# Patient Record
Sex: FEMALE | Race: BLACK OR AFRICAN AMERICAN | Employment: OTHER | URBAN - METROPOLITAN AREA
[De-identification: names, ages, dates, MRNs, and addresses within clinical notes are randomized per-mention and may not be internally consistent; named-entity substitution may affect disease eponyms.]

---

## 2017-01-25 ENCOUNTER — HOSPITAL ENCOUNTER (OUTPATIENT)
Dept: VASCULAR SURGERY | Age: 62
Discharge: HOME | End: 2017-01-25
Attending: FAMILY MEDICINE
Payer: COMMERCIAL

## 2017-01-25 ENCOUNTER — OFFICE VISIT (OUTPATIENT)
Dept: FAMILY MEDICINE CLINIC | Age: 62
End: 2017-01-25

## 2017-01-25 VITALS
SYSTOLIC BLOOD PRESSURE: 132 MMHG | WEIGHT: 265.5 LBS | HEIGHT: 68 IN | HEART RATE: 69 BPM | RESPIRATION RATE: 20 BRPM | TEMPERATURE: 97.1 F | BODY MASS INDEX: 40.24 KG/M2 | DIASTOLIC BLOOD PRESSURE: 92 MMHG

## 2017-01-25 DIAGNOSIS — M54.9 ACUTE UPPER BACK PAIN: Primary | ICD-10-CM

## 2017-01-25 DIAGNOSIS — M79.661 PAIN IN RIGHT LOWER LEG: ICD-10-CM

## 2017-01-25 DIAGNOSIS — M62.830 MUSCLE SPASM OF BACK: ICD-10-CM

## 2017-01-25 PROCEDURE — 93971 EXTREMITY STUDY: CPT

## 2017-01-25 RX ORDER — CYCLOBENZAPRINE HCL 10 MG
10 TABLET ORAL
Qty: 40 TAB | Refills: 0 | Status: SHIPPED | OUTPATIENT
Start: 2017-01-25 | End: 2017-02-17 | Stop reason: SDUPTHER

## 2017-01-25 RX ORDER — NAPROXEN 500 MG/1
500 TABLET ORAL 2 TIMES DAILY WITH MEALS
Qty: 30 TAB | Refills: 0 | Status: SHIPPED | OUTPATIENT
Start: 2017-01-25 | End: 2017-05-30 | Stop reason: ALTCHOICE

## 2017-01-25 NOTE — MR AVS SNAPSHOT
Visit Information Date & Time Provider Department Dept. Phone Encounter #  
 1/25/2017  9:30 AM Gloria Ha MD [address] [phone] [id number] Your Appointments 2/13/2017  9:30 AM  
Follow Up with Gloria Ha MD  
[address] (--) Appt Note: fu; fu; PT R/S FROM 1/13/17  
 [facility] [address] [place] [phone]  
  
    
 6/8/2017 11:00 AM  
Follow Up with Will Peters MD  
[facility] [place] ([facility]) Appt Note: 1 year with an ekg [facility] [address] [address] [phone] [facility] [address] Upcoming Health Maintenance Date Due DTaP/Tdap/Td series (1 - Tdap) 9/11/1976 ZOSTER VACCINE AGE 60> 9/11/2015 BREAST CANCER SCRN MAMMOGRAM 12/8/2016 PAP AKA CERVICAL CYTOLOGY 2/1/2017 COLONOSCOPY 5/26/2020 Allergies as of 1/25/2017  Review Complete On: 1/25/2017 By: Gloria Ha MD  
  
 Severity Noted Reaction Type Reactions Omnicef [Cefdinir] High 07/26/2011    Hives Codeine  06/23/2011    Other (comments) Severe headache Levaquin [Levofloxacin]  10/12/2012    Unknown (comments) Morphine  06/23/2011    Other (comments) Severe headache  
 Sulfa (Sulfonamide Antibiotics)  06/23/2011    Hives Topamax [Topiramate]  01/11/2016    Other (comments) Made blood vessels red and pain in eyes Current Immunizations  Reviewed on 9/28/2016 Name Date Influenza Vaccine (Quad) PF 9/28/2016 Not reviewed this visit You Were Diagnosed With   
  
 Codes Comments Acute upper back pain    -  Primary ICD-10-CM: M54.9 ICD-9-CM: 724.5, 338.19 Pain in right lower leg     ICD-10-CM: M79.661 ICD-9-CM: 729.5 Muscle spasm of back     ICD-10-CM: O23.724 ICD-9-CM: 724.8 Vitals BP Pulse Temp Resp Height(growth percentile) Weight(growth percentile) (!) 132/92 (BP 1 Location: Right arm, BP Patient Position: Sitting) 69 97.1 °F (36.2 °C) (Oral) 20 5' 8\" (1.727 m) 265 lb 8 oz (120.4 kg) BMI OB Status Smoking Status 40.37 kg/m2 Hysterectomy Former Smoker Vitals History BMI and BSA Data Body Mass Index Body Surface Area  
 40.37 kg/m 2 2.4 m 2 Preferred Pharmacy Pharmacy Name Phone [facility] [address] [address] [phone] Your Updated Medication List  
  
   
This list is accurate as of: 1/25/17 10:32 AM.  Always use your most recent med list.  
  
  
  
  
 ALIGN 4 mg Cap Generic drug:  Bifidobacterium Infantis Take  by mouth daily. ALPRAZolam 0.25 mg tablet Commonly known as:  XANAX  
TAKE 1 TABLET BY MOUTH EVERY DAY AS NEEDED FOR ANXIETY Blood-Glucose Meter monitoring kit Check blood sugar TID PRN with meals and/or bouts of dizziness  
  
 clobetasol 0.05 % ointment Commonly known as:  Sung Nim Apply  to affected area two (2) times daily as needed for Skin Irritation or Itching. cyclobenzaprine 10 mg tablet Commonly known as:  FLEXERIL Take 1 Tab by mouth three (3) times daily as needed for Muscle Spasm(s). EPINEPHrine 0.3 mg/0.3 mL injection Commonly known as:  AUVI-Q  
0.3 mL by IntraMUSCular route once as needed for Anaphylaxis for up to 1 dose. FISH OIL CONCENTRATE PO Take  by mouth daily. glucose blood VI test strips strip Commonly known as:  blood glucose test  
Patient to check blood sugar TID PRN  
  
 indomethacin 50 mg capsule Commonly known as:  INDOCIN Lancets Misc Check blood sugar TID PRN with meals and dizziness  
  
 lansoprazole 30 mg capsule Commonly known as:  PREVACID TAKE 1 CAPSULE BY MOUTH DAILY  
  
 levothyroxine 100 mcg tablet Commonly known as:  SYNTHROID  
 Take 1 Tab by mouth Daily (before breakfast). Brand name only LIPITOR 10 mg tablet Generic drug:  atorvastatin Take  by mouth. Take 1 every other day MULTIVITAMIN PO Take  by mouth daily. OTHER Azelaic/Finast/Flutic/Minox(Scalp) 5/0.1% Apply to affected area of scalp daily 30 ml  
  
 rosuvastatin 5 mg tablet Commonly known as:  CRESTOR Take 1 Tab by mouth nightly. VITAMIN C PO Take  by mouth daily. Prescriptions Sent to Pharmacy Refills  
 cyclobenzaprine (FLEXERIL) 10 mg tablet 0 Sig: Take 1 Tab by mouth three (3) times daily as needed for Muscle Spasm(s). Class: Normal  
 Pharmacy: [facility] [address] Ph #: [phone] Route: Oral  
  
To-Do List   
 01/25/2017 Lab:  D DIMER   
  
 01/25/2017 Imaging:  DUPLEX LOWER EXT VENOUS RIGHT Patient Instructions Please contact our office if you have any questions about your visit today. Introducing [facility] & HEALTH SERVICES! [facility] introduces [employer] patient portal. Now you can access parts of your medical record, email your doctor's office, and request medication refills online. 1. In your internet browser, go to [email or web address] 2. Click on the First Time User? Click Here link in the Sign In box. You will see the New Member Sign Up page. 3. Enter your [employer] Access Code exactly as it appears below. You will not need to use this code after youve completed the sign-up process. If you do not sign up before the expiration date, you must request a new code. · [employer] Access Code: [email or web address] Expires: 4/25/2017 10:32 AM 
 
4. Enter the last four digits of your Social Security Number (xxxx) and Date of Birth (mm/dd/yyyy) as indicated and click Submit. You will be taken to the next sign-up page. 5. Create a [employer] ID.  This will be your [employer] login ID and cannot be changed, so think of one that is secure and easy to remember. 6. Create a [employer] password. You can change your password at any time. 7. Enter your Password Reset Question and Answer. This can be used at a later time if you forget your password. 8. Enter your e-mail address. You will receive e-mail notification when new information is available in [address] 9. Click Sign Up. You can now view and download portions of your medical record. 10. Click the Download Summary menu link to download a portable copy of your medical information. If you have questions, please visit the Frequently Asked Questions section of the [employer] website. Remember, [employer] is NOT to be used for urgent needs. For medical emergencies, dial 911. Now available from your iPhone and Android! Please provide this summary of care documentation to your next provider. Your primary care clinician is listed as ELVIN BRITTON. If you have any questions after today's visit, please call [phone].

## 2017-01-25 NOTE — PROCEDURES
[facility]  *** FINAL REPORT ***    Name: Jeneane Curling  MRN: [id number]    Outpatient  : 11 Sep 1955  HIS Order #: [id number]  [address] [facility] Visit #: [id number]  Date: 2017    TYPE OF TEST: Peripheral Venous Testing    REASON FOR TEST  Pain in limb    Right Leg:-  Deep venous thrombosis:           No  Superficial venous thrombosis:    No  Deep venous insufficiency:        No  Superficial venous insufficiency: Not examined      INTERPRETATION/FINDINGS  Duplex images were obtained using 2-D gray scale, color flow, and  spectral Doppler analysis. Right leg :  1. Deep vein(s) visualized include the common femoral, deep femoral,  proximal femoral, mid femoral, distal femoral, popliteal(above knee),  popliteal(fossa), popliteal(below knee), gastrocnemius, posterior  tibial and peroneal veins. 2. No evidence of deep venous thrombosis detected in the veins  visualized. 3. No evidence of deep vein thrombosis in the contralateral common  femoral vein. 4. Superficial vein(s) visualized include the great saphenous and  small saphenous veins. 5. No evidence of superficial thrombosis detected. 6. No evidence of reflux detected in the deep veins visualized. ADDITIONAL COMMENTS  Wet reading given to Nohemy at 1:27 p.m. I have personally reviewed the data relevant to the interpretation of  this  study. TECHNOLOGIST: Orville Oneal. Ruth Meier  Signed: 2017 01:28 PM    PHYSICIAN: Dorcas Hightower.  Jori Rodriguez MD  Signed: 2017 11:10 PM

## 2017-01-25 NOTE — PROGRESS NOTES
Chief Complaint   Patient presents with    Leg Pain     right       Health Maintenance reviewed     1. Have you been to the ER, urgent care clinic since your last visit? Hospitalized since your last visit? No    2. Have you seen or consulted any other health care providers outside of the [address] [place] [address] since your last visit? Include any pap smears or colon screening.  Yes When: 1/17 Where: dermatology Reason for visit: ov

## 2017-01-25 NOTE — PROGRESS NOTES
HISTORY OF PRESENT ILLNESS  Sanjuana Oleary is a 64 y.o. female. Chief Complaint   Patient presents with    Leg Pain     right; sharp burning pain to back of lower leg 1 week ago that has radiated up to knee. Don't know if it's related but about the same time experienced some rib pain. Sudden onset of pain sharp burning pain right lower leg on the anteromedial aspect, states never had before, then developed pain up to the knee, states she has noted the protrusion of a vein on the front of the knee and then noted pain in the back of the knee as well. doew not feel any warmth. Pain comes and goes. Then developed sudden onset of pain upper back pain over the rib cage, no sob but was a severe pain \"like elephants stomping\". Deep inspiration causes more pain in the lower back. Tried taking aspirin regularly which has helped with the pain but still some pain in the left lower back   saw Dr. Mitch Griffin started on astelin nasal and sudafed for tinnitus and will start allergy shots  Ongoing pain in left ankle wearing a soft cast changed every 2 weeks, has been sedentar as a result of the pain      HPI  Past Medical History   Diagnosis Date    Abnormal nuclear cardiac imaging test 02/09/2012     Mid to distal anteroseptal and apical reversible defect concerning for ishcmeia. Mild-mod, mid-distal anterior & apical hypk. EF 67%. Borderline abnormal EKG w/1-mm inferolateral ST depression at 7 min exercise. Occasional PVCs.  Anxiety     Carotid duplex 93/75/2252     Mild 7-32% LICA stenosis.  Dengue fever      7/10 while in [place]    Diverticulosis     Dyslipidemia     GERD     Holter monitor study 04/29/2016     Sinus rhythm, avg HR 67 bpm (range  bpm). Rare ectopy.  Hypothyroidism     RLE venous duplex 07/23/2015     Right leg:  No DVT.  S/P cardiac catheterization 02/24/2012     Angiographically normal coronaries. Hyperdynamic. EF 70%. No RWMA.       Sinusitis     Tinnitus     Uter[facility])      hysterectomy     Current Outpatient Prescriptions   Medication Sig Dispense Refill    ALPRAZolam (XANAX) 0.25 mg tablet TAKE 1 TABLET BY MOUTH EVERY DAY AS NEEDED FOR ANXIETY 30 Tab 0    atorvastatin (LIPITOR) 10 mg tablet Take  by mouth. Take 1 every other day      clobetasol (TEMOVATE) 0.05 % ointment Apply  to affected area two (2) times daily as needed for Skin Irritation or Itching. 60 g 3    lansoprazole (PREVACID) 30 mg capsule TAKE 1 CAPSULE BY MOUTH DAILY 90 Cap 0    levothyroxine (SYNTHROID) 100 mcg tablet Take 1 Tab by mouth Daily (before breakfast). Brand name only 90 Tab 3    OTHER Azelaic/Finast/Flutic/Minox(Scalp) 5/0.1%  Apply to affected area of scalp daily  30 ml      EPINEPHrine (AUVI-Q) 0.3 mg/0.3 mL (1:1,000) injection 0.3 mL by IntraMUSCular route once as needed for Anaphylaxis for up to 1 dose. 1 Each 2    Lancets Misc Check blood sugar TID PRN with meals and dizziness 1 Package 11    Blood-Glucose Meter monitoring kit Check blood sugar TID PRN with meals and/or bouts of dizziness 1 Kit 0    glucose blood VI test strips (BLOOD GLUCOSE TEST) strip Patient to check blood sugar TID PRN 1 Package 11    Bifidobacterium Infantis (ALIGN) 4 mg cap Take  by mouth daily.  ASCORBATE CALCIUM (VITAMIN C PO) Take  by mouth daily.  OMEGA-3 FATTY ACIDS (FISH OIL CONCENTRATE PO) Take  by mouth daily.  rosuvastatin (CRESTOR) 5 mg tablet Take 1 Tab by mouth nightly. 30 Tab 1    indomethacin (INDOCIN) 50 mg capsule   0    MULTIVITAMIN PO Take  by mouth daily.        Allergies   Allergen Reactions    Omnicef [Cefdinir] Hives    Codeine Other (comments)     Severe headache    Levaquin [Levofloxacin] Unknown (comments)    Morphine Other (comments)     Severe headache    Sulfa (Sulfonamide Antibiotics) Hives    Topamax [Topiramate] Other (comments)     Made blood vessels red and pain in eyes       Review of Systems   Respiratory: Negative for shortness of breath. Cardiovascular: Negative for leg swelling. Musculoskeletal: Positive for myalgias. Neurological: Positive for sensory change. Tingling: burning pain in leg. Visit Vitals    BP (!) 132/92 (BP 1 Location: Right arm, BP Patient Position: Sitting)  Comment: manual    Pulse 69    Temp 97.1 °F (36.2 °C) (Oral)    Resp 20    Ht 5' 8\" (1.727 m)    Wt 265 lb 8 oz (120.4 kg)    BMI 40.37 kg/m2       Physical Exam   Constitutional: She appears well-developed and well-nourished. No distress. Neck: No JVD present. Pulmonary/Chest: Effort normal and breath sounds normal. No respiratory distress. She has no wheezes. She has no rales. Musculoskeletal: She exhibits no edema. Lymphadenopathy:     She has no cervical adenopathy. Neurological: Coordination normal.   Skin: No erythema. No pallor. Noted prominence of veins of the right lower ext   Psychiatric: She has a normal mood and affect. Her behavior is normal.   Nursing note and vitals reviewed. ASSESSMENT and PLAN    ICD-10-CM ICD-9-CM    1. Acute upper back pain M54.9 724.5 D DIMER     338.19 D DIMER      naproxen (NAPROSYN) 500 mg tablet   2. Pain in right lower leg M79.661 729.5 D DIMER      DUPLEX LOWER EXT VENOUS RIGHT      D DIMER      naproxen (NAPROSYN) 500 mg tablet   3.  Muscle spasm of back M62.830 724.8 cyclobenzaprine (FLEXERIL) 10 mg tablet

## 2017-01-26 ENCOUNTER — TELEPHONE (OUTPATIENT)
Dept: FAMILY MEDICINE CLINIC | Age: 62
End: 2017-01-26

## 2017-01-26 LAB
A-G RATIO,AGRAT: 1.8 RATIO (ref 1.1–2.6)
ALBUMIN SERPL-MCNC: 4.4 G/DL (ref 3.5–5)
ALP SERPL-CCNC: 90 U/L (ref 40–120)
ALT SERPL-CCNC: 31 U/L (ref 5–40)
ANION GAP SERPL CALC-SCNC: 17 MMOL/L
AST SERPL W P-5'-P-CCNC: 24 U/L (ref 10–37)
BILIRUB SERPL-MCNC: 0.5 MG/DL (ref 0.2–1.2)
BUN SERPL-MCNC: 16 MG/DL (ref 6–22)
CALCIUM SERPL-MCNC: 9.7 MG/DL (ref 8.4–10.5)
CHLORIDE SERPL-SCNC: 105 MMOL/L (ref 98–110)
CHOLEST SERPL-MCNC: 168 MG/DL (ref 110–200)
CO2 SERPL-SCNC: 24 MMOL/L (ref 20–32)
CREAT SERPL-MCNC: 0.8 MG/DL (ref 0.8–1.4)
D-DIMER, 115188: 0.29 MCG FEU/ML (ref 0–1.19)
GFRAA, 66117: >60
GFRNA, 66118: >60
GLOBULIN,GLOB: 2.5 G/DL (ref 2–4)
GLUCOSE SERPL-MCNC: 109 MG/DL (ref 65–99)
HCV AB SER IA-ACNC: NORMAL
HDLC SERPL-MCNC: 45 MG/DL (ref 40–59)
HEP A AB,IGM, 006734: NORMAL
HEP B CORE AB,IGM, 016881: NORMAL
HEP B SURFACE AG SCRN, 006510: NORMAL
LDLC SERPL CALC-MCNC: 107 MG/DL (ref 50–99)
POTASSIUM SERPL-SCNC: 4.6 MMOL/L (ref 3.5–5.5)
PROT SERPL-MCNC: 6.9 G/DL (ref 6.2–8.1)
SODIUM SERPL-SCNC: 146 MMOL/L (ref 133–145)
T4 FREE SERPL-MCNC: 1 NG/DL (ref 0.9–1.8)
TRIGL SERPL-MCNC: 77 MG/DL (ref 40–149)
TSH SERPL DL<=0.005 MIU/L-ACNC: 19.03 MCU/ML (ref 0.27–4.2)
VLDLC SERPL CALC-MCNC: 15 MG/DL (ref 8–30)

## 2017-01-27 NOTE — TELEPHONE ENCOUNTER
Per Dr. Webb Section instructions pt notified her thyroid level is elevated and she needs a sooner appointment. Made one for 1/31/17.

## 2017-01-31 ENCOUNTER — OFFICE VISIT (OUTPATIENT)
Dept: FAMILY MEDICINE CLINIC | Age: 62
End: 2017-01-31

## 2017-01-31 VITALS
HEART RATE: 68 BPM | TEMPERATURE: 98 F | SYSTOLIC BLOOD PRESSURE: 129 MMHG | DIASTOLIC BLOOD PRESSURE: 85 MMHG | HEIGHT: 68 IN | BODY MASS INDEX: 39.93 KG/M2 | RESPIRATION RATE: 20 BRPM | WEIGHT: 263.5 LBS

## 2017-01-31 DIAGNOSIS — Z51.81 MEDICATION MONITORING ENCOUNTER: ICD-10-CM

## 2017-01-31 DIAGNOSIS — R73.03 PREDIABETES: ICD-10-CM

## 2017-01-31 DIAGNOSIS — M54.9 ACUTE UPPER BACK PAIN: ICD-10-CM

## 2017-01-31 DIAGNOSIS — E78.5 DYSLIPIDEMIA: Primary | ICD-10-CM

## 2017-01-31 DIAGNOSIS — M62.830 MUSCLE SPASM OF BACK: ICD-10-CM

## 2017-01-31 DIAGNOSIS — M79.661 PAIN IN RIGHT LOWER LEG: ICD-10-CM

## 2017-01-31 DIAGNOSIS — E03.9 HYPOTHYROIDISM, UNSPECIFIED TYPE: ICD-10-CM

## 2017-01-31 RX ORDER — LEVOTHYROXINE SODIUM 112 UG/1
112 TABLET ORAL
Qty: 30 TAB | Refills: 6 | Status: SHIPPED | OUTPATIENT
Start: 2017-01-31 | End: 2017-03-01 | Stop reason: SDUPTHER

## 2017-01-31 NOTE — MR AVS SNAPSHOT
Visit Information Date & Time Provider Department Dept. Phone Encounter #  
 1/31/2017  3:15 PM Srinivasa Retana MD [facility] [phone] [id number] Follow-up Instructions Return in about 6 weeks (around 3/14/2017). Your Appointments 2/13/2017  9:30 AM  
Follow Up with Srinivasa Retana MD  
[facility] (--) Appt Note: fu; fu; PT R/S FROM 1/13/17  
 [facility] [address] [place] [phone]  
  
    
 6/8/2017 11:00 AM  
Follow Up with Shakeel Sharp MD  
[facility] ([address]) Appt Note: 1 year with an ekg [place] [address] [address] [address] [phone] [address] Upcoming Health Maintenance Date Due DTaP/Tdap/Td series (1 - Tdap) 9/11/1976 ZOSTER VACCINE AGE 60> 9/11/2015 BREAST CANCER SCRN MAMMOGRAM 12/8/2016 PAP AKA CERVICAL CYTOLOGY 2/1/2017 COLONOSCOPY 5/26/2020 Allergies as of 1/31/2017  Review Complete On: 1/31/2017 By: Srinivasa Retana MD  
  
 Severity Noted Reaction Type Reactions Omnicef [Cefdinir] High 07/26/2011    Hives Codeine  06/23/2011    Other (comments) Severe headache Levaquin [Levofloxacin]  10/12/2012    Unknown (comments) Morphine  06/23/2011    Other (comments) Severe headache  
 Sulfa (Sulfonamide Antibiotics)  06/23/2011    Hives Topamax [Topiramate]  01/11/2016    Other (comments) Made blood vessels red and pain in eyes Current Immunizations  Reviewed on 9/28/2016 Name Date Influenza Vaccine (Quad) PF 9/28/2016 Not reviewed this visit You Were Diagnosed With   
  
 Codes Comments Dyslipidemia    -  Primary ICD-10-CM: E78.5 ICD-9-CM: 272.4 Hypothyroidism, unspecified type     ICD-10-CM: E03.9 ICD-9-CM: 244.9  Prediabetes     ICD-10-CM: R73.03 
 ICD-9-CM: 790.29 Acute upper back pain     ICD-10-CM: M54.9 ICD-9-CM: 724.5, 338.19 Pain in right lower leg     ICD-10-CM: M79.661 ICD-9-CM: 729.5 Muscle spasm of back     ICD-10-CM: Q19.906 ICD-9-CM: 724.8 Medication monitoring encounter     ICD-10-CM: Z51.81 
ICD-9-CM: V58.83 Vitals BP Pulse Temp Resp Height(growth percentile) Weight(growth percentile) 129/85 (BP 1 Location: Right arm, BP Patient Position: Sitting) 68 98 °F (36.7 °C) (Oral) 20 5' 8\" (1.727 m) 263 lb 8 oz (119.5 kg) BMI OB Status Smoking Status 40.07 kg/m2 Hysterectomy Former Smoker BMI and BSA Data Body Mass Index Body Surface Area 40.07 kg/m 2 2.39 m 2 Preferred Pharmacy Pharmacy Name Phone [facility] [address] [address] [phone] Your Updated Medication List  
  
   
This list is accurate as of: 1/31/17  3:58 PM.  Always use your most recent med list.  
  
  
  
  
 ALIGN 4 mg Cap Generic drug:  Bifidobacterium Infantis Take  by mouth daily. ALPRAZolam 0.25 mg tablet Commonly known as:  XANAX  
TAKE 1 TABLET BY MOUTH EVERY DAY AS NEEDED FOR ANXIETY Blood-Glucose Meter monitoring kit Check blood sugar TID PRN with meals and/or bouts of dizziness  
  
 clobetasol 0.05 % ointment Commonly known as:  Jadon Webster Apply  to affected area two (2) times daily as needed for Skin Irritation or Itching. cyclobenzaprine 10 mg tablet Commonly known as:  FLEXERIL Take 1 Tab by mouth three (3) times daily as needed for Muscle Spasm(s). EPINEPHrine 0.3 mg/0.3 mL injection Commonly known as:  AUVI-Q  
0.3 mL by IntraMUSCular route once as needed for Anaphylaxis for up to 1 dose. FISH OIL CONCENTRATE PO Take  by mouth daily. glucose blood VI test strips strip Commonly known as:  blood glucose test  
Patient to check blood sugar TID PRN  
  
 Lancets Misc Check blood sugar TID PRN with meals and dizziness  
  
 lansoprazole 30 mg capsule Commonly known as:  PREVACID TAKE 1 CAPSULE BY MOUTH DAILY  
  
 levothyroxine 112 mcg tablet Commonly known as:  SYNTHROID Take 1 Tab by mouth Daily (before breakfast). LIPITOR 10 mg tablet Generic drug:  atorvastatin Take  by mouth. Take 1 every other day MULTIVITAMIN PO Take  by mouth daily. naproxen 500 mg tablet Commonly known as:  NAPROSYN Take 1 Tab by mouth two (2) times daily (with meals). OTHER Azelaic/Finast/Flutic/Minox(Scalp) 5/0.1% Apply to affected area of scalp daily 30 ml  
  
 rosuvastatin 5 mg tablet Commonly known as:  CRESTOR Take 1 Tab by mouth nightly. VITAMIN C PO Take  by mouth daily. Prescriptions Printed Refills  
 levothyroxine (SYNTHROID) 112 mcg tablet 6 Sig: Take 1 Tab by mouth Daily (before breakfast). Class: Print Route: Oral  
  
Follow-up Instructions Return in about 6 weeks (around 3/14/2017). To-Do List   
 03/14/2017 Lab:  HEMOGLOBIN A1C W/O EAG   
  
 03/14/2017 Lab:  METABOLIC PANEL, BASIC   
  
 03/14/2017 Lab:  T4, FREE   
  
 03/14/2017 Lab:  TSH 3RD GENERATION Patient Instructions Please contact our office if you have any questions about your visit today. Introducing [facility] & HEALTH SERVICES! [facility] introduces [employer] patient portal. Now you can access parts of your medical record, email your doctor's office, and request medication refills online. 1. In your internet browser, go to [email or web address] 2. Click on the First Time User? Click Here link in the Sign In box. You will see the New Member Sign Up page. 3. Enter your [employer] Access Code exactly as it appears below. You will not need to use this code after youve completed the sign-up process.  If you do not sign up before the expiration date, you must request a new code. 
 
· [employer] Access Code: [email or web address] Expires: 4/25/2017 10:32 AM 
 
4. Enter the last four digits of your Social Security Number (xxxx) and Date of Birth (mm/dd/yyyy) as indicated and click Submit. You will be taken to the next sign-up page. 5. Create a [employer] ID. This will be your [employer] login ID and cannot be changed, so think of one that is secure and easy to remember. 6. Create a [employer] password. You can change your password at any time. 7. Enter your Password Reset Question and Answer. This can be used at a later time if you forget your password. 8. Enter your e-mail address. You will receive e-mail notification when new information is available in [address] 9. Click Sign Up. You can now view and download portions of your medical record. 10. Click the Download Summary menu link to download a portable copy of your medical information. If you have questions, please visit the Frequently Asked Questions section of the [employer] website. Remember, [employer] is NOT to be used for urgent needs. For medical emergencies, dial 911. Now available from your iPhone and Android! Please provide this summary of care documentation to your next provider. Your primary care clinician is listed as ELVIN BRITTON. If you have any questions after today's visit, please call [phone].

## 2017-01-31 NOTE — PROGRESS NOTES
Chief Complaint   Patient presents with    Results     discuss lab results       1. Have you been to the ER, urgent care clinic since your last visit? Hospitalized since your last visit? No    2. Have you seen or consulted any other health care providers outside of the [address] [place] [address] since your last visit? Include any pap smears or colon screening.  No

## 2017-01-31 NOTE — PROGRESS NOTES
HISTORY OF PRESENT ILLNESS  West Hodgkin is a 64 y.o. female. Chief Complaint   Patient presents with    Results     discuss lab results     Went for d dimer and doppler study for leg swelling and pain, I prescribed naprosyn thinking was phlebitis and flexeril for muscle spasm, noted difference and improvement immediateily  I prescribed naprosyn and flexeril which seemed to help significantly  Doing PT for foot pain  Has gained weight due to .immpbility from foot pain severe  Hypothyroidism Reports compliance with meds but Chronic problem, uncontrolled this visit  hyperlipidemia chronic problem, stable   HPI  Past Medical History   Diagnosis Date    Abnormal nuclear cardiac imaging test 02/09/2012     Mid to distal anteroseptal and apical reversible defect concerning for ishcmeia. Mild-mod, mid-distal anterior & apical hypk. EF 67%. Borderline abnormal EKG w/1-mm inferolateral ST depression at 7 min exercise. Occasional PVCs.  Anxiety     Carotid duplex 74/65/2195     Mild 8-98% LICA stenosis.  Dengue fever      7/10 while in [place]    Diverticulosis     Dyslipidemia     GERD     Holter monitor study 04/29/2016     Sinus rhythm, avg HR 67 bpm (range  bpm). Rare ectopy.  Hypothyroidism     RLE venous duplex 07/23/2015     Right leg:  No DVT.  S/P cardiac catheterization 02/24/2012     Angiographically normal coronaries. Hyperdynamic. EF 70%. No RWMA.  Sinusitis     Tinnitus     Uterine cancer [facility])      hysterectomy     Current Outpatient Prescriptions   Medication Sig Dispense Refill    cyclobenzaprine (FLEXERIL) 10 mg tablet Take 1 Tab by mouth three (3) times daily as needed for Muscle Spasm(s). 40 Tab 0    naproxen (NAPROSYN) 500 mg tablet Take 1 Tab by mouth two (2) times daily (with meals). 30 Tab 0    ALPRAZolam (XANAX) 0.25 mg tablet TAKE 1 TABLET BY MOUTH EVERY DAY AS NEEDED FOR ANXIETY 30 Tab 0    atorvastatin (LIPITOR) 10 mg tablet Take  by mouth. Take 1 every other day      clobetasol (TEMOVATE) 0.05 % ointment Apply  to affected area two (2) times daily as needed for Skin Irritation or Itching. 60 g 3    rosuvastatin (CRESTOR) 5 mg tablet Take 1 Tab by mouth nightly. 30 Tab 1    lansoprazole (PREVACID) 30 mg capsule TAKE 1 CAPSULE BY MOUTH DAILY 90 Cap 0    levothyroxine (SYNTHROID) 100 mcg tablet Take 1 Tab by mouth Daily (before breakfast). Brand name only 90 Tab 3    OTHER Azelaic/Finast/Flutic/Minox(Scalp) 5/0.1%  Apply to affected area of scalp daily  30 ml      EPINEPHrine (AUVI-Q) 0.3 mg/0.3 mL (1:1,000) injection 0.3 mL by IntraMUSCular route once as needed for Anaphylaxis for up to 1 dose. 1 Each 2    Lancets Misc Check blood sugar TID PRN with meals and dizziness 1 Package 11    Blood-Glucose Meter monitoring kit Check blood sugar TID PRN with meals and/or bouts of dizziness 1 Kit 0    glucose blood VI test strips (BLOOD GLUCOSE TEST) strip Patient to check blood sugar TID PRN 1 Package 11    Bifidobacterium Infantis (ALIGN) 4 mg cap Take  by mouth daily.  ASCORBATE CALCIUM (VITAMIN C PO) Take  by mouth daily.  MULTIVITAMIN PO Take  by mouth daily.  OMEGA-3 FATTY ACIDS (FISH OIL CONCENTRATE PO) Take  by mouth daily. Allergies   Allergen Reactions    Omnicef [Cefdinir] Hives    Codeine Other (comments)     Severe headache    Levaquin [Levofloxacin] Unknown (comments)    Morphine Other (comments)     Severe headache    Sulfa (Sulfonamide Antibiotics) Hives    Topamax [Topiramate] Other (comments)     Made blood vessels red and pain in eyes       Review of Systems   Cardiovascular: Positive for leg swelling. Musculoskeletal: Positive for joint pain. Negative for falls.      Visit Vitals    /85 (BP 1 Location: Right arm, BP Patient Position: Sitting)    Pulse 68    Temp 98 °F (36.7 °C) (Oral)    Resp 20    Ht 5' 8\" (1.727 m)    Wt 263 lb 8 oz (119.5 kg)    BMI 40.07 kg/m2       Physical Exam Constitutional: She is oriented to person, place, and time. She appears well-developed and well-nourished. No distress. HENT:   Mouth/Throat: Oropharynx is clear and moist.   Neck: No JVD present. No thyromegaly present. Cardiovascular: Normal rate, regular rhythm and normal heart sounds. Pulmonary/Chest: Effort normal and breath sounds normal. No respiratory distress. She has no wheezes. She has no rales. Musculoskeletal: She exhibits edema (trace). She exhibits no tenderness. Lymphadenopathy:     She has no cervical adenopathy. Neurological: She is alert and oriented to person, place, and time. Coordination normal.   Skin: No pallor. Psychiatric: She has a normal mood and affect. Her behavior is normal.   Nursing note and vitals reviewed. Results for orders placed or performed in visit on 01/25/17   D DIMER   Result Value Ref Range    D-Dimer 0.29 0.00 - 1.19 mcg FEU/mL   LIPID PANEL   Result Value Ref Range    Triglyceride 77 40 - 149 mg/dL    HDL Cholesterol 45 40 - 59 mg/dL    Cholesterol, total 168 110 - 200 mg/dL    LDL, calculated 107 (H) 50 - 99 mg/dL    VLDL, calculated 15 8 - 30 mg/dL   METABOLIC PANEL, COMPREHENSIVE   Result Value Ref Range    Glucose 109 (H) 65 - 99 mg/dL    BUN 16 6 - 22 mg/dL    Creatinine 0.8 0.8 - 1.4 mg/dL    Sodium 146 (H) 133 - 145 mmol/L    Potassium 4.6 3.5 - 5.5 mmol/L    Chloride 105 98 - 110 mmol/L    CO2 24 20 - 32 mmol/L    AST 24 10 - 37 U/L    ALT 31 5 - 40 U/L    Alk.  phosphatase 90 40 - 120 U/L    Bilirubin, total 0.5 0.2 - 1.2 mg/dL    Calcium 9.7 8.4 - 10.5 mg/dL    Protein, total 6.9 6.2 - 8.1 g/dL    Albumin 4.4 3.5 - 5.0 g/dL    A-G Ratio 1.8 1.1 - 2.6 ratio    Globulin 2.5 2.0 - 4.0 g/dL    Anion gap 17.0 mmol/L    GFRAA >60.0 >60.0    GFRNA >60.0 >60.0   HEPATITIS A AB, IGM   Result Value Ref Range    Hepatitis A Ab, IgM None Detected None Detec   HEPATITIS B CORE AB, IGM   Result Value Ref Range    Hep B Core Ab, IgM None Detected None Detec HEPATITIS C AB   Result Value Ref Range    Hep C Virus Ab None Detected None Detec   T4, FREE   Result Value Ref Range    T4, Free 1.0 0.9 - 1.8 ng/dL   TSH, 3RD GENERATION   Result Value Ref Range    TSH 19.03 (H) 0.27 - 4.20 mcU/mL   HEP B SURFACE AG   Result Value Ref Range    Hep B surface Ag screen None Detected None Detec   Name: Ashlee Wilcox  MRN: [id number] Outpatient  : 11 Sep 1955  HIS Order #: [id number]  [id number] [facility] Visit #: [id number]  Date: 2017     TYPE OF TEST: Peripheral Venous Testing     REASON FOR TEST  Pain in limb     Right Leg:-  Deep venous thrombosis: No  Superficial venous thrombosis: No  Deep venous insufficiency: No  Superficial venous insufficiency: Not examined        INTERPRETATION/FINDINGS  Duplex images were obtained using 2-D gray scale, color flow, and  spectral Doppler analysis. Right leg :  1. Deep vein(s) visualized include the common femoral, deep femoral,  proximal femoral, mid femoral, distal femoral, popliteal(above knee),  popliteal(fossa), popliteal(below knee), gastrocnemius, posterior  tibial and peroneal veins. 2. No evidence of deep venous thrombosis detected in the veins  visualized. 3. No evidence of deep vein thrombosis in the contralateral common  femoral vein. 4. Superficial vein(s) visualized include the great saphenous and  small saphenous veins. 5. No evidence of superficial thrombosis detected. 6. No evidence of reflux detected in the deep veins visualized.     ADDITIONAL COMMENTS  Wet reading given to Joel Caballero at 1:27 p.m.     I have personally reviewed the data relevant to the interpretation of  this  study.     TECHNOLOGIST: Rayne Murdock. Jahaira Drivers  Signed: 2017 01:28 PM     PHYSICIAN: Nick Chaudhary. Sahra Stewart MD  Signed: 2017 11:10 PM     ASSESSMENT and PLAN    ICD-10-CM ICD-9-CM    1.  Dyslipidemia   Increased not as well controlled now mildly uncontrolled work on diet likely secondary to weight gain and decreased activity E78.5 272.4 2. Hypothyroidism, unspecified type  uncontrolled increase Synthroid recheck labs for followup E03.9 244.9 levothyroxine (SYNTHROID) 112 mcg tablet      METABOLIC PANEL, BASIC      TSH 3RD GENERATION      T4, FREE   3. Prediabetes ,Check labs on follow up  R64.82 653.20 METABOLIC PANEL, BASIC      HEMOGLOBIN A1C W/O EAG   4. Acute upper back pain improved with muscle relaxant M54.9 724.5      338.19    5. Pain in right lower leg M79.661 729.5    6. Muscle spasm of back M62.830 724.8    7. Medication monitoring encounter T87.66 K49.21 METABOLIC PANEL, BASIC      TSH 3RD GENERATION      T4, FREE      HEMOGLOBIN A1C W/O EAG   Follow-up Disposition:  Return in about 6 weeks (around 3/14/2017).

## 2017-02-06 ENCOUNTER — TELEPHONE (OUTPATIENT)
Dept: FAMILY MEDICINE CLINIC | Age: 62
End: 2017-02-06

## 2017-02-10 ENCOUNTER — OFFICE VISIT (OUTPATIENT)
Dept: FAMILY MEDICINE CLINIC | Age: 62
End: 2017-02-10

## 2017-02-10 VITALS
DIASTOLIC BLOOD PRESSURE: 80 MMHG | SYSTOLIC BLOOD PRESSURE: 122 MMHG | HEIGHT: 68 IN | BODY MASS INDEX: 39.56 KG/M2 | WEIGHT: 261 LBS | TEMPERATURE: 97.4 F | HEART RATE: 77 BPM | RESPIRATION RATE: 20 BRPM

## 2017-02-10 DIAGNOSIS — M79.604 PAIN IN RIGHT LEG: Primary | ICD-10-CM

## 2017-02-10 DIAGNOSIS — S89.91XS TRAUMATIC LEG INJURY, RIGHT, SEQUELA: ICD-10-CM

## 2017-02-10 DIAGNOSIS — Z91.81 HISTORY OF FALL: ICD-10-CM

## 2017-02-10 NOTE — MR AVS SNAPSHOT
Visit Information Date & Time Provider Department Dept. Phone Encounter #  
 2/10/2017  8:00 AM Chen Wilson MD [address] [phone] [id number] Follow-up Instructions Return in about 1 month (around 3/10/2017). Your Appointments 3/7/2017 10:15 AM  
Follow Up with Chen Wilson MD  
[address] (--) Appt Note: fu; fu; PT R/S FROM 1/13/17; fu  
 [address] [place] [address] [address]  
  
    
 6/8/2017 11:00 AM  
Follow Up with Liberty Elliott MD  
[facility] ([facility]) Appt Note: 1 year with an ekg [place] [address] [address] [address] [phone] [address] Upcoming Health Maintenance Date Due DTaP/Tdap/Td series (1 - Tdap) 9/11/1976 ZOSTER VACCINE AGE 60> 9/11/2015 BREAST CANCER SCRN MAMMOGRAM 12/8/2016 PAP AKA CERVICAL CYTOLOGY 2/1/2017 COLONOSCOPY 5/26/2020 Allergies as of 2/10/2017  Review Complete On: 2/10/2017 By: Chen Wilson MD  
  
 Severity Noted Reaction Type Reactions Omnicef [Cefdinir] High 07/26/2011    Hives Codeine  06/23/2011    Other (comments) Severe headache Levaquin [Levofloxacin]  10/12/2012    Unknown (comments) Morphine  06/23/2011    Other (comments) Severe headache  
 Sulfa (Sulfonamide Antibiotics)  06/23/2011    Hives Topamax [Topiramate]  01/11/2016    Other (comments) Made blood vessels red and pain in eyes Current Immunizations  Reviewed on 9/28/2016 Name Date Influenza Vaccine (Quad) PF 9/28/2016 Not reviewed this visit You Were Diagnosed With   
  
 Codes Comments Pain In Right Leg    -  Primary ICD-10-CM: M79.604 ICD-9-CM: 729.5 Traumatic leg injury, right, sequela     ICD-10-CM: S89.91XS 
ICD-9-CM: 908.9 History of fall     ICD-10-CM: Z91.81 
ICD-9-CM: V15.88 Vitals BP Pulse Temp Resp Height(growth percentile) Weight(growth percentile) 122/80 (BP 1 Location: Left arm, BP Patient Position: Sitting) 77 97.4 °F (36.3 °C) (Oral) 20 5' 8\" (1.727 m) 261 lb (118.4 kg) BMI OB Status Smoking Status 39.68 kg/m2 Hysterectomy Former Smoker BMI and BSA Data Body Mass Index Body Surface Area  
 39.68 kg/m 2 2.38 m 2 Preferred Pharmacy Pharmacy Name Phone [facility] [address] [facility] 18 [phone] Your Updated Medication List  
  
   
This list is accurate as of: 2/10/17  9:08 AM.  Always use your most recent med list.  
  
  
  
  
 ALIGN 4 mg Cap Generic drug:  Bifidobacterium Infantis Take  by mouth daily. ALPRAZolam 0.25 mg tablet Commonly known as:  XANAX  
TAKE 1 TABLET BY MOUTH EVERY DAY AS NEEDED FOR ANXIETY Blood-Glucose Meter monitoring kit Check blood sugar TID PRN with meals and/or bouts of dizziness  
  
 clobetasol 0.05 % ointment Commonly known as:  Ulysses Hale Apply  to affected area two (2) times daily as needed for Skin Irritation or Itching. cyclobenzaprine 10 mg tablet Commonly known as:  FLEXERIL Take 1 Tab by mouth three (3) times daily as needed for Muscle Spasm(s). EPINEPHrine 0.3 mg/0.3 mL injection Commonly known as:  AUVI-Q  
0.3 mL by IntraMUSCular route once as needed for Anaphylaxis for up to 1 dose. FISH OIL CONCENTRATE PO Take  by mouth daily. glucose blood VI test strips strip Commonly known as:  blood glucose test  
Patient to check blood sugar TID PRN Lancets Misc Check blood sugar TID PRN with meals and dizziness  
  
 lansoprazole 30 mg capsule Commonly known as:  PREVACID TAKE 1 CAPSULE BY MOUTH DAILY  
  
 levothyroxine 112 mcg tablet Commonly known as:  SYNTHROID  
 Take 1 Tab by mouth Daily (before breakfast). LIPITOR 10 mg tablet Generic drug:  atorvastatin Take  by mouth. Take 1 every other day MULTIVITAMIN PO Take  by mouth daily. naproxen 500 mg tablet Commonly known as:  NAPROSYN Take 1 Tab by mouth two (2) times daily (with meals). OTHER Azelaic/Finast/Flutic/Minox(Scalp) 5/0.1% Apply to affected area of scalp daily 30 ml  
  
 rosuvastatin 5 mg tablet Commonly known as:  CRESTOR Take 1 Tab by mouth nightly. VITAMIN C PO Take  by mouth daily. Follow-up Instructions Return in about 1 month (around 3/10/2017). To-Do List   
 02/10/2017 Imaging:  MRI FEMUR RT  WO CONT Patient Instructions Please contact our office if you have any questions about your visit today. Introducing [facility]! Jes Schulz introduces [employer] patient portal. Now you can access parts of your medical record, email your doctor's office, and request medication refills online. 1. In your internet browser, go to [email or web address] 2. Click on the First Time User? Click Here link in the Sign In box. You will see the New Member Sign Up page. 3. Enter your [employer] Access Code exactly as it appears below. You will not need to use this code after youve completed the sign-up process. If you do not sign up before the expiration date, you must request a new code. · [employer] Access Code: [email or web address] Expires: 4/25/2017 10:32 AM 
 
4. Enter the last four digits of your Social Security Number (xxxx) and Date of Birth (mm/dd/yyyy) as indicated and click Submit. You will be taken to the next sign-up page. 5. Create a [employer] ID. This will be your [employer] login ID and cannot be changed, so think of one that is secure and easy to remember. 6. Create a [employer] password. You can change your password at any time. 7. Enter your Password Reset Question and Answer.  This can be used at a later time if you forget your password. 8. Enter your e-mail address. You will receive e-mail notification when new information is available in [address] 9. Click Sign Up. You can now view and download portions of your medical record. 10. Click the Download Summary menu link to download a portable copy of your medical information. If you have questions, please visit the Frequently Asked Questions section of the [employer] website. Remember, [employer] is NOT to be used for urgent needs. For medical emergencies, dial 911. Now available from your iPhone and Android! Please provide this summary of care documentation to your next provider. Your primary care clinician is listed as ELVIN BRITTON. If you have any questions after today's visit, please call [phone].

## 2017-02-10 NOTE — PROGRESS NOTES
Chief Complaint   Patient presents with    Other     request referral to see vascular concerning legs. Swelling and redness, some pain       Health Maintenance reviewed     1. Have you been to the ER, urgent care clinic since your last visit? Hospitalized since your last visit? No    2. Have you seen or consulted any other health care providers outside of the [address] [place] [address] since your last visit? Include any pap smears or colon screening.  No

## 2017-02-10 NOTE — PROGRESS NOTES
HISTORY OF PRESENT ILLNESS  Ranjana Anand is a 64 y.o. female. Chief Complaint   Patient presents with    Other     request referral to see vascular concerning legs. Swelling and redness, some pain     Was taking naprosyn and flexeriil  complains of worsening of pain in popliteal area and difficulty with bending the leg back and complains of red streaky areas on the medial aspect. Concerned about lymphedema bc of history of lymph node resection 1 1/2 years ago fall and persistent pain since worried with feeling lump and firmness in thigh and pain in popliteal area worsening, has not been right since the fall    HPI  Past Medical History   Diagnosis Date    Abnormal nuclear cardiac imaging test 02/09/2012     Mid to distal anteroseptal and apical reversible defect concerning for ishcmeia. Mild-mod, mid-distal anterior & apical hypk. EF 67%. Borderline abnormal EKG w/1-mm inferolateral ST depression at 7 min exercise. Occasional PVCs.  Anxiety     Carotid duplex 37/87/7681     Mild 6-74% LICA stenosis.  Dengue fever      7/10 while in [place]    Diverticulosis     Dyslipidemia     GERD     Holter monitor study 04/29/2016     Sinus rhythm, avg HR 67 bpm (range  bpm). Rare ectopy.  Hypothyroidism     RLE venous duplex 07/23/2015     Right leg:  No DVT.  S/P cardiac catheterization 02/24/2012     Angiographically normal coronaries. Hyperdynamic. EF 70%. No RWMA.  Sinusitis     Tinnitus     Uterine cancer (HCC)      hysterectomy     ,  Current Outpatient Prescriptions   Medication Sig Dispense Refill    levothyroxine (SYNTHROID) 112 mcg tablet Take 1 Tab by mouth Daily (before breakfast). 30 Tab 6    cyclobenzaprine (FLEXERIL) 10 mg tablet Take 1 Tab by mouth three (3) times daily as needed for Muscle Spasm(s). 40 Tab 0    naproxen (NAPROSYN) 500 mg tablet Take 1 Tab by mouth two (2) times daily (with meals).  30 Tab 0    ALPRAZolam (XANAX) 0.25 mg tablet TAKE 1 TABLET BY MOUTH EVERY DAY AS NEEDED FOR ANXIETY 30 Tab 0    atorvastatin (LIPITOR) 10 mg tablet Take  by mouth. Take 1 every other day      clobetasol (TEMOVATE) 0.05 % ointment Apply  to affected area two (2) times daily as needed for Skin Irritation or Itching. 60 g 3    lansoprazole (PREVACID) 30 mg capsule TAKE 1 CAPSULE BY MOUTH DAILY 90 Cap 0    OTHER Azelaic/Finast/Flutic/Minox(Scalp) 5/0.1%  Apply to affected area of scalp daily  30 ml      EPINEPHrine (AUVI-Q) 0.3 mg/0.3 mL (1:1,000) injection 0.3 mL by IntraMUSCular route once as needed for Anaphylaxis for up to 1 dose. 1 Each 2    Lancets Misc Check blood sugar TID PRN with meals and dizziness 1 Package 11    Blood-Glucose Meter monitoring kit Check blood sugar TID PRN with meals and/or bouts of dizziness 1 Kit 0    glucose blood VI test strips (BLOOD GLUCOSE TEST) strip Patient to check blood sugar TID PRN 1 Package 11    Bifidobacterium Infantis (ALIGN) 4 mg cap Take  by mouth daily.  ASCORBATE CALCIUM (VITAMIN C PO) Take  by mouth daily.  OMEGA-3 FATTY ACIDS (FISH OIL CONCENTRATE PO) Take  by mouth daily.  rosuvastatin (CRESTOR) 5 mg tablet Take 1 Tab by mouth nightly. 30 Tab 1    MULTIVITAMIN PO Take  by mouth daily. Allergies   Allergen Reactions    Omnicef [Cefdinir] Hives    Codeine Other (comments)     Severe headache    Levaquin [Levofloxacin] Unknown (comments)    Morphine Other (comments)     Severe headache    Sulfa (Sulfonamide Antibiotics) Hives    Topamax [Topiramate] Other (comments)     Made blood vessels red and pain in eyes       Review of Systems   Musculoskeletal: Positive for falls and joint pain. Visit Vitals    /80 (BP 1 Location: Left arm, BP Patient Position: Sitting)    Pulse 77    Temp 97.4 °F (36.3 °C) (Oral)    Resp 20    Ht 5' 8\" (1.727 m)    Wt 261 lb (118.4 kg)    BMI 39.68 kg/m2       Physical Exam   Constitutional: She is oriented to person, place, and time.  She appears well-developed and well-nourished. No distress. HENT:   Mouth/Throat: Oropharynx is clear and moist.   Pulmonary/Chest: Effort normal.   Musculoskeletal: She exhibits edema (upper anterior thigh, firm ttp) and tenderness. Lymphadenopathy:     She has no cervical adenopathy. Neurological: She is oriented to person, place, and time. Coordination normal.   Skin: No rash noted. No erythema. No pallor. Psychiatric: She has a normal mood and affect. Nursing note and vitals reviewed. Name: Ashlee Wilcox  MRN: [id number] Outpatient  : 11 Sep 1955  HIS Order #: [id number]  [id number] [employer] Visit #: [id number]  Date: 2017     TYPE OF TEST: Peripheral Venous Testing     REASON FOR TEST  Pain in limb     Right Leg:-  Deep venous thrombosis: No  Superficial venous thrombosis: No  Deep venous insufficiency: No  Superficial venous insufficiency: Not examined        INTERPRETATION/FINDINGS  Duplex images were obtained using 2-D gray scale, color flow, and  spectral Doppler analysis. Right leg :  1. Deep vein(s) visualized include the common femoral, deep femoral,  proximal femoral, mid femoral, distal femoral, popliteal(above knee),  popliteal(fossa), popliteal(below knee), gastrocnemius, posterior  tibial and peroneal veins. 2. No evidence of deep venous thrombosis detected in the veins  visualized. 3. No evidence of deep vein thrombosis in the contralateral common  femoral vein. 4. Superficial vein(s) visualized include the great saphenous and  small saphenous veins. 5. No evidence of superficial thrombosis detected. 6. No evidence of reflux detected in the deep veins visualized.     ADDITIONAL COMMENTS  Wet reading given to Joel Caballero at 1:27 p.m.     I have personally reviewed the data relevant to the interpretation of  this  study.     TECHNOLOGIST: Rayne Murdock. Jahaira Drivers  Signed: 2017 01:28 PM     PHYSICIAN: Raciel Ayala.  Sahra Stewart MD  Signed: 2017 11:10 PM        ASSESSMENT and PLAN    ICD-10-CM ICD-9-CM    1. Pain In Right Leg M79.604 729.5 MRI FEMUR RT  WO CONT ordered bc of palpable abnormality and history of fall    2. Traumatic leg injury, right, sequela S89. 91XS 908.9 MRI FEMUR RT  WO CONT   3. History of fall Z91.81 V15.88 MRI FEMUR RT  WO CONT   Visit based of time 35 minutes total,  with more than 50% of the face-to-face visit time spent in counseling on leg pain and swelling. Redness, us review, ddx, it's treatment, prognosis, management advise, plan and follow-up recommendations.

## 2017-02-17 DIAGNOSIS — M62.830 MUSCLE SPASM OF BACK: ICD-10-CM

## 2017-02-17 RX ORDER — CYCLOBENZAPRINE HCL 10 MG
TABLET ORAL
Qty: 40 TAB | Refills: 0 | Status: SHIPPED | OUTPATIENT
Start: 2017-02-17 | End: 2017-07-21

## 2017-03-01 DIAGNOSIS — E03.9 HYPOTHYROIDISM, UNSPECIFIED TYPE: ICD-10-CM

## 2017-03-02 RX ORDER — ATORVASTATIN CALCIUM 10 MG/1
TABLET, FILM COATED ORAL
Qty: 90 TAB | Refills: 0 | Status: SHIPPED | OUTPATIENT
Start: 2017-03-02 | End: 2017-05-30 | Stop reason: SDUPTHER

## 2017-03-02 RX ORDER — LEVOTHYROXINE SODIUM 112 UG/1
TABLET ORAL
Qty: 90 TAB | Refills: 0 | Status: SHIPPED | OUTPATIENT
Start: 2017-03-02 | End: 2017-06-04 | Stop reason: SDUPTHER

## 2017-03-14 DIAGNOSIS — R73.03 PREDIABETES: ICD-10-CM

## 2017-03-14 DIAGNOSIS — Z51.81 MEDICATION MONITORING ENCOUNTER: ICD-10-CM

## 2017-03-14 DIAGNOSIS — E03.9 HYPOTHYROIDISM, UNSPECIFIED TYPE: ICD-10-CM

## 2017-03-31 ENCOUNTER — OFFICE VISIT (OUTPATIENT)
Dept: FAMILY MEDICINE CLINIC | Age: 62
End: 2017-03-31

## 2017-03-31 VITALS
WEIGHT: 257 LBS | SYSTOLIC BLOOD PRESSURE: 132 MMHG | HEIGHT: 68 IN | BODY MASS INDEX: 38.95 KG/M2 | OXYGEN SATURATION: 96 % | DIASTOLIC BLOOD PRESSURE: 91 MMHG | TEMPERATURE: 97 F | HEART RATE: 90 BPM

## 2017-03-31 DIAGNOSIS — M54.9 ACUTE UPPER BACK PAIN: ICD-10-CM

## 2017-03-31 DIAGNOSIS — M79.661 PAIN IN RIGHT LOWER LEG: ICD-10-CM

## 2017-03-31 DIAGNOSIS — F41.9 ANXIETY: ICD-10-CM

## 2017-03-31 DIAGNOSIS — E03.9 HYPOTHYROIDISM, UNSPECIFIED TYPE: Primary | ICD-10-CM

## 2017-03-31 DIAGNOSIS — R73.03 PREDIABETES: ICD-10-CM

## 2017-03-31 DIAGNOSIS — M62.830 MUSCLE SPASM OF BACK: ICD-10-CM

## 2017-03-31 RX ORDER — ALPRAZOLAM 0.25 MG/1
TABLET ORAL
Qty: 30 TAB | Refills: 0 | Status: SHIPPED | OUTPATIENT
Start: 2017-03-31 | End: 2017-05-30 | Stop reason: SDUPTHER

## 2017-03-31 NOTE — PROGRESS NOTES
1. Have you been to the ER, urgent care clinic since your last visit? Hospitalized since your last visit? No    2. Have you seen or consulted any other health care providers outside of the [address] [place] [address] since your last visit? Include any pap smears or colon screening. Yes When: 1 week ago Where: Dr. Lucretia Forde Reason for visit: diverticulitis  Chief Complaint   Patient presents with    Leg Pain     right, 1 month follow up for leg pain, unable to get MRI and labs due to having diverticulitis x 3 weeks, being treated by Dr. Veena Saunders. States no pain in leg today.     Blood sugar problem     prediabetic    Cholesterol Problem    Hypothyroidism    Vitamin D Deficiency    Labs

## 2017-03-31 NOTE — MR AVS SNAPSHOT
Visit Information Date & Time Provider Department Dept. Phone Encounter #  
 3/31/2017  1:45 PM Celio Dickey MD [address] [phone] Follow-up Instructions Return in about 6 weeks (around 5/12/2017). Your Appointments 6/8/2017 11:00 AM  
Follow Up with Arcadio Fobres MD  
[facility] \[facility] [place] [facility]\"" ([address]) Appt Note: 1 year with an ekg [place] [address] [address] [address] [phone] [facility] [address] Upcoming Health Maintenance Date Due DTaP/Tdap/Td series (1 - Tdap) 9/11/1976 ZOSTER VACCINE AGE 60> 9/11/2015 BREAST CANCER SCRN MAMMOGRAM 12/8/2016 PAP AKA CERVICAL CYTOLOGY 2/1/2017 COLONOSCOPY 5/26/2020 Allergies as of 3/31/2017  Review Complete On: 3/31/2017 By: Celio Dickey MD  
  
 Severity Noted Reaction Type Reactions Omnicef [Cefdinir] High 07/26/2011    Hives Codeine  06/23/2011    Other (comments) Severe headache Levaquin [Levofloxacin]  10/12/2012    Unknown (comments) Morphine  06/23/2011    Other (comments) Severe headache  
 Sulfa (Sulfonamide Antibiotics)  06/23/2011    Hives Topamax [Topiramate]  01/11/2016    Other (comments) Made blood vessels red and pain in eyes Current Immunizations  Reviewed on 9/28/2016 Name Date Influenza Vaccine (Quad) PF 9/28/2016 Not reviewed this visit You Were Diagnosed With   
  
 Codes Comments Hypothyroidism, unspecified type    -  Primary ICD-10-CM: E03.9 ICD-9-CM: 244.9 Prediabetes     ICD-10-CM: R73.03 
ICD-9-CM: 790.29 Anxiety     ICD-10-CM: F41.9 ICD-9-CM: 300.00 Muscle spasm of back     ICD-10-CM: N33.297 ICD-9-CM: 724.8 Acute upper back pain     ICD-10-CM: M54.9 ICD-9-CM: 724.5, 338.19 Pain in right lower leg     ICD-10-CM: M79.661 ICD-9-CM: 729.5 Vitals BP Pulse Temp Height(growth percentile) Weight(growth percentile) SpO2  
 (!) 132/91 90 97 °F (36.1 °C) (Oral) 5' 8\" (1.727 m) 257 lb (116.6 kg) 96% BMI OB Status Smoking Status 39.08 kg/m2 Hysterectomy Former Smoker BMI and BSA Data Body Mass Index Body Surface Area 39.08 kg/m 2 2.37 m 2 Preferred Pharmacy Pharmacy Name Phone [facility] [address] [place] [address] [phone] Your Updated Medication List  
  
   
This list is accurate as of: 3/31/17  2:36 PM.  Always use your most recent med list.  
  
  
  
  
 ALIGN 4 mg Cap Generic drug:  Bifidobacterium Infantis Take  by mouth daily. ALPRAZolam 0.25 mg tablet Commonly known as:  XANAX  
TAKE 1 TABLET BY MOUTH EVERY DAY AS NEEDED FOR ANXIETY  
  
 atorvastatin 10 mg tablet Commonly known as:  LIPITOR  
TAKE 1 TABLET BY MOUTH EVERY DAY Blood-Glucose Meter monitoring kit Check blood sugar TID PRN with meals and/or bouts of dizziness  
  
 clobetasol 0.05 % ointment Commonly known as:  Izetta Hatch Apply  to affected area two (2) times daily as needed for Skin Irritation or Itching. cyclobenzaprine 10 mg tablet Commonly known as:  FLEXERIL  
TAKE 1 TABLET BY MOUTH THREE TIMES DAILY AS NEEDED FOR MUSCLE SPASMS EPINEPHrine 0.3 mg/0.3 mL injection Commonly known as:  AUVI-Q  
0.3 mL by IntraMUSCular route once as needed for Anaphylaxis for up to 1 dose. FISH OIL CONCENTRATE PO Take  by mouth daily. glucose blood VI test strips strip Commonly known as:  blood glucose test  
Patient to check blood sugar TID PRN Lancets Misc Check blood sugar TID PRN with meals and dizziness  
  
 lansoprazole 30 mg capsule Commonly known as:  PREVACID TAKE 1 CAPSULE BY MOUTH DAILY MULTIVITAMIN PO Take  by mouth daily. naproxen 500 mg tablet Commonly known as:  NAPROSYN  
 Take 1 Tab by mouth two (2) times daily (with meals). OTHER Azelaic/Finast/Flutic/Minox(Scalp) 5/0.1% Apply to affected area of scalp daily 30 ml  
  
 rosuvastatin 5 mg tablet Commonly known as:  CRESTOR Take 1 Tab by mouth nightly. SYNTHROID 112 mcg tablet Generic drug:  levothyroxine TAKE 1 TABLET BY MOUTH DAILY BEFORE BREAKFAST  
  
 VITAMIN C PO Take  by mouth daily. Prescriptions Printed Refills ALPRAZolam (XANAX) 0.25 mg tablet 0 Sig: TAKE 1 TABLET BY MOUTH EVERY DAY AS NEEDED FOR ANXIETY Class: Print Follow-up Instructions Return in about 6 weeks (around 5/12/2017). Patient Instructions Please contact our office if you have any questions about your visit today. Introducing [facility]! Екатерина Bella introduces [employer] patient portal. Now you can access parts of your medical record, email your doctor's office, and request medication refills online. 1. In your internet browser, go to [email or web address] 2. Click on the First Time User? Click Here link in the Sign In box. You will see the New Member Sign Up page. 3. Enter your [facility] Access Code exactly as it appears below. You will not need to use this code after youve completed the sign-up process. If you do not sign up before the expiration date, you must request a new code. · [employer] Access Code: [email or web address] Expires: 4/25/2017 11:32 AM 
 
4. Enter the last four digits of your Social Security Number (xxxx) and Date of Birth (mm/dd/yyyy) as indicated and click Submit. You will be taken to the next sign-up page. 5. Create a [facility] ID. This will be your [employer] login ID and cannot be changed, so think of one that is secure and easy to remember. 6. Create a [employer] password. You can change your password at any time. 7. Enter your Password Reset Question and Answer. This can be used at a later time if you forget your password. 8. Enter your e-mail address. You will receive e-mail notification when new information is available in [address] 9. Click Sign Up. You can now view and download portions of your medical record. 10. Click the Download Summary menu link to download a portable copy of your medical information. If you have questions, please visit the Frequently Asked Questions section of the [employer] website. Remember, [employer] is NOT to be used for urgent needs. For medical emergencies, dial 911. Now available from your iPhone and Android! Please provide this summary of care documentation to your next provider. Your primary care clinician is listed as ELVIN BRITTON. If you have any questions after today's visit, please call [phone].

## 2017-03-31 NOTE — PROGRESS NOTES
HISTORY OF PRESENT ILLNESS  Russell Joiner is a 64 y.o. female. Chief Complaint   Patient presents with    Leg Pain     right, 1 month follow up for leg pain, unable to get MRI and labs due to having diverticulitis x 3 weeks, being treated by Dr. Claudia Nogueira. States no pain in leg today.  Blood sugar problem     Prediabetic chronic problem, stable no meds on diet for this     Cholesterol Problem    Hypothyroidism Chronic problem, uncontrolled I increased synthroid 112 last visit and states she is not eating so much very late at night close to the time of taking her synthroid    Vitamin D Deficiency    Labs   Pain right leg, did not get mri yet, states she is taping her leg and trying to do stretches  continues in PT for heel pain      HPI  Past Medical History:   Diagnosis Date    Abnormal nuclear cardiac imaging test 02/09/2012    Mid to distal anteroseptal and apical reversible defect concerning for ishcmeia. Mild-mod, mid-distal anterior & apical hypk. EF 67%. Borderline abnormal EKG w/1-mm inferolateral ST depression at 7 min exercise. Occasional PVCs.  Anxiety     Carotid duplex 56/92/9622    Mild 0-40% LICA stenosis.  Dengue fever     7/10 while in [place]    Diverticulosis     Dyslipidemia     GERD     Holter monitor study 04/29/2016    Sinus rhythm, avg HR 67 bpm (range  bpm). Rare ectopy.  Hypothyroidism     RLE venous duplex 07/23/2015    Right leg:  No DVT.  S/P cardiac catheterization 02/24/2012    Angiographically normal coronaries. Hyperdynamic. EF 70%. No RWMA.       Sinusitis     Tinnitus     [facility]terine [facility])     hysterectomy     Current Outpatient Prescriptions   Medication Sig Dispense Refill    atorvastatin (LIPITOR) 10 mg tablet TAKE 1 TABLET BY MOUTH EVERY DAY 90 Tab 0    SYNTHROID 112 mcg tablet TAKE 1 TABLET BY MOUTH DAILY BEFORE BREAKFAST 90 Tab 0    ALPRAZolam (XANAX) 0.25 mg tablet TAKE 1 TABLET BY MOUTH EVERY DAY AS NEEDED FOR ANXIETY 30 Tab 0    clobetasol (TEMOVATE) 0.05 % ointment Apply  to affected area two (2) times daily as needed for Skin Irritation or Itching. 60 g 3    lansoprazole (PREVACID) 30 mg capsule TAKE 1 CAPSULE BY MOUTH DAILY 90 Cap 0    OTHER Azelaic/Finast/Flutic/Minox(Scalp) 5/0.1%  Apply to affected area of scalp daily  30 ml      EPINEPHrine (AUVI-Q) 0.3 mg/0.3 mL (1:1,000) injection 0.3 mL by IntraMUSCular route once as needed for Anaphylaxis for up to 1 dose. 1 Each 2    Lancets Misc Check blood sugar TID PRN with meals and dizziness 1 Package 11    Blood-Glucose Meter monitoring kit Check blood sugar TID PRN with meals and/or bouts of dizziness 1 Kit 0    glucose blood VI test strips (BLOOD GLUCOSE TEST) strip Patient to check blood sugar TID PRN 1 Package 11    Bifidobacterium Infantis (ALIGN) 4 mg cap Take  by mouth daily.  ASCORBATE CALCIUM (VITAMIN C PO) Take  by mouth daily.  OMEGA-3 FATTY ACIDS (FISH OIL CONCENTRATE PO) Take  by mouth daily.  cyclobenzaprine (FLEXERIL) 10 mg tablet TAKE 1 TABLET BY MOUTH THREE TIMES DAILY AS NEEDED FOR MUSCLE SPASMS 40 Tab 0    naproxen (NAPROSYN) 500 mg tablet Take 1 Tab by mouth two (2) times daily (with meals). 30 Tab 0    rosuvastatin (CRESTOR) 5 mg tablet Take 1 Tab by mouth nightly. 30 Tab 1    MULTIVITAMIN PO Take  by mouth daily. Allergies   Allergen Reactions    Omnicef [Cefdinir] Hives    Codeine Other (comments)     Severe headache    Levaquin [Levofloxacin] Unknown (comments)    Morphine Other (comments)     Severe headache    Sulfa (Sulfonamide Antibiotics) Hives    Topamax [Topiramate] Other (comments)     Made blood vessels red and pain in eyes       Review of Systems   Gastrointestinal: Positive for abdominal pain. Musculoskeletal: Positive for joint pain.      Visit Vitals    BP (!) 132/91    Pulse 90    Temp 97 °F (36.1 °C) (Oral)    Ht 5' 8\" (1.727 m)    Wt 257 lb (116.6 kg)    SpO2 96%    BMI 39.08 kg/m2       Physical Exam Nursing note and vitals reviewed. Constitutional: She is oriented to person, place, and time. She appears well-developed and well-nourished. No distress. HENT:   Mouth/Throat: Oropharynx is clear and moist.   Neck: No JVD present. No thyromegaly present. Cardiovascular: Normal rate, regular rhythm and normal heart sounds. Pulmonary/Chest: Effort normal and breath sounds normal. No respiratory distress. She has no wheezes. She has no rales. Musculoskeletal: She exhibits no edema. Lymphadenopathy:     She has no cervical adenopathy. Neurological: She is alert and oriented to person, place, and time. Coordination normal.   Psychiatric: She has a normal mood and affect. Her behavior is normal.     Labs see scanned glucose 110 BUN 12 creatinine 0.9 sodium 143 potassium 4.4 chloride 102 CO2 21 hemoglobin A1c 6.4 TSH  1.37 free T4 1.6  ASSESSMENT and PLAN    ICD-10-CM ICD-9-CM    1. Hypothyroidism, unspecified type improved stable continue current medications  E03.9 244.9    2. Prediabetes Stable, continue current care. diabetes mellitus risk  R73.03 790.29    3. Anxiety Stable, continue current care. Refilled xanax F41.9 300.00 ALPRAZolam (XANAX) 0.25 mg tablet   4. Muscle spasm of back improved Stable, continue current care. M62.830 724.8    5. Acute upper back pain M54.9 724.5      338.19    6. Pain in right lower leg M79.661 729.5    May get mri as scheduled prior to follow up   Follow-up Disposition:  Return in about 6 weeks (around 5/12/2017).

## 2017-04-14 ENCOUNTER — HOSPITAL ENCOUNTER (EMERGENCY)
Age: 62
Discharge: HOME | End: 2017-04-14
Attending: EMERGENCY MEDICINE
Payer: COMMERCIAL

## 2017-04-14 ENCOUNTER — HOSPITAL ENCOUNTER (OUTPATIENT)
Age: 62
Discharge: HOME | End: 2017-04-14
Attending: FAMILY MEDICINE
Payer: COMMERCIAL

## 2017-04-14 VITALS
RESPIRATION RATE: 16 BRPM | HEART RATE: 62 BPM | BODY MASS INDEX: 38.19 KG/M2 | TEMPERATURE: 97.4 F | WEIGHT: 252 LBS | OXYGEN SATURATION: 99 % | DIASTOLIC BLOOD PRESSURE: 85 MMHG | HEIGHT: 68 IN | SYSTOLIC BLOOD PRESSURE: 168 MMHG

## 2017-04-14 DIAGNOSIS — M79.604 PAIN IN RIGHT LEG: ICD-10-CM

## 2017-04-14 DIAGNOSIS — Z91.81 HISTORY OF FALL: ICD-10-CM

## 2017-04-14 DIAGNOSIS — S00.83XA CONTUSION OF FACE, SCALP, AND NECK EXCEPT EYE(S): Primary | ICD-10-CM

## 2017-04-14 DIAGNOSIS — S10.93XA CONTUSION OF FACE, SCALP, AND NECK EXCEPT EYE(S): Primary | ICD-10-CM

## 2017-04-14 DIAGNOSIS — S89.91XS TRAUMATIC LEG INJURY, RIGHT, SEQUELA: ICD-10-CM

## 2017-04-14 DIAGNOSIS — S00.03XA CONTUSION OF FACE, SCALP, AND NECK EXCEPT EYE(S): Primary | ICD-10-CM

## 2017-04-14 PROCEDURE — 99282 EMERGENCY DEPT VISIT SF MDM: CPT

## 2017-04-14 PROCEDURE — 73718 MRI LOWER EXTREMITY W/O DYE: CPT
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
## 2017-04-14 NOTE — DISCHARGE INSTRUCTIONS
Headache: Care Instructions  Your Care Instructions    Headaches have many possible causes. Most headaches aren't a sign of a more serious problem, and they will get better on their own. Home treatment may help you feel better faster. The doctor has checked you carefully, but problems can develop later. If you notice any problems or new symptoms, get medical treatment right away. Follow-up care is a key part of your treatment and safety. Be sure to make and go to all appointments, and call your doctor if you are having problems. It's also a good idea to know your test results and keep a list of the medicines you take. How can you care for yourself at home? · Do not drive if you have taken a prescription pain medicine. · Rest in a quiet, dark room until your headache is gone. Close your eyes and try to relax or go to sleep. Don't watch TV or read. · Put a cold, moist cloth or cold pack on the painful area for 10 to 20 minutes at a time. Put a thin cloth between the cold pack and your skin. · Use a warm, moist towel or a heating pad set on low to relax tight shoulder and neck muscles. · Have someone gently massage your neck and shoulders. · Take pain medicines exactly as directed. ¨ If the doctor gave you a prescription medicine for pain, take it as prescribed. ¨ If you are not taking a prescription pain medicine, ask your doctor if you can take an over-the-counter medicine. · Be careful not to take pain medicine more often than the instructions allow, because you may get worse or more frequent headaches when the medicine wears off. · Do not ignore new symptoms that occur with a headache, such as a fever, weakness or numbness, vision changes, or confusion. These may be signs of a more serious problem. To prevent headaches  · Keep a headache diary so you can figure out what triggers your headaches. Avoiding triggers may help you prevent headaches.  Record when each headache began, how long it lasted, and what the pain was like (throbbing, aching, stabbing, or dull). Write down any other symptoms you had with the headache, such as nausea, flashing lights or dark spots, or sensitivity to bright light or loud noise. Note if the headache occurred near your period. List anything that might have triggered the headache, such as certain foods (chocolate, cheese, wine) or odors, smoke, bright light, stress, or lack of sleep. · Find healthy ways to deal with stress. Headaches are most common during or right after stressful times. Take time to relax before and after you do something that has caused a headache in the past.  · Try to keep your muscles relaxed by keeping good posture. Check your jaw, face, neck, and shoulder muscles for tension, and try relaxing them. When sitting at a desk, change positions often, and stretch for 30 seconds each hour. · Get plenty of sleep and exercise. · Eat regularly and well. Long periods without food can trigger a headache. · Treat yourself to a massage. Some people find that regular massages are very helpful in relieving tension. · Limit caffeine by not drinking too much coffee, tea, or soda. But don't quit caffeine suddenly, because that can also give you headaches. · Reduce eyestrain from computers by blinking frequently and looking away from the computer screen every so often. Make sure you have proper eyewear and that your monitor is set up properly, about an arm's length away. · Seek help if you have depression or anxiety. Your headaches may be linked to these conditions. Treatment can both prevent headaches and help with symptoms of anxiety or depression. When should you call for help? Call 911 anytime you think you may need emergency care. For example, call if:  · You have signs of a stroke. These may include:  ¨ Sudden numbness, paralysis, or weakness in your face, arm, or leg, especially on only one side of your body. ¨ Sudden vision changes.   ¨ Sudden trouble speaking. ¨ Sudden confusion or trouble understanding simple statements. ¨ Sudden problems with walking or balance. ¨ A sudden, severe headache that is different from past headaches. Call your doctor now or seek immediate medical care if:  · You have a new or worse headache. · Your headache gets much worse. Where can you learn more? Go to http://bonnie-justyna.info/. Enter M271 in the search box to learn more about \"Headache: Care Instructions. \"  Current as of: October 14, 2016  Content Version: 11.2  © 0762-1384 [employer]. Care instructions adapted under license by [employer] (which disclaims liability or warranty for this information). If you have questions about a medical condition or this instruction, always ask your healthcare professional. [address] any warranty or liability for your use of this information. Seasonal Allergies: Care Instructions  Your Care Instructions  Allergies occur when your body's defense system (immune system) overreacts to certain substances. The immune system treats a harmless substance as if it were a harmful germ or virus. Many things can cause this to happen. Examples include pollens, medicine, food, dust, animal dander, and mold. Your allergies are seasonal if you have symptoms just at certain times of the year. In that case, you are probably allergic to pollens from certain trees, grasses, or weeds. Allergies can be mild or severe. Over-the-counter allergy medicine may help with some symptoms. Read and follow all instructions on the label. Managing your allergies is an important part of staying healthy. Your doctor may suggest that you have tests to help find the cause of your allergies. When you know what things trigger your symptoms, you can avoid them. This can prevent allergy symptoms and other health problems. In some cases, immunotherapy might help.  For this treatment, you get shots or use pills that have a small amount of certain allergens in them. Your body \"gets used to\" the allergen, so you react less to it over time. This kind of treatment may help prevent or reduce some allergy symptoms. Follow-up care is a key part of your treatment and safety. Be sure to make and go to all appointments, and call your doctor if you are having problems. It's also a good idea to know your test results and keep a list of the medicines you take. How can you care for yourself at home? · Be safe with medicines. Take your medicines exactly as prescribed. Call your doctor if you think you are having a problem with your medicine. · During your allergy season, keep windows closed. If you need to use air-conditioning, change or clean all filters every month. Take a shower and change your clothes after you have been outside. · Stay inside when pollen counts are high. Vacuum once or twice a week. Use a vacuum  with a HEPA filter or a double-thickness filter. When should you call for help? Call 911 anytime you think you may need emergency care. For example, call if:  · You have symptoms of a severe allergic reaction. These may include:  ¨ Sudden raised, red areas (hives) all over your body. ¨ Swelling of the throat, mouth, lips, or tongue. ¨ Trouble breathing. ¨ Passing out (losing consciousness). Or you may feel very lightheaded or suddenly feel weak, confused, or restless. Watch closely for changes in your health, and be sure to contact your doctor if:  · You need help controlling your allergies. · You have questions about allergy testing. · You do not get better as expected. Where can you learn more? Go to http://bonnie-justyna.info/. Enter J912 in the search box to learn more about \"Seasonal Allergies: Care Instructions. \"  Current as of: February 12, 2016  Content Version: 11.2  © 9531-5335 [employer], [employer].  Care instructions adapted under license by [employer] (which disclaims liability or warranty for this information). If you have questions about a medical condition or this instruction, always ask your healthcare professional. [address] any warranty or liability for your use of this information.

## 2017-04-14 NOTE — ED NOTES
Patient discharged home ambulatory in stable condition in the care of her . I have reviewed discharge instructions with the patient and spouse. The patient and spouse verbalized understanding. Patient armband removed and shredded.

## 2017-04-14 NOTE — ED PROVIDER NOTES
HPI Comments: 1:07 PM Mell Gibson is a 64 y.o. female with a history of Dyslipidemia, Hypothyroidism, Uterine Cancer, s/p cardiac cath who presents to the emergency department c/o L sided throbbing headache with bilateral eye pain onset 1 week ago. Pt states that she accidentally hit the R side her head while getting into her car 1 week ago and explains that when she hit her head she felt as if her \"brain rattled\" against her L skull. Later that same evening, pt notes she did develop nausea and felt more fatigued than normal this has also persistent to today. Pt is concerned for a possible concussion. She states that she feels some of her symptoms may be secondary to seasonal allergies and have taken increased Antihistamines and Sudafed secondary to this she also reports taking 1 Xanax yesterday. No other acute symptoms or complaints were noted at this time. PCP: Mariam Urbie MD      The history is provided by the patient. Past Medical History:   Diagnosis Date    Abnormal nuclear cardiac imaging test 02/09/2012    Mid to distal anteroseptal and apical reversible defect concerning for ishcmeia. Mild-mod, mid-distal anterior & apical hypk. EF 67%. Borderline abnormal EKG w/1-mm inferolateral ST depression at 7 min exercise. Occasional PVCs.  Anxiety     Carotid duplex 44/52/6564    Mild 4-95% LICA stenosis.  [place]ue fever     7/10 [place] in [place]    Diverticulosis     Dyslipidemia     GERD     Holter monitor study 04/29/2016    Sinus rhythm, avg HR 67 bpm (range  bpm). Rare ectopy.  Hypothyroidism     RLE venous duplex 07/23/2015    Right leg:  No DVT.  S/P cardiac catheterization 02/24/2012    Angiographically normal coronaries. Hyperdynamic. EF 70%. No RWMA.       Sinusitis     Tinnitus     Uterine cancer [facility])     hysterectomy       Past Surgical History:   Procedure Laterality Date    HX CHOLECYSTECTOMY      2009    HX COLECTOMY      2008    HX HYSTERECTOMY           Family History:   Problem Relation Age of Onset    Other Mother      anuerysm    Cancer Father      brain tumor    Hypertension Maternal Aunt     Diabetes Maternal Aunt      lung    Heart Disease Maternal Aunt     Stroke Maternal Grandmother     Other Maternal Grandfather      leg amputated due to phlebitis    Heart Attack Paternal Grandmother     Heart Disease Maternal Aunt        Social History     Social History    Marital status:      Spouse name: N/A    Number of children: N/A    Years of education: N/A     Occupational History    Not on file. Social History Main Topics    Smoking status: Former Smoker     Packs/day: 1.00     Years: 20.00     Types: Cigarettes     Quit date: 6/23/1999    Smokeless tobacco: Never Used    Alcohol use No    Drug use: No    Sexual activity: Not on file     Other Topics Concern    Not on file     Social History Narrative         ALLERGIES: Omnicef [cefdinir]; Codeine; Levaquin [levofloxacin]; Morphine; Sulfa (sulfonamide antibiotics); and Topamax [topiramate]    Review of Systems   Constitutional: Positive for fatigue. Negative for chills and fever. HENT: Negative for congestion and rhinorrhea. Eyes: Positive for pain (bilateral eye pain). Respiratory: Negative for cough and shortness of breath. Cardiovascular: Negative for chest pain and leg swelling. Gastrointestinal: Positive for nausea. Negative for abdominal pain and vomiting. Genitourinary: Negative for dysuria and hematuria. Musculoskeletal: Negative for arthralgias and myalgias. Skin: Negative for rash and wound. Allergic/Immunologic: Positive for environmental allergies. Neurological: Positive for headaches (left sided; throbbing ). Negative for light-headedness. Psychiatric/Behavioral: Negative for confusion and hallucinations. All other systems reviewed and are negative.       Vitals:    04/14/17 1236   BP: 168/85   Pulse: 62   Resp: 16   Temp: 97.4 °F (36.3 °C)   SpO2: 99%   Weight: 114.3 kg (252 lb)   Height: 5' 8\" (1.727 m)            Physical Exam   Constitutional: She is oriented to person, place, and time. She appears well-developed. HENT:   Head: Normocephalic. Mouth/Throat: Oropharynx is clear and moist.   Eyes: Pupils are equal, round, and reactive to light. Neck: Normal range of motion. Cardiovascular: Normal rate and normal heart sounds. No murmur heard. Pulmonary/Chest: Effort normal. She has no wheezes. She has no rales. Abdominal: Soft. There is no tenderness. Musculoskeletal: Normal range of motion. She exhibits no edema. Neurological: She is alert and oriented to person, place, and time. Skin: Skin is warm and dry. Nursing note and vitals reviewed. [facility]  ED Course       Procedures    Vitals:  Patient Vitals for the past 12 hrs:   Temp Pulse Resp BP SpO2   04/14/17 1236 97.4 °F (36.3 °C) 62 16 168/85 99 %   99 %. Percentage is within normal limits. Medications ordered:   Medications - No data to display       Progress notes, Consult notes or Re-evaluation:   Discussed all results with pt and pt agrees with plan for discharge. All questions answered at this time. ED warnings given for any new or worsening symptoms. Pt voices understanding. Pt discharged in stable condition. Disposition:  Diagnosis:   1. Contusion of face, scalp, and neck except eye(s)        Disposition: DISCHARGED      Scribe Attestation:     I, [address] [place] [address] for and in the presence of Melissa Gao MD April 14, 2017 at 1:21 PM     Signed by: Zay Fall April 14, 2017, 1:21 PM      Physician Attestation:   I personally performed the services described in this documentation, reviewed and edited the documentation which was dictated to the scribe in my presence, and it accurately records my words and actions.  Melissa Gao MD  April 14, 2017 at 1:21 PM

## 2017-05-05 ENCOUNTER — TELEPHONE (OUTPATIENT)
Dept: FAMILY MEDICINE CLINIC | Age: 62
End: 2017-05-05

## 2017-05-05 NOTE — TELEPHONE ENCOUNTER
Pt is coming on Monday [id number] to get labs drawn,ask if dr mosquera would also order for her vitD and magnesium to be ckd.

## 2017-05-10 LAB
ANION GAP SERPL CALC-SCNC: 17 MMOL/L
AVG GLU, 10930: 139 MG/DL (ref 91–123)
BUN SERPL-MCNC: 13 MG/DL (ref 6–22)
CALCIUM SERPL-MCNC: 9.7 MG/DL (ref 8.4–10.5)
CHLORIDE SERPL-SCNC: 105 MMOL/L (ref 98–110)
CO2 SERPL-SCNC: 20 MMOL/L (ref 20–32)
CREAT SERPL-MCNC: 0.8 MG/DL (ref 0.8–1.4)
GFRAA, 66117: >60
GFRNA, 66118: >60
GLUCOSE SERPL-MCNC: 122 MG/DL (ref 65–99)
HBA1C MFR BLD HPLC: 6.5 % (ref 4.8–5.9)
POTASSIUM SERPL-SCNC: 4.5 MMOL/L (ref 3.5–5.5)
SODIUM SERPL-SCNC: 142 MMOL/L (ref 133–145)
T4 FREE SERPL-MCNC: 1.4 NG/DL (ref 0.9–1.8)
TSH SERPL DL<=0.005 MIU/L-ACNC: 0.37 MCU/ML (ref 0.27–4.2)

## 2017-05-19 ENCOUNTER — HOSPITAL ENCOUNTER (OUTPATIENT)
Dept: CT IMAGING | Age: 62
Discharge: HOME | End: 2017-05-19
Attending: PSYCHIATRY & NEUROLOGY
Payer: COMMERCIAL

## 2017-05-19 DIAGNOSIS — G44.89 HEADACHE SYNDROME: ICD-10-CM

## 2017-05-19 PROCEDURE — 70450 CT HEAD/BRAIN W/O DYE: CPT

## 2017-05-30 ENCOUNTER — OFFICE VISIT (OUTPATIENT)
Dept: FAMILY MEDICINE CLINIC | Age: 62
End: 2017-05-30

## 2017-05-30 VITALS
WEIGHT: 259 LBS | BODY MASS INDEX: 39.25 KG/M2 | TEMPERATURE: 97.5 F | HEIGHT: 68 IN | RESPIRATION RATE: 20 BRPM | DIASTOLIC BLOOD PRESSURE: 81 MMHG | SYSTOLIC BLOOD PRESSURE: 129 MMHG | HEART RATE: 77 BPM

## 2017-05-30 DIAGNOSIS — E55.9 VITAMIN D DEFICIENCY: ICD-10-CM

## 2017-05-30 DIAGNOSIS — Z23 ENCOUNTER FOR IMMUNIZATION: ICD-10-CM

## 2017-05-30 DIAGNOSIS — E03.9 HYPOTHYROIDISM, UNSPECIFIED TYPE: Primary | ICD-10-CM

## 2017-05-30 DIAGNOSIS — E78.5 DYSLIPIDEMIA: ICD-10-CM

## 2017-05-30 DIAGNOSIS — Z78.9 RECENT FOREIGN TRAVEL: ICD-10-CM

## 2017-05-30 DIAGNOSIS — F41.9 ANXIETY: ICD-10-CM

## 2017-05-30 DIAGNOSIS — S09.90XS CLOSED HEAD INJURY, SEQUELA: ICD-10-CM

## 2017-05-30 DIAGNOSIS — M79.604 PAIN OF RIGHT LOWER EXTREMITY: ICD-10-CM

## 2017-05-30 DIAGNOSIS — R73.03 PREDIABETES: ICD-10-CM

## 2017-05-30 RX ORDER — ALPRAZOLAM 0.25 MG/1
TABLET ORAL
Qty: 30 TAB | Refills: 0 | Status: SHIPPED | OUTPATIENT
Start: 2017-05-30 | End: 2017-09-11 | Stop reason: ALTCHOICE

## 2017-05-30 RX ORDER — ATORVASTATIN CALCIUM 10 MG/1
TABLET, FILM COATED ORAL
Qty: 90 TAB | Refills: 3 | Status: SHIPPED | OUTPATIENT
Start: 2017-05-30 | End: 2018-10-31 | Stop reason: SDUPTHER

## 2017-05-30 NOTE — PROGRESS NOTES
HISTORY OF PRESENT ILLNESS  Usama Mcclendon is a 64 y.o. female. Chief Complaint   Patient presents with    Hypothyroidism Chronic problem, uncontrolled     Blood sugar problem     Prediabetes chronic problem, stable no meds on diet for this      Anxiety    Spasms     back    Results     discuss lab results   complains of hitting her head while getting in the car and suffered a concussion from it, hit on the right side and induced more migraines, still has some tenderness sees neuro, Dr. Merle Wade, sent for CT, told was unremarkable  follow up leg pain from injury with fall, sent for mri, pain off and on still but not worse  Had labs from derm for hair and told vit d was very low, started on vit d 50k/week for 6 week course    HPI  Past Medical History:   Diagnosis Date    Abnormal nuclear cardiac imaging test 02/09/2012    Mid to distal anteroseptal and apical reversible defect concerning for ishcmeia. Mild-mod, mid-distal anterior & apical hypk. EF 67%. Borderline abnormal EKG w/1-mm inferolateral ST depression at 7 min exercise. Occasional PVCs.  Anxiety     Carotid duplex 10/63/3777    Mild 0-84% LICA stenosis.  Dengue fever     7/10 while in [place]    Diverticulosis     Dyslipidemia     GERD     Holter monitor study 04/29/2016    Sinus rhythm, avg HR 67 bpm (range  bpm). Rare ectopy.  Hypothyroidism     RLE venous duplex 07/23/2015    Right leg:  No DVT.  S/P cardiac catheterization 02/24/2012    Angiographically normal coronaries. Hyperdynamic. EF 70%. No RWMA.       Sinusitis     Tinnitus     Uterine cancer [facility])     hysterectomy     Current Outpatient Prescriptions   Medication Sig Dispense Refill    ALPRAZolam (XANAX) 0.25 mg tablet TAKE 1 TABLET BY MOUTH EVERY DAY AS NEEDED FOR ANXIETY 30 Tab 0    atorvastatin (LIPITOR) 10 mg tablet TAKE 1 TABLET BY MOUTH EVERY DAY 90 Tab 0    SYNTHROID 112 mcg tablet TAKE 1 TABLET BY MOUTH DAILY BEFORE BREAKFAST 90 Tab 0    cyclobenzaprine (FLEXERIL) 10 mg tablet TAKE 1 TABLET BY MOUTH THREE TIMES DAILY AS NEEDED FOR MUSCLE SPASMS 40 Tab 0    clobetasol (TEMOVATE) 0.05 % ointment Apply  to affected area two (2) times daily as needed for Skin Irritation or Itching. 60 g 3    lansoprazole (PREVACID) 30 mg capsule TAKE 1 CAPSULE BY MOUTH DAILY 90 Cap 0    OTHER Azelaic/Finast/Flutic/Minox(Scalp) 5/0.1%  Apply to affected area of scalp daily  30 ml      EPINEPHrine (AUVI-Q) 0.3 mg/0.3 mL (1:1,000) injection 0.3 mL by IntraMUSCular route once as needed for Anaphylaxis for up to 1 dose. 1 Each 2    Lancets Misc Check blood sugar TID PRN with meals and dizziness 1 Package 11    Blood-Glucose Meter monitoring kit Check blood sugar TID PRN with meals and/or bouts of dizziness 1 Kit 0    glucose blood VI test strips (BLOOD GLUCOSE TEST) strip Patient to check blood sugar TID PRN 1 Package 11    Bifidobacterium Infantis (ALIGN) 4 mg cap Take  by mouth daily.  ASCORBATE CALCIUM (VITAMIN C PO) Take  by mouth daily.  OMEGA-3 FATTY ACIDS (FISH OIL CONCENTRATE PO) Take  by mouth daily. Allergies   Allergen Reactions    Omnicef [Cefdinir] Hives    Codeine Other (comments)     Severe headache    Levaquin [Levofloxacin] Unknown (comments)    Morphine Other (comments)     Severe headache    Sulfa (Sulfonamide Antibiotics) Hives    Topamax [Topiramate] Other (comments)     Made blood vessels red and pain in eyes       Review of Systems   Respiratory: Negative for shortness of breath. Cardiovascular: Negative for chest pain. Musculoskeletal: Positive for joint pain. Neurological: Positive for headaches. .  Visit Vitals    /81 (BP 1 Location: Right arm, BP Patient Position: Sitting)    Pulse 77    Temp 97.5 °F (36.4 °C) (Oral)    Resp 20    Ht 5' 8\" (1.727 m)    Wt 259 lb (117.5 kg)    BMI 39.38 kg/m2       Physical Exam Nursing note and vitals reviewed.   Constitutional: She is oriented to person, place, and time. She appears well-developed and well-nourished. No distress. HENT:   Mouth/Throat: Oropharynx is clear and moist.   Neck: No JVD present. No thyromegaly present. Cardiovascular: Normal rate, regular rhythm and normal heart sounds. Pulmonary/Chest: Effort normal and breath sounds normal. No respiratory distress. She has no wheezes. She has no rales. Musculoskeletal: She exhibits no edema. Lymphadenopathy:     She has no cervical adenopathy. Neurological: She is alert and oriented to person, place, and time. Coordination normal.   Psychiatric: She has a normal mood and affect. Her behavior is normal.    Results for orders placed or performed in visit on 52/88/03   METABOLIC PANEL, BASIC   Result Value Ref Range    Glucose 122 (H) 65 - 99 mg/dL    BUN 13 6 - 22 mg/dL    Creatinine 0.8 0.8 - 1.4 mg/dL    Sodium 142 133 - 145 mmol/L    Potassium 4.5 3.5 - 5.5 mmol/L    Chloride 105 98 - 110 mmol/L    CO2 20 20 - 32 mmol/L    Calcium 9.7 8.4 - 10.5 mg/dL    Anion gap 17.0 mmol/L    GFRAA >60.0 >60.0    GFRNA >60.0 >60.0   T4, FREE   Result Value Ref Range    T4, Free 1.4 0.9 - 1.8 ng/dL   TSH 3RD GENERATION   Result Value Ref Range    TSH 0.37 0.27 - 4.20 mcU/mL   HEMOGLOBIN A1C W/O EAG   Result Value Ref Range    Hemoglobin A1c 6.5 (H) 4.8 - 5.9 %    AVG  (H) 91 - 123 mg/dL     EXAM: MRI of the right thigh without contrast     INDICATION: Fall with misshapen thigh. Pain.     TECHNIQUE: MRI of the right thigh without contrast. Multiplanar multisequence MR  imaging of the right thigh performed.      COMPARISON: None     FINDINGS:   The majority of the right femur is visualized. However, the femoral head,  femoral neck and greater lesser trochanters are superior to the field-of-view. The visualized portion of the femur has normal marrow signal. The cortex is  unremarkable.  No periosteal reaction identified.     A marker was placed at the site of interest. This is anterior to the rectus  femoris. The rectus femoris is grossly unremarkable. The quadriceps muscles are  unremarkable. No evidence of abnormal signal or atrophy. The remainder of the  visualized hamstrings are unremarkable. The sartorius and gracilis are  unremarkable. No fluid collection or mass lesion identified. No fascial  thickening or edema appreciated. The visualized neurovascular bundles are  unremarkable. The subcutaneous tissues are unremarkable.     IMPRESSION  IMPRESSION:   1. Unremarkable examination of the right thigh.      ASSESSMENT and PLAN    ICD-10-CM ICD-9-CM    1. Hypothyroidism, unspecified type . improved . stable continue current medications  E03.9 244.9    2. Prediabetes Stable, continue current care. R73.03 790.29    3. Vitamin D deficiency new dx on vit d 50,000 units / week E55.9 268.9    4. Pain of right lower extremity following fall, mri ok M79.604 729.5    5. Closed head injury, sequela S09.90XS 908.9    6. Anxiety F41.9 300.00 ALPRAZolam (XANAX) 0.25 mg tablet   7. Dyslipidemia E78.5 272.4 atorvastatin (LIPITOR) 10 mg tablet   . 8.   Plan for foreign travel to [facility], Hep A today

## 2017-05-30 NOTE — MR AVS SNAPSHOT
Visit Information Date & Time Provider Department Dept. Phone Encounter #  
 5/30/2017  9:45 AM Leroy Hoover MD [address] [id number] Your Appointments 6/8/2017 11:00 AM  
Follow Up with Pierre Shelton MD  
[facility] ([facility]) Appt Note: 1 year with an ekg [place] [id number] [phone] [address] [place] [address] [address] Upcoming Health Maintenance Date Due DTaP/Tdap/Td series (1 - Tdap) 9/11/1976 ZOSTER VACCINE AGE 60> 9/11/2015 BREAST CANCER SCRN MAMMOGRAM 12/8/2016 PAP AKA CERVICAL CYTOLOGY 2/1/2017 INFLUENZA AGE 9 TO ADULT 8/1/2017 COLONOSCOPY 5/26/2020 Allergies as of 5/30/2017  Review Complete On: 5/30/2017 By: Leroy Hoover MD  
  
 Severity Noted Reaction Type Reactions Omnicef [Cefdinir] High 07/26/2011    Hives Codeine  06/23/2011    Other (comments) Severe headache Levaquin [Levofloxacin]  10/12/2012    Unknown (comments) Morphine  06/23/2011    Other (comments) Severe headache  
 Sulfa (Sulfonamide Antibiotics)  06/23/2011    Hives Topamax [Topiramate]  01/11/2016    Other (comments) Made blood vessels red and pain in eyes Current Immunizations  Reviewed on 5/30/2017 Name Date Influenza Vaccine (Quad) PF 9/28/2016 Reviewed by Leroy Hoover MD on 5/30/2017 at 11:07 AM  
 Reviewed by Leroy Hoover MD on 5/30/2017 at 11:08 AM  
You Were Diagnosed With   
  
 Codes Comments Hypothyroidism, unspecified type    -  Primary ICD-10-CM: E03.9 ICD-9-CM: 244.9 Prediabetes     ICD-10-CM: R73.03 
ICD-9-CM: 790.29 Vitamin D deficiency     ICD-10-CM: E55.9 ICD-9-CM: 268.9 Pain of right lower extremity     ICD-10-CM: M79.604 ICD-9-CM: 729.5 Closed head injury, sequela     ICD-10-CM: S09.90XS ICD-9-CM: 695. 9 Anxiety     ICD-10-CM: F41.9 ICD-9-CM: 300.00 Dyslipidemia     ICD-10-CM: E78.5 ICD-9-CM: 272.4 Recent foreign travel     ICD-10-CM: Z78.9 ICD-9-CM: V49.89 Vitals BP Pulse Temp Resp Height(growth percentile) Weight(growth percentile) 129/81 (BP 1 Location: Right arm, BP Patient Position: Sitting) 77 97.5 °F (36.4 °C) (Oral) 20 5' 8\" (1.727 m) 259 lb (117.5 kg) BMI OB Status Smoking Status 39.38 kg/m2 Hysterectomy Former Smoker BMI and BSA Data Body Mass Index Body Surface Area  
 39.38 kg/m 2 2.37 m 2 Preferred Pharmacy Pharmacy Name Phone [facility] [address] [address] [phone] Your Updated Medication List  
  
   
This list is accurate as of: 5/30/17 11:30 AM.  Always use your most recent med list.  
  
  
  
  
 ALIGN 4 mg Cap Generic drug:  Bifidobacterium Infantis Take  by mouth daily. ALPRAZolam 0.25 mg tablet Commonly known as:  XANAX  
TAKE 1 TABLET BY MOUTH EVERY DAY AS NEEDED FOR ANXIETY  
  
 atorvastatin 10 mg tablet Commonly known as:  LIPITOR  
TAKE 1 TABLET BY MOUTH EVERY DAY Blood-Glucose Meter monitoring kit Check blood sugar TID PRN with meals and/or bouts of dizziness  
  
 clobetasol 0.05 % ointment Commonly known as:  Tommas Puneet Apply  to affected area two (2) times daily as needed for Skin Irritation or Itching. cyclobenzaprine 10 mg tablet Commonly known as:  FLEXERIL  
TAKE 1 TABLET BY MOUTH THREE TIMES DAILY AS NEEDED FOR MUSCLE SPASMS EPINEPHrine 0.3 mg/0.3 mL injection Commonly known as:  AUVI-Q  
0.3 mL by IntraMUSCular route once as needed for Anaphylaxis for up to 1 dose. FISH OIL CONCENTRATE PO Take  by mouth daily. glucose blood VI test strips strip Commonly known as:  blood glucose test  
Patient to check blood sugar TID PRN Lancets Misc Check blood sugar TID PRN with meals and dizziness lansoprazole 30 mg capsule Commonly known as:  PREVACID TAKE 1 CAPSULE BY MOUTH DAILY  
  
 OTHER Azelaic/Finast/Flutic/Minox(Scalp) 5/0.1% Apply to affected area of scalp daily 30 ml SYNTHROID 112 mcg tablet Generic drug:  levothyroxine TAKE 1 TABLET BY MOUTH DAILY BEFORE BREAKFAST  
  
 VITAMIN C PO Take  by mouth daily. Prescriptions Printed Refills ALPRAZolam (XANAX) 0.25 mg tablet 0 Sig: TAKE 1 TABLET BY MOUTH EVERY DAY AS NEEDED FOR ANXIETY Class: Print Prescriptions Sent to Pharmacy Refills  
 atorvastatin (LIPITOR) 10 mg tablet 3 Sig: TAKE 1 TABLET BY MOUTH EVERY DAY Class: Normal  
 Pharmacy: [facility] [address] [place] [address] [phone] Ph #: [phone] Patient Instructions Please contact our office if you have any questions about your visit today. Introducing [facility]! Blossom Meza introduces [employer] patient portal. Now you can access parts of your medical record, email your doctor's office, and request medication refills online. 1. In your internet browser, go to [email or web address] 2. Click on the First Time User? Click Here link in the Sign In box. You will see the New Member Sign Up page. 3. Enter your Frequency Access Code exactly as it appears below. You will not need to use this code after youve completed the sign-up process. If you do not sign up before the expiration date, you must request a new code. · [employer] Access Code: [email or web address] Expires: 8/2/2017  9:00 PM 
 
4. Enter the last four digits of your Social Security Number (xxxx) and Date of Birth (mm/dd/yyyy) as indicated and click Submit. You will be taken to the next sign-up page. 5. Create a Frequency ID. This will be your Frequency login ID and cannot be changed, so think of one that is secure and easy to remember. 6. Create a [employer] password. You can change your password at any time. 7. Enter your Password Reset Question and Answer. This can be used at a later time if you forget your password. 8. Enter your e-mail address. You will receive e-mail notification when new information is available in [address] 9. Click Sign Up. You can now view and download portions of your medical record. 10. Click the Download Summary menu link to download a portable copy of your medical information. If you have questions, please visit the Frequently Asked Questions section of the [employer] website. Remember, [employer] is NOT to be used for urgent needs. For medical emergencies, dial 911. Now available from your iPhone and Android! Please provide this summary of care documentation to your next provider. Your primary care clinician is listed as ELVIN BRITTON. If you have any questions after today's visit, please call [phone].

## 2017-05-30 NOTE — PROGRESS NOTES
Chief Complaint   Patient presents with    Hypothyroidism    Blood sugar problem     prediabetes    Anxiety    Spasms     back    Results     discuss lab results       Health Maintenance reviewed     1. Have you been to the ER, urgent care clinic since your last visit? Hospitalized since your last visit? Yes When: [facility] Where: 5/17 Reason for visit: headache    2. Have you seen or consulted any other health care providers outside of the [address] [place] [address] since your last visit? Include any pap smears or colon screening.  Yes When: 5/17 Where: neurology Reason for visit: ov

## 2017-06-04 DIAGNOSIS — E03.9 HYPOTHYROIDISM, UNSPECIFIED TYPE: ICD-10-CM

## 2017-06-05 RX ORDER — LEVOTHYROXINE SODIUM 112 UG/1
TABLET ORAL
Qty: 90 TAB | Refills: 0 | Status: SHIPPED | OUTPATIENT
Start: 2017-06-05 | End: 2017-08-31 | Stop reason: SDUPTHER

## 2017-06-08 ENCOUNTER — OFFICE VISIT (OUTPATIENT)
Dept: CARDIOLOGY CLINIC | Age: 62
End: 2017-06-08

## 2017-06-08 VITALS
OXYGEN SATURATION: 97 % | HEART RATE: 79 BPM | SYSTOLIC BLOOD PRESSURE: 134 MMHG | HEIGHT: 68 IN | WEIGHT: 258 LBS | DIASTOLIC BLOOD PRESSURE: 82 MMHG | BODY MASS INDEX: 39.1 KG/M2

## 2017-06-08 DIAGNOSIS — E78.5 DYSLIPIDEMIA: ICD-10-CM

## 2017-06-08 DIAGNOSIS — R07.89 OTHER CHEST PAIN: ICD-10-CM

## 2017-06-08 DIAGNOSIS — F41.9 ANXIETY: ICD-10-CM

## 2017-06-08 DIAGNOSIS — R00.2 PALPITATIONS: Primary | ICD-10-CM

## 2017-06-08 DIAGNOSIS — E03.9 HYPOTHYROIDISM, UNSPECIFIED TYPE: ICD-10-CM

## 2017-06-08 NOTE — PROGRESS NOTES
History of Present Illness: This is a 64year-old female here for annual followup. Overall, she is doing well but she had to postpone working out since she had severe episode of plantar fascitis last year and actually had a brace on her left foot. She was trying to lose weight prior to this but again was put out of commission. It is finally improving. She has gained weight in the interim. She was also diagnosed with prediabetes and her HbA1c was about 6.5. She will follow up with Dr. Lynne Nunez. She has occasional dyspnea without chest pain, no syncope, PND, orthopnea or edema.      Impression:   1. Recent new diagnosis of prediabetes with HbA1c of 6.5.   2. History of recurrent atypical chest pain in the past with indeterminate nuclear stress test 2012. She had some pain last year but resolved after empiric treatment for GERD. 3. Remote Dengue fever in 2010.  4. Thyroid disorder. 5. Remote history of uterine cancer and hysterectomy with chemotherapy and radiation. 6. History of palpitations with Holter monitor in 04/2016 showing rare ectopic beats without sustained arrhythmias. 7. BMI 39.     Recommendations: At this point, my major concern is her weight gain with the development of prediabetes. We talked about long term diet and exercise and she will follow closely with Dr. Lynne Nunez. She has not had any recurrent chest pain but she does have some occasional dyspnea and she has not been quite as active. I will order an echocardiogram.  Her blood pressure has been on the high side, but again we will plan for close monitoring. We discussed other tests for risk stratification including sed rate and CRP, however, with prediabetes I believe she is in a higher risk category regardless and I would aggressively control her cholesterol as well as her blood pressure. All questions answered. I will tentatively see back in one year.      Past Medical History:   Diagnosis Date    Abnormal nuclear cardiac imaging test 02/09/2012    Mid to distal anteroseptal and apical reversible defect concerning for ishcmeia. Mild-mod, mid-distal anterior & apical hypk. EF 67%. Borderline abnormal EKG w/1-mm inferolateral ST depression at 7 min exercise. Occasional PVCs.  Anxiety     Carotid duplex 91/79/4729    Mild 9-92% LICA stenosis.  Dengue fever     7/10 while in [place]    Diverticulosis     Dyslipidemia     GERD     Holter monitor study 04/29/2016    Sinus rhythm, avg HR 67 bpm (range  bpm). Rare ectopy.  Hypothyroidism     RLE venous duplex 07/23/2015    Right leg:  No DVT.  S/P cardiac catheterization 02/24/2012    Angiographically normal coronaries. Hyperdynamic. EF 70%. No RWMA.  Sinusitis     Tinnitus     Uterine cancer (HCC)     hysterectomy       Current Outpatient Prescriptions   Medication Sig Dispense Refill    SYNTHROID 112 mcg tablet TAKE 1 TABLET BY MOUTH DAILY BEFORE BREAKFAST 90 Tab 0    ALPRAZolam (XANAX) 0.25 mg tablet TAKE 1 TABLET BY MOUTH EVERY DAY AS NEEDED FOR ANXIETY 30 Tab 0    atorvastatin (LIPITOR) 10 mg tablet TAKE 1 TABLET BY MOUTH EVERY DAY 90 Tab 3    cyclobenzaprine (FLEXERIL) 10 mg tablet TAKE 1 TABLET BY MOUTH THREE TIMES DAILY AS NEEDED FOR MUSCLE SPASMS 40 Tab 0    clobetasol (TEMOVATE) 0.05 % ointment Apply  to affected area two (2) times daily as needed for Skin Irritation or Itching. 60 g 3    lansoprazole (PREVACID) 30 mg capsule TAKE 1 CAPSULE BY MOUTH DAILY 90 Cap 0    OTHER Azelaic/Finast/Flutic/Minox(Scalp) 5/0.1%  Apply to affected area of scalp daily  30 ml      EPINEPHrine (AUVI-Q) 0.3 mg/0.3 mL (1:1,000) injection 0.3 mL by IntraMUSCular route once as needed for Anaphylaxis for up to 1 dose.  1 Each 2    Lancets Misc Check blood sugar TID PRN with meals and dizziness 1 Package 11    Blood-Glucose Meter monitoring kit Check blood sugar TID PRN with meals and/or bouts of dizziness 1 Kit 0    glucose blood VI test strips (BLOOD GLUCOSE TEST) strip Patient to check blood sugar TID PRN 1 Package 11    Bifidobacterium Infantis (ALIGN) 4 mg cap Take  by mouth daily.  ASCORBATE CALCIUM (VITAMIN C PO) Take  by mouth daily.  OMEGA-3 FATTY ACIDS (FISH OIL CONCENTRATE PO) Take  by mouth daily. Social History   reports that she quit smoking about 17 years ago. Her smoking use included Cigarettes. She has a 20.00 pack-year smoking history. She has never used smokeless tobacco.   reports that she does not drink alcohol. Family History  family history includes Cancer in her father; Diabetes in her maternal aunt; Heart Attack in her paternal grandmother; Heart Disease in her maternal aunt and maternal aunt; Hypertension in her maternal aunt; Other in her maternal grandfather and mother; Stroke in her maternal grandmother. Review of Systems  Except as stated above include:  Constitutional: Negative for fever, chills and malaise/fatigue. HEENT: No congestion or recent URI. Gastrointestinal: No nausea, vomiting, abdominal pain, bloody stools. Pulmonary:  Negative except as stated above. Cardiac:  Negative except as stated above. Musculoskeletal: Negative except as stated above. Neurological:  No localized symptoms. Skin:  Negative except as stated above. Psych:  No mood swings. Endocrine:  No heat/cold intolerance. PHYSICAL EXAM  BP Readings from Last 3 Encounters:   06/08/17 134/82   05/30/17 129/81   04/14/17 168/85     Pulse Readings from Last 3 Encounters:   06/08/17 79   05/30/17 77   04/14/17 62     Wt Readings from Last 3 Encounters:   06/08/17 117 kg (258 lb)   05/30/17 117.5 kg (259 lb)   04/14/17 114.3 kg (252 lb)     General:   Well developed, well groomed. Head/Neck:   No jugular venous distention     No carotid bruits. No evidence of xanthelasma. Lungs:   No respiratory distress. Clear bilaterally. Heart:    Regular rate and rhythm. Normal S1/S2.       Palpation of heart with normal point of maximum impulse. No significant murmurs, rubs or gallops. Abdomen:   Soft and nontender. No palpable abdominal mass or bruits. Extremities:   Intact peripheral pulses. No significant edema. Neurological:   Alert and oriented to person, place, time. No focal neurological deficit visually.

## 2017-06-08 NOTE — PROGRESS NOTES
1. Have you been to the ER, urgent care clinic since your last visit? Hospitalized since your last visit? No     2. Have you seen or consulted any other health care providers outside of the [address] [place] [address] since your last visit? Include any pap smears or colon screening.   No

## 2017-06-08 NOTE — MR AVS SNAPSHOT
Visit Information Date & Time Provider Department Dept. Phone Encounter #  
 6/8/2017 11:00 AM Malika Kirkpatrick MD [facility] [address] [id number] Follow-up Instructions Follow-up and Disposition History Your Appointments 9/11/2017 10:00 AM  
Follow Up with Zeeshan Garcia MD  
[facility] (--) Appt Note: follow up [place] [id number] [address] [address] [phone]  
  
   
 [place] [address] [address] [address] Upcoming Health Maintenance Date Due DTaP/Tdap/Td series (1 - Tdap) 9/11/1976 ZOSTER VACCINE AGE 60> 9/11/2015 BREAST CANCER SCRN MAMMOGRAM 12/8/2016 PAP AKA CERVICAL CYTOLOGY 2/1/2017 INFLUENZA AGE 9 TO ADULT 8/1/2017 COLONOSCOPY 5/26/2020 Allergies as of 6/8/2017  Review Complete On: 6/8/2017 By: Malika Kirkpatrick MD  
  
 Severity Noted Reaction Type Reactions Omnicef [Cefdinir] High 07/26/2011    Hives Codeine  06/23/2011    Other (comments) Severe headache Levaquin [Levofloxacin]  10/12/2012    Unknown (comments) Morphine  06/23/2011    Other (comments) Severe headache  
 Sulfa (Sulfonamide Antibiotics)  06/23/2011    Hives Topamax [Topiramate]  01/11/2016    Other (comments) Made blood vessels red and pain in eyes Current Immunizations  Reviewed on 5/30/2017 Name Date Hep A Vaccine (Adult) 5/30/2017 Influenza Vaccine (Quad) PF 9/28/2016 Not reviewed this visit You Were Diagnosed With   
  
 Codes Comments Palpitations    -  Primary ICD-10-CM: R00.2 ICD-9-CM: 785.1 Dyslipidemia     ICD-10-CM: E78.5 ICD-9-CM: 272.4 Other chest pain     ICD-10-CM: R07.89 ICD-9-CM: 786.59 Anxiety     ICD-10-CM: F41.9 ICD-9-CM: 300.00 Hypothyroidism, unspecified type     ICD-10-CM: E03.9 ICD-9-CM: 355. 9 Vitals BP Pulse Height(growth percentile) Weight(growth percentile) SpO2 BMI 134/82 79 5' 8\" (1.727 m) 258 lb (117 kg) 97% 39.23 kg/m2 OB Status Smoking Status Hysterectomy Former Smoker Vitals History BMI and BSA Data Body Mass Index Body Surface Area  
 39.23 kg/m 2 2.37 m 2 Preferred Pharmacy Pharmacy Name Phone [facility] [address] [address] [phone] Your Updated Medication List  
  
   
This list is accurate as of: 6/8/17 12:07 PM.  Always use your most recent med list.  
  
  
  
  
 ALIGN 4 mg Cap Generic drug:  Bifidobacterium Infantis Take  by mouth daily. ALPRAZolam 0.25 mg tablet Commonly known as:  XANAX  
TAKE 1 TABLET BY MOUTH EVERY DAY AS NEEDED FOR ANXIETY  
  
 atorvastatin 10 mg tablet Commonly known as:  LIPITOR  
TAKE 1 TABLET BY MOUTH EVERY DAY Blood-Glucose Meter monitoring kit Check blood sugar TID PRN with meals and/or bouts of dizziness  
  
 clobetasol 0.05 % ointment Commonly known as:  Mai Riedel Apply  to affected area two (2) times daily as needed for Skin Irritation or Itching. cyclobenzaprine 10 mg tablet Commonly known as:  FLEXERIL  
TAKE 1 TABLET BY MOUTH THREE TIMES DAILY AS NEEDED FOR MUSCLE SPASMS EPINEPHrine 0.3 mg/0.3 mL injection Commonly known as:  AUVI-Q  
0.3 mL by IntraMUSCular route once as needed for Anaphylaxis for up to 1 dose. FISH OIL CONCENTRATE PO Take  by mouth daily. glucose blood VI test strips strip Commonly known as:  blood glucose test  
Patient to check blood sugar TID PRN Lancets Misc Check blood sugar TID PRN with meals and dizziness  
  
 lansoprazole 30 mg capsule Commonly known as:  PREVACID TAKE 1 CAPSULE BY MOUTH DAILY  
  
 OTHER Azelaic/Finast/Flutic/Minox(Scalp) 5/0.1% Apply to affected area of scalp daily 30 ml SYNTHROID 112 mcg tablet Generic drug:  levothyroxine TAKE 1 TABLET BY MOUTH DAILY BEFORE BREAKFAST VITAMIN C PO Take  by mouth daily. We Performed the Following AMB POC EKG ROUTINE W/ 12 LEADS, INTER & REP [33406 CPT(R)] To-Do List   
 06/08/2017 ECHO:  2D ECHO COMPLETE ADULT (TTE) W OR WO CONTR   
  
 06/13/2017 11:00 AM  
  Appointment with [id number] MACHINE (WT ) at [facility] NON-INVASIVE CARD ([phone]) Age Limit for ALL Heart procedures @ all [facility] facilities: 18 yrs and older only. Under the age of 25, refer to Katja Gonzales ([phone]). Wt Limit: 350lbs. This study requires patient to bring a written physician's order or MD office may fax the order to Central Scheduling at [phone]. Patient needs to bring a current list of all medications. No preparation is required for this study. Patients should report 15 minutes prior to their appointment time to the [facility], [address] [place] [address]/[address]. Introducing [facility]! [employer] introduces [employer] patient portal. Now you can access parts of your medical record, email your doctor's office, and request medication refills online. 1. In your internet browser, go to [email or web address] 2. Click on the First Time User? Click Here link in the Sign In box. You will see the New Member Sign Up page. 3. Enter your V[employer]olg Access Code exactly as it appears below. You will not need to use this code after youve completed the sign-up process. If you do not sign up before the expiration date, you must request a new code. · Vdolg Access Code: [email or web address] Expires: 8/2/2017  9:00 PM 
 
4. Enter the last four digits of your Social Security Number (xxxx) and Date of Birth (mm/dd/yyyy) as indicated and click Submit. You will be taken to the next sign-up page. 5. Create a V[employer]olg ID. This will be your V[employer]olg login ID and cannot be changed, so think of one that is secure and easy to remember. 6. Create a [employer] password. You can change your password at any time. 7. Enter your Password Reset Question and Answer. This can be used at a later time if you forget your password. 8. Enter your e-mail address. You will receive e-mail notification when new information is available in [address] 9. Click Sign Up. You can now view and download portions of your medical record. 10. Click the Download Summary menu link to download a portable copy of your medical information. If you have questions, please visit the Frequently Asked Questions section of the [employer] website. Remember, [employer] is NOT to be used for urgent needs. For medical emergencies, dial 911. Now available from your iPhone and Android! Please provide this summary of care documentation to your next provider. Your primary care clinician is listed as ELVIN BRITTON. If you have any questions after today's visit, please call [phone].

## 2017-06-13 ENCOUNTER — HOSPITAL ENCOUNTER (OUTPATIENT)
Dept: NON INVASIVE DIAGNOSTICS | Age: 62
Discharge: HOME | End: 2017-06-13
Attending: INTERNAL MEDICINE
Payer: COMMERCIAL

## 2017-06-13 DIAGNOSIS — R07.89 OTHER CHEST PAIN: ICD-10-CM

## 2017-06-13 DIAGNOSIS — R00.2 PALPITATIONS: ICD-10-CM

## 2017-06-13 PROCEDURE — 93306 TTE W/DOPPLER COMPLETE: CPT

## 2017-06-13 NOTE — PROGRESS NOTES
I have personally seen and evaluated the device findings. Interrogation reviewed and I agree with assessment.     Devon Sutton

## 2017-07-19 ENCOUNTER — TELEPHONE (OUTPATIENT)
Dept: FAMILY MEDICINE CLINIC | Age: 62
End: 2017-07-19

## 2017-07-19 NOTE — TELEPHONE ENCOUNTER
Received a call from the patient who states that she was seen yesterday at the [facility] in [place] for chest pains under her left breast, states that they were able to rule out MI and felt that based on her symptoms that this was more like gastritis and she became upset when further questioned about her symptoms and where she received care states that she felt the questions were aggressive in nature when asked to clarify where she received treatment since she initially stated that she attended a \"walk in\". Patient further states that while the pains have lessened she wanted to follow up with her private physician and demanded an appointment. Appointment has been made for the patient for tomorrow morning with a different provider as the PCP schedule is full, but will speak with the nurse and provider to see where the patient can be put onto the providers schedule. Patient has also been advised that she should report to the nearest ED for evaluation and treatment of symptoms if they return or worsen in severity.

## 2017-07-21 ENCOUNTER — APPOINTMENT (OUTPATIENT)
Dept: GENERAL RADIOLOGY | Age: 62
End: 2017-07-21
Attending: EMERGENCY MEDICINE
Payer: COMMERCIAL

## 2017-07-21 ENCOUNTER — HOSPITAL ENCOUNTER (EMERGENCY)
Age: 62
Discharge: HOME | End: 2017-07-21
Attending: EMERGENCY MEDICINE
Payer: COMMERCIAL

## 2017-07-21 ENCOUNTER — APPOINTMENT (OUTPATIENT)
Dept: CT IMAGING | Age: 62
End: 2017-07-21
Attending: EMERGENCY MEDICINE
Payer: COMMERCIAL

## 2017-07-21 VITALS
WEIGHT: 256 LBS | TEMPERATURE: 97.6 F | OXYGEN SATURATION: 98 % | RESPIRATION RATE: 16 BRPM | SYSTOLIC BLOOD PRESSURE: 137 MMHG | HEIGHT: 68 IN | BODY MASS INDEX: 38.8 KG/M2 | HEART RATE: 89 BPM | DIASTOLIC BLOOD PRESSURE: 74 MMHG

## 2017-07-21 DIAGNOSIS — K52.9 ENTERITIS: ICD-10-CM

## 2017-07-21 DIAGNOSIS — R10.12 ABDOMINAL PAIN, LUQ (LEFT UPPER QUADRANT): Primary | ICD-10-CM

## 2017-07-21 DIAGNOSIS — K29.90 GASTRITIS AND DUODENITIS: ICD-10-CM

## 2017-07-21 LAB
ALBUMIN SERPL BCP-MCNC: 3.9 G/DL (ref 3.4–5)
ALBUMIN/GLOB SERPL: 1 {RATIO} (ref 0.8–1.7)
ALP SERPL-CCNC: 82 U/L (ref 45–117)
ALT SERPL-CCNC: 45 U/L (ref 13–56)
ANION GAP BLD CALC-SCNC: 8 MMOL/L (ref 3–18)
AST SERPL W P-5'-P-CCNC: 24 U/L (ref 15–37)
ATRIAL RATE: 83 BPM
BASOPHILS # BLD AUTO: 0.1 K/UL (ref 0–0.06)
BASOPHILS # BLD: 1 % (ref 0–2)
BILIRUB SERPL-MCNC: 1.1 MG/DL (ref 0.2–1)
BUN SERPL-MCNC: 12 MG/DL (ref 7–18)
BUN/CREAT SERPL: 13 (ref 12–20)
CALCIUM SERPL-MCNC: 9.4 MG/DL (ref 8.5–10.1)
CALCULATED P AXIS, ECG09: 49 DEGREES
CALCULATED R AXIS, ECG10: 28 DEGREES
CALCULATED T AXIS, ECG11: 39 DEGREES
CHLORIDE SERPL-SCNC: 108 MMOL/L (ref 100–108)
CK MB CFR SERPL CALC: 0.9 % (ref 0–4)
CK MB SERPL-MCNC: 1.7 NG/ML (ref 5–25)
CK SERPL-CCNC: 200 U/L (ref 26–192)
CO2 SERPL-SCNC: 24 MMOL/L (ref 21–32)
CREAT SERPL-MCNC: 0.9 MG/DL (ref 0.6–1.3)
DIAGNOSIS, 93000: NORMAL
DIFFERENTIAL METHOD BLD: ABNORMAL
EOSINOPHIL # BLD: 0.2 K/UL (ref 0–0.4)
EOSINOPHIL NFR BLD: 4 % (ref 0–5)
ERYTHROCYTE [DISTWIDTH] IN BLOOD BY AUTOMATED COUNT: 15.5 % (ref 11.6–14.5)
GLOBULIN SER CALC-MCNC: 3.9 G/DL (ref 2–4)
GLUCOSE SERPL-MCNC: 105 MG/DL (ref 74–99)
HCT VFR BLD AUTO: 42.6 % (ref 35–45)
HGB BLD-MCNC: 14 G/DL (ref 12–16)
LIPASE SERPL-CCNC: 112 U/L (ref 73–393)
LYMPHOCYTES # BLD AUTO: 38 % (ref 21–52)
LYMPHOCYTES # BLD: 2.6 K/UL (ref 0.9–3.6)
MCH RBC QN AUTO: 27.3 PG (ref 24–34)
MCHC RBC AUTO-ENTMCNC: 32.9 G/DL (ref 31–37)
MCV RBC AUTO: 83 FL (ref 74–97)
MONOCYTES # BLD: 0.7 K/UL (ref 0.05–1.2)
MONOCYTES NFR BLD AUTO: 10 % (ref 3–10)
NEUTS SEG # BLD: 3.3 K/UL (ref 1.8–8)
NEUTS SEG NFR BLD AUTO: 47 % (ref 40–73)
P-R INTERVAL, ECG05: 146 MS
PLATELET # BLD AUTO: 193 K/UL (ref 135–420)
PMV BLD AUTO: 11.7 FL (ref 9.2–11.8)
POTASSIUM SERPL-SCNC: 3.9 MMOL/L (ref 3.5–5.5)
PROT SERPL-MCNC: 7.8 G/DL (ref 6.4–8.2)
Q-T INTERVAL, ECG07: 386 MS
QRS DURATION, ECG06: 78 MS
QTC CALCULATION (BEZET), ECG08: 453 MS
RBC # BLD AUTO: 5.13 M/UL (ref 4.2–5.3)
SODIUM SERPL-SCNC: 140 MMOL/L (ref 136–145)
TROPONIN I SERPL-MCNC: <0.02 NG/ML (ref 0–0.04)
VENTRICULAR RATE, ECG03: 83 BPM
WBC # BLD AUTO: 6.8 K/UL (ref 4.6–13.2)

## 2017-07-21 PROCEDURE — 96374 THER/PROPH/DIAG INJ IV PUSH: CPT

## 2017-07-21 PROCEDURE — 96375 TX/PRO/DX INJ NEW DRUG ADDON: CPT

## 2017-07-21 PROCEDURE — 74022 RADEX COMPL AQT ABD SERIES: CPT

## 2017-07-21 PROCEDURE — 96361 HYDRATE IV INFUSION ADD-ON: CPT

## 2017-07-21 PROCEDURE — 74011250636 HC RX REV CODE- 250/636: Performed by: EMERGENCY MEDICINE

## 2017-07-21 PROCEDURE — 83690 ASSAY OF LIPASE: CPT | Performed by: EMERGENCY MEDICINE

## 2017-07-21 PROCEDURE — 99284 EMERGENCY DEPT VISIT MOD MDM: CPT

## 2017-07-21 PROCEDURE — 74011250637 HC RX REV CODE- 250/637: Performed by: EMERGENCY MEDICINE

## 2017-07-21 PROCEDURE — 85025 COMPLETE CBC W/AUTO DIFF WBC: CPT | Performed by: EMERGENCY MEDICINE

## 2017-07-21 PROCEDURE — C9113 INJ PANTOPRAZOLE SODIUM, VIA: HCPCS | Performed by: EMERGENCY MEDICINE

## 2017-07-21 PROCEDURE — 93005 ELECTROCARDIOGRAM TRACING: CPT

## 2017-07-21 PROCEDURE — 80053 COMPREHEN METABOLIC PANEL: CPT | Performed by: EMERGENCY MEDICINE

## 2017-07-21 PROCEDURE — 74011636320 HC RX REV CODE- 636/320: Performed by: EMERGENCY MEDICINE

## 2017-07-21 PROCEDURE — 82550 ASSAY OF CK (CPK): CPT | Performed by: EMERGENCY MEDICINE

## 2017-07-21 PROCEDURE — 74177 CT ABD & PELVIS W/CONTRAST: CPT

## 2017-07-21 RX ORDER — ONDANSETRON 4 MG/1
4 TABLET, FILM COATED ORAL
Qty: 12 TAB | Refills: 0 | Status: SHIPPED | OUTPATIENT
Start: 2017-07-21 | End: 2017-09-11 | Stop reason: ALTCHOICE

## 2017-07-21 RX ORDER — TRAMADOL HYDROCHLORIDE 50 MG/1
50 TABLET ORAL
Qty: 20 TAB | Refills: 0 | Status: SHIPPED | OUTPATIENT
Start: 2017-07-21 | End: 2017-09-11 | Stop reason: ALTCHOICE

## 2017-07-21 RX ORDER — PANTOPRAZOLE SODIUM 40 MG/1
40 TABLET, DELAYED RELEASE ORAL DAILY
Qty: 20 TAB | Refills: 0 | Status: SHIPPED | OUTPATIENT
Start: 2017-07-21 | End: 2017-10-18

## 2017-07-21 RX ORDER — CYCLOBENZAPRINE HCL 5 MG
10 TABLET ORAL
Qty: 20 TAB | Refills: 0 | Status: SHIPPED | OUTPATIENT
Start: 2017-07-21 | End: 2017-10-18

## 2017-07-21 RX ORDER — FENTANYL CITRATE 50 UG/ML
50 INJECTION, SOLUTION INTRAMUSCULAR; INTRAVENOUS
Status: COMPLETED | OUTPATIENT
Start: 2017-07-21 | End: 2017-07-21

## 2017-07-21 RX ORDER — PANTOPRAZOLE SODIUM 40 MG/10ML
40 INJECTION, POWDER, LYOPHILIZED, FOR SOLUTION INTRAVENOUS
Status: COMPLETED | OUTPATIENT
Start: 2017-07-21 | End: 2017-07-21

## 2017-07-21 RX ADMIN — PANTOPRAZOLE SODIUM 40 MG: 40 INJECTION, POWDER, FOR SOLUTION INTRAVENOUS at 14:05

## 2017-07-21 RX ADMIN — IOPAMIDOL 100 ML: 612 INJECTION, SOLUTION INTRAVENOUS at 15:39

## 2017-07-21 RX ADMIN — PHENOBARBITAL 30 ML: 16.2; .1037; .0065; .0194 ELIXIR ORAL at 15:26

## 2017-07-21 RX ADMIN — SODIUM CHLORIDE 1000 ML: 900 INJECTION, SOLUTION INTRAVENOUS at 14:05

## 2017-07-21 RX ADMIN — FENTANYL CITRATE 50 MCG: 50 INJECTION INTRAMUSCULAR; INTRAVENOUS at 15:26

## 2017-07-21 NOTE — ED PROVIDER NOTES
HPI Comments: Pt with history of diverticular disease requiring previous sigmoid colectomy, anxiety and CAD, presents to ED with complaint of LUQ pain that radiates to her back for the past week. She notes that she is recovering from traveler's diarrhea after a visit to [facility]. She notes that stools have normalized but \"I still have to go to the bathroom immediately after the first thing I eat in the morning\". She denies any fevers or chills. Some mild nausea but she never had any vomiting./  No current fevers or chills but states she had a \"low grade fever\" with her recent illness. No melena or hematochezia. She denies any cough or dyspnea, no diaphoresis, no chest pain. She denies any history of GERD or PUD. No recent changes to her medications. She is followed by Dr. Sony Ruth but doesn't have an appointment with him until next week. She states she went to [facility] last week when she was acutely symptomatic. \"they mostly did a work up for my heart and decided that it was probably gas but when I was reading about it, my  and I started to think I might have pancreatitis\". She denies any chest pain or palpitations. No fall or trauma, no pain with inspiration. No other acute symptoms or complaints were noted. Past Medical History:   Diagnosis Date    Abnormal nuclear cardiac imaging test 02/09/2012    Mid to distal anteroseptal and apical reversible defect concerning for ishcmeia. Mild-mod, mid-distal anterior & apical hypk. EF 67%. Borderline abnormal EKG w/1-mm inferolateral ST depression at 7 min exercise. Occasional PVCs.  Anxiety     Carotid duplex 53/09/7967    Mild 4-36% LICA stenosis.  Dengue fever     7/10 while in [place]    Diverticulosis     Dyslipidemia     GERD     Holter monitor study 04/29/2016    Sinus rhythm, avg HR 67 bpm (range  bpm). Rare ectopy.  Hypothyroidism     RLE venous duplex 07/23/2015    Right leg:  No DVT.       S/P cardiac catheterization 02/24/2012    Angiographically normal coronaries. Hyperdynamic. EF 70%. No RWMA.  Sinusitis     Tinnitus     Uterine cancer [facility])     hysterectomy       Past Surgical History:   Procedure Laterality Date    HX CHOLECYSTECTOMY      2009    HX COLECTOMY      2008    HX HYSTERECTOMY           Family History:   Problem Relation Age of Onset    Other Mother      anuerysm    Cancer Father      brain tumor    Hypertension Maternal Aunt     Diabetes Maternal Aunt      lung    Heart Disease Maternal Aunt     Stroke Maternal Grandmother     Other Maternal Grandfather      leg amputated due to phlebitis    Heart Attack Paternal Grandmother     Heart Disease Maternal Aunt        Social History     Social History    Marital status:      Spouse name: N/A    Number of children: N/A    Years of education: N/A     Occupational History    Not on file. Social History Main Topics    Smoking status: Former Smoker     Packs/day: 1.00     Years: 20.00     Types: Cigarettes     Quit date: 6/23/1999    Smokeless tobacco: Never Used    Alcohol use No    Drug use: No    Sexual activity: Not on file     Other Topics Concern    Not on file     Social History Narrative         ALLERGIES: Omnicef [cefdinir]; Keflex [cephalexin]; Codeine; Levaquin [levofloxacin]; Morphine; Sulfa (sulfonamide antibiotics); and Topamax [topiramate]    Review of Systems   Constitutional: Positive for fatigue. Negative for chills, diaphoresis and fever. HENT: Negative. Eyes: Negative for visual disturbance. Respiratory: Negative for cough, chest tightness and shortness of breath. Cardiovascular: Negative for chest pain, palpitations and leg swelling. Gastrointestinal: Positive for abdominal pain, diarrhea and nausea. Negative for abdominal distention, constipation and vomiting. Endocrine: Negative. Genitourinary: Negative for dysuria and hematuria.    Musculoskeletal: Positive for back pain. Negative for neck pain and neck stiffness. Skin: Negative for pallor. Allergic/Immunologic: Negative. Neurological: Negative for dizziness, syncope, light-headedness and headaches. Hematological: Does not bruise/bleed easily. Vitals:    07/21/17 1242   BP: 121/88   Pulse: 89   Resp: 16   Temp: 97.6 °F (36.4 °C)   SpO2: 97%   Weight: 116.1 kg (256 lb)   Height: 5' 8\" (1.727 m)            Physical Exam   Constitutional: She is oriented to person, place, and time. She appears well-developed and well-nourished. No distress. Resting comfortably on stretcher   HENT:   Head: Normocephalic and atraumatic. MM moist   Eyes: Conjunctivae and EOM are normal. No scleral icterus. Sclera clear bilaterally   Neck: Normal range of motion. Neck supple. No JVD present. Non-tender to palpation   Cardiovascular: Normal rate, regular rhythm and normal heart sounds. Exam reveals no gallop and no friction rub. No murmur heard. Pulmonary/Chest: Effort normal and breath sounds normal. No respiratory distress. She has no wheezes. She has no rales. She exhibits no tenderness. No crepitance with palpation   Abdominal: Soft. Bowel sounds are normal. She exhibits no distension and no mass. There is tenderness. There is no rebound and no guarding. Mild LUQ tenderness to palpation at lateral costal margin   Genitourinary:   Genitourinary Comments: No CVA tenderness   Musculoskeletal: She exhibits no edema or tenderness. Normal inspection of upper extremities. No edema noted to bilateral lower extremities   Lymphadenopathy:     She has no cervical adenopathy. Neurological: She is alert and oriented to person, place, and time. No cranial nerve deficit. She exhibits normal muscle tone. Normal motor and sensation bilaterally to upper and lower extremities   Skin: Skin is warm and dry. No rash noted. She is not diaphoretic. Psychiatric:   Normal mood and affect. Vitals reviewed.        MDM  Number of Diagnoses or Management Options  Abdominal pain, LUQ (left upper quadrant):   Enteritis:   Gastritis and duodenitis:   Diagnosis management comments: Pt with complaint of LUQ pain for the past week with recent traveler's diarrhea. GI symptoms, except for the pain, are resolving. She denies any cardiopulmonary symptoms. Pain is lateral, not midline, and pt's VS are reassuring; low suspicion for acute aortic pathology. Will treat pain, check labs and XR. Reassess. Labs and XR do not show acute findings but pt still complains of pain. VS stable. Will check CT. Reviewed old chart:  Echo done June 2017 showed EF 60-65%, G1DD, no wall motion abnormalities, no significant changes from echo done in 2011. Reviewed results with pt. She has had constant pain x 1 week with negative CE, do not suspect acute cardiac ischemia as etiology of current symptoms. She is comfortable with plans to discharge to home with PMD and GI follow up. Amount and/or Complexity of Data Reviewed  Clinical lab tests: ordered and reviewed  Tests in the radiology section of CPT®: ordered and reviewed  Tests in the medicine section of CPT®: ordered and reviewed  Decide to obtain previous medical records or to obtain history from someone other than the patient: yes  Obtain history from someone other than the patient: yes  Review and summarize past medical records: yes  Independent visualization of images, tracings, or specimens: yes    Risk of Complications, Morbidity, and/or Mortality  Presenting problems: moderate  Management options: moderate    Patient Progress  Patient progress: stable    ED Course       Procedures    EKG:  NSR, rate 83, normal axis, septal Q waves, no significant ST-T abnormalities.   No significant changes noted from 6/8/17

## 2017-07-21 NOTE — ED TRIAGE NOTES
\"I think I am having a pancreatic attack\". Patient C/O left upper abdominal pain under left breast that radiates to upper back. Also C/O nausea and diarrhea. Patient denies chest pain or shortness of breath.

## 2017-07-21 NOTE — DISCHARGE INSTRUCTIONS
Abdominal Pain: Care Instructions  Your Care Instructions    Abdominal pain has many possible causes. Some aren't serious and get better on their own in a few days. Others need more testing and treatment. If your pain continues or gets worse, you need to be rechecked and may need more tests to find out what is wrong. You may need surgery to correct the problem. Don't ignore new symptoms, such as fever, nausea and vomiting, urination problems, pain that gets worse, and dizziness. These may be signs of a more serious problem. Your doctor may have recommended a follow-up visit in the next 8 to 12 hours. If you are not getting better, you may need more tests or treatment. The doctor has checked you carefully, but problems can develop later. If you notice any problems or new symptoms, get medical treatment right away. Follow-up care is a key part of your treatment and safety. Be sure to make and go to all appointments, and call your doctor if you are having problems. It's also a good idea to know your test results and keep a list of the medicines you take. How can you care for yourself at home? · Rest until you feel better. · To prevent dehydration, drink plenty of fluids, enough so that your urine is light yellow or clear like water. Choose water and other caffeine-free clear liquids until you feel better. If you have kidney, heart, or liver disease and have to limit fluids, talk with your doctor before you increase the amount of fluids you drink. · If your stomach is upset, eat mild foods, such as rice, dry toast or crackers, bananas, and applesauce. Try eating several small meals instead of two or three large ones. · Wait until 48 hours after all symptoms have gone away before you have spicy foods, alcohol, and drinks that contain caffeine. · Do not eat foods that are high in fat. · Avoid anti-inflammatory medicines such as aspirin, ibuprofen (Advil, Motrin), and naproxen (Aleve).  These can cause stomach upset. Talk to your doctor if you take daily aspirin for another health problem. When should you call for help? Call 911 anytime you think you may need emergency care. For example, call if:  · You passed out (lost consciousness). · You pass maroon or very bloody stools. · You vomit blood or what looks like coffee grounds. · You have new, severe belly pain. Call your doctor now or seek immediate medical care if:  · Your pain gets worse, especially if it becomes focused in one area of your belly. · You have a new or higher fever. · Your stools are black and look like tar, or they have streaks of blood. · You have unexpected vaginal bleeding. · You have symptoms of a urinary tract infection. These may include:  ¨ Pain when you urinate. ¨ Urinating more often than usual.  ¨ Blood in your urine. · You are dizzy or lightheaded, or you feel like you may faint. Watch closely for changes in your health, and be sure to contact your doctor if:  · You are not getting better after 1 day (24 hours). Where can you learn more? Go to http://bonnie-justyna.info/. Enter X963 in the search box to learn more about \"Abdominal Pain: Care Instructions. \"  Current as of: March 20, 2017  Content Version: 11.3  © 3232-6634 [employer]. Care instructions adapted under license by [employer] (which disclaims liability or warranty for this information). If you have questions about a medical condition or this instruction, always ask your healthcare professional. [place] [address] any warranty or liability for your use of this information. Colitis: Care Instructions  Your Care Instructions  Colitis is the medical term for swelling (inflammation) of the intestine. It can be caused by different things, such as an infection or loss of blood flow in the intestine. Other causes are problems like Crohn's disease or ulcerative colitis.   Symptoms may include fever, diarrhea that may be bloody, or belly pain. Sometimes symptoms go away without treatment. But you may need treatment or more tests, such as blood tests or a stool test. Or you may need imaging tests like a CT scan or a colonoscopy. In some cases, the doctor may want to test a sample of tissue from the intestine. This test is called a biopsy. The doctor has checked you carefully, but problems can develop later. If you notice any problems or new symptoms, get medical treatment right away. Follow-up care is a key part of your treatment and safety. Be sure to make and go to all appointments, and call your doctor if you are having problems. It's also a good idea to know your test results and keep a list of the medicines you take. How can you care for yourself at home? · Rest until you feel better. · Your doctor may recommend that you eat bland foods. These include rice, dry toast or crackers, bananas, and applesauce. · To prevent dehydration, drink plenty of fluids. Choose water and other caffeine-free clear liquids until you feel better. If you have kidney, heart, or liver disease and have to limit fluids, talk with your doctor before you increase the amount of fluids you drink. · Be safe with medicines. Take your medicines exactly as prescribed. Call your doctor if you think you are having a problem with your medicine. You will get more details on the specific medicines your doctor prescribes. When should you call for help? Call 911 anytime you think you may need emergency care. For example, call if:  · You passed out (lost consciousness). · You vomit blood or what looks like coffee grounds. · Your stools are maroon or very bloody. Call your doctor now or seek immediate medical care if:  · You have new or worse pain. · You have a new or higher fever. · You have new or worse symptoms. · You cannot keep fluids or medicines down.   Watch closely for changes in your health, and be sure to contact your doctor if:  · You do not get better as expected. Where can you learn more? Go to http://bonnie-justyna.info/. Shorty Peers in the search box to learn more about \"Colitis: Care Instructions. \"  Current as of: August 9, 2016  Content Version: 11.3  © 6680-0116 [employer]. Care instructions adapted under license by [employer] (which disclaims liability or warranty for this information). If you have questions about a medical condition or this instruction, always ask your healthcare professional. [address] any warranty or liability for your use of this information. Gastritis: Care Instructions  Your Care Instructions    Gastritis is a sore and upset stomach. It happens when something irritates the stomach lining. Many things can cause it. These include an infection such as the flu or something you ate or drank. Medicines or a sore on the lining of the stomach (ulcer) also can cause it. Your belly may bloat and ache. You may belch, vomit, and feel sick to your stomach. You should be able to relieve the problem by taking medicine. And it may help to change your diet. If gastritis lasts, your doctor may prescribe medicine. Follow-up care is a key part of your treatment and safety. Be sure to make and go to all appointments, and call your doctor if you are having problems. It's also a good idea to know your test results and keep a list of the medicines you take. How can you care for yourself at home? · If your doctor prescribed antibiotics, take them as directed. Do not stop taking them just because you feel better. You need to take the full course of antibiotics. · Be safe with medicines. If your doctor prescribed medicine to decrease stomach acid, take it as directed. Call your doctor if you think you are having a problem with your medicine.   · Do not take any other medicine, including over-the-counter pain relievers, without talking to your doctor first.  · If your doctor recommends over-the-counter medicine to reduce stomach acid, such as Pepcid AC, Prilosec, Tagamet HB, or Zantac 75, follow the directions on the label. · Drink plenty of fluids (enough so that your urine is light yellow or clear like water) to prevent dehydration. Choose water and other caffeine-free clear liquids. If you have kidney, heart, or liver disease and have to limit fluids, talk with your doctor before you increase the amount of fluids you drink. · Limit how much alcohol you drink. · Avoid coffee, tea, cola drinks, chocolate, and other foods with caffeine. They increase stomach acid. When should you call for help? Call 911 anytime you think you may need emergency care. For example, call if:  · You vomit blood or what looks like coffee grounds. · You pass maroon or very bloody stools. Call your doctor now or seek immediate medical care if:  · You start breathing fast and have not produced urine in the last 8 hours. · You cannot keep fluids down. Watch closely for changes in your health, and be sure to contact your doctor if:  · You do not get better as expected. Where can you learn more? Go to http://bonnie-justyna.info/. Enter [id number] in the search box to learn more about \"Gastritis: Care Instructions. \"  Current as of: August 9, 2016  Content Version: 11.3  © 0520-5364 [employer]. Care instructions adapted under license by [employer] (which disclaims liability or warranty for this information). If you have questions about a medical condition or this instruction, always ask your healthcare professional. [address] any warranty or liability for your use of this information.

## 2017-08-31 DIAGNOSIS — E03.9 HYPOTHYROIDISM, UNSPECIFIED TYPE: ICD-10-CM

## 2017-09-05 RX ORDER — LEVOTHYROXINE SODIUM 112 UG/1
TABLET ORAL
Qty: 90 TAB | Refills: 0 | Status: SHIPPED | OUTPATIENT
Start: 2017-09-05 | End: 2017-10-18

## 2017-09-11 ENCOUNTER — OFFICE VISIT (OUTPATIENT)
Dept: FAMILY MEDICINE CLINIC | Age: 62
End: 2017-09-11

## 2017-09-11 VITALS
DIASTOLIC BLOOD PRESSURE: 87 MMHG | RESPIRATION RATE: 20 BRPM | HEIGHT: 68 IN | WEIGHT: 256.25 LBS | TEMPERATURE: 97 F | SYSTOLIC BLOOD PRESSURE: 129 MMHG | HEART RATE: 68 BPM | BODY MASS INDEX: 38.84 KG/M2

## 2017-09-11 DIAGNOSIS — Z51.81 MEDICATION MONITORING ENCOUNTER: ICD-10-CM

## 2017-09-11 DIAGNOSIS — E78.5 DYSLIPIDEMIA: ICD-10-CM

## 2017-09-11 DIAGNOSIS — E03.9 HYPOTHYROIDISM, UNSPECIFIED TYPE: ICD-10-CM

## 2017-09-11 DIAGNOSIS — R20.0 NUMBNESS: ICD-10-CM

## 2017-09-11 DIAGNOSIS — K27.9 PUD (PEPTIC ULCER DISEASE): Primary | ICD-10-CM

## 2017-09-11 DIAGNOSIS — K21.9 GASTROESOPHAGEAL REFLUX DISEASE WITHOUT ESOPHAGITIS: ICD-10-CM

## 2017-09-11 DIAGNOSIS — R73.03 PREDIABETES: ICD-10-CM

## 2017-09-11 NOTE — PROGRESS NOTES
Chief Complaint   Patient presents with    Hypothyroidism    Blood sugar problem     prediabetes    Vitamin D Deficiency    Anxiety    Cholesterol Problem     dyslipidemia       Health Maintenance Due   Topic Date Due    DTaP/Tdap/Td series (1 - Tdap) 09/11/1976    ZOSTER VACCINE AGE 60>  07/11/2015    BREAST CANCER SCRN MAMMOGRAM  12/08/2016    PAP AKA CERVICAL CYTOLOGY  02/01/2017    INFLUENZA AGE 9 TO ADULT  08/01/2017       Health Maintenance reviewed     1. Have you been to the ER, urgent care clinic since your last visit? Hospitalized since your last visit? Yes When: 8/17 Where: [facility] Reason for visit: breast pain    2. Have you seen or consulted any other health care providers outside of the [address] [place] [address] since your last visit? Include any pap smears or colon screening.  No

## 2017-09-11 NOTE — MR AVS SNAPSHOT
Visit Information Date & Time Provider Department Dept. Phone Encounter #  
 9/11/2017 10:00 AM Marcus Cartagena MD [address] [phone] [id number] Follow-up Instructions Return in about 6 weeks (around 10/23/2017) for fuw tih labs and schedule WWE. Your Appointments 6/14/2018 11:00 AM  
Follow Up with Radha Howard MD  
[facility] ([facility]) Appt Note: 1 year follow up [place] [id number]  
[phone] [address] Upcoming Health Maintenance Date Due DTaP/Tdap/Td series (1 - Tdap) 9/11/1976 ZOSTER VACCINE AGE 60> 7/11/2015 BREAST CANCER SCRN MAMMOGRAM 12/8/2016 PAP AKA CERVICAL CYTOLOGY 2/1/2017 COLONOSCOPY 5/26/2020 Allergies as of 9/11/2017  Review Complete On: 9/11/2017 By: Marcus Cartagena MD  
  
 Severity Noted Reaction Type Reactions Omnicef [Cefdinir] High 07/26/2011    Hives Keflex [Cephalexin] Medium 07/21/2017    Hives Codeine  06/23/2011    Other (comments) Severe headache Levaquin [Levofloxacin]  10/12/2012    Unknown (comments) Morphine  06/23/2011    Other (comments) Severe headache  
 Sulfa (Sulfonamide Antibiotics)  06/23/2011    Hives Topamax [Topiramate]  01/11/2016    Other (comments) Made blood vessels red and pain in eyes Current Immunizations  Reviewed on 5/30/2017 Name Date Hep A Vaccine (Adult) 5/30/2017 Influenza Vaccine (Quad) PF 9/28/2016 Not reviewed this visit You Were Diagnosed With   
  
 Codes Comments PUD (peptic ulcer disease)    -  Primary ICD-10-CM: K27.9 ICD-9-CM: 533.90 Gastroesophageal reflux disease without esophagitis     ICD-10-CM: K21.9 ICD-9-CM: 530.81 Hypothyroidism, unspecified type     ICD-10-CM: E03.9 ICD-9-CM: 244.9 Prediabetes     ICD-10-CM: R73.03 
ICD-9-CM: 790.29 Dyslipidemia     ICD-10-CM: E78.5 ICD-9-CM: 272.4 Medication monitoring encounter     ICD-10-CM: Z51.81 
ICD-9-CM: V58.83 Vitals BP Pulse Temp Resp Height(growth percentile) Weight(growth percentile) 129/87 (BP 1 Location: Right arm, BP Patient Position: Sitting) 68 97 °F (36.1 °C) (Oral) 20 5' 8\" (1.727 m) 256 lb 4 oz (116.2 kg) BMI OB Status Smoking Status 38.96 kg/m2 Hysterectomy Former Smoker BMI and BSA Data Body Mass Index Body Surface Area  
 38.96 kg/m 2 2.36 m 2 Preferred Pharmacy Pharmacy Name Phone [address] [place] [address] [phone] Your Updated Medication List  
  
   
This list is accurate as of: 9/11/17 11:22 AM.  Always use your most recent med list.  
  
  
  
  
 ALIGN 4 mg Cap Generic drug:  Bifidobacterium Infantis Take  by mouth daily. atorvastatin 10 mg tablet Commonly known as:  LIPITOR  
TAKE 1 TABLET BY MOUTH EVERY DAY Blood-Glucose Meter monitoring kit Check blood sugar TID PRN with meals and/or bouts of dizziness  
  
 clobetasol 0.05 % ointment Commonly known as:  May Misbah Apply  to affected area two (2) times daily as needed for Skin Irritation or Itching. cyclobenzaprine 5 mg tablet Commonly known as:  FLEXERIL Take 2 Tabs by mouth three (3) times daily as needed for Muscle Spasm(s). EPINEPHrine 0.3 mg/0.3 mL injection Commonly known as:  AUVI-Q  
0.3 mL by IntraMUSCular route once as needed for Anaphylaxis for up to 1 dose. FISH OIL CONCENTRATE PO Take  by mouth daily. glucose blood VI test strips strip Commonly known as:  blood glucose test  
Patient to check blood sugar TID PRN Lancets Misc Check blood sugar TID PRN with meals and dizziness OTHER Azelaic/Finast/Flutic/Minox(Scalp) 5/0.1% Apply to affected area of scalp daily 30 ml  
  
 pantoprazole 40 mg tablet Commonly known as:  PROTONIX Take 1 Tab by mouth daily. SYNTHROID 112 mcg tablet Generic drug:  levothyroxine TAKE 1 TABLET BY MOUTH DAILY BEFORE BREAKFAST Follow-up Instructions Return in about 6 weeks (around 10/23/2017) for [facility] labs and schedule WWE. To-Do List   
 11/11/2017 Lab:  HEMOGLOBIN A1C W/O EAG   
  
 11/11/2017 Lab:  LIPID PANEL   
  
 11/11/2017 Lab:  METABOLIC PANEL, COMPREHENSIVE   
  
 11/11/2017 Lab:  T4, FREE   
  
 11/11/2017 Lab:  TSH 3RD GENERATION Patient Instructions Please contact our office if you have any questions about your visit today. Introducing [facility]! [facility] introduces [employer] patient portal. Now you can access parts of your medical record, email your doctor's office, and request medication refills online. 1. In your internet browser, go to [email or web address] 2. Click on the First Time User? Click Here link in the Sign In box. You will see the New Member Sign Up page. 3. Enter your [employer] Access Code exactly as it appears below. You will not need to use this code after youve completed the sign-up process. If you do not sign up before the expiration date, you must request a new code. · [employer] Access Code: [email or web address] Expires: 12/10/2017  9:51 AM 
 
4. Enter the last four digits of your Social Security Number (xxxx) and Date of Birth (mm/dd/yyyy) as indicated and click Submit. You will be taken to the next sign-up page. 5. Create a [employer] ID. This will be your [employer] login ID and cannot be changed, so think of one that is secure and easy to remember. 6. Create a [employer] password. You can change your password at any time. 7. Enter your Password Reset Question and Answer. This can be used at a later time if you forget your password. 8. Enter your e-mail address. You will receive e-mail notification when new information is available in [address] 9. Click Sign Up. You can now view and download portions of your medical record. 10. Click the Download Summary menu link to download a portable copy of your medical information. If you have questions, please visit the Frequently Asked Questions section of the [employer] website. Remember, [employer] is NOT to be used for urgent needs. For medical emergencies, dial 911. Now available from your iPhone and Android! Please provide this summary of care documentation to your next provider. Your primary care clinician is listed as ELVIN BRITTON. If you have any questions after today's visit, please call [phone].

## 2017-10-18 ENCOUNTER — OFFICE VISIT (OUTPATIENT)
Dept: CARDIOLOGY CLINIC | Age: 62
End: 2017-10-18

## 2017-10-18 ENCOUNTER — TELEPHONE (OUTPATIENT)
Dept: FAMILY MEDICINE CLINIC | Age: 62
End: 2017-10-18

## 2017-10-18 VITALS
OXYGEN SATURATION: 98 % | BODY MASS INDEX: 38.19 KG/M2 | HEART RATE: 73 BPM | HEIGHT: 68 IN | WEIGHT: 252 LBS | DIASTOLIC BLOOD PRESSURE: 90 MMHG | SYSTOLIC BLOOD PRESSURE: 112 MMHG

## 2017-10-18 DIAGNOSIS — E78.5 DYSLIPIDEMIA: ICD-10-CM

## 2017-10-18 DIAGNOSIS — R03.0 ELEVATED BLOOD PRESSURE READING: ICD-10-CM

## 2017-10-18 DIAGNOSIS — Z51.81 MEDICATION MONITORING ENCOUNTER: ICD-10-CM

## 2017-10-18 DIAGNOSIS — R07.89 OTHER CHEST PAIN: ICD-10-CM

## 2017-10-18 DIAGNOSIS — E55.9 VITAMIN D DEFICIENCY: Primary | ICD-10-CM

## 2017-10-18 DIAGNOSIS — R00.2 PALPITATIONS: Primary | ICD-10-CM

## 2017-10-18 DIAGNOSIS — Z13.21 ENCOUNTER FOR VITAMIN DEFICIENCY SCREENING: ICD-10-CM

## 2017-10-18 RX ORDER — LEVOTHYROXINE SODIUM 112 UG/1
TABLET ORAL
COMMUNITY
End: 2017-12-04 | Stop reason: SDUPTHER

## 2017-10-18 NOTE — MR AVS SNAPSHOT
Visit Information Date & Time Provider Department Dept. Phone Encounter #  
 10/18/2017 11:30 AM Cara Brian NP [facility] [address] [id number] Your Appointments 10/23/2017 10:00 AM  
Follow Up with Arturo Hines MD  
[address] (--) Appt Note: fu  
 [address] [facility] [id number] [phone]  
  
   
 [facility] [address]  
  
    
 11/16/2017 11:40 AM  
POST HOSPITAL with James Groves MD  
[facility] ([facility]) Appt Note: post ED/ irregular heart beats/palpitations/chest pains/ see [address] [facility] [address] [address] [address] [address] [phone] Branden Alvarezca  
  
    
 11/27/2017  9:00 AM  
PHYSICAL with Arturo Hines MD  
[address] (--) Appt Note: annual  
 [address] [address] [address] [address] [phone]  
  
   
 [address] [address] [address] [address] 6/14/2018 11:00 AM  
Follow Up with James Groves MD  
[facility] ([facility]) Appt Note: 1 year follow up [place] [address] [address] [address] [phone] [address] Upcoming Health Maintenance Date Due DTaP/Tdap/Td series (1 - Tdap) 9/11/1976 ZOSTER VACCINE AGE 60> 7/11/2015 BREAST CANCER SCRN MAMMOGRAM 12/8/2016 PAP AKA CERVICAL CYTOLOGY 2/1/2017 COLONOSCOPY 5/26/2020 Allergies as of 10/18/2017  Review Complete On: 10/18/2017 By: Cara Brian NP Severity Noted Reaction Type Reactions Omnicef [Cefdinir] High 07/26/2011    Hives Keflex [Cephalexin] Medium 07/21/2017    Hives Codeine  06/23/2011    Other (comments) Severe headache Ketek [Telithromycin]  09/21/2017    Other (comments) Passed out Levaquin [Levofloxacin]  10/12/2012    Unknown (comments) Morphine  06/23/2011    Other (comments) Severe headache  
 Sulfa (Sulfonamide Antibiotics)  06/23/2011    Hives Topamax [Topiramate]  01/11/2016    Other (comments) Made blood vessels red and pain in eyes Current Immunizations  Reviewed on 5/30/2017 Name Date Hep A Vaccine (Adult) 5/30/2017 Influenza Vaccine (Quad) PF 9/28/2016 Not reviewed this visit You Were Diagnosed With   
  
 Codes Comments Dyslipidemia    -  Primary ICD-10-CM: E78.5 ICD-9-CM: 272.4 Other chest pain     ICD-10-CM: R07.89 ICD-9-CM: 786.59 Palpitations     ICD-10-CM: R00.2 ICD-9-CM: 785.1 Elevated blood pressure reading     ICD-10-CM: R03.0 ICD-9-CM: 796.2 Vitals BP Pulse Height(growth percentile) Weight(growth percentile) SpO2 BMI  
 112/90 73 5' 8\" (1.727 m) 252 lb (114.3 kg) 98% 38.32 kg/m2 OB Status Smoking Status Hysterectomy Former Smoker Vitals History BMI and BSA Data Body Mass Index Body Surface Area  
 38.32 kg/m 2 2.34 m 2 Preferred Pharmacy Pharmacy Name Phone [address] [facility] 18 [phone] Your Updated Medication List  
  
   
This list is accurate as of: 10/18/17 12:28 PM.  Always use your most recent med list.  
  
  
  
  
 ALIGN 4 mg Cap Generic drug:  Bifidobacterium Infantis Take  by mouth daily. atorvastatin 10 mg tablet Commonly known as:  LIPITOR  
TAKE 1 TABLET BY MOUTH EVERY DAY Blood-Glucose Meter monitoring kit Check blood sugar TID PRN with meals and/or bouts of dizziness  
  
 clobetasol 0.05 % ointment Commonly known as:  Rocíotorri Bhagat Apply  to affected area two (2) times daily as needed for Skin Irritation or Itching. EPINEPHrine 0.3 mg/0.3 mL injection Commonly known as:  AUVI-Q  
 0.3 mL by IntraMUSCular route once as needed for Anaphylaxis for up to 1 dose. FISH OIL CONCENTRATE PO Take  by mouth daily. glucose blood VI test strips strip Commonly known as:  blood glucose test  
Patient to check blood sugar TID PRN Lancets Misc Check blood sugar TID PRN with meals and dizziness OTHER Azelaic/Finast/Flutic/Minox(Scalp) 5/0.1% Apply to affected area of scalp daily 30 ml SYNTHROID 112 mcg tablet Generic drug:  levothyroxine Take  by mouth Daily (before breakfast). We Performed the Following AMB POC EKG ROUTINE W/ 12 LEADS, INTER & REP [04451 CPT(R)] To-Do List   
 10/18/2017 Lab:  MAGNESIUM Around 10/18/2017 Lab:  METABOLIC PANEL, BASIC Patient Instructions 2 week event monitor; Dx:  Palpitations BMP, Mg to be done with other labs per Dr. Carmen Byrnes Follow-up with Dr Zara Dc as scheduled and as needed Introducing [facility]! Dear Carmen Rojo: Thank you for requesting a [employer] account. Our records indicate that you already have an active [employer] account. You can access your account anytime at [email or web address] Did you know that you can access your hospital and ER discharge instructions at any time in [employer]? You can also review all of your test results from your hospital stay or ER visit. Additional Information If you have questions, please visit the Frequently Asked Questions section of the [employer] website at [email or web address] Remember, [employer] is NOT to be used for urgent needs. For medical emergencies, dial 911. Now available from your iPhone and Android! Please provide this summary of care documentation to your next provider. Your primary care clinician is listed as ELVIN BRITTON. If you have any questions after today's visit, please call [phone].

## 2017-10-18 NOTE — TELEPHONE ENCOUNTER
Pt went to see her cardiologist (Dr. Gutierrez [facility] office). She saw the nurse practitioner Cele Conrad and she recommended she ask her pcp to order labs to check magnesium, Vitamin B and Vitamin D. She wants to know if Dr. Cameron Gao can add these to her current labs that need to be drawn for her upcoming appointment on 10/23/17.

## 2017-10-18 NOTE — PROGRESS NOTES
Jennifer Sotomayor presents today for a post-hospital follow-up. She presented to [facility] on 9/21/17, with complaints of palpitations and a syncopal episode that occurred at home. I did not see documentation of any syncopal episode on her hospital records. She states that she felt lightheaded and dizzy and sat down on the couch and the next thing she remembers is waking up several minutes later. She told her  and she was brought to the ER. She ruled out for MI and she underwent a nuclear stress test on 9/22/17, and it showed normal EKG, normal LV wall motion and EF. There was no scintigraphic evidence of myocardial scar or ischemia with treadmill exercise. According to the discharge summary, a few PVCs were noted during her monitored time in the hospital.      She is a 58year old female with history of pre-diabetes, recurrent atypical chest pain, palpitations (rare ectopics on Holter in 2016), and uterine cancer s/p hysterectomy. She was last seen by Dr. Malcolm Munguia on 6/8/17. She has history of GERD which was treated with a PPI in the past.     She reports that when she checks her pulse at home, sometimes it is elevated and sometimes it is slower. The palpitations feel like \"skipped beats. \"   She complains of fatigue that occurs at times. Denies chest pain, tightness, heaviness, and admits to palpitations. She states that sometimes her heart \"beats hard. \"  Denies shortness of breath at rest, dyspnea on exertion, orthopnea and PND. Denies abdominal bloating. Denies lightheadedness, dizziness, and syncope. Denies lower extremity edema and claudication. Denies nausea, vomiting, diarrhea, melena, hematochezia. Denies hematuria, urgency, frequency. Denies fever, chills. She states that for 2-3 weeks, she took a lower dose of Synthroid as she thought the increased dose of 112 mcg was causing the palpitations.   She states that she also has noticed that when she took some of her supplemental vitamins, PPI, or muscle relaxer, she would notice palpitations and therefore, she has not been taking them. She denies caffeine use or use of any energy drinks. PMH:  Past Medical History:   Diagnosis Date    Abnormal nuclear cardiac imaging test 02/09/2012    Mid to distal anteroseptal and apical reversible defect concerning for ishcmeia. Mild-mod, mid-distal anterior & apical hypk. EF 67%. Borderline abnormal EKG w/1-mm inferolateral ST depression at 7 min exercise. Occasional PVCs.  Anxiety     Carotid duplex 24/96/3192    Mild 9-49% LICA stenosis.  Dengue fever     7/10 while in [place]    Diverticulosis     Dyslipidemia     GERD     Holter monitor study 04/29/2016    Sinus rhythm, avg HR 67 bpm (range  bpm). Rare ectopy.  Hypothyroidism     RLE venous duplex 07/23/2015    Right leg:  No DVT.  S/P cardiac catheterization 02/24/2012    Angiographically normal coronaries. Hyperdynamic. EF 70%. No RWMA.  Sinusitis     Tinnitus     Uterine cancer ([facility] [address]     hysterectomy       PSH:  Past Surgical History:   Procedure Laterality Date    HX CHOLECYSTECTOMY      2009    HX COLECTOMY      2008    HX HYSTERECTOMY         MEDS:  Current Outpatient Prescriptions   Medication Sig    levothyroxine (SYNTHROID) 112 mcg tablet Take  by mouth Daily (before breakfast).  atorvastatin (LIPITOR) 10 mg tablet TAKE 1 TABLET BY MOUTH EVERY DAY    clobetasol (TEMOVATE) 0.05 % ointment Apply  to affected area two (2) times daily as needed for Skin Irritation or Itching.  OTHER Azelaic/Finast/Flutic/Minox(Scalp) 5/0.1%  Apply to affected area of scalp daily  30 ml    EPINEPHrine (AUVI-Q) 0.3 mg/0.3 mL (1:1,000) injection 0.3 mL by IntraMUSCular route once as needed for Anaphylaxis for up to 1 dose.     Lancets Misc Check blood sugar TID PRN with meals and dizziness    Blood-Glucose Meter monitoring kit Check blood sugar TID PRN with meals and/or bouts of dizziness    glucose blood VI test strips (BLOOD GLUCOSE TEST) strip Patient to check blood sugar TID PRN    Bifidobacterium Infantis (ALIGN) 4 mg cap Take  by mouth daily.  OMEGA-3 FATTY ACIDS (FISH OIL CONCENTRATE PO) Take  by mouth daily. No current facility-administered medications for this visit. Allergies and Sensitivities:  Allergies   Allergen Reactions    Omnicef [Cefdinir] Hives    Keflex [Cephalexin] Hives    Codeine Other (comments)     Severe headache    Ketek [Telithromycin] Other (comments)     Passed out    Levaquin [Levofloxacin] Unknown (comments)    Morphine Other (comments)     Severe headache    Sulfa (Sulfonamide Antibiotics) Hives    Topamax [Topiramate] Other (comments)     Made blood vessels red and pain in eyes       Family History:  Family History   Problem Relation Age of Onset    Other Mother      anuerysm    Cancer Father      brain tumor    Hypertension Maternal Aunt     Diabetes Maternal Aunt      lung    Heart Disease Maternal Aunt     Stroke Maternal Grandmother     Other Maternal Grandfather      leg amputated due to phlebitis    Heart Attack Paternal Grandmother     Heart Disease Maternal Aunt        Social History:  She  reports that she quit smoking about 18 years ago. Her smoking use included Cigarettes. She has a 20.00 pack-year smoking history. She has never used smokeless tobacco.  She  reports that she does not drink alcohol. Physical:  Visit Vitals    /90    Pulse 73    Ht 5' 8\" (1.727 m)    Wt 114.3 kg (252 lb)    SpO2 98%    BMI 38.32 kg/m2         Exam:  Neck:  Supple, no JVD, no carotid bruits  CV:  Normal S1 and  S2, no murmurs, rubs, or gallops noted  Lungs:  Clear to ausculation throughout, no wheezes or rales  Abd:  Soft, non-tender, non-distended with good bowel sounds.   No hepatosplenomegaly  Extremities:  No edema      Data:  EKG:    Normal sinus rhythm, rate 73.  RSR prime normal variant in V1 & V2.  Normal EKG      LABS:  Lab Results   Component Value Date/Time    Sodium 141 09/21/2017 01:43 PM    Potassium 4.1 09/21/2017 01:43 PM    Chloride 110 09/21/2017 01:43 PM    CO2 22 09/21/2017 01:43 PM    Glucose 111 09/21/2017 01:43 PM    BUN 15 09/21/2017 01:43 PM    Creatinine 1.0 09/21/2017 01:43 PM     Lab Results   Component Value Date/Time    Cholesterol, total 168 01/25/2017 10:34 AM    HDL Cholesterol 45 01/25/2017 10:34 AM    LDL, calculated 107 01/25/2017 10:34 AM    Triglyceride 77 01/25/2017 10:34 AM     Lab Results   Component Value Date/Time    ALT (SGPT) 45 07/21/2017 01:55 PM         Impression/Plan:  1.  ? Syncopal episode  2. Palpitations  3. Hypertension, diastolic blood pressure slightly elevated  4. History of uterine CA    Mrs. Pat Kamara was seen today for a post-hospital follow-up. She was hospitalized at [facility] for complaints of palpitations and ? Syncopal episode. She tells me that she had been having palpitations for several days prior to presenting to the ER. The day of presentation, she states that she felt lightheaded and dizzy and she sat down on the couch. The next thing she remembers is waking up a few minutes later. She tells me that this is why she went to the ER. A review of the hospital notes that we have available does not mention any complaints of syncope. She has noticed that she experiences palpitations when she takes some of her vitamin supplements such as Govind Red, which she has been taking for years. She also gets that same sensations when she takes her muscle relaxer and PPI. She has since stopped taking certain medications until she has the palpitations evaluated. She denies chest pain, shortness of breath,  The palpitations occur intermittently. Will ask that she wear a 2 week event monitor to assess for any rhythm abnormalities or ectopics. Hopefully the results of the event monitor will be available by that time.      Her diastolic blood pressure is slightly elevated. Her heart rate is stable, EKG shows sinus rhythm. Lungs are clear and she exhibits no signs of volume overload. No changes were made to her medication regimen at this time. She told me that she took a smaller dose of Synthroid for about 2-3 weeks as she thought the increased dose may be causing the palpitations. She was asked to resume her prescribed dose of 112 mcg. I asked that she inform her PCP about the change she made. The results of lab findings and studies done at [facility] discussed with her. Reassured that she ruled out for MI and that the stress test was negative. She will follow-up to see Dr. Chandu Milan scheduled and as needed. Vick Reaves MSN, FNP-BC    Please note:  Portions of this chart were created with Dragon medical speech to text program.  Unrecognized errors may be present.

## 2017-10-18 NOTE — PROGRESS NOTES
1. Have you been to the ER, urgent care clinic since your last visit? Hospitalized since your last visit? Yes 9/21/17-9/22/17  Chest pain     2. Have you seen or consulted any other health care providers outside of the [address] [place] [address] since your last visit? Include any pap smears or colon screening.  No

## 2017-10-18 NOTE — TELEPHONE ENCOUNTER
It appears cardiology ordered BMP and Magnesium but not the Vit D or Vit B. Please advise if you prefer orders to be placed by Cardiology.

## 2017-10-18 NOTE — PATIENT INSTRUCTIONS
2 week event monitor;  Dx:  Palpitations  BMP, Mg to be done with other labs per Dr. Harvey Bridegroom with Dr Tatiana Patel as scheduled and as needed

## 2017-10-19 NOTE — TELEPHONE ENCOUNTER
Irving Sales MD   You Yesterday (1:41 PM)                 Orders done, in chart vit d and vit b12 (Routing comment)

## 2017-10-19 NOTE — TELEPHONE ENCOUNTER
Contacted patient and verified identity using name and date of birth (2- identifiers). Spoke with patient and advised of orders.

## 2017-10-20 ENCOUNTER — HOSPITAL ENCOUNTER (OUTPATIENT)
Dept: LAB | Age: 62
Discharge: HOME | End: 2017-10-20

## 2017-10-20 LAB — SENTARA SPECIMEN COL,SENBCF: NORMAL

## 2017-10-20 PROCEDURE — 99001 SPECIMEN HANDLING PT-LAB: CPT | Performed by: FAMILY MEDICINE

## 2017-10-21 LAB
25(OH)D3 SERPL-MCNC: 32.5 NG/ML (ref 32–100)
A-G RATIO,AGRAT: 2 RATIO (ref 1.1–2.6)
ALBUMIN SERPL-MCNC: 4.5 G/DL (ref 3.5–5)
ALP SERPL-CCNC: 72 U/L (ref 40–120)
ALT SERPL-CCNC: 29 U/L (ref 5–40)
ANION GAP SERPL CALC-SCNC: 21 MMOL/L
AST SERPL W P-5'-P-CCNC: 22 U/L (ref 10–37)
AVG GLU, 10930: 127 MG/DL (ref 91–123)
BILIRUB SERPL-MCNC: 0.6 MG/DL (ref 0.2–1.2)
BUN SERPL-MCNC: 17 MG/DL (ref 6–22)
CALCIUM SERPL-MCNC: 9.7 MG/DL (ref 8.4–10.5)
CHLORIDE SERPL-SCNC: 102 MMOL/L (ref 98–110)
CHOLEST SERPL-MCNC: 142 MG/DL (ref 110–200)
CO2 SERPL-SCNC: 19 MMOL/L (ref 20–32)
CREAT SERPL-MCNC: 0.9 MG/DL (ref 0.8–1.4)
GFRAA, 66117: >60
GFRNA, 66118: 59.6
GLOBULIN,GLOB: 2.3 G/DL (ref 2–4)
GLUCOSE SERPL-MCNC: 99 MG/DL (ref 70–99)
HBA1C MFR BLD HPLC: 6.1 % (ref 4.8–5.9)
HDLC SERPL-MCNC: 43 MG/DL (ref 40–59)
LDLC SERPL CALC-MCNC: 84 MG/DL (ref 50–99)
MAGNESIUM SERPL-MCNC: 2.1 MG/DL (ref 1.6–2.5)
POTASSIUM SERPL-SCNC: 4.2 MMOL/L (ref 3.5–5.5)
PROT SERPL-MCNC: 6.8 G/DL (ref 6.2–8.1)
SODIUM SERPL-SCNC: 142 MMOL/L (ref 133–145)
T4 FREE SERPL-MCNC: 1.4 NG/DL (ref 0.9–1.8)
TRIGL SERPL-MCNC: 78 MG/DL (ref 40–149)
TSH SERPL DL<=0.005 MIU/L-ACNC: 2.47 MCU/ML (ref 0.27–4.2)
VIT B12 SERPL-MCNC: 334 PG/ML (ref 211–911)
VLDLC SERPL CALC-MCNC: 16 MG/DL (ref 8–30)

## 2017-10-23 ENCOUNTER — OFFICE VISIT (OUTPATIENT)
Dept: FAMILY MEDICINE CLINIC | Age: 62
End: 2017-10-23

## 2017-10-23 VITALS
HEIGHT: 68 IN | DIASTOLIC BLOOD PRESSURE: 92 MMHG | HEART RATE: 69 BPM | TEMPERATURE: 97 F | WEIGHT: 253.5 LBS | BODY MASS INDEX: 38.42 KG/M2 | RESPIRATION RATE: 20 BRPM | SYSTOLIC BLOOD PRESSURE: 142 MMHG

## 2017-10-23 DIAGNOSIS — R19.5 LOOSE STOOLS: ICD-10-CM

## 2017-10-23 DIAGNOSIS — B37.2 CANDIDAL INTERTRIGO: ICD-10-CM

## 2017-10-23 DIAGNOSIS — L29.0 PRURITUS ANI: ICD-10-CM

## 2017-10-23 DIAGNOSIS — E55.9 VITAMIN D DEFICIENCY: ICD-10-CM

## 2017-10-23 DIAGNOSIS — R00.2 HEART PALPITATIONS: Primary | ICD-10-CM

## 2017-10-23 DIAGNOSIS — R59.9 LYMPH NODE ENLARGEMENT: ICD-10-CM

## 2017-10-23 DIAGNOSIS — R73.03 PREDIABETES: ICD-10-CM

## 2017-10-23 DIAGNOSIS — E78.5 DYSLIPIDEMIA: ICD-10-CM

## 2017-10-23 RX ORDER — FLUCONAZOLE 150 MG/1
150 TABLET ORAL DAILY
Qty: 1 TAB | Refills: 0 | Status: SHIPPED | OUTPATIENT
Start: 2017-10-23 | End: 2017-11-02 | Stop reason: SDUPTHER

## 2017-10-23 RX ORDER — NYSTATIN AND TRIAMCINOLONE ACETONIDE 100000; 1 [USP'U]/G; MG/G
OINTMENT TOPICAL
Qty: 30 G | Refills: 1 | Status: SHIPPED | OUTPATIENT
Start: 2017-10-23 | End: 2018-02-12 | Stop reason: SDUPTHER

## 2017-10-23 NOTE — MR AVS SNAPSHOT
Visit Information Date & Time Provider Department Dept. Phone Encounter #  
 10/23/2017 10:30 AM Ingrid Cuenca MD [address] [id number] Follow-up Instructions Return in about 1 month (around 11/23/2017). Your Appointments 11/16/2017 11:40 AM  
POST HOSPITAL with Bere Cam MD  
[facility] [place] ([facility]) Appt Note: post ED/ irregular heart beats/palpitations/chest pains/ see [address] [facility] [address] [place] [address]  
[phone] Branden Marroquin  
  
    
 11/27/2017  9:00 AM  
PHYSICAL with MD Jessie Baltazar 70 (--) Appt Note: annual  
 [address] [place] [address]  
[phone]  
  
   
 [address] [place] [address] 6/14/2018 11:00 AM  
Follow Up with Bere Cam MD  
[facility] [place] [facility]) Appt Note: 1 year follow up [place] [address]  
[phone] [facility] [place] [address] Upcoming Health Maintenance Date Due DTaP/Tdap/Td series (1 - Tdap) 9/11/1976 ZOSTER VACCINE AGE 60> 7/11/2015 BREAST CANCER SCRN MAMMOGRAM 12/8/2016 PAP AKA CERVICAL CYTOLOGY 2/1/2017 COLONOSCOPY 5/26/2020 Allergies as of 10/23/2017  Review Complete On: 10/23/2017 By: Ingrid Cuenca MD  
  
 Severity Noted Reaction Type Reactions Omnicef [Cefdinir] High 07/26/2011    Hives Keflex [Cephalexin] Medium 07/21/2017    Hives Codeine  06/23/2011    Other (comments) Severe headache Ketek [Telithromycin]  09/21/2017    Other (comments) Passed out Levaquin [Levofloxacin]  10/12/2012    Unknown (comments) Morphine  06/23/2011    Other (comments) Severe headache  
 Sulfa (Sulfonamide Antibiotics)  06/23/2011    Hives Topamax [Topiramate]  01/11/2016    Other (comments) Made blood vessels red and pain in eyes Current Immunizations  Reviewed on 5/30/2017 Name Date Hep A Vaccine (Adult) 5/30/2017 Influenza Vaccine (Quad) PF 9/28/2016 Not reviewed this visit You Were Diagnosed With   
  
 Codes Comments Heart palpitations    -  Primary ICD-10-CM: R00.2 ICD-9-CM: 785.1 Lymph node enlargement     ICD-10-CM: R59.9 ICD-9-CM: 258. 6 Dyslipidemia     ICD-10-CM: E78.5 ICD-9-CM: 272.4 Prediabetes     ICD-10-CM: R73.03 
ICD-9-CM: 790.29 Vitamin D deficiency     ICD-10-CM: E55.9 ICD-9-CM: 268.9 Loose stools     ICD-10-CM: R19.5 ICD-9-CM: 787.7 Pruritus ani     ICD-10-CM: L29.0 ICD-9-CM: 698.0 Candidal intertrigo     ICD-10-CM: B37.2 ICD-9-CM: 112.3 Vitals BP Pulse Temp Resp Height(growth percentile) Weight(growth percentile) (!) 142/92 (BP 1 Location: Right arm, BP Patient Position: Sitting) 69 97 °F (36.1 °C) (Oral) 20 5' 8\" (1.727 m) 253 lb 8 oz (115 kg) BMI OB Status Smoking Status 38.54 kg/m2 Hysterectomy Former Smoker Vitals History BMI and BSA Data Body Mass Index Body Surface Area 38.54 kg/m 2 2.35 m 2 Preferred Pharmacy Pharmacy Name Phone [address] [facility] 18 [phone] Your Updated Medication List  
  
   
This list is accurate as of: 10/23/17 12:22 PM.  Always use your most recent med list.  
  
  
  
  
 ALIGN 4 mg Cap Generic drug:  Bifidobacterium Infantis Take  by mouth daily. atorvastatin 10 mg tablet Commonly known as:  LIPITOR  
TAKE 1 TABLET BY MOUTH EVERY DAY Blood-Glucose Meter monitoring kit Check blood sugar TID PRN with meals and/or bouts of dizziness  
  
 clobetasol 0.05 % ointment Commonly known as:  Ulysses Hale Apply  to affected area two (2) times daily as needed for Skin Irritation or Itching. EPINEPHrine 0.3 mg/0.3 mL injection Commonly known as:  AUVI-Q  
0.3 mL by IntraMUSCular route once as needed for Anaphylaxis for up to 1 dose. fluconazole 150 mg tablet Commonly known as:  DIFLUCAN Take 1 Tab by mouth daily for 1 day. FDA advises cautious prescribing of oral fluconazole in pregnancy. glucose blood VI test strips strip Commonly known as:  blood glucose test  
Patient to check blood sugar TID PRN Lancets Misc Check blood sugar TID PRN with meals and dizziness  
  
 nystatin-triamcinolone 100,000-0.1 unit/gram-% ointment Commonly known as:  Lily Estimable Apply to affected area bid OTHER Azelaic/Finast/Flutic/Minox(Scalp) 5/0.1% Apply to affected area of scalp daily 30 ml SYNTHROID 112 mcg tablet Generic drug:  levothyroxine Take  by mouth Daily (before breakfast). Prescriptions Sent to Pharmacy Refills  
 fluconazole (DIFLUCAN) 150 mg tablet 0 Sig: Take 1 Tab by mouth daily for 1 day. FDA advises cautious prescribing of oral fluconazole in pregnancy. Class: Normal  
 Pharmacy: [email or web address] [address] [place] [address] Ph #: [phone] Route: Oral  
 nystatin-triamcinolone (MYCOLOG) 100,000-0.1 unit/gram-% ointment 1 Sig: Apply to affected area bid Class: Normal  
 Pharmacy: [email or web address] [address] [place] [address] [address] [place] [address] Ph #: [phone] Follow-up Instructions Return in about 1 month (around 11/23/2017). To-Do List   
 10/23/2017 Microbiology:  OVA & PARASITES, STOOL Patient Instructions Please contact our office if you have any questions about your visit today. Introducing [facility]! Dear Fred Pond: Thank you for requesting a i-marker account. Our records indicate that you already have an active i-marker account.   You can access your account anytime at [email or web address] Did you know that you can access your hospital and ER discharge instructions at any time in [employer]? You can also review all of your test results from your hospital stay or ER visit. Additional Information If you have questions, please visit the Frequently Asked Questions section of the [employer] website at [email or web address] Remember, [employer] is NOT to be used for urgent needs. For medical emergencies, dial 911. Now available from your iPhone and Android! Please provide this summary of care documentation to your next provider. Your primary care clinician is listed as ELVIN BRITTON. If you have any questions after today's visit, please call [phone].

## 2017-10-23 NOTE — PROGRESS NOTES
HISTORY OF PRESENT ILLNESS  Gonzalez Ca is a 58 y.o. female. Chief Complaint   Patient presents with    Ulcer     peptic    GERD    Hypothyroidism chronic problem, stable     Blood sugar problem     Prediabetes chronic problem, stable no meds on diet for this     Cholesterol Problem     Dyslipidemia chronic problem, stable     Results     discuss lab results       complains of severe palpitations for 3 days. Went to [facility] when acutely worse and felt dizzy as well. Thinks she passed out at home as she had some LOC. Was admitted overnight observation, had a nuclear stress test, told was unremarkable, DR. Whitmore. Had follow up with cardiology NP last week. Had labs drawn, mg, b12 etc, ordered a 2 week heart monitor. Has had some ongoing since, states she stopped everything except synthroid, lipitor and align. . Stopped prevacid bc she noted after taking one night. complains of rash under pannus of abdomen, took lotrimin and clobetasol rash. complains of anal itching, concerned with infection after traveling treated with xifixan and asking about parasites. complains of loose stools  complains of vaginal discharge. HPI  Past Medical History:   Diagnosis Date    Abnormal nuclear cardiac imaging test 02/09/2012    Mid to distal anteroseptal and apical reversible defect concerning for ishcmeia. Mild-mod, mid-distal anterior & apical hypk. EF 67%. Borderline abnormal EKG w/1-mm inferolateral ST depression at 7 min exercise. Occasional PVCs.  Anxiety     Carotid duplex 14/77/8697    Mild 6-58% LICA stenosis.  Dengue fever     7/10 while in [place]    Diverticulosis     Dyslipidemia     GERD     Holter monitor study 04/29/2016    Sinus rhythm, avg HR 67 bpm (range  bpm). Rare ectopy.  Hypothyroidism     RLE venous duplex 07/23/2015    Right leg:  No DVT.  S/P cardiac catheterization 02/24/2012    Angiographically normal coronaries. Hyperdynamic.   EF 70%.  No RWMA.  Sinusitis     Tinnitus     Uterine cancer [facility])     hysterectomy     Current Outpatient Prescriptions   Medication Sig Dispense Refill    levothyroxine (SYNTHROID) 112 mcg tablet Take  by mouth Daily (before breakfast).  atorvastatin (LIPITOR) 10 mg tablet TAKE 1 TABLET BY MOUTH EVERY DAY 90 Tab 3    clobetasol (TEMOVATE) 0.05 % ointment Apply  to affected area two (2) times daily as needed for Skin Irritation or Itching. 60 g 3    OTHER Azelaic/Finast/Flutic/Minox(Scalp) 5/0.1%  Apply to affected area of scalp daily  30 ml      EPINEPHrine (AUVI-Q) 0.3 mg/0.3 mL (1:1,000) injection 0.3 mL by IntraMUSCular route once as needed for Anaphylaxis for up to 1 dose. 1 Each 2    Lancets Misc Check blood sugar TID PRN with meals and dizziness 1 Package 11    Blood-Glucose Meter monitoring kit Check blood sugar TID PRN with meals and/or bouts of dizziness 1 Kit 0    glucose blood VI test strips (BLOOD GLUCOSE TEST) strip Patient to check blood sugar TID PRN 1 Package 11    Bifidobacterium Infantis (ALIGN) 4 mg cap Take  by mouth daily. Allergies   Allergen Reactions    Omnicef [Cefdinir] Hives    Keflex [Cephalexin] Hives    Codeine Other (comments)     Severe headache    Ketek [Telithromycin] Other (comments)     Passed out    Levaquin [Levofloxacin] Unknown (comments)    Morphine Other (comments)     Severe headache    Sulfa (Sulfonamide Antibiotics) Hives    Topamax [Topiramate] Other (comments)     Made blood vessels red and pain in eyes       ROS  Visit Vitals    BP (!) 142/92 (BP 1 Location: Right arm, BP Patient Position: Sitting)  Comment: manual    Pulse 69    Temp 97 °F (36.1 °C) (Oral)    Resp 20    Ht 5' 8\" (1.727 m)    Wt 253 lb 8 oz (115 kg)    BMI 38.54 kg/m2       Physical Exam   Nursing note and vitals reviewed. Constitutional: She is oriented to person, place, and time. She appears well-developed and well-nourished. No distress.    HENT: Mouth/Throat: Oropharynx is clear and moist.   Neck: No JVD present. No thyromegaly present. Cardiovascular: Normal rate, regular rhythm and normal heart sounds. Pulmonary/Chest: Effort normal and breath sounds normal. No respiratory distress. She has no wheezes. She has no rales. Musculoskeletal: She exhibits no edema. Lymphadenopathy:     She has anterior cervical adenopathy. Neurological: She is alert and oriented to person, place, and time. Coordination normal.   Psychiatric: She has a normal mood and affect. Her behavior is normal.       Component      Latest Ref Rng & Units 10/20/2017   Glucose      70 - 99 mg/dL 99   BUN      6 - 22 mg/dL 17   Creatinine      0.8 - 1.4 mg/dL 0.9   Sodium      133 - 145 mmol/L 142   Potassium      3.5 - 5.5 mmol/L 4.2   Chloride      98 - 110 mmol/L 102   CO2      20 - 32 mmol/L 19 (L)   AST      10 - 37 U/L 22   ALT (SGPT)      5 - 40 U/L 29   Alk. phosphatase      40 - 120 U/L 72   Bilirubin, total      0.2 - 1.2 mg/dL 0.6   Calcium      8.4 - 10.5 mg/dL 9.7   Protein, total      6.2 - 8.1 g/dL 6.8   Albumin      3.5 - 5.0 g/dL 4.5   A-G Ratio      1.1 - 2.6 ratio 2.0   Globulin      2.0 - 4.0 g/dL 2.3   Anion gap      mmol/L 21.0   GFRAA      >60.0 >60.0   GFRNA      >60.0 59.6 (L)   Triglyceride      40 - 149 mg/dL 78   HDL Cholesterol      40 - 59 mg/dL 43   Cholesterol, total      110 - 200 mg/dL 142   LDL, calculated      50 - 99 mg/dL 84   VLDL, calculated      8 - 30 mg/dL 16   Hemoglobin A1c, (calculated)      4.8 - 5.9 % 6.1 (H)   AVG GLU      91 - 123 mg/dL 127 (H)   [facility] SPECIMEN COL       Specimens collected/sent to [facility]   Magnesium      1.6 - 2.5 mg/dL 2.1   T4, Free      0.9 - 1.8 ng/dL 1.4   Vitamin B12      211 - 911 pg/mL 334   VITAMIN D, 25-HYDROXY      32.0 - 100.0 ng/mL 32.5   TSH      0.27 - 4.20 mcU/mL 2.47     ASSESSMENT and PLAN    ICD-10-CM ICD-9-CM    1. Heart palpitations R00.2 785.1    2. Lymph node enlargement R59.9 785.6    3. Dyslipidemia E78.5 272.4    4. Prediabetes R73.03 790.29    5. Vitamin D deficiency E55.9 268.9    6. Loose stools R19.5 787.7 OVA & PARASITES, STOOL   7. Pruritus ani L29.0 698.0 OVA & PARASITES, STOOL   8. Candidal intertrigo B37.2 112.3 fluconazole (DIFLUCAN) 150 mg tablet      nystatin-triamcinolone (MYCOLOG) 100,000-0.1 unit/gram-% ointment   Visit based of time 45 minutes total,  with more than 50% of the face-to-face visit time spent in counseling on numerous issues and new complaints above, record and lab review, it's treatment, prognosis, management advise, plan and follow-up recommendations.   /Follow-up Disposition:  Return in about 1 month (around 11/23/2017).

## 2017-10-23 NOTE — PROGRESS NOTES
Chief Complaint   Patient presents with    Ulcer     peptic    GERD    Hypothyroidism    Blood sugar problem     prediabetes    Cholesterol Problem     dyslipidemia    Results     discuss lab results       Health Maintenance Due   Topic Date Due    DTaP/Tdap/Td series (1 - Tdap) 09/11/1976    ZOSTER VACCINE AGE 60>  07/11/2015    BREAST CANCER SCRN MAMMOGRAM  12/08/2016    PAP AKA CERVICAL CYTOLOGY  02/01/2017       Health Maintenance reviewed    1. Have you been to the ER, urgent care clinic since your last visit? Hospitalized since your last visit? Yes When: 9/17 Where: [facility] Reason for visit: palpitations    2. Have you seen or consulted any other health care providers outside of the [address] [place] [address] since your last visit? Include any pap smears or colon screening.  Yes When: 10/17 Where: cardiology Reason for visit: ov    Per Verbal order from Di Sam MD   to remove Fish oil from the patients medication list.

## 2017-11-01 LAB — CULTURE RESULT, SENTARA: NORMAL

## 2017-11-02 ENCOUNTER — OFFICE VISIT (OUTPATIENT)
Dept: FAMILY MEDICINE CLINIC | Age: 62
End: 2017-11-02

## 2017-11-02 VITALS
DIASTOLIC BLOOD PRESSURE: 83 MMHG | BODY MASS INDEX: 38.34 KG/M2 | RESPIRATION RATE: 16 BRPM | HEIGHT: 68 IN | SYSTOLIC BLOOD PRESSURE: 131 MMHG | TEMPERATURE: 97 F | WEIGHT: 253 LBS | HEART RATE: 73 BPM

## 2017-11-02 DIAGNOSIS — N39.0 URINARY TRACT INFECTION WITH HEMATURIA, SITE UNSPECIFIED: Primary | ICD-10-CM

## 2017-11-02 DIAGNOSIS — B37.2 CANDIDAL INTERTRIGO: ICD-10-CM

## 2017-11-02 DIAGNOSIS — R31.9 URINARY TRACT INFECTION WITH HEMATURIA, SITE UNSPECIFIED: Primary | ICD-10-CM

## 2017-11-02 LAB
BILIRUB UR QL STRIP: NORMAL
GLUCOSE UR-MCNC: NEGATIVE MG/DL
KETONES P FAST UR STRIP-MCNC: NORMAL MG/DL
PH UR STRIP: 5.5 [PH] (ref 4.6–8)
PROT UR QL STRIP: NEGATIVE MG/DL
SP GR UR STRIP: 1.03 (ref 1–1.03)
UA UROBILINOGEN AMB POC: NORMAL (ref 0.2–1)
URINALYSIS CLARITY POC: CLEAR
URINALYSIS COLOR POC: YELLOW
URINE BLOOD POC: NORMAL
URINE LEUKOCYTES POC: NORMAL
URINE NITRITES POC: NEGATIVE

## 2017-11-02 RX ORDER — CIPROFLOXACIN 500 MG/1
500 TABLET ORAL 2 TIMES DAILY
Qty: 14 TAB | Refills: 0 | Status: SHIPPED | OUTPATIENT
Start: 2017-11-02 | End: 2017-11-07 | Stop reason: ALTCHOICE

## 2017-11-02 RX ORDER — FLUCONAZOLE 150 MG/1
150 TABLET ORAL DAILY
Qty: 1 TAB | Refills: 0 | Status: SHIPPED | OUTPATIENT
Start: 2017-11-02 | End: 2017-11-07 | Stop reason: SDUPTHER

## 2017-11-02 NOTE — PROGRESS NOTES
HISTORY OF PRESENT ILLNESS  Enrrique Jay is a 58 y.o. female. Chief Complaint   Patient presents with    Urinary Frequency x 2 weeks worsening    Rash     under her abdomen, chest area noted from electrodes from heart monitor   follow up intertrigo new problem responding to nystatin some      Urinary Frequency    The history is provided by the patient. This is a new problem. The current episode started more than 1 week ago. The problem occurs every urination. The problem has been gradually worsening. The quality of the pain is described as burning. The pain is moderate. There has been no fever. Associated symptoms include frequency and flank pain. Pertinent negatives include no hematuria, no hesitancy and no back pain. The patient is not pregnant. She has tried nothing for the symptoms. The treatment provided no relief. Her past medical history does not include recurrent UTIs. Past Medical History:   Diagnosis Date    Abnormal nuclear cardiac imaging test 02/09/2012    Mid to distal anteroseptal and apical reversible defect concerning for ishcmeia. Mild-mod, mid-distal anterior & apical hypk. EF 67%. Borderline abnormal EKG w/1-mm inferolateral ST depression at 7 min exercise. Occasional PVCs.  Anxiety     Carotid duplex 86/70/8732    Mild 8-31% LICA stenosis.  Dengue fever     7/10 while in [place]    Diverticulosis     Dyslipidemia     GERD     Holter monitor study 04/29/2016    Sinus rhythm, avg HR 67 bpm (range  bpm). Rare ectopy.  Hypothyroidism     RLE venous duplex 07/23/2015    Right leg:  No DVT.  S/P cardiac catheterization 02/24/2012    Angiographically normal coronaries. Hyperdynamic. EF 70%. No RWMA.       Sinusitis     Tinnitus     Uterine cancer [facility])     hysterectomy     Current Outpatient Prescriptions   Medication Sig Dispense Refill    nystatin-triamcinolone (MYCOLOG) 100,000-0.1 unit/gram-% ointment Apply to affected area bid 30 g 1    levothyroxine (SYNTHROID) 112 mcg tablet Take  by mouth Daily (before breakfast).  atorvastatin (LIPITOR) 10 mg tablet TAKE 1 TABLET BY MOUTH EVERY DAY 90 Tab 3    clobetasol (TEMOVATE) 0.05 % ointment Apply  to affected area two (2) times daily as needed for Skin Irritation or Itching. 60 g 3    OTHER Azelaic/Finast/Flutic/Minox(Scalp) 5/0.1%  Apply to affected area of scalp daily  30 ml      EPINEPHrine (AUVI-Q) 0.3 mg/0.3 mL (1:1,000) injection 0.3 mL by IntraMUSCular route once as needed for Anaphylaxis for up to 1 dose. 1 Each 2    Lancets Misc Check blood sugar TID PRN with meals and dizziness 1 Package 11    Blood-Glucose Meter monitoring kit Check blood sugar TID PRN with meals and/or bouts of dizziness 1 Kit 0    glucose blood VI test strips (BLOOD GLUCOSE TEST) strip Patient to check blood sugar TID PRN 1 Package 11    Bifidobacterium Infantis (ALIGN) 4 mg cap Take  by mouth daily. Allergies   Allergen Reactions    Omnicef [Cefdinir] Hives    Keflex [Cephalexin] Hives    Codeine Other (comments)     Severe headache    Ketek [Telithromycin] Other (comments)     Passed out    Levaquin [Levofloxacin] Unknown (comments)    Morphine Other (comments)     Severe headache    Sulfa (Sulfonamide Antibiotics) Hives    Topamax [Topiramate] Other (comments)     Made blood vessels red and pain in eyes       Review of Systems   Genitourinary: Positive for flank pain and frequency. Negative for hematuria and hesitancy. Musculoskeletal: Negative for back pain. Visit Vitals    /83 (BP 1 Location: Left arm, BP Patient Position: Sitting)    Pulse 73    Temp 97 °F (36.1 °C) (Oral)    Resp 16    Ht 5' 8\" (1.727 m)    Wt 253 lb (114.8 kg)    BMI 38.47 kg/m2       Physical Exam Nursing note and vitals reviewed. Constitutional: She is oriented to person, place, and time. She appears well-developed and well-nourished. No distress.    HENT:   Mouth/Throat: Oropharynx is clear and moist. Neck: No JVD present. No thyromegaly present. Cardiovascular: Normal rate, regular rhythm and normal heart sounds. Pulmonary/Chest: Effort normal and breath sounds normal. No respiratory distress. She has no wheezes. She has no rales. Musculoskeletal: She exhibits no edema. Lymphadenopathy:     She has no cervical adenopathy. Neurological: She is alert and oriented to person, place, and time. Coordination normal.   Psychiatric: She has a normal mood and affect. Her behavior is normal.    Results for orders placed or performed in visit on 11/02/17   AMB POC URINALYSIS DIP STICK AUTO W/O MICRO   Result Value Ref Range    Color (UA POC) Yellow     Clarity (UA POC) Clear     Glucose (UA POC) Negative Negative    Bilirubin (UA POC) 1+ Negative    Ketones (UA POC) Trace Negative    Specific gravity (UA POC) 1.030 1.001 - 1.035    Blood (UA POC) Trace Negative    pH (UA POC) 5.5 4.6 - 8.0    Protein (UA POC) Negative Negative mg/dL    Urobilinogen (UA POC) 0.2 mg/dL 0.2 - 1    Nitrites (UA POC) Negative Negative    Leukocyte esterase (UA POC) Trace Negative       ASSESSMENT and PLAN    ICD-10-CM ICD-9-CM    1. Urinary tract infection with hematuria, site unspecified N39.0 599.0 AMB POC URINALYSIS DIP STICK AUTO W/O MICRO    R31.9  CULTURE, URINE      ciprofloxacin HCl (CIPRO) 500 mg tablet   2.  Candidal intertrigo B37.2 112.3 fluconazole (DIFLUCAN) 150 mg tablet

## 2017-11-02 NOTE — MR AVS SNAPSHOT
Visit Information Date & Time Provider Department Dept. Phone Encounter #  
 11/2/2017  7:45 AM Orlando Barbosa MD [address] [phone] Your Appointments 11/16/2017 11:40 AM  
POST HOSPITAL with Zo Fong MD  
[facility] ([facility]) Appt Note: post ED/ irregular heart beats/palpitations/chest pains/ see [address] [facility] [address] [id number] [address] [address] [phone] Branden Marroquin  
  
    
 11/27/2017  9:00 AM  
PHYSICAL with Orlando Barbosa MD  
[address] (--) Appt Note: annual  
 [address] [place] [address] [address]  
  
    
 12/4/2017 10:15 AM  
Follow Up with Orlando Barbosa MD  
[address] (--) Appt Note: fu  
 [address] [address] [address] [address]  
[phone]  
  
   
 [address] [address] [address] [address] 6/14/2018 11:00 AM  
Follow Up with Zo Fong MD  
[facility] ([facility]) Appt Note: 1 year follow up [place] [address] [address] [address] [phone] [facility] [address] Upcoming Health Maintenance Date Due DTaP/Tdap/Td series (1 - Tdap) 9/11/1976 ZOSTER VACCINE AGE 60> 7/11/2015 BREAST CANCER SCRN MAMMOGRAM 12/8/2016 PAP AKA CERVICAL CYTOLOGY 2/1/2017 COLONOSCOPY 5/26/2020 Allergies as of 11/2/2017  Review Complete On: 11/2/2017 By: Orlando Barbosa MD  
  
 Severity Noted Reaction Type Reactions Omnicef [Cefdinir] High 07/26/2011    Hives Keflex [Cephalexin] Medium 07/21/2017    Hives Codeine  06/23/2011    Other (comments) Severe headache Ketek [Telithromycin]  09/21/2017    Other (comments) Passed out Levaquin [Levofloxacin]  10/12/2012    Unknown (comments) Did not tolerate Morphine  06/23/2011    Other (comments) Severe headache  
 Sulfa (Sulfonamide Antibiotics)  06/23/2011    Hives Topamax [Topiramate]  01/11/2016    Other (comments) Made blood vessels red and pain in eyes Current Immunizations  Reviewed on 5/30/2017 Name Date Hep A Vaccine (Adult) 5/30/2017 Influenza Vaccine (Quad) PF 9/28/2016 Not reviewed this visit You Were Diagnosed With   
  
 Codes Comments Urinary tract infection with hematuria, site unspecified    -  Primary ICD-10-CM: N39.0, R31.9 ICD-9-CM: 599.0 Candidal intertrigo     ICD-10-CM: B37.2 ICD-9-CM: 112.3 Vitals BP Pulse Temp Resp Height(growth percentile) Weight(growth percentile) 131/83 (BP 1 Location: Left arm, BP Patient Position: Sitting) 73 97 °F (36.1 °C) (Oral) 16 5' 8\" (1.727 m) 253 lb (114.8 kg) BMI OB Status Smoking Status 38.47 kg/m2 Hysterectomy Former Smoker BMI and BSA Data Body Mass Index Body Surface Area  
 38.47 kg/m 2 2.35 m 2 Preferred Pharmacy Pharmacy Name Phone [address] [place] [address] [phone] Your Updated Medication List  
  
   
This list is accurate as of: 11/2/17  8:51 AM.  Always use your most recent med list.  
  
  
  
  
 ALIGN 4 mg Cap Generic drug:  Bifidobacterium Infantis Take  by mouth daily. atorvastatin 10 mg tablet Commonly known as:  LIPITOR  
TAKE 1 TABLET BY MOUTH EVERY DAY Blood-Glucose Meter monitoring kit Check blood sugar TID PRN with meals and/or bouts of dizziness  
  
 ciprofloxacin HCl 500 mg tablet Commonly known as:  CIPRO Take 1 Tab by mouth two (2) times a day for 7 days. clobetasol 0.05 % ointment Commonly known as:  Jadon Webster Apply  to affected area two (2) times daily as needed for Skin Irritation or Itching. EPINEPHrine 0.3 mg/0.3 mL injection Commonly known as:  AUVI-Q  
0.3 mL by IntraMUSCular route once as needed for Anaphylaxis for up to 1 dose. fluconazole 150 mg tablet Commonly known as:  DIFLUCAN Take 1 Tab by mouth daily for 1 day. FDA advises cautious prescribing of oral fluconazole in pregnancy. glucose blood VI test strips strip Commonly known as:  blood glucose test  
Patient to check blood sugar TID PRN Lancets Misc Check blood sugar TID PRN with meals and dizziness  
  
 nystatin-triamcinolone 100,000-0.1 unit/gram-% ointment Commonly known as:  Aldona Jeffrey Apply to affected area bid OTHER Azelaic/Finast/Flutic/Minox(Scalp) 5/0.1% Apply to affected area of scalp daily 30 ml SYNTHROID 112 mcg tablet Generic drug:  levothyroxine Take  by mouth Daily (before breakfast). Prescriptions Sent to Pharmacy Refills  
 fluconazole (DIFLUCAN) 150 mg tablet 0 Sig: Take 1 Tab by mouth daily for 1 day. FDA advises cautious prescribing of oral fluconazole in pregnancy. Class: Normal  
 Pharmacy: [address] Ph #: [phone] Route: Oral  
 ciprofloxacin HCl (CIPRO) 500 mg tablet 0 Sig: Take 1 Tab by mouth two (2) times a day for 7 days. Class: Normal  
 Pharmacy: [address] Ph #: [phone] Route: Oral  
  
We Performed the Following AMB POC URINALYSIS DIP STICK AUTO W/O MICRO [94615 CPT(R)] CULTURE, URINE [id number] CPT(R)] Patient Instructions Please contact our office if you have any questions about your visit today. Yeast Skin Infection: Care Instructions Your Care Instructions Yeast normally lives on your skin.  Sometimes too much yeast can overgrow in certain areas of the skin and cause an infection. The infection causes red, scaly, moist patches on your skin that may itch. Common areas for skin yeast infections are skin folds under the breasts or belly area. The warm and moist areas in the skin folds can make it easier for yeast to overgrow. Yeast infections also can be found on other parts of the body such as the groin or armpits. You will probably get a cream or ointment that contains an antifungal medicine. Examples of these are miconazole and clotrimazole. You put it on your skin to treat the infection. Your doctor may give you a prescription for the cream or ointment. Or you may be able to buy it without a prescription at most drugstores. If the infection is severe, the doctor will prescribe antifungal pills. A yeast infection usually goes away after about a week of treatment. But it's important to use the medicine for as long as your doctor tells you to. Follow-up care is a key part of your treatment and safety. Be sure to make and go to all appointments, and call your doctor if you are having problems. It's also a good idea to know your test results and keep a list of the medicines you take. How can you care for yourself at home? · Be safe with medicines. Take your medicines exactly as prescribed. Call your doctor if you think you are having a problem with your medicine. · Keep your skin clean and dry. Your doctor may suggest using powder that contains an antifungal medicine in the skin folds. · Wear loose clothing. When should you call for help? Call your doctor now or seek immediate medical care if: 
? · You have symptoms of infection, such as: 
¨ Increased pain, swelling, warmth, or redness. ¨ Red streaks leading from the area. ¨ Pus draining from the area. ¨ A fever. ? Watch closely for changes in your health, and be sure to contact your doctor if: 
? · You do not get better as expected. Where can you learn more? Go to http://bonnie-justyna.info/. Enter K763 in the search box to learn more about \"Yeast Skin Infection: Care Instructions. \" Current as of: October 13, 2016 Content Version: 11.4 © 7168-2396 [employer]. Care instructions adapted under license by Bio[employer] (which disclaims liability or warranty for this information). If you have questions about a medical condition or this instruction, always ask your healthcare professional. [address] any warranty or liability for your use of this information. Urinary Tract Infection in Women: Care Instructions Your Care Instructions A urinary tract infection, or UTI, is a general term for an infection anywhere between the kidneys and the urethra (where urine comes out). Most UTIs are bladder infections. They often cause pain or burning when you urinate. UTIs are caused by bacteria and can be cured with antibiotics. Be sure to complete your treatment so that the infection goes away. Follow-up care is a key part of your treatment and safety. Be sure to make and go to all appointments, and call your doctor if you are having problems. It's also a good idea to know your test results and keep a list of the medicines you take. How can you care for yourself at home? · Take your antibiotics as directed. Do not stop taking them just because you feel better. You need to take the full course of antibiotics. · Drink extra water and other fluids for the next day or two. This may help wash out the bacteria that are causing the infection. (If you have kidney, heart, or liver disease and have to limit fluids, talk with your doctor before you increase your fluid intake.) · Avoid drinks that are carbonated or have caffeine. They can irritate the bladder. · Urinate often. Try to empty your bladder each time.  
· To relieve pain, take a hot bath or lay a heating pad set on low over your lower belly or genital area. Never go to sleep with a heating pad in place. To prevent UTIs · Drink plenty of water each day. This helps you urinate often, which clears bacteria from your system. (If you have kidney, heart, or liver disease and have to limit fluids, talk with your doctor before you increase your fluid intake.) · Urinate when you need to. · Urinate right after you have sex. · Change sanitary pads often. · Avoid douches, bubble baths, feminine hygiene sprays, and other feminine hygiene products that have deodorants. · After going to the bathroom, wipe from front to back. When should you call for help? Call your doctor now or seek immediate medical care if: 
? · Symptoms such as fever, chills, nausea, or vomiting get worse or appear for the first time. ? · You have new pain in your back just below your rib cage. This is called flank pain. ? · There is new blood or pus in your urine. ? · You have any problems with your antibiotic medicine. ? Watch closely for changes in your health, and be sure to contact your doctor if: 
? · You are not getting better after taking an antibiotic for 2 days. ? · Your symptoms go away but then come back. Where can you learn more? Go to http://bonnie-justyna.info/. Enter F759 in the search box to learn more about \"Urinary Tract Infection in Women: Care Instructions. \" Current as of: May 12, 2017 Content Version: 11.4 © 8170-5693 [employer]. Care instructions adapted under license by [employer]IO (which disclaims liability or warranty for this information). If you have questions about a medical condition or this instruction, always ask your healthcare professional. [address] any warranty or liability for your use of this information. Introducing [facility]! Dear Rima Conley: Thank you for requesting a Patronpath account.   Our records indicate that you already have an active O'ol [employer] account. You can access your account anytime at [email or web address] Did you know that you can access your hospital and ER discharge instructions at any time in O'ol [employer]? You can also review all of your test results from your hospital stay or ER visit. Additional Information If you have questions, please visit the Frequently Asked Questions section of the O'ol Blue website at [email or web address] Remember, O'ol Blue is NOT to be used for urgent needs. For medical emergencies, dial 911. Now available from your iPhone and Android! Please provide this summary of care documentation to your next provider. Your primary care clinician is listed as ELVIN BRITTON. If you have any questions after today's visit, please call [phone].

## 2017-11-02 NOTE — PROGRESS NOTES
Chief Complaint   Patient presents with    Urinary Frequency    Rash     under her abdomen       Health Maintenance Due   Topic Date Due    DTaP/Tdap/Td series (1 - Tdap) 09/11/1976    ZOSTER VACCINE AGE 60>  07/11/2015    BREAST CANCER SCRN MAMMOGRAM  12/08/2016    PAP AKA CERVICAL CYTOLOGY  02/01/2017       Health Maintenance reviewed     1. Have you been to the ER, urgent care clinic since your last visit? Hospitalized since your last visit? No    2. Have you seen or consulted any other health care providers outside of the [address] [place] [address] since your last visit? Include any pap smears or colon screening.  No

## 2017-11-02 NOTE — PATIENT INSTRUCTIONS
Please contact our office if you have any questions about your visit today. Yeast Skin Infection: Care Instructions  Your Care Instructions    Yeast normally lives on your skin. Sometimes too much yeast can overgrow in certain areas of the skin and cause an infection. The infection causes red, scaly, moist patches on your skin that may itch. Common areas for skin yeast infections are skin folds under the breasts or belly area. The warm and moist areas in the skin folds can make it easier for yeast to overgrow. Yeast infections also can be found on other parts of the body such as the groin or armpits. You will probably get a cream or ointment that contains an antifungal medicine. Examples of these are miconazole and clotrimazole. You put it on your skin to treat the infection. Your doctor may give you a prescription for the cream or ointment. Or you may be able to buy it without a prescription at most drugstores. If the infection is severe, the doctor will prescribe antifungal pills. A yeast infection usually goes away after about a week of treatment. But it's important to use the medicine for as long as your doctor tells you to. Follow-up care is a key part of your treatment and safety. Be sure to make and go to all appointments, and call your doctor if you are having problems. It's also a good idea to know your test results and keep a list of the medicines you take. How can you care for yourself at home? · Be safe with medicines. Take your medicines exactly as prescribed. Call your doctor if you think you are having a problem with your medicine. · Keep your skin clean and dry. Your doctor may suggest using powder that contains an antifungal medicine in the skin folds. · Wear loose clothing. When should you call for help? Call your doctor now or seek immediate medical care if:  ? · You have symptoms of infection, such as:  ¨ Increased pain, swelling, warmth, or redness.   ¨ Red streaks leading from the area. ¨ Pus draining from the area. ¨ A fever. ? Watch closely for changes in your health, and be sure to contact your doctor if:  ? · You do not get better as expected. Where can you learn more? Go to http://bonnie-justyna.info/. Enter Y152 in the search box to learn more about \"Yeast Skin Infection: Care Instructions. \"  Current as of: October 13, 2016  Content Version: 11.4  © 0351-4019 [employer]. Care instructions adapted under license by [employer] (which disclaims liability or warranty for this information). If you have questions about a medical condition or this instruction, always ask your healthcare professional. [place] [address] any warranty or liability for your use of this information. Urinary Tract Infection in Women: Care Instructions  Your Care Instructions    A urinary tract infection, or UTI, is a general term for an infection anywhere between the kidneys and the urethra (where urine comes out). Most UTIs are bladder infections. They often cause pain or burning when you urinate. UTIs are caused by bacteria and can be cured with antibiotics. Be sure to complete your treatment so that the infection goes away. Follow-up care is a key part of your treatment and safety. Be sure to make and go to all appointments, and call your doctor if you are having problems. It's also a good idea to know your test results and keep a list of the medicines you take. How can you care for yourself at home? · Take your antibiotics as directed. Do not stop taking them just because you feel better. You need to take the full course of antibiotics. · Drink extra water and other fluids for the next day or two. This may help wash out the bacteria that are causing the infection.  (If you have kidney, heart, or liver disease and have to limit fluids, talk with your doctor before you increase your fluid intake.)  · Avoid drinks that are carbonated or have caffeine. They can irritate the bladder. · Urinate often. Try to empty your bladder each time. · To relieve pain, take a hot bath or lay a heating pad set on low over your lower belly or genital area. Never go to sleep with a heating pad in place. To prevent UTIs  · Drink plenty of water each day. This helps you urinate often, which clears bacteria from your system. (If you have kidney, heart, or liver disease and have to limit fluids, talk with your doctor before you increase your fluid intake.)  · Urinate when you need to. · Urinate right after you have sex. · Change sanitary pads often. · Avoid douches, bubble baths, feminine hygiene sprays, and other feminine hygiene products that have deodorants. · After going to the bathroom, wipe from front to back. When should you call for help? Call your doctor now or seek immediate medical care if:  ? · Symptoms such as fever, chills, nausea, or vomiting get worse or appear for the first time. ? · You have new pain in your back just below your rib cage. This is called flank pain. ? · There is new blood or pus in your urine. ? · You have any problems with your antibiotic medicine. ? Watch closely for changes in your health, and be sure to contact your doctor if:  ? · You are not getting better after taking an antibiotic for 2 days. ? · Your symptoms go away but then come back. Where can you learn more? Go to http://bonnie-justyna.info/. Enter I919 in the search box to learn more about \"Urinary Tract Infection in Women: Care Instructions. \"  Current as of: May 12, 2017  Content Version: 11.4  © 8225-2380 [employer]. Care instructions adapted under license by [employer] (which disclaims liability or warranty for this information).  If you have questions about a medical condition or this instruction, always ask your healthcare professional. [address] any warranty or liability for your use of this information.

## 2017-11-04 LAB — CULTURE RESULT, SENTARA: NORMAL

## 2017-11-07 ENCOUNTER — OFFICE VISIT (OUTPATIENT)
Dept: FAMILY MEDICINE CLINIC | Age: 62
End: 2017-11-07

## 2017-11-07 VITALS
DIASTOLIC BLOOD PRESSURE: 94 MMHG | HEIGHT: 68 IN | SYSTOLIC BLOOD PRESSURE: 136 MMHG | BODY MASS INDEX: 38.12 KG/M2 | WEIGHT: 251.5 LBS | TEMPERATURE: 96.9 F | HEART RATE: 74 BPM | RESPIRATION RATE: 20 BRPM

## 2017-11-07 DIAGNOSIS — B37.2 CANDIDAL INTERTRIGO: ICD-10-CM

## 2017-11-07 DIAGNOSIS — B37.31 VULVOVAGINITIS CANDIDA ALBICANS: Primary | ICD-10-CM

## 2017-11-07 RX ORDER — FLUCONAZOLE 150 MG/1
150 TABLET ORAL DAILY
Qty: 1 TAB | Refills: 0 | Status: SHIPPED | OUTPATIENT
Start: 2017-11-07 | End: 2017-11-08

## 2017-11-07 RX ORDER — TERCONAZOLE 80 MG/1
80 SUPPOSITORY VAGINAL
Qty: 3 SUPPOSITORY | Refills: 1 | Status: SHIPPED | OUTPATIENT
Start: 2017-11-07 | End: 2017-11-14

## 2017-11-07 NOTE — PROGRESS NOTES
HISTORY OF PRESENT ILLNESS  Enrrique Jay is a 58 y.o. female. Chief Complaint   Patient presents with    Anal Irritation     patient states to have irritation in the labia area that goes to the anal area. Describes as a burning, itching and 'skin crawling' sensation. Worse at night, worsening, noted on the labia and anal area. HPI  Past Medical History:   Diagnosis Date    Abnormal nuclear cardiac imaging test 02/09/2012    Mid to distal anteroseptal and apical reversible defect concerning for ishcmeia. Mild-mod, mid-distal anterior & apical hypk. EF 67%. Borderline abnormal EKG w/1-mm inferolateral ST depression at 7 min exercise. Occasional PVCs.  Anxiety     Carotid duplex 99/14/0498    Mild 0-85% LICA stenosis.  Dengue fever     7/10 while in [place]    Diverticulosis     Dyslipidemia     GERD     Holter monitor study 04/29/2016    Sinus rhythm, avg HR 67 bpm (range  bpm). Rare ectopy.  Hypothyroidism     RLE venous duplex 07/23/2015    Right leg:  No DVT.  S/P cardiac catheterization 02/24/2012    Angiographically normal coronaries. Hyperdynamic. EF 70%. No RWMA.  Sinusitis     Tinnitus     Uterine cancer [facility])     hysterectomy     Current Outpatient Prescriptions   Medication Sig Dispense Refill    ciprofloxacin HCl (CIPRO) 500 mg tablet Take 1 Tab by mouth two (2) times a day for 7 days. 14 Tab 0    nystatin-triamcinolone (MYCOLOG) 100,000-0.1 unit/gram-% ointment Apply to affected area bid 30 g 1    levothyroxine (SYNTHROID) 112 mcg tablet Take  by mouth Daily (before breakfast).  atorvastatin (LIPITOR) 10 mg tablet TAKE 1 TABLET BY MOUTH EVERY DAY 90 Tab 3    clobetasol (TEMOVATE) 0.05 % ointment Apply  to affected area two (2) times daily as needed for Skin Irritation or Itching.  60 g 3    OTHER Azelaic/Finast/Flutic/Minox(Scalp) 5/0.1%  Apply to affected area of scalp daily  30 ml      EPINEPHrine (AUVI-Q) 0.3 mg/0.3 mL (1:1,000) injection 0.3 mL by IntraMUSCular route once as needed for Anaphylaxis for up to 1 dose. 1 Each 2    Lancets Misc Check blood sugar TID PRN with meals and dizziness 1 Package 11    Blood-Glucose Meter monitoring kit Check blood sugar TID PRN with meals and/or bouts of dizziness 1 Kit 0    glucose blood VI test strips (BLOOD GLUCOSE TEST) strip Patient to check blood sugar TID PRN 1 Package 11    Bifidobacterium Infantis (ALIGN) 4 mg cap Take  by mouth daily. Allergies   Allergen Reactions    Omnicef [Cefdinir] Hives    Keflex [Cephalexin] Hives    Codeine Other (comments)     Severe headache    Ketek [Telithromycin] Other (comments)     Passed out    Levaquin [Levofloxacin] Unknown (comments)     Did not tolerate      Morphine Other (comments)     Severe headache    Sulfa (Sulfonamide Antibiotics) Hives    Topamax [Topiramate] Other (comments)     Made blood vessels red and pain in eyes       Review of Systems   Constitutional: Negative for chills and fever. Skin: Positive for itching and rash. Visit Vitals    BP (!) 136/94 (BP 1 Location: Right arm, BP Patient Position: Sitting)    Pulse 74    Temp 96.9 °F (36.1 °C) (Oral)    Resp 20    Ht 5' 8\" (1.727 m)    Wt 251 lb 8 oz (114.1 kg)    BMI 38.24 kg/m2       Physical Exam   Constitutional: She is oriented to person, place, and time. She appears well-developed and well-nourished. She appears distressed. HENT:   Mouth/Throat: Oropharynx is clear and moist.   Pulmonary/Chest: Effort normal and breath sounds normal.   Musculoskeletal: She exhibits no edema or tenderness. Neurological: She is alert and oriented to person, place, and time. Skin: Rash noted. There is erythema. Intertriginous folds buttocks, under breasts, groin     Nursing note and vitals reviewed.     Updated: [id number]   LAB ACC#: [id number]   SOURCE: Stool   --FINAL REPORT--   Final report   These results were obtained using wet preparation(s) and trichrome stained smear. This test does not include testing for Cryptosporidium   parvum, Cyclospora, or Microsporidia. Comment   No ova, cysts, or parasites seen. One negative specimen does not rule out the possibility of a   parasitic infection. Performed at: [address]  [id number]   : Kim Griffin MD, Phone:  [phone]    CULTURE RESULT   NORMAL   Comment:   Updated: [id number]   LAB ACC#: [id number]   SOURCE: Clean Catch Urine   --FINAL REPORT--   No Growth        ASSESSMENT and PLAN    ICD-10-CM ICD-9-CM    1. Vulvovaginitis candida albicans B37.3 112.1 fluconazole (DIFLUCAN) 150 mg tablet      terconazole (TERAZOL 3) 80 mg vaginal suppository   2.  Candidal intertrigo B37.2 112.3 fluconazole (DIFLUCAN) 150 mg tablet      terconazole (TERAZOL 3) 80 mg vaginal suppository

## 2017-11-07 NOTE — PROGRESS NOTES
Chief Complaint   Patient presents with    Anal Irritation     patient states to have irritation in the labia area that goes to the anal area. Describes as a burning, itching and 'skin crawling' sensation. 1. Have you been to the ER, urgent care clinic since your last visit? Hospitalized since your last visit? No    2. Have you seen or consulted any other health care providers outside of the [address] [place] [address] since your last visit? Include any pap smears or colon screening.  No

## 2017-11-07 NOTE — MR AVS SNAPSHOT
Visit Information Date & Time Provider Department Dept. Phone Encounter #  
 11/7/2017  1:00 PM Jessie Christy 70 [id number] Your Appointments 11/16/2017 11:40 AM  
POST HOSPITAL with Paramjit Gordon MD  
[facility] ([facility] Appt Note: post ED/ irregular heart beats/palpitations/chest pains/ see [address] [facility] 270 [id number] [address] [address] [phone] Branden Marroquin  
  
    
 11/27/2017  9:00 AM  
PHYSICAL with MD Jessie Christy 70 (--) Appt Note: annual  
 [address] [place] [address] [address]  
  
    
 12/4/2017 10:15 AM  
Follow Up with MD Jessie Christy 70 (--) Appt Note: fu  
 [address] [address] [address] [address] [phone]  
  
   
 [address] [address] [address] [address] 6/14/2018 11:00 AM  
Follow Up with Paramjit Gordon MD  
[facility] ([facility]) Appt Note: 1 year follow up [place] [address] [address] [address] [phone] [address] Upcoming Health Maintenance Date Due DTaP/Tdap/Td series (1 - Tdap) 9/11/1976 ZOSTER VACCINE AGE 60> 7/11/2015 BREAST CANCER SCRN MAMMOGRAM 12/8/2016 PAP AKA CERVICAL CYTOLOGY 2/1/2017 COLONOSCOPY 5/26/2020 Allergies as of 11/7/2017  Review Complete On: 11/7/2017 By: Tamica Martinez MD  
  
 Severity Noted Reaction Type Reactions Omnicef [Cefdinir] High 07/26/2011    Hives Keflex [Cephalexin] Medium 07/21/2017    Hives Codeine  06/23/2011    Other (comments) Severe headache Ketek [Telithromycin]  09/21/2017    Other (comments) Passed out Levaquin [Levofloxacin]  10/12/2012    Unknown (comments) Did not tolerate Morphine  06/23/2011    Other (comments) Severe headache  
 Sulfa (Sulfonamide Antibiotics)  06/23/2011    Hives Topamax [Topiramate]  01/11/2016    Other (comments) Made blood vessels red and pain in eyes Current Immunizations  Reviewed on 5/30/2017 Name Date Hep A Vaccine (Adult) 5/30/2017 Influenza Vaccine (Quad) PF 9/28/2016 Not reviewed this visit You Were Diagnosed With   
  
 Codes Comments Vulvovaginitis candida albicans    -  Primary ICD-10-CM: B37.3 ICD-9-CM: 112.1 Candidal intertrigo     ICD-10-CM: B37.2 ICD-9-CM: 112.3 Vitals BP Pulse Temp Resp Height(growth percentile) Weight(growth percentile) (!) 136/94 (BP 1 Location: Right arm, BP Patient Position: Sitting) 74 96.9 °F (36.1 °C) (Oral) 20 5' 8\" (1.727 m) 251 lb 8 oz (114.1 kg) BMI OB Status Smoking Status 38.24 kg/m2 Hysterectomy Former Smoker BMI and BSA Data Body Mass Index Body Surface Area  
 38.24 kg/m 2 2.34 m 2 Preferred Pharmacy Pharmacy Name Phone [facility] [address] [facility] 18 [phone] Your Updated Medication List  
  
   
This list is accurate as of: 11/7/17  2:00 PM.  Always use your most recent med list.  
  
  
  
  
 ALIGN 4 mg Cap Generic drug:  Bifidobacterium Infantis Take  by mouth daily. atorvastatin 10 mg tablet Commonly known as:  LIPITOR  
TAKE 1 TABLET BY MOUTH EVERY DAY Blood-Glucose Meter monitoring kit Check blood sugar TID PRN with meals and/or bouts of dizziness  
  
 clobetasol 0.05 % ointment Commonly known as:  Izetta Hatch Apply  to affected area two (2) times daily as needed for Skin Irritation or Itching. EPINEPHrine 0.3 mg/0.3 mL injection Commonly known as:  AUVI-Q  
 0.3 mL by IntraMUSCular route once as needed for Anaphylaxis for up to 1 dose. fluconazole 150 mg tablet Commonly known as:  DIFLUCAN Take 1 Tab by mouth daily for 1 day. FDA advises cautious prescribing of oral fluconazole in pregnancy. glucose blood VI test strips strip Commonly known as:  blood glucose test  
Patient to check blood sugar TID PRN Lancets Misc Check blood sugar TID PRN with meals and dizziness  
  
 nystatin-triamcinolone 100,000-0.1 unit/gram-% ointment Commonly known as:  Drusilla Bright Apply to affected area bid OTHER Azelaic/Finast/Flutic/Minox(Scalp) 5/0.1% Apply to affected area of scalp daily 30 ml SYNTHROID 112 mcg tablet Generic drug:  levothyroxine Take  by mouth Daily (before breakfast). terconazole 80 mg vaginal suppository Commonly known as:  TERAZOL 3 Insert 1 Suppository into vagina nightly for 7 days. Prescriptions Sent to Pharmacy Refills  
 fluconazole (DIFLUCAN) 150 mg tablet 0 Sig: Take 1 Tab by mouth daily for 1 day. FDA advises cautious prescribing of oral fluconazole in pregnancy. Class: Normal  
 Pharmacy: [address] Ph #: [phone] Route: Oral  
 terconazole (TERAZOL 3) 80 mg vaginal suppository 1 Sig: Insert 1 Suppository into vagina nightly for 7 days. Class: Normal  
 Pharmacy: [place] [address] [address] [place] [address] Ph #: [phone] Route: Vaginal  
  
Patient Instructions Please contact our office if you have any questions about your visit today. Introducing [facility]! Dear Wendie Childers: Thank you for requesting a Blue Calypso account. Our records indicate that you already have an active Blue Calypso account. You can access your account anytime at [email or web address] Did you know that you can access your hospital and ER discharge instructions at any time in Greatist? You can also review all of your test results from your hospital stay or ER visit. Additional Information If you have questions, please visit the Frequently Asked Questions section of the Greatist website at [email or web address] Remember, Greatist is NOT to be used for urgent needs. For medical emergencies, dial 911. Now available from your iPhone and Android! Please provide this summary of care documentation to your next provider. Your primary care clinician is listed as ELVIN BRITTON. If you have any questions after today's visit, please call [phone].

## 2017-11-11 DIAGNOSIS — Z51.81 MEDICATION MONITORING ENCOUNTER: ICD-10-CM

## 2017-11-11 DIAGNOSIS — K27.9 PUD (PEPTIC ULCER DISEASE): ICD-10-CM

## 2017-11-11 DIAGNOSIS — E03.9 HYPOTHYROIDISM, UNSPECIFIED TYPE: ICD-10-CM

## 2017-11-11 DIAGNOSIS — K21.9 GASTROESOPHAGEAL REFLUX DISEASE WITHOUT ESOPHAGITIS: ICD-10-CM

## 2017-11-11 DIAGNOSIS — R73.03 PREDIABETES: ICD-10-CM

## 2017-11-11 DIAGNOSIS — E78.5 DYSLIPIDEMIA: ICD-10-CM

## 2017-11-16 ENCOUNTER — OFFICE VISIT (OUTPATIENT)
Dept: CARDIOLOGY CLINIC | Age: 62
End: 2017-11-16

## 2017-11-16 VITALS
WEIGHT: 255 LBS | SYSTOLIC BLOOD PRESSURE: 124 MMHG | DIASTOLIC BLOOD PRESSURE: 60 MMHG | BODY MASS INDEX: 38.65 KG/M2 | HEART RATE: 70 BPM | OXYGEN SATURATION: 98 % | HEIGHT: 68 IN

## 2017-11-16 DIAGNOSIS — E78.5 DYSLIPIDEMIA: ICD-10-CM

## 2017-11-16 DIAGNOSIS — R07.89 OTHER CHEST PAIN: Primary | ICD-10-CM

## 2017-11-16 DIAGNOSIS — R00.2 PALPITATIONS: ICD-10-CM

## 2017-11-16 DIAGNOSIS — E03.9 HYPOTHYROIDISM, UNSPECIFIED TYPE: ICD-10-CM

## 2017-11-16 DIAGNOSIS — F41.9 ANXIETY: ICD-10-CM

## 2017-11-16 NOTE — PROGRESS NOTES
History of Present Illness: This is a 58year-old female here for followup. She was hospitalized September 2017 after an episode of atypical chest pain, syncope and heart palpitations. Echocardiogram and nuclear stress test and event monitor were unremarkable except for some rare PVCs. She wants to get back to working out and recently had plantar fascitis which has resolved. She also relates potentially to Prevacid exacerbating her palpitations.       Impression:   Recent syncope, atypical chest pain and palpitations of unclear etiology. She was found to have only rare PVCs. History of ulcer and reflux previously on Prevacid but concern for worsening palpitations. Remote Dengue fever in 2010. Thyroid disorder. Remote history of uterine cancer and hysterectomy with chemotherapy and radiation. At this point, I discussed her event monitor which did not show any significant events when she had her symptoms. She had only a few PVCs. She had an echocardiogram and nuclear stress test that were unremarkable that do not suspect ischemia. She wants to get back to working out which is reasonable. She is going to see Dr. Vero Thao for another option for a PPI. All questions answered. I will see her back in six months. Past Medical History:   Diagnosis Date    Abnormal nuclear cardiac imaging test 02/09/2012    Mid to distal anteroseptal and apical reversible defect concerning for ishcmeia. Mild-mod, mid-distal anterior & apical hypk. EF 67%. Borderline abnormal EKG w/1-mm inferolateral ST depression at 7 min exercise. Occasional PVCs.  Anxiety     Carotid duplex 07/64/3579    Mild 1-02% LICA stenosis.  Dengue fever     7/10 while in [place]    Diverticulosis     Dyslipidemia     GERD     Holter monitor study 04/29/2016    Sinus rhythm, avg HR 67 bpm (range  bpm). Rare ectopy.  Hypothyroidism     RLE venous duplex 07/23/2015    Right leg:  No DVT.       S/P cardiac catheterization 02/24/2012    Angiographically normal coronaries. Hyperdynamic. EF 70%. No RWMA.  Sinusitis     Tinnitus     Uterine cancer [facility])     hysterectomy       Current Outpatient Prescriptions   Medication Sig Dispense Refill    nystatin-triamcinolone (MYCOLOG) 100,000-0.1 unit/gram-% ointment Apply to affected area bid 30 g 1    levothyroxine (SYNTHROID) 112 mcg tablet Take  by mouth Daily (before breakfast).  atorvastatin (LIPITOR) 10 mg tablet TAKE 1 TABLET BY MOUTH EVERY DAY 90 Tab 3    clobetasol (TEMOVATE) 0.05 % ointment Apply  to affected area two (2) times daily as needed for Skin Irritation or Itching. 60 g 3    OTHER Azelaic/Finast/Flutic/Minox(Scalp) 5/0.1%  Apply to affected area of scalp daily  30 ml      EPINEPHrine (AUVI-Q) 0.3 mg/0.3 mL (1:1,000) injection 0.3 mL by IntraMUSCular route once as needed for Anaphylaxis for up to 1 dose. 1 Each 2    Lancets Misc Check blood sugar TID PRN with meals and dizziness 1 Package 11    Blood-Glucose Meter monitoring kit Check blood sugar TID PRN with meals and/or bouts of dizziness 1 Kit 0    glucose blood VI test strips (BLOOD GLUCOSE TEST) strip Patient to check blood sugar TID PRN 1 Package 11    Bifidobacterium Infantis (ALIGN) 4 mg cap Take  by mouth daily. Social History   reports that she quit smoking about 18 years ago. Her smoking use included Cigarettes. She has a 20.00 pack-year smoking history. She has never used smokeless tobacco.   reports that she does not drink alcohol. Family History  family history includes Cancer in her father; Diabetes in her maternal aunt; Heart Attack in her paternal grandmother; Heart Disease in her maternal aunt and maternal aunt; Hypertension in her maternal aunt; Other in her maternal grandfather and mother; Stroke in her maternal grandmother. Review of Systems  Except as stated above include:  Constitutional: Negative for fever, chills and malaise/fatigue.    HEENT: No congestion or recent URI. Gastrointestinal: No nausea, vomiting, abdominal pain, bloody stools. Pulmonary:  Negative except as stated above. Cardiac:  Negative except as stated above. Musculoskeletal: Negative except as stated above. Neurological:  No localized symptoms. Skin:  Negative except as stated above. Psych:  No mood swings. Endocrine:  No heat/cold intolerance. PHYSICAL EXAM  BP Readings from Last 3 Encounters:   11/16/17 124/60   11/07/17 (!) 136/94   11/02/17 131/83     Pulse Readings from Last 3 Encounters:   11/16/17 70   11/07/17 74   11/02/17 73     Wt Readings from Last 3 Encounters:   11/16/17 115.7 kg (255 lb)   11/07/17 114.1 kg (251 lb 8 oz)   11/02/17 114.8 kg (253 lb)     General:   Well developed, well groomed. Head/Neck:   No jugular venous distention     No carotid bruits. No evidence of xanthelasma. Lungs:   No respiratory distress. Clear bilaterally. Heart:    Regular rate and rhythm. Normal S1/S2. Palpation of heart with normal point of maximum impulse. No significant murmurs, rubs or gallops. Abdomen:   Soft and nontender. No palpable abdominal mass or bruits. Extremities:   Intact peripheral pulses. No significant edema. Neurological:   Alert and oriented to person, place, time. No focal neurological deficit visually.     Blood Pressure Metric:  controlled

## 2017-11-16 NOTE — PROGRESS NOTES
1. Have you been to the ER, urgent care clinic since your last visit? Hospitalized since your last visit? Yes, 9/22/17 - 9/23/17 for chest pain     2. Have you seen or consulted any other health care providers outside of the [address] [place] [address] since your last visit? Include any pap smears or colon screening.   No

## 2017-11-16 NOTE — MR AVS SNAPSHOT
Visit Information Date & Time Provider Department Dept. Phone Encounter #  
 11/16/2017 11:40 AM Linda Nash MD [facility] [id number] Your Appointments 11/27/2017  9:00 AM  
PHYSICAL with Joey Harrington MD  
[facility] (--) Appt Note: annual  
 [address] [place] [address] [address]  
  
    
 12/4/2017 10:15 AM  
Follow Up with Joey Harrington MD  
[facility] (--) Appt Note: fu  
 [address] [address] [address] [address] [phone]  
  
   
 [address] [address] [address] [address] 6/14/2018 11:00 AM  
Follow Up with Linda Nash MD  
[facility] ([facility]) Appt Note: 1 year follow up [place] [address] [address] [address] [phone] [address] [place] [address] [address] Upcoming Health Maintenance Date Due DTaP/Tdap/Td series (1 - Tdap) 9/11/1976 ZOSTER VACCINE AGE 60> 7/11/2015 BREAST CANCER SCRN MAMMOGRAM 12/8/2016 PAP AKA CERVICAL CYTOLOGY 2/1/2017 COLONOSCOPY 5/26/2020 Allergies as of 11/16/2017  Review Complete On: 11/7/2017 By: Joey Harrington MD  
  
 Severity Noted Reaction Type Reactions Omnicef [Cefdinir] High 07/26/2011    Hives Keflex [Cephalexin] Medium 07/21/2017    Hives Codeine  06/23/2011    Other (comments) Severe headache Ketek [Telithromycin]  09/21/2017    Other (comments) Passed out Levaquin [Levofloxacin]  10/12/2012    Unknown (comments) Did not tolerate Morphine  06/23/2011    Other (comments) Severe headache  
 Sulfa (Sulfonamide Antibiotics)  06/23/2011    Hives Topamax [Topiramate]  01/11/2016    Other (comments) Made blood vessels red and pain in eyes Current Immunizations  Reviewed on 5/30/2017 Name Date Hep A Vaccine (Adult) 5/30/2017 Influenza Vaccine (Quad) PF 9/28/2016 Not reviewed this visit You Were Diagnosed With   
  
 Codes Comments Palpitations    -  Primary ICD-10-CM: R00.2 ICD-9-CM: 785.1 Dyslipidemia     ICD-10-CM: E78.5 ICD-9-CM: 272.4 Anxiety     ICD-10-CM: F41.9 ICD-9-CM: 300.00 Vitals BP Pulse Height(growth percentile) Weight(growth percentile) SpO2 BMI  
 124/60 70 5' 8\" (1.727 m) 255 lb (115.7 kg) 98% 38.77 kg/m2 OB Status Smoking Status Hysterectomy Former Smoker Vitals History BMI and BSA Data Body Mass Index Body Surface Area 38.77 kg/m 2 2.36 m 2 Preferred Pharmacy Pharmacy Name Phone [facility] [phone] [place] [address] [phone] Your Updated Medication List  
  
   
This list is accurate as of: 11/16/17 12:18 PM.  Always use your most recent med list.  
  
  
  
  
 ALIGN 4 mg Cap Generic drug:  Bifidobacterium Infantis Take  by mouth daily. atorvastatin 10 mg tablet Commonly known as:  LIPITOR  
TAKE 1 TABLET BY MOUTH EVERY DAY Blood-Glucose Meter monitoring kit Check blood sugar TID PRN with meals and/or bouts of dizziness  
  
 clobetasol 0.05 % ointment Commonly known as:  Izetta Hatch Apply  to affected area two (2) times daily as needed for Skin Irritation or Itching. EPINEPHrine 0.3 mg/0.3 mL injection Commonly known as:  AUVI-Q  
0.3 mL by IntraMUSCular route once as needed for Anaphylaxis for up to 1 dose. glucose blood VI test strips strip Commonly known as:  blood glucose test  
Patient to check blood sugar TID PRN Lancets Misc Check blood sugar TID PRN with meals and dizziness  
  
 nystatin-triamcinolone 100,000-0.1 unit/gram-% ointment Commonly known as:  Timothy Spaniel Apply to affected area bid  OTHER  
 Azelaic/Finast/Flutic/Minox(Scalp) 5/0.1% Apply to affected area of scalp daily 30 ml SYNTHROID 112 mcg tablet Generic drug:  levothyroxine Take  by mouth Daily (before breakfast). We Performed the Following AMB POC EKG ROUTINE W/ 12 LEADS, INTER & REP [38031 CPT(R)] Introducing [facility]! Dear Vangie Kamara: Thank you for requesting a sezmi account. Our records indicate that you already have an active sezmi account. You can access your account anytime at [email or web address] Did you know that you can access your hospital and ER discharge instructions at any time in sezmi? You can also review all of your test results from your hospital stay or ER visit. Additional Information If you have questions, please visit the Frequently Asked Questions section of the sezmi website at [email or web address] Remember, sezmi is NOT to be used for urgent needs. For medical emergencies, dial 911. Now available from your iPhone and Android! Please provide this summary of care documentation to your next provider. Your primary care clinician is listed as ELVIN BRITTON. If you have any questions after today's visit, please call [phone].

## 2017-11-27 ENCOUNTER — OFFICE VISIT (OUTPATIENT)
Dept: FAMILY MEDICINE CLINIC | Age: 62
End: 2017-11-27

## 2017-11-27 VITALS
HEIGHT: 68 IN | BODY MASS INDEX: 38.34 KG/M2 | DIASTOLIC BLOOD PRESSURE: 81 MMHG | TEMPERATURE: 97.3 F | WEIGHT: 253 LBS | HEART RATE: 77 BPM | SYSTOLIC BLOOD PRESSURE: 115 MMHG | OXYGEN SATURATION: 98 %

## 2017-11-27 DIAGNOSIS — K57.32 DIVERTICULITIS OF LARGE INTESTINE WITHOUT PERFORATION OR ABSCESS WITHOUT BLEEDING: Primary | ICD-10-CM

## 2017-11-27 DIAGNOSIS — R14.0 ABDOMINAL BLOATING: ICD-10-CM

## 2017-11-27 DIAGNOSIS — N39.0 URINARY TRACT INFECTION WITHOUT HEMATURIA, SITE UNSPECIFIED: ICD-10-CM

## 2017-11-27 DIAGNOSIS — K21.9 GASTROESOPHAGEAL REFLUX DISEASE WITHOUT ESOPHAGITIS: ICD-10-CM

## 2017-11-27 DIAGNOSIS — M62.830 MUSCLE SPASM OF BACK: ICD-10-CM

## 2017-11-27 LAB
BILIRUB UR QL STRIP: NORMAL
GLUCOSE UR-MCNC: NEGATIVE MG/DL
KETONES P FAST UR STRIP-MCNC: NORMAL MG/DL
PH UR STRIP: 5.5 [PH] (ref 4.6–8)
PROT UR QL STRIP: NORMAL
SP GR UR STRIP: 1.02 (ref 1–1.03)
UA UROBILINOGEN AMB POC: NORMAL (ref 0.2–1)
URINALYSIS CLARITY POC: CLEAR
URINALYSIS COLOR POC: YELLOW
URINE BLOOD POC: NORMAL
URINE LEUKOCYTES POC: NORMAL
URINE NITRITES POC: NEGATIVE

## 2017-11-27 RX ORDER — LANSOPRAZOLE 30 MG/1
30 CAPSULE, DELAYED RELEASE ORAL DAILY
Qty: 90 CAP | Refills: 3 | Status: SHIPPED | OUTPATIENT
Start: 2017-11-27 | End: 2017-12-04 | Stop reason: SDUPTHER

## 2017-11-27 NOTE — MR AVS SNAPSHOT
Visit Information Date & Time Provider Department Dept. Phone Encounter #  
 11/27/2017  9:00 AM Jessie Harris 70 [phone] [id number] Your Appointments 12/4/2017 10:15 AM  
Follow Up with MD Jessie Harris 70 (--) Appt Note: bib Bhatti 57 [id number] [address] [address] [phone]  
  
   
 Davy [address] [id number] [address] [address] 6/14/2018 11:00 AM  
Follow Up with Kita Bellamy MD  
[facility] Appt Note: 1 year follow up [place] [address] [address] [address] [phone] [address] Upcoming Health Maintenance Date Due DTaP/Tdap/Td series (1 - Tdap) 9/11/1976 ZOSTER VACCINE AGE 60> 7/11/2015 BREAST CANCER SCRN MAMMOGRAM 12/8/2016 PAP AKA CERVICAL CYTOLOGY 2/1/2017 COLONOSCOPY 5/26/2020 Allergies as of 11/27/2017  Review Complete On: 11/27/2017 By: Vega Hdz MD  
  
 Severity Noted Reaction Type Reactions Omnicef [Cefdinir] High 07/26/2011    Hives Keflex [Cephalexin] Medium 07/21/2017    Hives Codeine  06/23/2011    Other (comments) Severe headache Ketek [Telithromycin]  09/21/2017    Other (comments) Passed out Levaquin [Levofloxacin]  10/12/2012    Unknown (comments) Did not tolerate Morphine  06/23/2011    Other (comments) Severe headache  
 Sulfa (Sulfonamide Antibiotics)  06/23/2011    Hives Topamax [Topiramate]  01/11/2016    Other (comments) Made blood vessels red and pain in eyes Current Immunizations  Reviewed on 5/30/2017 Name Date Hep A Vaccine (Adult) 5/30/2017 Influenza Vaccine (Quad) PF 9/28/2016 Not reviewed this visit You Were Diagnosed With   
  
 Codes Comments  Diverticulitis of large intestine without perforation or abscess without bleeding    -  Primary ICD-10-CM: K57.32 
ICD-9-CM: 562.11 Abdominal bloating     ICD-10-CM: R14.0 ICD-9-CM: 787.3 Muscle spasm of back     ICD-10-CM: E90.674 ICD-9-CM: 724.8 Gastroesophageal reflux disease without esophagitis     ICD-10-CM: K21.9 ICD-9-CM: 530.81 Urinary tract infection without hematuria, site unspecified     ICD-10-CM: N39.0 ICD-9-CM: 599.0 Vitals BP Pulse Temp Height(growth percentile) Weight(growth percentile) SpO2  
 115/81 77 97.3 °F (36.3 °C) (Oral) 5' 8\" (1.727 m) 253 lb (114.8 kg) 98% BMI OB Status Smoking Status 38.47 kg/m2 Hysterectomy Former Smoker BMI and BSA Data Body Mass Index Body Surface Area  
 38.47 kg/m 2 2.35 m 2 Preferred Pharmacy Pharmacy Name Phone [phone] [place] [address] [phone] Your Updated Medication List  
  
   
This list is accurate as of: 11/27/17 11:03 AM.  Always use your most recent med list.  
  
  
  
  
 ALIGN 4 mg Cap Generic drug:  Bifidobacterium Infantis Take  by mouth daily. atorvastatin 10 mg tablet Commonly known as:  LIPITOR  
TAKE 1 TABLET BY MOUTH EVERY DAY Blood-Glucose Meter monitoring kit Check blood sugar TID PRN with meals and/or bouts of dizziness  
  
 clobetasol 0.05 % ointment Commonly known as:  Narayanan Siskin Apply  to affected area two (2) times daily as needed for Skin Irritation or Itching. EPINEPHrine 0.3 mg/0.3 mL injection Commonly known as:  AUVI-Q  
0.3 mL by IntraMUSCular route once as needed for Anaphylaxis for up to 1 dose. glucose blood VI test strips strip Commonly known as:  blood glucose test  
Patient to check blood sugar TID PRN Lancets Misc Check blood sugar TID PRN with meals and dizziness  
  
 lansoprazole 30 mg capsule Commonly known as:  PREVACID Take 1 Cap by mouth daily. nystatin-triamcinolone 100,000-0.1 unit/gram-% ointment Commonly known as:  Derry Blocker Apply to affected area bid OTHER Azelaic/Finast/Flutic/Minox(Scalp) 5/0.1% Apply to affected area of scalp daily 30 ml SYNTHROID 112 mcg tablet Generic drug:  levothyroxine Take  by mouth Daily (before breakfast). Prescriptions Sent to Pharmacy Refills  
 lansoprazole (PREVACID) 30 mg capsule 3 Sig: Take 1 Cap by mouth daily. Class: Normal  
 Pharmacy: [facility]  [address] Ph #: [phone] Route: Oral  
  
We Performed the Following AMB POC URINALYSIS DIP STICK AUTO W/O MICRO [92611 CPT(R)] To-Do List   
 11/27/2017 Microbiology:  CULTURE, URINE Patient Instructions 1. Have you been to the ER, urgent care clinic since your last visit? Hospitalized since your last visit? No 
 
2. Have you seen or consulted any other health care providers outside of the [address] [place] [address] since your last visit? Include any pap smears or colon screening. No 
 
 
 
  
Body Mass Index: Care Instructions Your Care Instructions Body mass index (BMI) can help you see if your weight is raising your risk for health problems. It uses a formula to compare how much you weigh with how tall you are. · A BMI lower than 18.5 is considered underweight. · A BMI between 18.5 and 24.9 is considered healthy. · A BMI between 25 and 29.9 is considered overweight. A BMI of 30 or higher is considered obese. If your BMI is in the normal range, it means that you have a lower risk for weight-related health problems. If your BMI is in the overweight or obese range, you may be at increased risk for weight-related health problems, such as high blood pressure, heart disease, stroke, arthritis or joint pain, and diabetes.  If your BMI is in the underweight range, you may be at increased risk for health problems such as fatigue, lower protection (immunity) against illness, muscle loss, bone loss, hair loss, and hormone problems. BMI is just one measure of your risk for weight-related health problems. You may be at higher risk for health problems if you are not active, you eat an unhealthy diet, or you drink too much alcohol or use tobacco products. Follow-up care is a key part of your treatment and safety. Be sure to make and go to all appointments, and call your doctor if you are having problems. It's also a good idea to know your test results and keep a list of the medicines you take. How can you care for yourself at home? · Practice healthy eating habits. This includes eating plenty of fruits, vegetables, whole grains, lean protein, and low-fat dairy. · If your doctor recommends it, get more exercise. Walking is a good choice. Bit by bit, increase the amount you walk every day. Try for at least 30 minutes on most days of the week. · Do not smoke. Smoking can increase your risk for health problems. If you need help quitting, talk to your doctor about stop-smoking programs and medicines. These can increase your chances of quitting for good. · Limit alcohol to 2 drinks a day for men and 1 drink a day for women. Too much alcohol can cause health problems. If you have a BMI higher than 25 · Your doctor may do other tests to check your risk for weight-related health problems. This may include measuring the distance around your waist. A waist measurement of more than 40 inches in men or 35 inches in women can increase the risk of weight-related health problems. · Talk with your doctor about steps you can take to stay healthy or improve your health. You may need to make lifestyle changes to lose weight and stay healthy, such as changing your diet and getting regular exercise. If you have a BMI lower than 18.5 · Your doctor may do other tests to check your risk for health problems. · Talk with your doctor about steps you can take to stay healthy or improve your health. You may need to make lifestyle changes to gain or maintain weight and stay healthy, such as getting more healthy foods in your diet and doing exercises to build muscle. Where can you learn more? Go to http://bonnie-justyna.info/. Enter S176 in the search box to learn more about \"Body Mass Index: Care Instructions. \" Current as of: October 13, 2016 Content Version: 11.4 © 2754-2383 [employer]. Care instructions adapted under license by [employer] (which disclaims liability or warranty for this information). If you have questions about a medical condition or this instruction, always ask your healthcare professional. [address] any warranty or liability for your use of this information. Introducing [facility]! Dear Jamilah Galvez: Thank you for requesting a [employer] account. Our records indicate that you already have an active [employer] account. You can access your account anytime at [email or web address] Did you know that you can access your hospital and ER discharge instructions at any time in [employer]? You can also review all of your test results from your hospital stay or ER visit. Additional Information If you have questions, please visit the Frequently Asked Questions section of the [employer] website at [email or web address] Remember, [employer] is NOT to be used for urgent needs. For medical emergencies, dial 911. Now available from your iPhone and Android! Please provide this summary of care documentation to your next provider. Your primary care clinician is listed as ELVIN BRITTON. If you have any questions after today's visit, please call [phone].

## 2017-11-27 NOTE — PROGRESS NOTES
HISTORY OF PRESENT ILLNESS  Doretha De La Vega is a 58 y.o. female. Chief Complaint   Patient presents with    Abdominal Pain     states started 5 days ago, states Dr. Nadia Haynes treated her for Diverticulitis (talked to her on the phone, did not see her), wants to discuss Prevacid, had in the past   complains of gurgling and abd pain, had been prescribed augmentin for presumed diverticulits per Dr. Nadia Haynes which caused her yeast issues to flare  Near the end of the course of augmentin, complains of abd bloating and then low back pain on the left side    LOW BACK PAIN     states her back \"went out\" 8 days ago, very low back, could not walk, better now, pain score 8, took Tylenol and Flexeril, helped her sleep. States she fell onto the floor when her back went out, has history of injury from [facility] years ago         HPI  Past Medical History:   Diagnosis Date    Abnormal nuclear cardiac imaging test 02/09/2012    Mid to distal anteroseptal and apical reversible defect concerning for ishcmeia. Mild-mod, mid-distal anterior & apical hypk. EF 67%. Borderline abnormal EKG w/1-mm inferolateral ST depression at 7 min exercise. Occasional PVCs.  Anxiety     Carotid duplex 08/34/4718    Mild 2-96% LICA stenosis.  Dengue fever     7/10 while in [place]    Diverticulosis     Dyslipidemia     GERD     Holter monitor study 04/29/2016    Sinus rhythm, avg HR 67 bpm (range  bpm). Rare ectopy.  Hypothyroidism     RLE venous duplex 07/23/2015    Right leg:  No DVT.  S/P cardiac catheterization 02/24/2012    Angiographically normal coronaries. Hyperdynamic. EF 70%. No RWMA.  Sinusitis     Tinnitus     [facility])     hysterectomy     Current Outpatient Prescriptions   Medication Sig Dispense Refill    nystatin-triamcinolone (MYCOLOG) 100,000-0.1 unit/gram-% ointment Apply to affected area bid 30 g 1    levothyroxine (SYNTHROID) 112 mcg tablet Take  by mouth Daily (before breakfast).  atorvastatin (LIPITOR) 10 mg tablet TAKE 1 TABLET BY MOUTH EVERY DAY 90 Tab 3    clobetasol (TEMOVATE) 0.05 % ointment Apply  to affected area two (2) times daily as needed for Skin Irritation or Itching. 60 g 3    OTHER Azelaic/Finast/Flutic/Minox(Scalp) 5/0.1%  Apply to affected area of scalp daily  30 ml      EPINEPHrine (AUVI-Q) 0.3 mg/0.3 mL (1:1,000) injection 0.3 mL by IntraMUSCular route once as needed for Anaphylaxis for up to 1 dose. 1 Each 2    Lancets Misc Check blood sugar TID PRN with meals and dizziness 1 Package 11    Blood-Glucose Meter monitoring kit Check blood sugar TID PRN with meals and/or bouts of dizziness 1 Kit 0    glucose blood VI test strips (BLOOD GLUCOSE TEST) strip Patient to check blood sugar TID PRN 1 Package 11    Bifidobacterium Infantis (ALIGN) 4 mg cap Take  by mouth daily. Allergies   Allergen Reactions    Omnicef [Cefdinir] Hives    Keflex [Cephalexin] Hives    Codeine Other (comments)     Severe headache    Ketek [Telithromycin] Other (comments)     Passed out    Levaquin [Levofloxacin] Unknown (comments)     Did not tolerate      Morphine Other (comments)     Severe headache    Sulfa (Sulfonamide Antibiotics) Hives    Topamax [Topiramate] Other (comments)     Made blood vessels red and pain in eyes       Review of Systems   Constitutional: Negative for chills and fever. Gastrointestinal: Positive for abdominal pain. Negative for vomiting. Skin: Positive for itching and rash. Visit Vitals    /81    Pulse 77    Temp 97.3 °F (36.3 °C) (Oral)    Ht 5' 8\" (1.727 m)    Wt 253 lb (114.8 kg)    SpO2 98%    BMI 38.47 kg/m2   '    Physical Exam   Constitutional: She is oriented to person, place, and time. She appears well-developed and well-nourished. She appears distressed. Cardiovascular: Normal rate, regular rhythm and normal heart sounds. Pulmonary/Chest: Effort normal and breath sounds normal. No respiratory distress.  She has no wheezes. She has no rales. Abdominal: Soft. Bowel sounds are normal. There is tenderness in the epigastric area. There is no rigidity, no guarding, no tenderness at McBurney's point and negative Newell's sign. Musculoskeletal: She exhibits tenderness. She exhibits no edema. Neurological: She is alert and oriented to person, place, and time. Coordination normal.   Skin: No pallor. Psychiatric: She has a normal mood and affect. Her behavior is normal.   Nursing note and vitals reviewed. ASSESSMENT and PLAN    ICD-10-CM ICD-9-CM    1. Diverticulitis of large intestine without perforation or abscess without bleeding K57.32 562.11 Probiotics, recommend simethicone for bloating   2. Abdominal bloating R14.0 787.3    3. Muscle spasm of back M62.830 724.8    4. Gastroesophageal reflux disease without esophagitis K21.9 530.81 lansoprazole (PREVACID) 30 mg capsule   5. Urinary tract infection without hematuria, site unspecified N39.0 599.0 AMB POC URINALYSIS DIP STICK AUTO W/O MICRO      CULTURE, URINE       Discussed the patient's BMI with her. The BMI follow up plan is as follows:     dietary management education, guidance, and counseling  encourage exercise  monitor weight  prescribed dietary intake    An After Visit Summary was printed and given to the patient. Silvestre Farris keep f/u already scheduled

## 2017-11-27 NOTE — PATIENT INSTRUCTIONS
1. Have you been to the ER, urgent care clinic since your last visit? Hospitalized since your last visit? No    2. Have you seen or consulted any other health care providers outside of the [address] [place] [address] since your last visit? Include any pap smears or colon screening. No           Body Mass Index: Care Instructions  Your Care Instructions    Body mass index (BMI) can help you see if your weight is raising your risk for health problems. It uses a formula to compare how much you weigh with how tall you are. · A BMI lower than 18.5 is considered underweight. · A BMI between 18.5 and 24.9 is considered healthy. · A BMI between 25 and 29.9 is considered overweight. A BMI of 30 or higher is considered obese. If your BMI is in the normal range, it means that you have a lower risk for weight-related health problems. If your BMI is in the overweight or obese range, you may be at increased risk for weight-related health problems, such as high blood pressure, heart disease, stroke, arthritis or joint pain, and diabetes. If your BMI is in the underweight range, you may be at increased risk for health problems such as fatigue, lower protection (immunity) against illness, muscle loss, bone loss, hair loss, and hormone problems. BMI is just one measure of your risk for weight-related health problems. You may be at higher risk for health problems if you are not active, you eat an unhealthy diet, or you drink too much alcohol or use tobacco products. Follow-up care is a key part of your treatment and safety. Be sure to make and go to all appointments, and call your doctor if you are having problems. It's also a good idea to know your test results and keep a list of the medicines you take. How can you care for yourself at home? · Practice healthy eating habits. This includes eating plenty of fruits, vegetables, whole grains, lean protein, and low-fat dairy. · If your doctor recommends it, get more exercise. Walking is a good choice. Bit by bit, increase the amount you walk every day. Try for at least 30 minutes on most days of the week. · Do not smoke. Smoking can increase your risk for health problems. If you need help quitting, talk to your doctor about stop-smoking programs and medicines. These can increase your chances of quitting for good. · Limit alcohol to 2 drinks a day for men and 1 drink a day for women. Too much alcohol can cause health problems. If you have a BMI higher than 25  · Your doctor may do other tests to check your risk for weight-related health problems. This may include measuring the distance around your waist. A waist measurement of more than 40 inches in men or 35 inches in women can increase the risk of weight-related health problems. · Talk with your doctor about steps you can take to stay healthy or improve your health. You may need to make lifestyle changes to lose weight and stay healthy, such as changing your diet and getting regular exercise. If you have a BMI lower than 18.5  · Your doctor may do other tests to check your risk for health problems. · Talk with your doctor about steps you can take to stay healthy or improve your health. You may need to make lifestyle changes to gain or maintain weight and stay healthy, such as getting more healthy foods in your diet and doing exercises to build muscle. Where can you learn more? Go to http://bonnie-justyna.info/. Enter S176 in the search box to learn more about \"Body Mass Index: Care Instructions. \"  Current as of: October 13, 2016  Content Version: 11.4  © 6392-1023 [employer]. Care instructions adapted under license by [employer] (which disclaims liability or warranty for this information).  If you have questions about a medical condition or this instruction, always ask your healthcare professional. [place] [address] any warranty or liability for your use of this information.

## 2017-11-27 NOTE — PROGRESS NOTES
Chief Complaint   Patient presents with    Abdominal Pain     states started 5 days ago, states Dr. Veena Saunders treated her for Diverticulitis (talked to her on the phone, did not see her), wants to discuss [facility]     states her back \"went out\" 8 days ago, very low back, could not walk, better now, pain score 8, took Tylenol and Flexeril, helped her sleep

## 2017-12-04 ENCOUNTER — OFFICE VISIT (OUTPATIENT)
Dept: FAMILY MEDICINE CLINIC | Age: 62
End: 2017-12-04

## 2017-12-04 VITALS
SYSTOLIC BLOOD PRESSURE: 127 MMHG | BODY MASS INDEX: 38.42 KG/M2 | HEART RATE: 67 BPM | DIASTOLIC BLOOD PRESSURE: 76 MMHG | TEMPERATURE: 97.7 F | HEIGHT: 68 IN | WEIGHT: 253.5 LBS | RESPIRATION RATE: 20 BRPM

## 2017-12-04 DIAGNOSIS — R19.7 DIARRHEA, UNSPECIFIED TYPE: Primary | ICD-10-CM

## 2017-12-04 DIAGNOSIS — R73.03 PREDIABETES: ICD-10-CM

## 2017-12-04 DIAGNOSIS — N39.0 URINARY TRACT INFECTION WITHOUT HEMATURIA, SITE UNSPECIFIED: ICD-10-CM

## 2017-12-04 DIAGNOSIS — B37.2 INTERTRIGINOUS CANDIDIASIS: ICD-10-CM

## 2017-12-04 DIAGNOSIS — E78.5 DYSLIPIDEMIA: ICD-10-CM

## 2017-12-04 DIAGNOSIS — K21.9 GASTROESOPHAGEAL REFLUX DISEASE WITHOUT ESOPHAGITIS: ICD-10-CM

## 2017-12-04 DIAGNOSIS — E03.9 HYPOTHYROIDISM, UNSPECIFIED TYPE: ICD-10-CM

## 2017-12-04 DIAGNOSIS — R00.2 HEART PALPITATIONS: ICD-10-CM

## 2017-12-04 DIAGNOSIS — B37.31 VAGINAL CANDIDIASIS: ICD-10-CM

## 2017-12-04 DIAGNOSIS — Z23 ENCOUNTER FOR IMMUNIZATION: ICD-10-CM

## 2017-12-04 DIAGNOSIS — Z51.81 MEDICATION MONITORING ENCOUNTER: ICD-10-CM

## 2017-12-04 RX ORDER — LANSOPRAZOLE 30 MG/1
30 CAPSULE, DELAYED RELEASE ORAL DAILY
Qty: 90 CAP | Refills: 1 | Status: SHIPPED | OUTPATIENT
Start: 2017-12-04 | End: 2018-03-28 | Stop reason: ALTCHOICE

## 2017-12-04 RX ORDER — LEVOTHYROXINE SODIUM 112 UG/1
112 TABLET ORAL
Qty: 90 TAB | Refills: 3 | Status: SHIPPED | OUTPATIENT
Start: 2017-12-04 | End: 2018-12-05 | Stop reason: SDUPTHER

## 2017-12-04 RX ORDER — LANSOPRAZOLE 30 MG/1
30 CAPSULE, DELAYED RELEASE ORAL DAILY
Qty: 30 CAP | Refills: 3 | Status: SHIPPED | OUTPATIENT
Start: 2017-12-04 | End: 2017-12-04 | Stop reason: SDUPTHER

## 2017-12-04 RX ORDER — KETOCONAZOLE 20 MG/G
CREAM TOPICAL
Qty: 30 G | Refills: 1 | Status: SHIPPED | OUTPATIENT
Start: 2017-12-04 | End: 2018-11-14 | Stop reason: ALTCHOICE

## 2017-12-04 NOTE — MR AVS SNAPSHOT
Visit Information Date & Time Provider Department Dept. Phone Encounter #  
 12/4/2017 10:15 AM Di Sam MD [address] [id number] Follow-up Instructions Return in about 2 months (around 2/4/2018). Your Appointments 6/14/2018 11:00 AM  
Follow Up with Steve Rodriguez MD  
[facility] ([address]) Appt Note: 1 year follow up Zunilda Chaudhry [id number] [phone] [address] [place] [address] [address] Upcoming Health Maintenance Date Due DTaP/Tdap/Td series (1 - Tdap) 9/11/1976 ZOSTER VACCINE AGE 60> 7/11/2015 BREAST CANCER SCRN MAMMOGRAM 12/8/2016 PAP AKA CERVICAL CYTOLOGY 2/1/2017 COLONOSCOPY 5/26/2020 Allergies as of 12/4/2017  Review Complete On: 12/4/2017 By: Di Sam MD  
  
 Severity Noted Reaction Type Reactions Omnicef [Cefdinir] High 07/26/2011    Hives Keflex [Cephalexin] Medium 07/21/2017    Hives Codeine  06/23/2011    Other (comments) Severe headache Ketek [Telithromycin]  09/21/2017    Other (comments) Passed out Levaquin [Levofloxacin]  10/12/2012    Unknown (comments) Did not tolerate Morphine  06/23/2011    Other (comments) Severe headache  
 Sulfa (Sulfonamide Antibiotics)  06/23/2011    Hives Topamax [Topiramate]  01/11/2016    Other (comments) Made blood vessels red and pain in eyes Current Immunizations  Reviewed on 5/30/2017 Name Date Hep A Vaccine (Adult) 5/30/2017 Influenza Vaccine (Quad) PF 9/28/2016 Not reviewed this visit You Were Diagnosed With   
  
 Codes Comments Diarrhea, unspecified type    -  Primary ICD-10-CM: R19.7 ICD-9-CM: 787.91 Vaginal candidiasis     ICD-10-CM: B37.3 ICD-9-CM: 112.1 Urinary tract infection without hematuria, site unspecified     ICD-10-CM: N39.0 ICD-9-CM: 599.0 Heart palpitations     ICD-10-CM: R00.2 ICD-9-CM: 785.1 Gastroesophageal reflux disease without esophagitis     ICD-10-CM: K21.9 ICD-9-CM: 530.81 Dyslipidemia     ICD-10-CM: E78.5 ICD-9-CM: 272.4 Hypothyroidism, unspecified type     ICD-10-CM: E03.9 ICD-9-CM: 244.9 Prediabetes     ICD-10-CM: R73.03 
ICD-9-CM: 790.29 Medication monitoring encounter     ICD-10-CM: Z51.81 
ICD-9-CM: V58.83 Intertriginous candidiasis     ICD-10-CM: B37.2 ICD-9-CM: 112.3 Vitals BP Pulse Temp Resp Height(growth percentile) Weight(growth percentile) 127/76 (BP 1 Location: Right arm, BP Patient Position: Sitting) 67 97.7 °F (36.5 °C) (Oral) 20 5' 8\" (1.727 m) 253 lb 8 oz (115 kg) BMI OB Status Smoking Status 38.54 kg/m2 Hysterectomy Former Smoker BMI and BSA Data Body Mass Index Body Surface Area 38.54 kg/m 2 2.35 m 2 Preferred Pharmacy Pharmacy Name Phone [facility] [address] [facility] [place], [address] [address] [phone] Your Updated Medication List  
  
   
This list is accurate as of: 12/4/17 11:33 AM.  Always use your most recent med list.  
  
  
  
  
 ALIGN 4 mg Cap Generic drug:  Bifidobacterium Infantis Take  by mouth daily. atorvastatin 10 mg tablet Commonly known as:  LIPITOR  
TAKE 1 TABLET BY MOUTH EVERY DAY Blood-Glucose Meter monitoring kit Check blood sugar TID PRN with meals and/or bouts of dizziness  
  
 clobetasol 0.05 % ointment Commonly known as:  [place] Apply  to affected area two (2) times daily as needed for Skin Irritation or Itching. EPINEPHrine 0.3 mg/0.3 mL injection Commonly known as:  AUVI-Q  
0.3 mL by IntraMUSCular route once as needed for Anaphylaxis for up to 1 dose. glucose blood VI test strips strip Commonly known as:  blood glucose test  
Patient to check blood sugar TID PRN  
  
 ketoconazole 2 % topical cream  
 Commonly known as:  NIZORAL Apply thin layer to affected area bid Lancets Misc Check blood sugar TID PRN with meals and dizziness  
  
 lansoprazole 30 mg capsule Commonly known as:  PREVACID Take 1 Cap by mouth daily. BRAND NAME ONLY, MEDICALLY NECESSARY  
  
 levothyroxine 112 mcg tablet Commonly known as:  SYNTHROID Take 1 Tab by mouth Daily (before breakfast). nystatin-triamcinolone 100,000-0.1 unit/gram-% ointment Commonly known as:  Keli [place] Apply to affected area bid OTHER Azelaic/Finast/Flutic/Minox(Scalp) 5/0.1% Apply to affected area of scalp daily 30 ml Prescriptions Sent to Pharmacy Refills  
 levothyroxine (SYNTHROID) 112 mcg tablet 3 Sig: Take 1 Tab by mouth Daily (before breakfast). Class: Normal  
 Pharmacy: [facility] [address] [place] [address] [place],[facility] Ph #: [phone] Route: Oral  
 lansoprazole (PREVACID) 30 mg capsule 1 Sig: Take 1 Cap by mouth daily. BRAND NAME ONLY, MEDICALLY NECESSARY Class: Normal  
 Pharmacy: [facility] [address] [place] [address] [address],[facility] Ph #: [phone] Route: Oral  
 ketoconazole (NIZORAL) 2 % topical cream 1 Sig: Apply thin layer to affected area bid Class: Normal  
 Pharmacy: [facility] [address] [place] [address] [address],[facility] Ph #: [phone] Follow-up Instructions Return in about 2 months (around 2/4/2018). To-Do List   
 02/04/2018 Lab:  HEMOGLOBIN A1C W/O EAG   
  
 02/04/2018 Lab:  LIPID PANEL   
  
 02/04/2018 Lab:  METABOLIC PANEL, COMPREHENSIVE   
  
 02/04/2018 Lab:  T4, FREE   
  
 02/04/2018 Lab:  TSH 3RD GENERATION Patient Instructions Please contact our office if you have any questions about your visit today. Introducing [facility]! Dear Kaity Wei: Thank you for requesting a [employer] account. Our records indicate that you already have an active [employer] account. You can access your account anytime at [email or web address]. [employer]/[facility] Did you know that you can access your hospital and ER discharge instructions at any time in [employer]? You can also review all of your test results from your hospital stay or ER visit. Additional Information If you have questions, please visit the Frequently Asked Questions section of the [employer] website at [email or web address]/[email or web address]/. Remember, [employer] is NOT to be used for urgent needs. For medical emergencies, dial 911. Now available from your iPhone and Android! Please provide this summary of care documentation to your next provider. Your primary care clinician is listed as ELVIN BRITTON. If you have any questions after today's visit, please call [phone].

## 2017-12-04 NOTE — PROGRESS NOTES
Chief Complaint   Patient presents with    Irregular Heart Beat     palpitations    Cholesterol Problem     dyslipidemia    Blood sugar problem     prediabetes    Vitamin D Deficiency    Other     patient states UTI and skin condition seems to be 'going in the right direction.'       Health Maintenance Due   Topic Date Due    DTaP/Tdap/Td series (1 - Tdap) 09/11/1976    ZOSTER VACCINE AGE 60>  07/11/2015    BREAST CANCER SCRN MAMMOGRAM  12/08/2016    PAP AKA CERVICAL CYTOLOGY  02/01/2017       Health Maintenance reviewed     1. Have you been to the ER, urgent care clinic since your last visit? Hospitalized since your last visit? No    2. Have you seen or consulted any other health care providers outside of the [facility] since your last visit? Include any pap smears or colon screening.  No

## 2017-12-04 NOTE — PROGRESS NOTES
Sanjuana Oleary is a 58 y.o. female who presents for routine immunizations. She denies any symptoms , reactions or allergies that would exclude them from being immunized today. Risks and adverse reactions were discussed and the VIS was given to them. All questions were addressed. She was observed for 15 min post injection. There were no reactions observed.     Vic Enriquez, WINDYN

## 2017-12-04 NOTE — PROGRESS NOTES
HISTORY OF PRESENT ILLNESS  Kaushik Donaldson is a 58 y.o. female. Chief Complaint   Patient presents with    Irregular Heart Beat     Palpitations chronic problem, stable improved some    Cholesterol Problem chronic problem, stable      dyslipidemia    Blood sugar problem     Prediabetes chronic problem, stable no meds on diet for this     Vitamin D Deficiency    Other     patient states UTI and skin condition seems to be 'going in the right direction.'   Ongoing rash in folds of the groin but improved with mycolog, still some color variation in the groin area, no longer itchy  Diarrhea seems a bit better, started on probiotics  Treated for uti with abx better but still some mild urgency    HPI  Past Medical History:   Diagnosis Date    Abnormal nuclear cardiac imaging test 02/09/2012    Mid to distal anteroseptal and apical reversible defect concerning for ishcmeia. Mild-mod, mid-distal anterior & apical hypk. EF 67%. Borderline abnormal EKG w/1-mm inferolateral ST depression at 7 min exercise. Occasional PVCs.  Anxiety     Carotid duplex 51/89/5246    Mild 1-43% LICA stenosis.  Dengue fever     7/10 while in [place]    Diverticulosis     Dyslipidemia     GERD     Holter monitor study 04/29/2016    Sinus rhythm, avg HR 67 bpm (range  bpm). Rare ectopy.  Hypothyroidism     RLE venous duplex 07/23/2015    Right leg:  No DVT.  S/P cardiac catheterization 02/24/2012    Angiographically normal coronaries. Hyperdynamic. EF 70%. No RWMA.       Sinusitis     Tinnitus     Uterine cancer (HCC)     hysterectomy       Allergies   Allergen Reactions    Omnicef [Cefdinir] Hives    Keflex [Cephalexin] Hives    Codeine Other (comments)     Severe headache    Ketek [Telithromycin] Other (comments)     Passed out    Levaquin [Levofloxacin] Unknown (comments)     Did not tolerate      Morphine Other (comments)     Severe headache    Sulfa (Sulfonamide Antibiotics) Hives    Topamax [Topiramate] Other (comments)     Made blood vessels red and pain in eyes     Allergies   Allergen Reactions    Omnicef [Cefdinir] Hives    Keflex [Cephalexin] Hives    Codeine Other (comments)     Severe headache    Ketek [Telithromycin] Other (comments)     Passed out    Levaquin [Levofloxacin] Unknown (comments)     Did not tolerate      Morphine Other (comments)     Severe headache    Sulfa (Sulfonamide Antibiotics) Hives    Topamax [Topiramate] Other (comments)     Made blood vessels red and pain in eyes       ROS  Visit Vitals    /76 (BP 1 Location: Right arm, BP Patient Position: Sitting)    Pulse 67    Temp 97.7 °F (36.5 °C) (Oral)    Resp 20    Ht 5' 8\" (1.727 m)    Wt 253 lb 8 oz (115 kg)    BMI 38.54 kg/m2       Physical Exam  Nursing note and vitals reviewed. Constitutional: She is oriented to person, place, and time. She appears well-developed and well-nourished. No distress. HENT:   Mouth/Throat: Oropharynx is clear and moist.   Neck: No JVD present. No thyromegaly present. Cardiovascular: Normal rate, regular rhythm and normal heart sounds. Pulmonary/Chest: Effort normal and breath sounds normal. No respiratory distress. She has no wheezes. She has no rales. Musculoskeletal: She exhibits no edema. Lymphadenopathy:     She has no cervical adenopathy. Neurological: She is alert and oriented to person, place, and time. Coordination normal.   Psychiatric: She has a normal mood and affect. Her behavior is normal.    ASSESSMENT and PLAN    ICD-10-CM ICD-9-CM    1. Diarrhea, unspecified type R19.7 787.91    2. Vaginal candidiasis B37.3 112.1    3. Urinary tract infection without hematuria, site unspecified N39.0 599.0    4. Heart palpitations R00.2 785.1    5. Gastroesophageal reflux disease without esophagitis K77.7 548.36 METABOLIC PANEL, COMPREHENSIVE      lansoprazole (PREVACID) 30 mg capsule      DISCONTINUED: lansoprazole (PREVACID) 30 mg capsule   6.  Dyslipidemia E78.5 272.4 LIPID PANEL      METABOLIC PANEL, COMPREHENSIVE   7. Hypothyroidism, unspecified type E03.9 244.9 levothyroxine (SYNTHROID) 112 mcg tablet      METABOLIC PANEL, COMPREHENSIVE      TSH 3RD GENERATION      T4, FREE   8. Prediabetes R04.67 746.48 METABOLIC PANEL, COMPREHENSIVE      HEMOGLOBIN A1C W/O EAG   9. Medication monitoring encounter Z51.81 V58.83 LIPID PANEL      METABOLIC PANEL, COMPREHENSIVE      HEMOGLOBIN A1C W/O EAG   10. Intertriginous candidiasis B37.2 112.3 ketoconazole (NIZORAL) 2 % topical cream   11. Encounter for immunization Z23 V03.89 INFLUENZA VIRUS VAC QUAD,SPLIT,PRESV FREE SYRINGE IM      HEPATITIS A VACCINE, ADULT DOSAGE, IM   Visit based of time 35 minutes total,  with more than 50% of the face-to-face visit time spent in counseling on multiple issues above , it's treatment, prognosis, management advise, plan and follow-up recommendations. Follow-up Disposition:  Return in about 2 months (around 2/4/2018).

## 2017-12-08 ENCOUNTER — HOSPITAL ENCOUNTER (OUTPATIENT)
Dept: CT IMAGING | Age: 62
Discharge: HOME | End: 2017-12-08
Attending: NURSE PRACTITIONER
Payer: COMMERCIAL

## 2017-12-08 DIAGNOSIS — R10.84 GENERALIZED ABDOMINAL PAIN: ICD-10-CM

## 2017-12-08 LAB — CREAT UR-MCNC: 0.8 MG/DL (ref 0.6–1.3)

## 2017-12-08 PROCEDURE — 74011636320 HC RX REV CODE- 636/320

## 2017-12-08 PROCEDURE — 82565 ASSAY OF CREATININE: CPT

## 2017-12-08 PROCEDURE — 74177 CT ABD & PELVIS W/CONTRAST: CPT

## 2017-12-08 RX ADMIN — IOPAMIDOL 80 ML: 612 INJECTION, SOLUTION INTRAVENOUS at 20:00

## 2017-12-18 ENCOUNTER — HOSPITAL ENCOUNTER (OUTPATIENT)
Dept: LAB | Age: 62
Discharge: HOME | End: 2017-12-18

## 2017-12-18 LAB — SENTARA SPECIMEN COL,SENBCF: NORMAL

## 2017-12-18 PROCEDURE — 99001 SPECIMEN HANDLING PT-LAB: CPT | Performed by: NURSE PRACTITIONER

## 2018-01-16 ENCOUNTER — HOSPITAL ENCOUNTER (OUTPATIENT)
Dept: LAB | Age: 63
Discharge: HOME | End: 2018-01-16

## 2018-01-16 LAB — SENTARA SPECIMEN COL,SENBCF: NORMAL

## 2018-01-16 PROCEDURE — 99001 SPECIMEN HANDLING PT-LAB: CPT | Performed by: NURSE PRACTITIONER

## 2018-02-04 DIAGNOSIS — K21.9 GASTROESOPHAGEAL REFLUX DISEASE WITHOUT ESOPHAGITIS: ICD-10-CM

## 2018-02-04 DIAGNOSIS — E78.5 DYSLIPIDEMIA: ICD-10-CM

## 2018-02-04 DIAGNOSIS — Z51.81 MEDICATION MONITORING ENCOUNTER: ICD-10-CM

## 2018-02-04 DIAGNOSIS — R73.03 PREDIABETES: ICD-10-CM

## 2018-02-04 DIAGNOSIS — E03.9 HYPOTHYROIDISM, UNSPECIFIED TYPE: ICD-10-CM

## 2018-02-12 DIAGNOSIS — B37.2 CANDIDAL INTERTRIGO: ICD-10-CM

## 2018-02-12 NOTE — TELEPHONE ENCOUNTER
Pt called requesting refill for medication .   Requested Prescriptions     Pending Prescriptions Disp Refills    nystatin-triamcinolone (MYCOLOG) 100,000-0.1 unit/gram-% ointment 30 g 1     Sig: Apply to affected area bid

## 2018-02-14 RX ORDER — NYSTATIN AND TRIAMCINOLONE ACETONIDE 100000; 1 [USP'U]/G; MG/G
OINTMENT TOPICAL
Qty: 30 G | Refills: 1 | Status: SHIPPED | OUTPATIENT
Start: 2018-02-14 | End: 2018-11-14 | Stop reason: ALTCHOICE

## 2018-03-28 ENCOUNTER — OFFICE VISIT (OUTPATIENT)
Dept: FAMILY MEDICINE CLINIC | Age: 63
End: 2018-03-28

## 2018-03-28 VITALS
HEIGHT: 68 IN | RESPIRATION RATE: 20 BRPM | DIASTOLIC BLOOD PRESSURE: 81 MMHG | HEART RATE: 70 BPM | SYSTOLIC BLOOD PRESSURE: 134 MMHG | TEMPERATURE: 97.8 F | BODY MASS INDEX: 37.47 KG/M2 | WEIGHT: 247.25 LBS

## 2018-03-28 DIAGNOSIS — B85.2 PEDICULOSIS: Primary | ICD-10-CM

## 2018-03-28 DIAGNOSIS — Z20.7 SCABIES EXPOSURE: ICD-10-CM

## 2018-03-28 RX ORDER — SPINOSAD 9 MG/ML
1 SUSPENSION TOPICAL ONCE
Qty: 1 BOTTLE | Refills: 0 | Status: SHIPPED | OUTPATIENT
Start: 2018-03-28 | End: 2018-03-28

## 2018-03-28 RX ORDER — PERMETHRIN 50 MG/G
CREAM TOPICAL
Qty: 60 G | Refills: 0 | Status: SHIPPED | OUTPATIENT
Start: 2018-03-28 | End: 2018-05-08 | Stop reason: SDUPTHER

## 2018-03-28 RX ORDER — MALATHION 0 G/ML
LOTION TOPICAL ONCE
Qty: 59 ML | Refills: 0 | Status: CANCELLED | OUTPATIENT
Start: 2018-03-28 | End: 2018-03-28

## 2018-03-28 NOTE — PATIENT INSTRUCTIONS
Please contact our office if you have any questions about your visit today. Head Lice: Care Instructions  Your Care Instructions    Head lice are tiny bugs that can live in your hair and on your head. Live lice are tan to grayish white. They're about the size of a sesame seed. It may be easiest to find them at the base of the scalp, at the bottom of the neck, and behind the ears. When you have lice, all people living in your home need to be carefully checked and then treated. Lice eggs (nits) may be easier to see than live lice. They look like tiny yellow or white dots attached to the hair, close to the scalp. They're often easier to see than live lice. Nits can look like dandruff. But you can't pick them off with your fingernail or brush them away. Lice aren't dangerous. They don't spread disease or have anything to do with how clean someone is. The lice may make your head itch. Lice won't go away without treatment. You can treat lice and their eggs with prescription or over-the-counter medicines. After treatment, your skin may still itch for a week or more. This is because of your body's reaction to the lice. Follow-up care is a key part of your treatment and safety. Be sure to make and go to all appointments, and call your doctor if you are having problems. It's also a good idea to know your test results and keep a list of the medicines you take. How can you care for yourself at home? · Use an over-the-counter medicine to kill lice. It's important to use any medicine correctly and to choose a medicine that is safe. Talk to your doctor or pharmacist if you have questions. · Check your scalp for live lice 48 hours after treatment. If you find some, try a different type of treatment. It may be that the lice in your area are resistant to the first treatment you tried. · Check your scalp again 7 to 10 days after the first treatment. If you find live lice, you need a second treatment.  This is to make sure that all lice are killed, including those that hatched since the first treatment. · Try not to scratch. It may help to use an over-the-counter cream or calamine lotion to calm the itching. If the itching is really bad, ask the doctor about an over-the-counter antihistamine, such as diphenhydramine (Benadryl) or loratadine (Claritin). Read and follow all instructions on the label. · You may want to remove nits after treatment, but you don't have to remove them all. Some people use a special comb to remove nits after using lice medicine. The boss are often packaged with over-the-counter lice shampoos. A flea comb that's made for dogs and cats will also work. · Try not to share anything that comes into contact with hair. For example, don't share hair bands, barrettes, towels, hats, boss, or brushes. Teach your children not to share anything that comes into contact with hair if they have lice. · You don't need to spend a lot of time or money deep cleaning your home. But it is a good idea to:  [employer], boss, barrettes, and other items for 10 minutes in hot water (at least 130°F). ¨ Vacuum carpets, mattresses, couches, and other fabric-covered furniture. ¨ Machine-wash clothes, bedding, towels, and hats in hot water (at least 130°F). Dry them in a hot dryer. If you don't have access to a washing machine, instead you can store these items in a sealed plastic bag for 14 days. When should you call for help? Call your doctor now or seek immediate medical care if:  ? · You have signs of a skin infection, such as:  ¨ Increased pain, swelling, warmth, and redness. ¨ Red streaks coming from an area of the scalp. ¨ Pus draining from the area. ¨ A fever. ? Watch closely for changes in your health, and be sure to contact your doctor if:  ? · You see live lice or new nits after you have followed the directions for your medicine. ? · Anyone else in your family has lice.    ? · You do not get better as expected. Where can you learn more? Go to http://bonnie-justyna.info/. Enter R349 in the search box to learn more about \"Head Lice: Care Instructions. \"  Current as of: October 13, 2016  Content Version: 11.4  © 4370-8601 [employer]. Care instructions adapted under license by [employer] (which disclaims liability or warranty for this information). If you have questions about a medical condition or this instruction, always ask your healthcare professional. [address] any warranty or liability for your use of this information. Malathion (On the skin)   Malathion (mal-a-THYE-on)  Treats head lice infection. Brand Name(s): Ovide   There may be other brand names for this medicine. When This Medicine Should Not Be Used: You should not use this medicine if you have had an allergic reaction to malathion. You should not use this medicine on children younger than 1 year of age. How to Use This Medicine:   Lotion  · Use on scalp hair only. Keep your eyes tightly closed while you apply this medicine. Wash your scalp right away if skin irritation occurs. · To use:  ¨ Apply the lotion to dry hair and rub it in until your hair and scalp are thoroughly moistened. ¨ Wash your hands with soap and water before and after you use this medicine. ¨ Leave your hair uncovered and allow it to dry naturally. Do not use a hair dryer. ¨ After 8 to 12 hours, wash your hair with a non-medicated shampoo. Rinse thoroughly. ¨ After rinsing, use a fine-toothed (nit) comb to remove the dead lice and eggs. ¨ If lice are still present after 7 to 9 days, use a second application of malathion lotion. · This medicine is flammable. Keep the lotion and wet hair away from open flames. Do not use electric heat sources such as hair dryers and curlers while your hair is wet. Do not smoke while you apply this medicine or while your hair is wet.   If a dose is missed:   · Apply a dose as soon as you can. If it is almost time for your next dose, wait until then and apply a regular dose. Do not apply extra medicine to make up for a missed dose. How to Store and Dispose of This Medicine:   · Store the medicine in a closed container at room temperature, away from heat, moisture, and direct light. Keep the medicine away from heat or open flame. · Ask your pharmacist or doctor how to dispose of the medicine container and any leftover or  medicine. · Keep all medicine out of the reach of children. Never share your medicine with anyone. Drugs and Foods to Avoid:   Ask your doctor or pharmacist before using any other medicine, including over-the-counter medicines, vitamins, and herbal products. · Do not put cosmetics or skin care products on the treated skin. Warnings While Using This Medicine:   · Make sure your doctor knows if you are pregnant or breastfeeding. · Be careful not get any of this medicine in your eyes. If you do, flush your eyes with water for at least 10 minutes. Check with your doctor if your eyes become irritated or your vision changes. · Call a doctor or poison control center right away if you accidentally swallow this medicine. · Wash your scalp and hair immediately if skin irritation occurs. If the irritation goes away after you wash your hair, you may apply the medicine again. If the irritation happens again, check with your doctor. · Call your doctor right away if you have pain or blistering after you use this medicine. · Do not use this medicine on your child unless your doctor says it is okay. · Do not use this medicine to treat a skin problem your doctor has not examined.   Possible Side Effects While Using This Medicine:   Call your doctor right away if you notice any of these side effects:  · Allergic reaction: Itching or hives, swelling in your face or hands, swelling or tingling in your mouth or throat, chest tightness, trouble breathing  · Blistering and peeling of the skin  · Pain, reddening, or swelling of the skin  If you notice these less serious side effects, talk with your doctor:   · Burning, dry, or itching eyes or changes in your vision  · Stinging sensation or irritation on your scalp or skin  If you notice other side effects that you think are caused by this medicine, tell your doctor. Call your doctor for medical advice about side effects. You may report side effects to FDA at 7-809-BIZ-1789  © 2017 [facility] Information is for End User's use only and may not be sold, redistributed or otherwise used for commercial purposes. The above information is an  only. It is not intended as medical advice for individual conditions or treatments. Talk to your doctor, nurse or pharmacist before following any medical regimen to see if it is safe and effective for you. Malathion (Ovide) - (On the skin)   Why this medicine is used:   Treats head lice infection. Contact a nurse or doctor right away if you have:  · Blistering and peeling of the skin  · Pain, reddening, or swelling of the skin     Common side effects:  · Burning, dry, or itching eyes or changes in your vision  © 2017 [address] is for End User's use only and may not be sold, redistributed or otherwise used for commercial purposes. Spinosad (On the skin)   Spinosad (SPIN-oh-sad)  Treats head lice. Brand Name(s): Natroba   There may be other brand names for this medicine. When This Medicine Should Not Be Used: This medicine is generally considered safe for most people. Talk to your doctor if you have concerns. How to Use This Medicine:   Liquid  · Use this medicine as directed on the hair and scalp only. Do not get the medicine in your eyes, nose, mouth, and vagina. If it gets in your eyes, rinse them well with water. · Children will need an adult to apply the medicine for them. · Shake the bottle well before using.   · Wash your hands with soap and water before and after you use this medicine. · Apply the medicine directly to dry hair. Start closest to the scalp and then apply outward. Completely cover your entire scalp and all of your hair, from roots to tips. Use the whole bottle of medicine, if needed. · Leave the medicine on the hair and scalp for 10 minutes. · After 10 minutes, rinse your hair with warm water only. Dry with a clean towel. Comb hair with a fine-toothed comb to remove any nits (eggs) or nit shells. They look like small white dots. · Read and follow the patient instructions that come with this medicine. Talk to your doctor or pharmacist if you have any questions. · Store the medicine in a closed container at room temperature, away from heat, moisture, and direct light. Drugs and Foods to Avoid:      Ask your doctor or pharmacist before using any other medicine, including over-the-counter medicines, vitamins, and herbal products. Warnings While Using This Medicine:   · Tell your doctor if you are pregnant or breastfeeding, or if you have any skin problems. · Do not use this medicine on children younger than 7 months old because it contains benzyl alcohol which may cause serious reactions. · Check everyone in your household 7 days after treatment. If you find any live lice, tell your doctor. · Lice are easily spread from one person to another. To prevent the spread of lice, dry clean or wash your clothes, hats, and bedding in hot, soapy water. Dry in a hot dryer. Also wash personal care items such as brushes in hot, soapy water. · Do not use this medicine for a hair or scalp problem that has not been checked by your doctor. · Keep all medicine out of the reach of children. Never share your medicine with anyone.   Possible Side Effects While Using This Medicine:   Call your doctor right away if you notice any of these side effects:  · Allergic reaction: Itching or hives, swelling in your face or hands, swelling or tingling in your mouth or throat, chest tightness, trouble breathing  If you notice other side effects that you think are caused by this medicine, tell your doctor. Call your doctor for medical advice about side effects. You may report side effects to FDA at 6-570-WMO-1689  © 2017 [facility] Information is for End User's use only and may not be sold, redistributed or otherwise used for commercial purposes. The above information is an  only. It is not intended as medical advice for individual conditions or treatments. Talk to your doctor, nurse or pharmacist before following any medical regimen to see if it is safe and effective for you.

## 2018-03-28 NOTE — PROGRESS NOTES
HPI  Gina Celestin is a 58 y.o. female  Chief Complaint   Patient presents with    Head Lice     seen at urgent care on 3/23/18. She was told to use Nix OTC and re treat again in another week. She request a prescription strength medication as she feels no relief from OTC. Reports using OTC for lice treatment after going to urgent care. Does not feel that it has helped as she still has living lice in head and she can feel them crawl. Requesting a prescription treatment. Reports she also was diagnosed with scabies as she had the burrows all over. Reports areas have improved but new areas are developing. Reports itching. Requesting to be retreated. Reports she works in a [employer] [facility] [employer] and they have a lice and scabies infestation. Reports her  also has the same symptoms and he was treated but has started having the itching and rash to his body. Past Medical History  Past Medical History:   Diagnosis Date    Abnormal nuclear cardiac imaging test 02/09/2012    Mid to distal anteroseptal and apical reversible defect concerning for ishcmeia. Mild-mod, mid-distal anterior & apical hypk. EF 67%. Borderline abnormal EKG w/1-mm inferolateral ST depression at 7 min exercise. Occasional PVCs.  Anxiety     Carotid duplex 78/33/6537    Mild 7-90% LICA stenosis.  Dengue fever     7/10 while in [place]    Diverticulosis     Dyslipidemia     GERD     Holter monitor study 04/29/2016    Sinus rhythm, avg HR 67 bpm (range  bpm). Rare ectopy.  Hypothyroidism     RLE venous duplex 07/23/2015    Right leg:  No DVT.  S/P cardiac catheterization 02/24/2012    Angiographically normal coronaries. Hyperdynamic. EF 70%. No RWMA.       Sinusitis     Tinnitus     [facility]ter[facility] cancer [facility])     hysterectomy       Surgical History  Past Surgical History:   Procedure Laterality Date    HX CHOLECYSTECTOMY      2009    HX COLECTOMY      2008    HX HYSTERECTOMY Medications  Current Outpatient Prescriptions   Medication Sig Dispense Refill    permethrin (ACTICIN) 5 % topical cream apply sparingly as directed 60 g 0    nystatin-triamcinolone (MYCOLOG) 100,000-0.1 unit/gram-% ointment Apply to affected area bid 30 g 1    levothyroxine (SYNTHROID) 112 mcg tablet Take 1 Tab by mouth Daily (before breakfast). 90 Tab 3    ketoconazole (NIZORAL) 2 % topical cream Apply thin layer to affected area bid 30 g 1    atorvastatin (LIPITOR) 10 mg tablet TAKE 1 TABLET BY MOUTH EVERY DAY 90 Tab 3    clobetasol (TEMOVATE) 0.05 % ointment Apply  to affected area two (2) times daily as needed for Skin Irritation or Itching. 60 g 3    OTHER Azelaic/Finast/Flutic/Minox(Scalp) 5/0.1%  Apply to affected area of scalp daily  30 ml      EPINEPHrine (AUVI-Q) 0.3 mg/0.3 mL (1:1,000) injection 0.3 mL by IntraMUSCular route once as needed for Anaphylaxis for up to 1 dose. 1 Each 2    Lancets Misc Check blood sugar TID PRN with meals and dizziness 1 Package 11    Blood-Glucose Meter monitoring kit Check blood sugar TID PRN with meals and/or bouts of dizziness 1 Kit 0    glucose blood VI test strips (BLOOD GLUCOSE TEST) strip Patient to check blood sugar TID PRN 1 Package 11    Bifidobacterium Infantis (ALIGN) 4 mg cap Take  by mouth daily.          Allergies  Allergies   Allergen Reactions    Omnicef [Cefdinir] Hives    Keflex [Cephalexin] Hives    Codeine Other (comments)     Severe headache    Ketek [Telithromycin] Other (comments)     Passed out    Levaquin [Levofloxacin] Unknown (comments)     Did not tolerate      Morphine Other (comments)     Severe headache    Sulfa (Sulfonamide Antibiotics) Hives    Topamax [Topiramate] Other (comments)     Made blood vessels red and pain in eyes       Family History  Family History   Problem Relation Age of Onset    Other Mother      anuerysm    Cancer Father      brain tumor    Hypertension Maternal Aunt     Diabetes Maternal Aunt lung    Heart Disease Maternal Aunt     Stroke Maternal Grandmother     Other Maternal Grandfather      leg amputated due to phlebitis    Heart Attack Paternal Grandmother     Heart Disease Maternal Aunt        Social History  Social History     Social History    Marital status:      Spouse name: N/A    Number of children: N/A    Years of education: N/A     Occupational History    Not on file. Social History Main Topics    Smoking status: Former Smoker     Packs/day: 1.00     Years: 20.00     Types: Cigarettes     Quit date: 6/23/1999    Smokeless tobacco: Never Used    Alcohol use No    Drug use: No    Sexual activity: Not on file     Other Topics Concern    Not on file     Social History Narrative       Problem List  Patient Active Problem List   Diagnosis Code    Dyslipidemia E78.5    Chest pain R07.89    Hypothyroidism E03.9    GERD K21.9    Vitamin D deficiency Z72.8    Helicobacter pylori antibody positive R76.8    Prediabetes R73.03    Benign positional vertigo H81.10    Palpitations R00.2    Encounter for long-term (current) use of other medications Z79.899    Anxiety F41.9    Insomnia G47.00    Intractable cluster headache syndrome G44.001    Tinnitus H93.19    Allergic rhinitis J30.9       Review of Systems  Review of Systems   Constitutional: Negative for chills and fever. Respiratory: Negative for shortness of breath. Cardiovascular: Negative for chest pain. Skin: Positive for itching and rash. Vital Signs  Vitals:    03/28/18 1524   BP: 134/81   Pulse: 70   Resp: 20   Temp: 97.8 °F (36.6 °C)   TempSrc: Oral   Weight: 247 lb 4 oz (112.2 kg)   Height: 5' 8\" (1.727 m)   PainSc:   0 - No pain       Physical Exam  Physical Exam   Constitutional: She is oriented to person, place, and time. Neurological: She is alert and oriented to person, place, and time. Skin: Rash noted. Linear rash lines noted to back and abdominal area. No finger webbing. Vitals reviewed. Diagnostics  Orders Placed This Encounter    permethrin (ACTICIN) 5 % topical cream     Sig: apply sparingly as directed     Dispense:  60 g     Refill:  0    spinosad (NATROBA) 0.9 % susp     Si Bottle by Apply Externally route once for 1 dose. Dispense:  1 Bottle     Refill:  0       Results  Results for orders placed or performed during the hospital encounter of 18   [address] [place] [address]. Result Value Ref Range    [facility] SPECIMEN COL Specimens collected/sent to [facility]           Assessment and Plan  Diagnoses and all orders for this visit:    1. Pediculosis  -     spinosad (NATROBA) 0.9 % susp; 1 Bottle by Apply Externally route once for 1 dose. 2. Scabies exposure  -     permethrin (ACTICIN) 5 % topical cream; apply sparingly as directed      Medication, side effects, possible allergic reactions and warnings reviewed with patient. Patient verbalized understanding. Instructed in washing and disposing of bedding and cleaning home. Informed that  would need treatment at the same time. After care summary printed and reviewed with patient. Plan reviewed with patient. Questions answered. Patient verbalized understanding of plan and is in agreement with plan. Patient to follow up as needed or earlier if symptoms worsen.     SELENA Kiran

## 2018-03-28 NOTE — MR AVS SNAPSHOT
[facility] 
 
 
 [place] [id number] 
[phone] Patient: Kenia Carbajal MRN: [id number] ECO:[birth year]36[birth year] Visit Information Date & Time Provider Department Dept. Phone Encounter #  
 3/28/2018  3:00 PM Gauri Ochoa NP Carry Fer Miller [phone] [id number] Follow-up Instructions Return if symptoms worsen or fail to improve. Your Appointments 5/3/2018  7:30 AM  
Follow Up with Frida Robledo MD  
[address] [place] [address] (--) Appt Note: f/u (prefers to see Dr. Kash Adams) [place] [phone] [phone]  
  
   
 [place] [id number] 6/14/2018 11:00 AM  
Follow Up with Gerardo Oliver MD  
[facility] ([address]) Appt Note: 1 year follow up Zunilda Merrillquie Juancho [phone] [address] [place] [address] [address] Upcoming Health Maintenance Date Due DTaP/Tdap/Td series (1 - Tdap) 9/11/1976 ZOSTER VACCINE AGE 60> 7/11/2015 BREAST CANCER SCRN MAMMOGRAM 12/8/2016 PAP AKA CERVICAL CYTOLOGY 2/1/2017 COLONOSCOPY 5/26/2020 Allergies as of 3/28/2018  Review Complete On: 12/4/2017 By: Frida Robledo MD  
  
 Severity Noted Reaction Type Reactions Omnicef [Cefdinir] High 07/26/2011    Hives Keflex [Cephalexin] Medium 07/21/2017    Hives Codeine  06/23/2011    Other (comments) Severe headache Ketek [Telithromycin]  09/21/2017    Other (comments) Passed out Levaquin [Levofloxacin]  10/12/2012    Unknown (comments) Did not tolerate Morphine  06/23/2011    Other (comments) Severe headache  
 Sulfa (Sulfonamide Antibiotics)  06/23/2011    Hives Topamax [Topiramate]  01/11/2016    Other (comments) Made blood vessels red and pain in eyes Current Immunizations  Reviewed on 12/4/2017 Name Date Hep A Vaccine (Adult) 12/4/2017, 5/30/2017 Influenza Vaccine (Quad) PF 12/4/2017, 9/28/2016 Not reviewed this visit You Were Diagnosed With   
  
 Codes Comments Pediculosis    -  Primary ICD-10-CM: B85.2 ICD-9-CM: 132.9 Scabies exposure     ICD-10-CM: Z20.89 ICD-9-CM: V01.89 Vitals BP Pulse Temp Resp Height(growth percentile) Weight(growth percentile) 134/81 (BP 1 Location: Right arm, BP Patient Position: Sitting) 70 97.8 °F (36.6 °C) (Oral) 20 5' 8\" (1.727 m) 247 lb 4 oz (112.2 kg) BMI OB Status Smoking Status 37.59 kg/m2 Hysterectomy Former Smoker BMI and BSA Data Body Mass Index Body Surface Area  
 37.59 kg/m 2 2.32 m 2 Preferred Pharmacy Pharmacy Name Phone [phone] [address] [place] [address] [phone] Your Updated Medication List  
  
   
This list is accurate as of 3/28/18  4:15 PM.  Always use your most recent med list.  
  
  
  
  
 ALIGN 4 mg Cap Generic drug:  Bifidobacterium Infantis Take  by mouth daily. atorvastatin 10 mg tablet Commonly known as:  LIPITOR  
TAKE 1 TABLET BY MOUTH EVERY DAY Blood-Glucose Meter monitoring kit Check blood sugar TID PRN with meals and/or bouts of dizziness  
  
 clobetasol 0.05 % ointment Commonly known as:  Norva Panning Apply  to affected area two (2) times daily as needed for Skin Irritation or Itching. EPINEPHrine 0.3 mg/0.3 mL injection Commonly known as:  AUVI-Q  
0.3 mL by IntraMUSCular route once as needed for Anaphylaxis for up to 1 dose. glucose blood VI test strips strip Commonly known as:  blood glucose test  
Patient to check blood sugar TID PRN  
  
 ketoconazole 2 % topical cream  
Commonly known as:  NIZORAL Apply thin layer to affected area bid Lancets Misc Check blood sugar TID PRN with meals and dizziness  
  
 levothyroxine 112 mcg tablet Commonly known as:  SYNTHROID Take 1 Tab by mouth Daily (before breakfast). nystatin-triamcinolone 100,000-0.1 unit/gram-% ointment Commonly known as:  Felizardo Big Apply to affected area bid OTHER Azelaic/Finast/Flutic/Minox(Scalp) 5/0.1% Apply to affected area of scalp daily 30 ml  
  
 permethrin 5 % topical cream  
Commonly known as:  ACTICIN  
apply sparingly as directed  
  
 spinosad 0.9 % Susp Commonly known as:  NATROBA  
1 Bottle by Apply Externally route once for 1 dose. Prescriptions Printed Refills  
 permethrin (ACTICIN) 5 % topical cream 0 Sig: apply sparingly as directed Class: Print  
 spinosad (NATROBA) 0.9 % susp 0 Si Bottle by Apply Externally route once for 1 dose. Class: Print Route: Apply Externally Follow-up Instructions Return if symptoms worsen or fail to improve. Patient Instructions Please contact our office if you have any questions about your visit today. Head Lice: Care Instructions Your Care Instructions Head lice are tiny bugs that can live in your hair and on your head. Live lice are tan to grayish white. They're about the size of a sesame seed. It may be easiest to find them at the base of the scalp, at the bottom of the neck, and behind the ears. When you have lice, all people living in your home need to be carefully checked and then treated. Lice eggs (nits) may be easier to see than live lice. They look like tiny yellow or white dots attached to the hair, close to the scalp. They're often easier to see than live lice. Nits can look like dandruff. But you can't pick them off with your fingernail or brush them away. Lice aren't dangerous. They don't spread disease or have anything to do with how clean someone is. The lice may make your head itch. Lice won't go away without treatment. You can treat lice and their eggs with prescription or over-the-counter medicines.  After treatment, your skin may still itch for a week or more. This is because of your body's reaction to the lice. Follow-up care is a key part of your treatment and safety. Be sure to make and go to all appointments, and call your doctor if you are having problems. It's also a good idea to know your test results and keep a list of the medicines you take. How can you care for yourself at home? · Use an over-the-counter medicine to kill lice. It's important to use any medicine correctly and to choose a medicine that is safe. Talk to your doctor or pharmacist if you have questions. · Check your scalp for live lice 48 hours after treatment. If you find some, try a different type of treatment. It may be that the lice in your area are resistant to the first treatment you tried. · Check your scalp again 7 to 10 days after the first treatment. If you find live lice, you need a second treatment. This is to make sure that all lice are killed, including those that hatched since the first treatment. · Try not to scratch. It may help to use an over-the-counter cream or calamine lotion to calm the itching. If the itching is really bad, ask the doctor about an over-the-counter antihistamine, such as diphenhydramine (Benadryl) or loratadine (Claritin). Read and follow all instructions on the label. · You may want to remove nits after treatment, but you don't have to remove them all. Some people use a special comb to remove nits after using lice medicine. The boss are often packaged with over-the-counter lice shampoos. A flea comb that's made for dogs and cats will also work. · Try not to share anything that comes into contact with hair. For example, don't share hair bands, barrettes, towels, hats, boss, or brushes. Teach your children not to share anything that comes into contact with hair if they have lice. · You don't need to spend a lot of time or money deep cleaning your home. But it is a good idea to: ¨ Soak hairbrushes, boss, barrettes, and other items for 10 minutes in hot water (at least 130°F). ¨ Vacuum carpets, mattresses, couches, and other fabric-covered furniture. ¨ Machine-wash clothes, bedding, towels, and hats in hot water (at least 130°F). Dry them in a hot dryer. If you don't have access to a washing machine, instead you can store these items in a sealed plastic bag for 14 days. When should you call for help? Call your doctor now or seek immediate medical care if: 
? · You have signs of a skin infection, such as: 
¨ Increased pain, swelling, warmth, and redness. ¨ Red streaks coming from an area of the scalp. ¨ Pus draining from the area. ¨ A fever. ? Watch closely for changes in your health, and be sure to contact your doctor if: 
? · You see live lice or new nits after you have followed the directions for your medicine. ? · Anyone else in your family has lice. ? · You do not get better as expected. Where can you learn more? Go to http://bonnie-justyna.info/. Enter R349 in the search box to learn more about \"Head Lice: Care Instructions. \" Current as of: October 13, 2016 Content Version: 11.4 © 5322-0454 [employer]. Care instructions adapted under license by [employer] (which disclaims liability or warranty for this information). If you have questions about a medical condition or this instruction, always ask your healthcare professional. [place] [address] any warranty or liability for your use of this information. Malathion (On the skin) Malathion (mal-a-THYE-on) Treats head lice infection. Brand Name(s): Ovide There may be other brand names for this medicine. When This Medicine Should Not Be Used: You should not use this medicine if you have had an allergic reaction to malathion. You should not use this medicine on children younger than 1 year of age. How to Use This Medicine:  
Miguel Stapleton · Use on scalp hair only. Keep your eyes tightly closed while you apply this medicine. Wash your scalp right away if skin irritation occurs. · To use: 
¨ Apply the lotion to dry hair and rub it in until your hair and scalp are thoroughly moistened. ¨ Wash your hands with soap and water before and after you use this medicine. ¨ Leave your hair uncovered and allow it to dry naturally. Do not use a hair dryer. ¨ After 8 to 12 hours, wash your hair with a non-medicated shampoo. Rinse thoroughly. ¨ After rinsing, use a fine-toothed (nit) comb to remove the dead lice and eggs. ¨ If lice are still present after 7 to 9 days, use a second application of malathion lotion. · This medicine is flammable. Keep the lotion and wet hair away from open flames. Do not use electric heat sources such as hair dryers and curlers while your hair is wet. Do not smoke while you apply this medicine or while your hair is wet. If a dose is missed:  
· Apply a dose as soon as you can. If it is almost time for your next dose, wait until then and apply a regular dose. Do not apply extra medicine to make up for a missed dose. How to Store and Dispose of This Medicine: · Store the medicine in a closed container at room temperature, away from heat, moisture, and direct light. Keep the medicine away from heat or open flame. · Ask your pharmacist or doctor how to dispose of the medicine container and any leftover or  medicine. · Keep all medicine out of the reach of children. Never share your medicine with anyone. Drugs and Foods to Avoid: Ask your doctor or pharmacist before using any other medicine, including over-the-counter medicines, vitamins, and herbal products. · Do not put cosmetics or skin care products on the treated skin. Warnings While Using This Medicine: · Make sure your doctor knows if you are pregnant or breastfeeding. · Be careful not get any of this medicine in your eyes.  If you do, flush your eyes with water for at least 10 minutes. Check with your doctor if your eyes become irritated or your vision changes. · Call a doctor or poison control center right away if you accidentally swallow this medicine. · Wash your scalp and hair immediately if skin irritation occurs. If the irritation goes away after you wash your hair, you may apply the medicine again. If the irritation happens again, check with your doctor. · Call your doctor right away if you have pain or blistering after you use this medicine. · Do not use this medicine on your child unless your doctor says it is okay. · Do not use this medicine to treat a skin problem your doctor has not examined. Possible Side Effects While Using This Medicine:  
Call your doctor right away if you notice any of these side effects: · Allergic reaction: Itching or hives, swelling in your face or hands, swelling or tingling in your mouth or throat, chest tightness, trouble breathing · Blistering and peeling of the skin · Pain, reddening, or swelling of the skin If you notice these less serious side effects, talk with your doctor: · Burning, dry, or itching eyes or changes in your vision · Stinging sensation or irritation on your scalp or skin If you notice other side effects that you think are caused by this medicine, tell your doctor. Call your doctor for medical advice about side effects. You may report side effects to FDA at 1-791-FDA-2418 © 2017 [address] Information is for End User's use only and may not be sold, redistributed or otherwise used for commercial purposes. The above information is an  only. It is not intended as medical advice for individual conditions or treatments. Talk to your doctor, nurse or pharmacist before following any medical regimen to see if it is safe and effective for you. Malathion (Ovide) - (On the skin) Why this medicine is used:  
Treats head lice infection. Contact a nurse or doctor right away if you have: · Blistering and peeling of the skin · Pain, reddening, or swelling of the skin Common side effects: 
· Burning, dry, or itching eyes or changes in your vision © 2017 [facility] [place] Information is for End User's use only and may not be sold, redistributed or otherwise used for commercial purposes. Spinosad (On the skin) Spinosad (SPIN-oh-sad) Treats head lice. Brand Name(s): [facility] There may be other brand names for this medicine. When This Medicine Should Not Be Used: This medicine is generally considered safe for most people. Talk to your doctor if you have concerns. How to Use This Medicine:  
Liquid · Use this medicine as directed on the hair and scalp only. Do not get the medicine in your eyes, nose, mouth, and vagina. If it gets in your eyes, rinse them well with water. · Children will need an adult to apply the medicine for them. · Shake the bottle well before using. · Wash your hands with soap and water before and after you use this medicine. · Apply the medicine directly to dry hair. Start closest to the scalp and then apply outward. Completely cover your entire scalp and all of your hair, from roots to tips. Use the whole bottle of medicine, if needed. · Leave the medicine on the hair and scalp for 10 minutes. · After 10 minutes, rinse your hair with warm water only. Dry with a clean towel. Comb hair with a fine-toothed comb to remove any nits (eggs) or nit shells. They look like small white dots. · Read and follow the patient instructions that come with this medicine. Talk to your doctor or pharmacist if you have any questions. · Store the medicine in a closed container at room temperature, away from heat, moisture, and direct light. Drugs and Foods to Avoid: Ask your doctor or pharmacist before using any other medicine, including over-the-counter medicines, vitamins, and herbal products. Warnings While Using This Medicine: · Tell your doctor if you are pregnant or breastfeeding, or if you have any skin problems. · Do not use this medicine on children younger than 7 months old because it contains benzyl alcohol which may cause serious reactions. · Check everyone in your household 7 days after treatment. If you find any live lice, tell your doctor. · Lice are easily spread from one person to another. To prevent the spread of lice, dry clean or wash your clothes, hats, and bedding in hot, soapy water. Dry in a hot dryer. Also wash personal care items such as brushes in hot, soapy water. · Do not use this medicine for a hair or scalp problem that has not been checked by your doctor. · Keep all medicine out of the reach of children. Never share your medicine with anyone. Possible Side Effects While Using This Medicine:  
Call your doctor right away if you notice any of these side effects: · Allergic reaction: Itching or hives, swelling in your face or hands, swelling or tingling in your mouth or throat, chest tightness, trouble breathing If you notice other side effects that you think are caused by this medicine, tell your doctor. Call your doctor for medical advice about side effects. You may report side effects to FDA at 6-747-FDA-9108 © 2017 [address] Information is for End User's use only and may not be sold, redistributed or otherwise used for commercial purposes. The above information is an  only. It is not intended as medical advice for individual conditions or treatments. Talk to your doctor, nurse or pharmacist before following any medical regimen to see if it is safe and effective for you. Introducing [facility]! Dear Negin De La Garza: Thank you for requesting a [employer] account. Our records indicate that you already have an active [employer] account. You can access your account anytime at [email or web address] Did you know that you can access your hospital and ER discharge instructions at any time in Glass? You can also review all of your test results from your hospital stay or ER visit. Additional Information If you have questions, please visit the Frequently Asked Questions section of the [employer] website at [email or web address] Remember, Glass is NOT to be used for urgent needs. For medical emergencies, dial 911. Now available from your iPhone and Android! Please provide this summary of care documentation to your next provider. Your primary care clinician is listed as ELVIN BRITTON. If you have any questions after today's visit, please call [phone].

## 2018-03-28 NOTE — PROGRESS NOTES
Chief Complaint   Patient presents with    Head Lice     seen at urgent care on 3/23/18. She was told to use Nix OTC and re treat again in another week. She request a prescription strength medication as she feels no relief from OTC. Health Maintenance Due   Topic Date Due    DTaP/Tdap/Td series (1 - Tdap) 09/11/1976    ZOSTER VACCINE AGE 60>  07/11/2015    BREAST CANCER SCRN MAMMOGRAM  12/08/2016    PAP AKA CERVICAL CYTOLOGY  02/01/2017       Health Maintenance reviewed     1. Have you been to the ER, urgent care clinic since your last visit? Hospitalized since your last visit? Yes When: 3/18 Where: urgent care Reason for visit: head lice    2. Have you seen or consulted any other health care providers outside of the [address] [place] [address] since your last visit? Include any pap smears or colon screening.  Yes When: 2/18 Where: dermatology Reason for visit: ov

## 2018-03-29 ENCOUNTER — TELEPHONE (OUTPATIENT)
Dept: FAMILY MEDICINE CLINIC | Age: 63
End: 2018-03-29

## 2018-03-29 NOTE — TELEPHONE ENCOUNTER
Natroba 0.9% needs Prior Auth. Please advise. Pharmacy stated that patient is very persistent and states she can only use this brand name.

## 2018-03-30 NOTE — TELEPHONE ENCOUNTER
Talked with patient today. She states she called her insurance company and was told they will put in an 1 time override for Ulysses. She states pharmacy said she should be able to  script by Tues.

## 2018-04-10 ENCOUNTER — HOSPITAL ENCOUNTER (OUTPATIENT)
Dept: LAB | Age: 63
Discharge: HOME | End: 2018-04-10

## 2018-04-10 LAB — SENTARA SPECIMEN COL,SENBCF: NORMAL

## 2018-04-10 PROCEDURE — 99001 SPECIMEN HANDLING PT-LAB: CPT | Performed by: PHYSICIAN ASSISTANT

## 2018-05-07 ENCOUNTER — HOSPITAL ENCOUNTER (OUTPATIENT)
Dept: LAB | Age: 63
Discharge: HOME | End: 2018-05-07

## 2018-05-07 LAB — SENTARA SPECIMEN COL,SENBCF: NORMAL

## 2018-05-07 PROCEDURE — 99001 SPECIMEN HANDLING PT-LAB: CPT | Performed by: PHYSICIAN ASSISTANT

## 2018-05-08 ENCOUNTER — OFFICE VISIT (OUTPATIENT)
Dept: FAMILY MEDICINE CLINIC | Age: 63
End: 2018-05-08

## 2018-05-08 VITALS
WEIGHT: 243 LBS | HEIGHT: 68 IN | DIASTOLIC BLOOD PRESSURE: 87 MMHG | OXYGEN SATURATION: 99 % | SYSTOLIC BLOOD PRESSURE: 152 MMHG | BODY MASS INDEX: 36.83 KG/M2 | TEMPERATURE: 96.7 F | RESPIRATION RATE: 16 BRPM | HEART RATE: 68 BPM

## 2018-05-08 DIAGNOSIS — B86 SCABIES: Primary | ICD-10-CM

## 2018-05-08 DIAGNOSIS — Z20.7 SCABIES EXPOSURE: ICD-10-CM

## 2018-05-08 RX ORDER — IVERMECTIN 10 MG/G
CREAM TOPICAL
Qty: 30 G | Refills: 0 | Status: SHIPPED | OUTPATIENT
Start: 2018-05-08 | End: 2018-05-11 | Stop reason: SDUPTHER

## 2018-05-08 RX ORDER — IVERMECTIN 3 MG/1
3 TABLET ORAL ONCE
Qty: 8 TAB | Refills: 0 | Status: SHIPPED | OUTPATIENT
Start: 2018-05-08 | End: 2018-06-05 | Stop reason: SDUPTHER

## 2018-05-08 RX ORDER — PERMETHRIN 50 MG/G
CREAM TOPICAL
Qty: 60 G | Refills: 0 | Status: SHIPPED | OUTPATIENT
Start: 2018-05-08 | End: 2018-05-24 | Stop reason: SDUPTHER

## 2018-05-08 NOTE — PROGRESS NOTES
Progress Note    Patient: Damaso Andrade MRN: [id number]  SSN: [id number]    YOB: 1955  Age: 58 y.o. Sex: female          Subjective:   Damaso Andrade is a 58 y.o. female who is here for an acute care visit and follow up. She mentions that 6 months ago she had a cardiac event. She was seen at [facility] for this and her cardiac work up was negative. She was told that she had some esophageal erosions and has been seeing GI for this. Now recently, she did not realize that she had scabies--suspected contracted from the hospital. She was initially thought to be a yeast. She mentions that she ended up in the ER in March and they discovered the scabies. She mentions that she has now developed skin burrows and a severe rash. She has seen the dermatologist about the scabies---Dr. Han Hale. She mentions that she was not given adequate amounts of her medication. She mentions that she has had professional cleaning in her house. She has already done a round of Ivermectin and Permetherin. Objective:     Past Medical History:   Diagnosis Date    Abnormal nuclear cardiac imaging test 02/09/2012    Mid to distal anteroseptal and apical reversible defect concerning for ishcmeia. Mild-mod, mid-distal anterior & apical hypk. EF 67%. Borderline abnormal EKG w/1-mm inferolateral ST depression at 7 min exercise. Occasional PVCs.  Anxiety     Carotid duplex 09/01/5176    Mild 0-36% LICA stenosis.  Dengue fever     7/10 while in [place]    Diverticulosis     Dyslipidemia     GERD     Holter monitor study 04/29/2016    Sinus rhythm, avg HR 67 bpm (range  bpm). Rare ectopy.  Hypothyroidism     RLE venous duplex 07/23/2015    Right leg:  No DVT.  S/P cardiac catheterization 02/24/2012    Angiographically normal coronaries. Hyperdynamic. EF 70%. No RWMA.       Sinusitis     Tinnitus     Uterine cancer ([facility] [address]     hysterectomy        Vitals:    05/08/18 1521   BP: 152/87   Pulse: 68   Resp: 16   Temp: 96.7 °F (35.9 °C)   TempSrc: Oral   SpO2: 99%   Weight: 243 lb (110.2 kg)   Height: 5' 8\" (1.727 m)        Review of Systems:  Pertinent items are noted in the History of Present Illness. Physical Exam:   GENERAL: alert, cooperative, mild distress, appears stated age, morbidly obese  EYE: conjunctivae/corneas clear. PERRL, EOM's intact. Fundi benign  THROAT & NECK: normal and no erythema or exudates noted. LUNG: clear to auscultation bilaterally  HEART: regular rate and rhythm, S1, S2 normal, no murmur, click, rub or gallop  ABDOMEN: soft, non-tender. Bowel sounds normal. No masses,  no organomegaly  EXTREMITIES:  extremities normal, atraumatic, no cyanosis or edema  SKIN: Multiple excoriations on the skin and evidence of burrowing within the skin folds    Lab/Data Review:  No new labs to review. Assessment:     1. Scabies    - ivermectin (STROMECTOL) 3 mg tablet; Take 1 Tab by mouth once for 1 dose. Dispense: 8 Tab; Refill: 0  - lindane (KWELL) 1 % lotion; Apply 1 Bottle to affected area now for 1 dose. Dispense: 30 mL; Refill: 0    2. Scabies exposure    - permethrin (ACTICIN) 5 % topical cream; apply sparingly as directed  Dispense: 60 g; Refill: 0    Patient will follow up as needed.      Plan:       Signed By: Maria Del Carmen Jaffe DO     May 8, 2018

## 2018-05-08 NOTE — MR AVS SNAPSHOT
[facility] 
 
 
 [address] [place] [id number] [phone] Patient: Rogelio Beaver MRN: [id number] Visit Information Date & Time Provider Department Dept. Phone Encounter #  
 5/8/2018  3:00 PM Lilyjacob [facility] [phone] [id number] Follow-up Instructions Return if symptoms worsen or fail to improve, for Follow up. Your Appointments 6/14/2018 11:00 AM  
Follow Up with Alia Barahona MD  
[facility] ([facility]) Appt Note: 1 year follow up [place] [address] [phone] [address] [place] [address] [address] Upcoming Health Maintenance Date Due DTaP/Tdap/Td series (1 - Tdap) 9/11/1976 ZOSTER VACCINE AGE 60> 7/11/2015 BREAST CANCER SCRN MAMMOGRAM 12/8/2016 PAP AKA CERVICAL CYTOLOGY 2/1/2017 Influenza Age 5 to Adult 8/1/2018 COLONOSCOPY 5/26/2020 Allergies as of 5/8/2018  Review Complete On: 2/1/3375 By: Brianne Jarvis. Solitario [facility] LPN Severity Noted Reaction Type Reactions Omnicef [Cefdinir] High 07/26/2011    Hives Keflex [Cephalexin] Medium 07/21/2017    Hives Codeine  06/23/2011    Other (comments) Severe headache Ketek [Telithromycin]  09/21/2017    Other (comments) Passed out Levaquin [Levofloxacin]  10/12/2012    Unknown (comments) Did not tolerate Morphine  06/23/2011    Other (comments) Severe headache  
 Sulfa (Sulfonamide Antibiotics)  06/23/2011    Hives Topamax [Topiramate]  01/11/2016    Other (comments) Made blood vessels red and pain in eyes Current Immunizations  Reviewed on 12/4/2017 Name Date Hep A Vaccine (Adult) 12/4/2017, 5/30/2017 Influenza Vaccine (Quad) PF 12/4/2017, 9/28/2016 Not reviewed this visit You Were Diagnosed With   
  
 Codes Comments Scabies    -  Primary ICD-10-CM: B86 
ICD-9-CM: 133.0 Scabies exposure     ICD-10-CM: Z20.89 ICD-9-CM: V01.89 Vitals BP Pulse Temp Resp Height(growth percentile) Weight(growth percentile) 152/87 (BP 1 Location: Right arm, BP Patient Position: Sitting) 68 96.7 °F (35.9 °C) (Oral) 16 5' 8\" (1.727 m) 243 lb (110.2 kg) SpO2 BMI OB Status Smoking Status 99% 36.95 kg/m2 Hysterectomy Former Smoker BMI and BSA Data Body Mass Index Body Surface Area  
 36.95 kg/m 2 2.3 m 2 Preferred Pharmacy Pharmacy Name Phone [facility] Charity Ramos, [address] [facility] [phone] Your Updated Medication List  
  
   
This list is accurate as of 5/8/18  3:59 PM.  Always use your most recent med list.  
  
  
  
  
 ALIGN 4 mg Cap Generic drug:  Bifidobacterium Infantis Take  by mouth daily. atorvastatin 10 mg tablet Commonly known as:  LIPITOR  
TAKE 1 TABLET BY MOUTH EVERY DAY Blood-Glucose Meter monitoring kit Check blood sugar TID PRN with meals and/or bouts of dizziness  
  
 clobetasol 0.05 % ointment Commonly known as:  Sim Foil Apply  to affected area two (2) times daily as needed for Skin Irritation or Itching. EPINEPHrine 0.3 mg/0.3 mL injection Commonly known as:  AUVI-Q  
0.3 mL by IntraMUSCular route once as needed for Anaphylaxis for up to 1 dose. glucose blood VI test strips strip Commonly known as:  blood glucose test  
Patient to check blood sugar TID PRN  
  
 * ivermectin 3 mg tablet Commonly known as:  STROMECTOL Take 1 Tab by mouth once for 1 dose. * ivermectin 1 % Crea Apply one gram to affected areas once a day for 7 days. ketoconazole 2 % topical cream  
Commonly known as:  NIZORAL Apply thin layer to affected area bid Lancets Misc Check blood sugar TID PRN with meals and dizziness  
  
 levothyroxine 112 mcg tablet Commonly known as:  SYNTHROID  
 Take 1 Tab by mouth Daily (before breakfast). lindane 1 % lotion Commonly known as:  Bin Jones Apply 1 Bottle to affected area now for 1 dose. nystatin-triamcinolone 100,000-0.1 unit/gram-% ointment Commonly known as:  Fred Buzzard Apply to affected area bid OTHER Azelaic/Finast/Flutic/Minox(Scalp) 5/0.1% Apply to affected area of scalp daily 30 ml  
  
 permethrin 5 % topical cream  
Commonly known as:  ACTICIN  
apply sparingly as directed * Notice: This list has 2 medication(s) that are the same as other medications prescribed for you. Read the directions carefully, and ask your doctor or other care provider to review them with you. Prescriptions Printed Refills  
 lindane (KWELL) 1 % lotion 0 Sig: Apply 1 Bottle to affected area now for 1 dose. Class: Print Route: Topical  
  
Prescriptions Sent to Pharmacy Refills  
 ivermectin (STROMECTOL) 3 mg tablet 0 Sig: Take 1 Tab by mouth once for 1 dose. Class: Normal  
 Pharmacy: [address] Ph #: [phone] Route: Oral  
 ivermectin 1 % crea 0 Sig: Apply one gram to affected areas once a day for 7 days. Class: Normal  
 Pharmacy: [id number] [address] Ph #: [phone]  
 permethrin (ACTICIN) 5 % topical cream 0 Sig: apply sparingly as directed Class: Normal  
 Pharmacy: [id number] [address] Ph #: [phone] Follow-up Instructions Return if symptoms worsen or fail to improve, for Follow up. Introducing [facility] & HEALTH SERVICES! Dear Flavia Both: Thank you for requesting a [employer] account. Our records indicate that you already have an active [employer] account. You can access your account anytime at [email or web address] Did you know that you can access your hospital and ER discharge instructions at any time in [employer]? You can also review all of your test results from your hospital stay or ER visit. Additional Information If you have questions, please visit the Frequently Asked Questions section of the [employer] website at [email or web address] Remember, [employer] is NOT to be used for urgent needs. For medical emergencies, dial 911. Now available from your iPhone and Android! Please provide this summary of care documentation to your next provider. Your primary care clinician is listed as ELVIN BRITTON. If you have any questions after today's visit, please call [phone].

## 2018-05-08 NOTE — PROGRESS NOTES
Chief Complaint   Patient presents with    Skin Problem     states that she has continued to have problems related to scabies adn lice and has seen dermatologist with no real solution to her problems     1. Have you been to the ER, urgent care clinic since your last visit? Hospitalized since your last visit? No    2. Have you seen or consulted any other health care providers outside of the [address] [place] [address] since your last visit? Include any pap smears or colon screening.  No

## 2018-05-09 LAB
A-G RATIO,AGRAT: 1.7 RATIO (ref 1.1–2.6)
ALBUMIN SERPL-MCNC: 4.1 G/DL (ref 3.5–5)
ALP SERPL-CCNC: 69 U/L (ref 40–120)
ALT SERPL-CCNC: 29 U/L (ref 5–40)
ANION GAP SERPL CALC-SCNC: 17 MMOL/L
AST SERPL W P-5'-P-CCNC: 29 U/L (ref 10–37)
AVG GLU, 10930: 128 MG/DL (ref 91–123)
BILIRUB SERPL-MCNC: 0.4 MG/DL (ref 0.2–1.2)
BUN SERPL-MCNC: 13 MG/DL (ref 6–22)
CALCIUM SERPL-MCNC: 9.1 MG/DL (ref 8.4–10.5)
CHLORIDE SERPL-SCNC: 105 MMOL/L (ref 98–110)
CHOLEST SERPL-MCNC: 137 MG/DL (ref 110–200)
CO2 SERPL-SCNC: 24 MMOL/L (ref 20–32)
CREAT SERPL-MCNC: 0.7 MG/DL (ref 0.8–1.4)
GFRAA, 66117: >60
GFRNA, 66118: >60
GLOBULIN,GLOB: 2.4 G/DL (ref 2–4)
GLUCOSE SERPL-MCNC: 75 MG/DL (ref 70–99)
HBA1C MFR BLD HPLC: 6.1 % (ref 4.8–5.9)
HDLC SERPL-MCNC: 3.3 MG/DL (ref 0–5)
HDLC SERPL-MCNC: 41 MG/DL (ref 40–59)
LDLC SERPL CALC-MCNC: 85 MG/DL (ref 50–99)
POTASSIUM SERPL-SCNC: 4.3 MMOL/L (ref 3.5–5.5)
PROT SERPL-MCNC: 6.5 G/DL (ref 6.2–8.1)
SODIUM SERPL-SCNC: 146 MMOL/L (ref 133–145)
T4 FREE SERPL-MCNC: 1.6 NG/DL (ref 0.9–1.8)
TRIGL SERPL-MCNC: 56 MG/DL (ref 40–149)
TSH SERPL DL<=0.005 MIU/L-ACNC: 0.42 MCU/ML (ref 0.27–4.2)
VLDLC SERPL CALC-MCNC: 11 MG/DL (ref 8–30)

## 2018-05-24 ENCOUNTER — OFFICE VISIT (OUTPATIENT)
Dept: FAMILY MEDICINE CLINIC | Age: 63
End: 2018-05-24

## 2018-05-24 VITALS
TEMPERATURE: 98.1 F | RESPIRATION RATE: 20 BRPM | WEIGHT: 240 LBS | HEART RATE: 70 BPM | HEIGHT: 68 IN | BODY MASS INDEX: 36.37 KG/M2 | SYSTOLIC BLOOD PRESSURE: 164 MMHG | DIASTOLIC BLOOD PRESSURE: 98 MMHG

## 2018-05-24 DIAGNOSIS — R73.03 PREDIABETES: ICD-10-CM

## 2018-05-24 DIAGNOSIS — Z12.31 ENCOUNTER FOR SCREENING MAMMOGRAM FOR BREAST CANCER: ICD-10-CM

## 2018-05-24 DIAGNOSIS — Z20.7 SCABIES EXPOSURE: ICD-10-CM

## 2018-05-24 DIAGNOSIS — C54.8 MALIGNANT NEOPLASM OF OVERLAPPING SITES OF BODY OF UTERUS (HCC): ICD-10-CM

## 2018-05-24 DIAGNOSIS — Z01.419 WELL WOMAN EXAM: Primary | ICD-10-CM

## 2018-05-24 RX ORDER — PERMETHRIN 50 MG/G
CREAM TOPICAL
Qty: 60 G | Refills: 1 | Status: SHIPPED | OUTPATIENT
Start: 2018-05-24 | End: 2018-06-05 | Stop reason: SDUPTHER

## 2018-05-24 NOTE — MR AVS SNAPSHOT
[address] [facility] 
 
 
 [address] [address] [address] [address] [phone] Patient: Rosalio Brock MRN: [id number]:[id number] Visit Information Date & Time Provider Department Dept. Phone Encounter #  
 5/24/2018  3:30 PM Onel Barron, [address] [id number] Follow-up Instructions Return in about 3 months (around 8/24/2018) for Follow Up with Dr. Chiara Christine . Your Appointments 6/14/2018 11:00 AM  
Follow Up with Robert Mueller MD  
[facility] ([address]) Appt Note: 1 year follow up [place] [address] [address] [address] [phone] [address] Upcoming Health Maintenance Date Due DTaP/Tdap/Td series (1 - Tdap) 9/11/1976 ZOSTER VACCINE AGE 60> 7/11/2015 BREAST CANCER SCRN MAMMOGRAM 12/8/2016 Influenza Age 5 to Adult 8/1/2018 COLONOSCOPY 5/26/2020 Allergies as of 5/24/2018  Review Complete On: 5/16/2018 By: Onel Barron DO Severity Noted Reaction Type Reactions Omnicef [Cefdinir] High 07/26/2011    Hives Keflex [Cephalexin] Medium 07/21/2017    Hives Codeine  06/23/2011    Other (comments) Severe headache Ketek [Telithromycin]  09/21/2017    Other (comments) Passed out Levaquin [Levofloxacin]  10/12/2012    Unknown (comments) Did not tolerate Morphine  06/23/2011    Other (comments) Severe headache  
 Sulfa (Sulfonamide Antibiotics)  06/23/2011    Hives Topamax [Topiramate]  01/11/2016    Other (comments) Made blood vessels red and pain in eyes Current Immunizations  Reviewed on 12/4/2017 Name Date Hep A Vaccine (Adult) 12/4/2017, 5/30/2017 Influenza Vaccine (Quad) PF 12/4/2017, 9/28/2016 Not reviewed this visit You Were Diagnosed With   
  
 Codes Comments Well woman exam    -  Primary ICD-10-CM: C68.883 ICD-9-CM: V72.31 Scabies exposure     ICD-10-CM: Z20.89 ICD-9-CM: V01.89 Encounter for screening mammogram for breast cancer     ICD-10-CM: Z12.31 
ICD-9-CM: V76.12 Prediabetes     ICD-10-CM: R73.03 
ICD-9-CM: 790.29 Malignant neoplasm of overlapping sites of body of uterus ([facility] [address]     ICD-10-CM: C54.8 ICD-9-CM: 182.8 Vitals BP Pulse Temp Resp Height(growth percentile) Weight(growth percentile) (!) 164/98 (BP 1 Location: Right arm, BP Patient Position: Sitting) 70 98.1 °F (36.7 °C) (Oral) 20 5' 8\" (1.727 m) 240 lb (108.9 kg) BMI OB Status Smoking Status 36.49 kg/m2 Hysterectomy Former Smoker Vitals History BMI and BSA Data Body Mass Index Body Surface Area  
 36.49 kg/m 2 2.29 m 2 [facility]erred Pharm[facility] Phone [facility] [facility], [address] [facility] [phone] Your Updated Medication List  
  
   
This list is accurate as of 5/24/18  4:22 PM.  Always use your most recent med list.  
  
  
  
  
 ALIGN 4 mg Cap Generic drug:  Bifidobacterium Infantis Take  by mouth daily. atorvastatin 10 mg tablet Commonly known as:  LIPITOR  
TAKE 1 TABLET BY MOUTH EVERY DAY Blood-Glucose Meter monitoring kit Check blood sugar TID PRN with meals and/or bouts of dizziness  
  
 clobetasol 0.05 % ointment Commonly known as:  Ana Cocking Apply  to affected area two (2) times daily as needed for Skin Irritation or Itching. EPINEPHrine 0.3 mg/0.3 mL injection Commonly known as:  AUVI-Q  
0.3 mL by IntraMUSCular route once as needed for Anaphylaxis for up to 1 dose. glucose blood VI test strips strip Commonly known as:  blood glucose test  
Patient to check blood sugar TID PRN  
  
 ketoconazole 2 % topical cream  
Commonly known as:  NIZORAL Apply thin layer to affected area bid Lancets Misc Check blood sugar TID PRN with meals and dizziness  
  
 levothyroxine 112 mcg tablet Commonly known as:  SYNTHROID Take 1 Tab by mouth Daily (before breakfast). nystatin-triamcinolone 100,000-0.1 unit/gram-% ointment Commonly known as:  Georgeanna Jimy Apply to affected area bid OTHER Azelaic/Finast/Flutic/Minox(Scalp) 5/0.1% Apply to affected area of scalp daily 30 ml  
  
 permethrin 5 % topical cream  
Commonly known as:  ACTICIN  
apply sparingly as directed Prescriptions Sent to Pharmacy Refills  
 permethrin (ACTICIN) 5 % topical cream 1 Sig: apply sparingly as directed Class: Normal  
 Pharmacy: [id number] #: [phone] Follow-up Instructions Return in about 3 months (around 8/24/2018) for Follow Up with Dr. Ling Moore . To-Do List   
 05/24/2018 Lab:  HEMOGLOBIN A1C WITH EAG Around 08/22/2018 Lab:  LIPID PANEL Around 08/22/2018 Lab:  METABOLIC PANEL, COMPREHENSIVE Patient Instructions Please contact our office if you have any questions about your visit today. 1.) Please get labs a week before next visit 2.) Return in 3 months for follow up Prediabetes: Care Instructions Your Care Instructions Prediabetes is a warning sign that you are at risk for getting type 2 diabetes. It means that your blood sugar is higher than it should be. The food you eat turns into sugar, which your body uses for energy. Normally, an organ called the pancreas makes insulin, which allows the sugar in your blood to get into your body's cells. But when your body can't use insulin the right way, the sugar doesn't move into cells. It stays in your blood instead. This is called insulin resistance. The buildup of sugar in the blood causes prediabetes. The good news is that lifestyle changes may help you get your blood sugar back to normal and help you avoid or delay diabetes. Follow-up care is a key part of your treatment and safety. Be sure to make and go to all appointments, and call your doctor if you are having problems. It's also a good idea to know your test results and keep a list of the medicines you take. How can you care for yourself at home? · Watch your weight. A healthy weight helps your body use insulin properly. · Limit the amount of calories, sweets, and unhealthy fat you eat. Ask your doctor if you should see a dietitian. A registered dietitian can help you create meal plans that fit your lifestyle. · Get at least 30 minutes of exercise on most days of the week. Exercise helps control your blood sugar. It also helps you maintain a healthy weight. Walking is a good choice. You also may want to do other activities, such as running, swimming, cycling, or playing tennis or team sports. · Do not smoke. Smoking can make prediabetes worse. If you need help quitting, talk to your doctor about stop-smoking programs and medicines. These can increase your chances of quitting for good. · If your doctor prescribed medicines, take them exactly as prescribed. Call your doctor if you think you are having a problem with your medicine. You will get more details on the specific medicines your doctor prescribes. When should you call for help? Watch closely for changes in your health, and be sure to contact your doctor if: 
? · You have any symptoms of diabetes. These may include: ¨ Being thirsty more often. ¨ Urinating more. ¨ Being hungrier. ¨ Losing weight. ¨ Being very tired. ¨ Having blurry vision. ? · You have a wound that will not heal.  
? · You have an infection that will not go away. ? · You have problems with your blood pressure. ? · You want more information about diabetes and how you can keep from getting it. Where can you learn more? Go to http://bonnie-justyna.info/.  
Enter I222 in the search box to learn more about \"Prediabetes: Care Instructions. \" Current as of: March 13, 2017 Content Version: 11.4 © 9481-2198 Touch-Writer. Care instructions adapted under license by [employer] (which disclaims liability or warranty for this information). If you have questions about a medical condition or this instruction, always ask your healthcare professional. [address] any warranty or liability for your use of this information. Well Visit, Women 48 to 72: Care Instructions Your Care Instructions Physical exams can help you stay healthy. Your doctor has checked your overall health and may have suggested ways to take good care of yourself. He or she also may have recommended tests. At home, you can help prevent illness with healthy eating, regular exercise, and other steps. Follow-up care is a key part of your treatment and safety. Be sure to make and go to all appointments, and call your doctor if you are having problems. It's also a good idea to know your test results and keep a list of the medicines you take. How can you care for yourself at home? · Reach and stay at a healthy weight. This will lower your risk for many problems, such as obesity, diabetes, heart disease, and high blood pressure. · Get at least 30 minutes of exercise on most days of the week. Walking is a good choice. You also may want to do other activities, such as running, swimming, cycling, or playing tennis or team sports. · Do not smoke. Smoking can make health problems worse. If you need help quitting, talk to your doctor about stop-smoking programs and medicines. These can increase your chances of quitting for good. · Protect your skin from too much sun. When you're outdoors from 10 a.m. to 4 p.m., stay in the shade or cover up with clothing and a hat with a wide brim. Wear sunglasses that block UV rays. Even when it's cloudy, put broad-spectrum sunscreen (SPF 30 or higher) on any exposed skin. · See a dentist one or two times a year for checkups and to have your teeth cleaned. · Wear a seat belt in the car. · Limit alcohol to 1 drink a day. Too much alcohol can cause health problems. Follow your doctor's advice about when to have certain tests. These tests can spot problems early. · Cholesterol. Your doctor will tell you how often to have this done based on your age, family history, or other things that can increase your risk for heart attack and stroke. · Blood pressure. Have your blood pressure checked during a routine doctor visit. Your doctor will tell you how often to check your blood pressure based on your age, your blood pressure results, and other factors. · Mammogram. Ask your doctor how often you should have a mammogram, which is an X-ray of your breasts. A mammogram can spot breast cancer before it can be felt and when it is easiest to treat. · Pap test and pelvic exam. Ask your doctor how often you should have a Pap test. You may not need to have a Pap test as often as you used to. · Vision. Have your eyes checked every year or two or as often as your doctor suggests. Some experts recommend that you have yearly exams for glaucoma and other age-related eye problems starting at age 48. · Hearing. Tell your doctor if you notice any change in your hearing. You can have tests to find out how well you hear. · Diabetes. Ask your doctor whether you should have tests for diabetes. · Colon cancer. You should begin tests for colon cancer at age 48. You may have one of several tests. Your doctor will tell you how often to have tests based on your age and risk. Risks include whether you already had a precancerous polyp removed from your colon or whether your parents, sisters and brothers, or children have had colon cancer. · Thyroid disease. Talk to your doctor about whether to have your thyroid checked as part of a regular physical exam. Women have an increased chance of a thyroid problem. · Osteoporosis. You should begin tests for bone density at age 72. If you are younger than 72, ask your doctor whether you have factors that may increase your risk for this disease. You may want to have this test before age 72. · Heart attack and stroke risk. At least every 4 to 6 years, you should have your risk for heart attack and stroke assessed. Your doctor uses factors such as your age, blood pressure, cholesterol, and whether you smoke or have diabetes to show what your risk for a heart attack or stroke is over the next 10 years. When should you call for help? Watch closely for changes in your health, and be sure to contact your doctor if you have any problems or symptoms that concern you. Where can you learn more? Go to http://bonnie-justyna.info/. Enter F339 in the search box to learn more about \"Well Visit, Women 50 to 72: Care Instructions. \" Current as of: May 12, 2017 Content Version: 11.4 © 7089-2508 Meilimei. Care instructions adapted under license by [employer] (which disclaims liability or warranty for this information). If you have questions about a medical condition or this instruction, always ask your healthcare professional. [place] [address] any warranty or liability for your use of this information. Introducing [facility] & HEALTH SERVICES! Dear Lashawn Lizarraga: Thank you for requesting a [employer] account. Our records indicate that you already have an active [employer] account. You can access your account anytime at [email or web address] Did you know that you can access your hospital and ER discharge instructions at any time in [employer]? You can also review all of your test results from your hospital stay or ER visit. Additional Information If you have questions, please visit the Frequently Asked Questions section of the [employer] website at [email or web address] Remember, MyChart is NOT to be used for urgent needs. For medical emergencies, dial 911. Now available from your iPhone and Android! Please provide this summary of care documentation to your next provider. Your primary care clinician is listed as ELVIN BRITTON. If you have any questions after today's visit, please call [phone].

## 2018-05-24 NOTE — PATIENT INSTRUCTIONS
Please contact our office if you have any questions about your visit today. 1.) Please get labs a week before next visit    2.) Return in 3 months for follow up      Prediabetes: Care Instructions  Your Care Instructions    Prediabetes is a warning sign that you are at risk for getting type 2 diabetes. It means that your blood sugar is higher than it should be. The food you eat turns into sugar, which your body uses for energy. Normally, an organ called the pancreas makes insulin, which allows the sugar in your blood to get into your body's cells. But when your body can't use insulin the right way, the sugar doesn't move into cells. It stays in your blood instead. This is called insulin resistance. The buildup of sugar in the blood causes prediabetes. The good news is that lifestyle changes may help you get your blood sugar back to normal and help you avoid or delay diabetes. Follow-up care is a key part of your treatment and safety. Be sure to make and go to all appointments, and call your doctor if you are having problems. It's also a good idea to know your test results and keep a list of the medicines you take. How can you care for yourself at home? · Watch your weight. A healthy weight helps your body use insulin properly. · Limit the amount of calories, sweets, and unhealthy fat you eat. Ask your doctor if you should see a dietitian. A registered dietitian can help you create meal plans that fit your lifestyle. · Get at least 30 minutes of exercise on most days of the week. Exercise helps control your blood sugar. It also helps you maintain a healthy weight. Walking is a good choice. You also may want to do other activities, such as running, swimming, cycling, or playing tennis or team sports. · Do not smoke. Smoking can make prediabetes worse. If you need help quitting, talk to your doctor about stop-smoking programs and medicines. These can increase your chances of quitting for good.   · If your doctor prescribed medicines, take them exactly as prescribed. Call your doctor if you think you are having a problem with your medicine. You will get more details on the specific medicines your doctor prescribes. When should you call for help? Watch closely for changes in your health, and be sure to contact your doctor if:  ? · You have any symptoms of diabetes. These may include:  ¨ Being thirsty more often. ¨ Urinating more. ¨ Being hungrier. ¨ Losing weight. ¨ Being very tired. ¨ Having blurry vision. ? · You have a wound that will not heal.   ? · You have an infection that will not go away. ? · You have problems with your blood pressure. ? · You want more information about diabetes and how you can keep from getting it. Where can you learn more? Go to http://bonnieInvisible Connectjustyna.info/. Enter I222 in the search box to learn more about \"Prediabetes: Care Instructions. \"  Current as of: March 13, 2017  Content Version: 11.4  © 9414-1167 [employer]. Care instructions adapted under license by [employer] (which disclaims liability or warranty for this information). If you have questions about a medical condition or this instruction, always ask your healthcare professional. [place] [address] any warranty or liability for your use of this information. Well Visit, Women 48 to 72: Care Instructions  Your Care Instructions    Physical exams can help you stay healthy. Your doctor has checked your overall health and may have suggested ways to take good care of yourself. He or she also may have recommended tests. At home, you can help prevent illness with healthy eating, regular exercise, and other steps. Follow-up care is a key part of your treatment and safety. Be sure to make and go to all appointments, and call your doctor if you are having problems. It's also a good idea to know your test results and keep a list of the medicines you take.   How can you care for yourself at home? · Reach and stay at a healthy weight. This will lower your risk for many problems, such as obesity, diabetes, heart disease, and high blood pressure. · Get at least 30 minutes of exercise on most days of the week. Walking is a good choice. You also may want to do other activities, such as running, swimming, cycling, or playing tennis or team sports. · Do not smoke. Smoking can make health problems worse. If you need help quitting, talk to your doctor about stop-smoking programs and medicines. These can increase your chances of quitting for good. · Protect your skin from too much sun. When you're outdoors from 10 a.m. to 4 p.m., stay in the shade or cover up with clothing and a hat with a wide brim. Wear sunglasses that block UV rays. Even when it's cloudy, put broad-spectrum sunscreen (SPF 30 or higher) on any exposed skin. · See a dentist one or two times a year for checkups and to have your teeth cleaned. · Wear a seat belt in the car. · Limit alcohol to 1 drink a day. Too much alcohol can cause health problems. Follow your doctor's advice about when to have certain tests. These tests can spot problems early. · Cholesterol. Your doctor will tell you how often to have this done based on your age, family history, or other things that can increase your risk for heart attack and stroke. · Blood pressure. Have your blood pressure checked during a routine doctor visit. Your doctor will tell you how often to check your blood pressure based on your age, your blood pressure results, and other factors. · Mammogram. Ask your doctor how often you should have a mammogram, which is an X-ray of your breasts. A mammogram can spot breast cancer before it can be felt and when it is easiest to treat. · Pap test and pelvic exam. Ask your doctor how often you should have a Pap test. You may not need to have a Pap test as often as you used to. · Vision.  Have your eyes checked every year or two or as often as your doctor suggests. Some experts recommend that you have yearly exams for glaucoma and other age-related eye problems starting at age 48. · Hearing. Tell your doctor if you notice any change in your hearing. You can have tests to find out how well you hear. · Diabetes. Ask your doctor whether you should have tests for diabetes. · Colon cancer. You should begin tests for colon cancer at age 48. You may have one of several tests. Your doctor will tell you how often to have tests based on your age and risk. Risks include whether you already had a precancerous polyp removed from your colon or whether your parents, sisters and brothers, or children have had colon cancer. · Thyroid disease. Talk to your doctor about whether to have your thyroid checked as part of a regular physical exam. Women have an increased chance of a thyroid problem. · Osteoporosis. You should begin tests for bone density at age 72. If you are younger than 72, ask your doctor whether you have factors that may increase your risk for this disease. You may want to have this test before age 72. · Heart attack and stroke risk. At least every 4 to 6 years, you should have your risk for heart attack and stroke assessed. Your doctor uses factors such as your age, blood pressure, cholesterol, and whether you smoke or have diabetes to show what your risk for a heart attack or stroke is over the next 10 years. When should you call for help? Watch closely for changes in your health, and be sure to contact your doctor if you have any problems or symptoms that concern you. Where can you learn more? Go to http://bonnie-justyna.info/. Enter O920 in the search box to learn more about \"Well Visit, Women 50 to 72: Care Instructions. \"  Current as of: May 12, 2017  Content Version: 11.4  © 6416-1370 [employer].  Care instructions adapted under license by [employer] (which disclaims liability or warranty for this information). If you have questions about a medical condition or this instruction, always ask your healthcare professional. [place] [address] any warranty or liability for your use of this information.

## 2018-05-24 NOTE — PROGRESS NOTES
Chief Complaint   Patient presents with    Complete Physical     no pap    Results     discuss lab results       Health Maintenance Due   Topic Date Due    DTaP/Tdap/Td series (1 - Tdap) 09/11/1976    ZOSTER VACCINE AGE 60>  07/11/2015    BREAST CANCER SCRN MAMMOGRAM  12/08/2016       Health Maintenance reviewed       1. Have you been to the ER, urgent care clinic since your last visit? Hospitalized since your last visit? No    2. Have you seen or consulted any other health care providers outside of the [address] [place] [address] since your last visit? Include any pap smears or colon screening.  No

## 2018-05-24 NOTE — PROGRESS NOTES
Progress Note    Patient: Damaso Andrade MRN: [id number]  SSN: [id number]    YOB: 1955  Age: 58 y.o. Sex: female          Subjective:   Damaso Andrade is a 58 y.o. female who is here for follow up to do her physical. She will have her mammogram within the year. She mentions that she had her colonoscopy within the past two years. Visit from 5/9/18   Patient is here for an acute care visit and follow up. She mentions that 6 months ago she had a cardiac event. She was seen at [facility] for this and her cardiac work up was negative. She was told that she had some esophageal erosions and has been seeing GI for this. Now recently, she did not realize that she had scabies--suspected contracted from the hospital. She was initially thought to be a yeast. She mentions that she ended up in the ER in March and they discovered the scabies. She mentions that she has now developed skin burrows and a severe rash. She has seen the dermatologist about the scabies---Dr. Han Hale. She mentions that she was not given adequate amounts of her medication. She mentions that she has had professional cleaning in her house. She has already done a round of Ivermectin and Permetherin. Objective:     Past Medical History:   Diagnosis Date    Abnormal nuclear cardiac imaging test 02/09/2012    Mid to distal anteroseptal and apical reversible defect concerning for ishcmeia. Mild-mod, mid-distal anterior & apical hypk. EF 67%. Borderline abnormal EKG w/1-mm inferolateral ST depression at 7 min exercise. Occasional PVCs.  Anxiety     Carotid duplex 02/29/4137    Mild 6-26% LICA stenosis.  Dengue fever     7/10 while in [place]    Diverticulosis     Dyslipidemia     GERD     Holter monitor study 04/29/2016    Sinus rhythm, avg HR 67 bpm (range  bpm). Rare ectopy.  Hypothyroidism     RLE venous duplex 07/23/2015    Right leg:  No DVT.       S/P cardiac catheterization 02/24/2012 Angiographically normal coronaries. Hyperdynamic. EF 70%. No RWMA.  Sinusitis     Tinnitus     Uterine cancer ([address]     hysterectomy        Vitals:    05/24/18 1547 05/24/18 1552   BP: 151/90 (!) 164/98   Pulse: 70    Resp: 20    Temp: 98.1 °F (36.7 °C)    TempSrc: Oral    Weight: 240 lb (108.9 kg)    Height: 5' 8\" (1.727 m)         Review of Systems:  Pertinent items are noted in the History of Present Illness. Physical Exam:   GENERAL: alert, cooperative, mild distress, appears stated age, morbidly obese  EYE: conjunctivae/corneas clear. PERRL, EOM's intact. Fundi benign  THROAT & NECK: normal and no erythema or exudates noted. LUNG: clear to auscultation bilaterally  HEART: regular rate and rhythm, S1, S2 normal, no murmur, click, rub or gallop  ABDOMEN: soft, non-tender. Bowel sounds normal. No masses,  no organomegaly  EXTREMITIES:  extremities normal, atraumatic, no cyanosis or edema  SKIN: Multiple excoriations on the skin and evidence of burrowing within the skin folds    Lab/Data Review:  No new labs to review. Assessment:     1. Well woman exam    - Permetherin (ACTICIN) 5 % topical cream; apply sparingly as directed  Dispense: 60 g; Refill: 1  - LIPID PANEL; Future  - METABOLIC PANEL, COMPREHENSIVE; Future  - HEMOGLOBIN A1C WITH EAG; Future    2. Scabies exposure    - Permetherin (ACTICIN) 5 % topical cream; apply sparingly as directed  Dispense: 60 g; Refill: 1  - LIPID PANEL; Future  - METABOLIC PANEL, COMPREHENSIVE; Future  - HEMOGLOBIN A1C WITH EAG; Future    3. Encounter for screening mammogram for breast cancer    - permethrin (ACTICIN) 5 % topical cream; apply sparingly as directed  Dispense: 60 g; Refill: 1  - LIPID PANEL; Future  - METABOLIC PANEL, COMPREHENSIVE; Future  - HEMOGLOBIN A1C WITH EAG; Future    4. Prediabetes    - permethrin (ACTICIN) 5 % topical cream; apply sparingly as directed  Dispense: 60 g; Refill: 1  - LIPID PANEL;  Future  - METABOLIC PANEL, COMPREHENSIVE; Future  - HEMOGLOBIN A1C WITH EAG; Future    5. Malignant neoplasm of overlapping sites of body of uterus (HCC)    - permethrin (ACTICIN) 5 % topical cream; apply sparingly as directed  Dispense: 60 g; Refill: 1  - LIPID PANEL; Future  - METABOLIC PANEL, COMPREHENSIVE; Future  - HEMOGLOBIN A1C WITH EAG; Future      Patient will follow up as needed.      Plan:       Signed By: Brenna Jeans, DO     May 24, 2018

## 2018-06-05 ENCOUNTER — OFFICE VISIT (OUTPATIENT)
Dept: FAMILY MEDICINE CLINIC | Age: 63
End: 2018-06-05

## 2018-06-05 VITALS
HEIGHT: 68 IN | TEMPERATURE: 97 F | HEART RATE: 80 BPM | RESPIRATION RATE: 17 BRPM | SYSTOLIC BLOOD PRESSURE: 142 MMHG | DIASTOLIC BLOOD PRESSURE: 82 MMHG | BODY MASS INDEX: 36.07 KG/M2 | WEIGHT: 238 LBS

## 2018-06-05 DIAGNOSIS — C54.8 MALIGNANT NEOPLASM OF OVERLAPPING SITES OF BODY OF UTERUS (HCC): ICD-10-CM

## 2018-06-05 DIAGNOSIS — R73.03 PREDIABETES: ICD-10-CM

## 2018-06-05 DIAGNOSIS — R05.9 COUGH: Primary | ICD-10-CM

## 2018-06-05 DIAGNOSIS — Z20.7 SCABIES EXPOSURE: ICD-10-CM

## 2018-06-05 DIAGNOSIS — B86 SCABIES: ICD-10-CM

## 2018-06-05 DIAGNOSIS — Z01.419 WELL WOMAN EXAM: ICD-10-CM

## 2018-06-05 DIAGNOSIS — Z12.31 ENCOUNTER FOR SCREENING MAMMOGRAM FOR BREAST CANCER: ICD-10-CM

## 2018-06-05 RX ORDER — IVERMECTIN 5 MG/G
1 LOTION TOPICAL ONCE
Qty: 117 G | Refills: 1 | Status: SHIPPED | OUTPATIENT
Start: 2018-06-05 | End: 2018-06-05

## 2018-06-05 RX ORDER — BENZONATATE 200 MG/1
200 CAPSULE ORAL
Qty: 30 CAP | Refills: 0 | Status: SHIPPED | OUTPATIENT
Start: 2018-06-05 | End: 2018-06-12

## 2018-06-05 RX ORDER — HYDROCODONE POLISTIREX AND CHLORPHENIRAMINE POLISTIREX 10; 8 MG/5ML; MG/5ML
5 SUSPENSION, EXTENDED RELEASE ORAL
Qty: 115 ML | Refills: 0 | Status: SHIPPED | OUTPATIENT
Start: 2018-06-05 | End: 2018-06-14 | Stop reason: ALTCHOICE

## 2018-06-05 RX ORDER — IVERMECTIN 3 MG/1
TABLET ORAL
Qty: 16 TAB | Refills: 1 | Status: SHIPPED | OUTPATIENT
Start: 2018-06-05 | End: 2018-06-14 | Stop reason: ALTCHOICE

## 2018-06-05 RX ORDER — PERMETHRIN 50 MG/G
CREAM TOPICAL
Qty: 60 G | Refills: 1 | Status: SHIPPED | OUTPATIENT
Start: 2018-06-05 | End: 2018-08-14 | Stop reason: ALTCHOICE

## 2018-06-05 NOTE — PATIENT INSTRUCTIONS
Please contact our office if you have any questions about your visit today. 1.) Use cough medications as needed. 2.) Keep regular follow up.

## 2018-06-05 NOTE — PROGRESS NOTES
Progress Note    Patient: Carlos Swain MRN: [id number]  SSN: [id number]    YOB: 1955  Age: 58 y.o. Sex: female          Subjective:   Carlos Swain is a 58 y.o. female who is here for an acute care visit. The patient mentions that she been trying to disinfect her house. She mentions that she has been using the [facility] and Ivermectin. She mentions that she called her dermatologist yesterday but could not speak to them. She mentions that she has been dealing with a persistent cough. She states that this harbors back to when she developed her scabies. She mentions that she does not have a lot of mucus coming up. Visit from 5/24/18  Patient is here for follow up to do her physical. She will have her mammogram within the year. She mentions that she had her colonoscopy within the past two years. Visit from 5/9/18   Patient is here for an acute care visit and follow up. She mentions that 6 months ago she had a cardiac event. She was seen at [address] [place] [address] for this and her cardiac work up was negative. She was told that she had some esophageal erosions and has been seeing GI for this. Now recently, she did not realize that she had scabies--suspected contracted from the hospital. She was initially thought to be a yeast. She mentions that she ended up in the ER in March and they discovered the scabies. She mentions that she has now developed skin burrows and a severe rash. She has seen the dermatologist about the scabies---Dr. Radha Richard. She mentions that she was not given adequate amounts of her medication. She mentions that she has had professional cleaning in her house. She has already done a round of Ivermectin and Permetherin. Objective:     Past Medical History:   Diagnosis Date    Abnormal nuclear cardiac imaging test 02/09/2012    Mid to distal anteroseptal and apical reversible defect concerning for ishcmeia. Mild-mod, mid-distal anterior & apical hypk. EF 67%. Borderline abnormal EKG w/1-mm inferolateral ST depression at 7 min exercise. Occasional PVCs.  Anxiety     Carotid duplex 63/30/9404    Mild 2-88% LICA stenosis.  Dengue fever     7/10 while in [place]    Diverticulosis     Dyslipidemia     GERD     Holter monitor study 04/29/2016    Sinus rhythm, avg HR 67 bpm (range  bpm). Rare ectopy.  Hypothyroidism     RLE venous duplex 07/23/2015    Right leg:  No DVT.  S/P cardiac catheterization 02/24/2012    Angiographically normal coronaries. Hyperdynamic. EF 70%. No RWMA.  Sinusitis     Tinnitus     Uterine cancer (HCC)     hysterectomy        Vitals:    06/05/18 0858   BP: 139/84   Pulse: 80   Resp: 17   Temp: 97 °F (36.1 °C)   TempSrc: Oral   Weight: 238 lb (108 kg)   Height: 5' 8\" (1.727 m)        Review of Systems:  Pertinent items are noted in the History of Present Illness. Physical Exam:   GENERAL: alert, cooperative, mild distress, appears stated age, morbidly obese  EYE: conjunctivae/corneas clear. PERRL, EOM's intact. Fundi benign  THROAT & NECK: normal and no erythema or exudates noted. LUNG: clear to auscultation bilaterally  HEART: regular rate and rhythm, S1, S2 normal, no murmur, click, rub or gallop  ABDOMEN: soft, non-tender. Bowel sounds normal. No masses,  no organomegaly  EXTREMITIES:  extremities normal, atraumatic, no cyanosis or edema  SKIN: Multiple excoriations on the skin     Lab/Data Review:  No new labs to review. Assessment:     1.) Cough: Possibly viral or allergic. I will place her on Tessalon Pearls and Tussionex for use as needed. 2.) Scabies Exposure: Patient ordered Ivermectin Oral and Topical as well as Permetherin cream.     Patient will return in August for follow up.       Plan:     Orders Placed This Encounter    ivermectin (SKLICE) 0.5 % lotn    permethrin (ACTICIN) 5 % topical cream    benzonatate (TESSALON) 200 mg capsule    chlorpheniramine-HYDROcodone (TUSSIONEX) 10-8 mg/5 mL suspension    ivermectin (STROMECTOL) 3 mg tablet         Signed By: Mary Lou Onofre DO     June 5, 2018

## 2018-06-05 NOTE — PROGRESS NOTES
Chief Complaint   Patient presents with    Cough     non productive states that this started 3 weeks ago    Nasal Congestion     1. Have you been to the ER, urgent care clinic since your last visit? Hospitalized since your last visit? No    2. Have you seen or consulted any other health care providers outside of the [address] [place] [address] since your last visit? Include any pap smears or colon screening.  No

## 2018-06-14 ENCOUNTER — OFFICE VISIT (OUTPATIENT)
Dept: CARDIOLOGY CLINIC | Age: 63
End: 2018-06-14

## 2018-06-14 VITALS
DIASTOLIC BLOOD PRESSURE: 96 MMHG | HEIGHT: 68 IN | SYSTOLIC BLOOD PRESSURE: 142 MMHG | WEIGHT: 235 LBS | HEART RATE: 64 BPM | BODY MASS INDEX: 35.61 KG/M2 | OXYGEN SATURATION: 98 %

## 2018-06-14 DIAGNOSIS — E78.5 DYSLIPIDEMIA: ICD-10-CM

## 2018-06-14 DIAGNOSIS — I49.3 PVC (PREMATURE VENTRICULAR CONTRACTION): ICD-10-CM

## 2018-06-14 DIAGNOSIS — E03.9 HYPOTHYROIDISM, UNSPECIFIED TYPE: ICD-10-CM

## 2018-06-14 DIAGNOSIS — F41.9 ANXIETY: ICD-10-CM

## 2018-06-14 DIAGNOSIS — R00.2 PALPITATIONS: Primary | ICD-10-CM

## 2018-06-14 DIAGNOSIS — R07.89 OTHER CHEST PAIN: ICD-10-CM

## 2018-06-14 NOTE — PROGRESS NOTES
History of Present Illness:  This is a 58year-old female here for followup. She was hospitalized September 2017 at [facility] with presumed esophagitis, was treated with PPI and did well. Her chest pain resolved. She has noted no new dyspnea, PND, orthopnea, edema. Her major issue for the past few months has been scabies for which she has been treated. She still continues to have some itching. She otherwise is doing well.      Impression:   Recent scabies, status post treatment. History of PVC's and palpitations, stable. History of remote syncope, no recurrence  Atypical chest pain likely due to underlying GERD, status post treatment which is improved. Also, has remote ulcers. Remote Dengue fever in 2010. Thyroid disorder. Remote history of uterine cancer and hysterectomy with chemotherapy and radiation. Her blood pressure has been on the high side. I recommended she follow up with her primary. She is being treated for scabies. She has had no new symptoms of palpitations for her PVCs and they are stable. I suspect her previous atypical chest pain was GI related. All questions answered. I will see her back in one year. Past Medical History:   Diagnosis Date    Abnormal nuclear cardiac imaging test 02/09/2012    Mid to distal anteroseptal and apical reversible defect concerning for ishcmeia. Mild-mod, mid-distal anterior & apical hypk. EF 67%. Borderline abnormal EKG w/1-mm inferolateral ST depression at 7 min exercise. Occasional PVCs.  Anxiety     Carotid duplex 75/01/0421    Mild 5-25% LICA stenosis.  Dengue fever     7/10 while in [place]    Diverticulosis     Dyslipidemia     GERD     Holter monitor study 04/29/2016    Sinus rhythm, avg HR 67 bpm (range  bpm). Rare ectopy.  Hypothyroidism     RLE venous duplex 07/23/2015    Right leg:  No DVT.  S/P cardiac catheterization 02/24/2012    Angiographically normal coronaries. Hyperdynamic. EF 70%. No RWMA.  Sinusitis     Tinnitus     Uterine cancer [facility])     hysterectomy       Current Outpatient Prescriptions   Medication Sig Dispense Refill    permethrin (ACTICIN) 5 % topical cream apply sparingly as directed 60 g 1    nystatin-triamcinolone (MYCOLOG) 100,000-0.1 unit/gram-% ointment Apply to affected area bid 30 g 1    levothyroxine (SYNTHROID) 112 mcg tablet Take 1 Tab by mouth Daily (before breakfast). 90 Tab 3    ketoconazole (NIZORAL) 2 % topical cream Apply thin layer to affected area bid 30 g 1    atorvastatin (LIPITOR) 10 mg tablet TAKE 1 TABLET BY MOUTH EVERY DAY 90 Tab 3    clobetasol (TEMOVATE) 0.05 % ointment Apply  to affected area two (2) times daily as needed for Skin Irritation or Itching. 60 g 3    OTHER Azelaic/Finast/Flutic/Minox(Scalp) 5/0.1%  Apply to affected area of scalp daily  30 ml      EPINEPHrine (AUVI-Q) 0.3 mg/0.3 mL (1:1,000) injection 0.3 mL by IntraMUSCular route once as needed for Anaphylaxis for up to 1 dose. 1 Each 2    Lancets Misc Check blood sugar TID PRN with meals and dizziness 1 Package 11    Blood-Glucose Meter monitoring kit Check blood sugar TID PRN with meals and/or bouts of dizziness 1 Kit 0    glucose blood VI test strips (BLOOD GLUCOSE TEST) strip Patient to check blood sugar TID PRN 1 Package 11    Bifidobacterium Infantis (ALIGN) 4 mg cap Take  by mouth daily. Social History   reports that she quit smoking about 18 years ago. Her smoking use included Cigarettes. She has a 20.00 pack-year smoking history. She has never used smokeless tobacco.   reports that she does not drink alcohol. Family History  family history includes Cancer in her father; Diabetes in her maternal aunt; Heart Attack in her paternal grandmother; Heart Disease in her maternal aunt and maternal aunt; Hypertension in her maternal aunt; Other in her maternal grandfather and mother; Stroke in her maternal grandmother.     Review of Systems  Except as stated above include:  Constitutional: Negative for fever, chills and malaise/fatigue. HEENT: No congestion or recent URI. Gastrointestinal: No nausea, vomiting, abdominal pain, bloody stools. Pulmonary:  Negative except as stated above. Cardiac:  Negative except as stated above. Musculoskeletal: Negative except as stated above. Neurological:  No localized symptoms. Skin:  Negative except as stated above. Psych:  Negative except as stated above. Endocrine:  Negative except as stated above. PHYSICAL EXAM  BP Readings from Last 3 Encounters:   06/14/18 (!) 142/96   06/05/18 142/82   05/24/18 (!) 164/98     Pulse Readings from Last 3 Encounters:   06/14/18 64   06/05/18 80   05/24/18 70     Wt Readings from Last 3 Encounters:   06/14/18 106.6 kg (235 lb)   06/05/18 108 kg (238 lb)   05/24/18 108.9 kg (240 lb)     General:   Well developed, well groomed. Head/Neck:   No jugular venous distention     No evidence of xanthelasma. Lungs:   No respiratory distress. Extremities:   No significant edema. Neurological:   Alert and oriented to person, place, time. No focal neurological deficit visually.   Skin:   No obvious rash    Blood Pressure Metric:  Monitor regularly w/pmd

## 2018-06-14 NOTE — MR AVS SNAPSHOT
[address] [address] [address] [address] 
[phone] Patient: Russ Avila MRN: [id number] TQV:[id number]/[id number] Visit Information Date & Time Provider Department Dept. Phone Encounter #  
 6/14/2018 11:00 AM Lakshmi Galan MD [facility] [address] [id number] Your Appointments 8/27/2018  3:30 PM  
Follow Up with [address] [facility] (--) Appt Note: 3 months follow up Davy Koch [address] [address] [address] [phone]  
  
   
 Davy Koch [address] [address] [address] Upcoming Health Maintenance Date Due Pneumococcal 19-64 Highest Risk (1 of 3 - PCV13) 9/11/1974 DTaP/Tdap/Td series (1 - Tdap) 9/11/1976 ZOSTER VACCINE AGE 60> 7/11/2015 BREAST CANCER SCRN MAMMOGRAM 12/8/2016 Influenza Age 5 to Adult 8/1/2018 COLONOSCOPY 5/26/2020 Allergies as of 6/14/2018  Review Complete On: 6/14/2018 By: Lakshmi Galan MD  
  
 Severity Noted Reaction Type Reactions Omnicef [Cefdinir] High 07/26/2011    Hives Keflex [Cephalexin] Medium 07/21/2017    Hives Codeine  06/23/2011    Other (comments) Severe headache Ketek [Telithromycin]  09/21/2017    Other (comments) Passed out Levaquin [Levofloxacin]  10/12/2012    Unknown (comments) Did not tolerate Morphine  06/23/2011    Other (comments) Severe headache  
 Sulfa (Sulfonamide Antibiotics)  06/23/2011    Hives Topamax [Topiramate]  01/11/2016    Other (comments) Made blood vessels red and pain in eyes Current Immunizations  Reviewed on 12/4/2017 Name Date Hep A Vaccine (Adult) 12/4/2017, 5/30/2017 Influenza Vaccine (Quad) PF 12/4/2017, 9/28/2016 Not reviewed this visit You Were Diagnosed With   
  
 Codes Comments Palpitations    -  Primary ICD-10-CM: R00.2 ICD-9-CM: 785.1 Dyslipidemia     ICD-10-CM: E78.5 ICD-9-CM: 272.4 Other chest pain     ICD-10-CM: R07.89 ICD-9-CM: 786.59 Anxiety     ICD-10-CM: F41.9 ICD-9-CM: 300.00 Hypothyroidism, unspecified type     ICD-10-CM: E03.9 ICD-9-CM: 244.9 PVC (premature ventricular contraction)     ICD-10-CM: I49.3 ICD-9-CM: 427.69 Vitals BP Pulse Height(growth percentile) Weight(growth percentile) SpO2 BMI  
 (!) 142/96 (BP 1 Location: Right arm, BP Patient Position: Sitting) 64 5' 8\" (1.727 m) 235 lb (106.6 kg) 98% 35.73 kg/m2 OB Status Smoking Status Hysterectomy Former Smoker Vitals History BMI and BSA Data Body Mass Index Body Surface Area 35.73 kg/m 2 2.26 m 2 Preferred Pharmacy Pharmacy Name Phone [id number] [address] [phone] Your Updated Medication List  
  
   
This list is accurate as of 6/14/18 11:39 AM.  Always use your most recent med list.  
  
  
  
  
 ALIGN 4 mg Cap Generic drug:  Bifidobacterium Infantis Take  by mouth daily. atorvastatin 10 mg tablet Commonly known as:  LIPITOR  
TAKE 1 TABLET BY MOUTH EVERY DAY Blood-Glucose Meter monitoring kit Check blood sugar TID PRN with meals and/or bouts of dizziness  
  
 clobetasol 0.05 % ointment Commonly known as:  Alfredia Rubinstein Apply  to affected area two (2) times daily as needed for Skin Irritation or Itching. EPINEPHrine 0.3 mg/0.3 mL injection Commonly known as:  AUVI-Q  
0.3 mL by IntraMUSCular route once as needed for Anaphylaxis for up to 1 dose. glucose blood VI test strips strip Commonly known as:  blood glucose test  
Patient to check blood sugar TID PRN  
  
 ketoconazole 2 % topical cream  
Commonly known as:  NIZORAL Apply thin layer to affected area bid Lancets Misc Check blood sugar TID PRN with meals and dizziness  
  
 levothyroxine 112 mcg tablet Commonly known as:  SYNTHROID Take 1 Tab by mouth Daily (before breakfast). nystatin-triamcinolone 100,000-0.1 unit/gram-% ointment Commonly known as:  Gonzalez Fury Apply to affected area bid OTHER Azelaic/Finast/Flutic/Minox(Scalp) 5/0.1% Apply to affected area of scalp daily 30 ml  
  
 permethrin 5 % topical cream  
Commonly known as:  ACTICIN  
apply sparingly as directed We Performed the Following AMB POC EKG ROUTINE W/ 12 LEADS, INTER & REP [51209 CPT(R)] Introducing [facility] SERVICES! Dear Chad Rolle: Thank you for requesting a Kid$Shirt account. Our records indicate that you already have an active Kid$Shirt account. You can access your account anytime at [email or web address] Did you know that you can access your hospital and ER discharge instructions at any time in Kid$Shirt? You can also review all of your test results from your hospital stay or ER visit. Additional Information If you have questions, please visit the Frequently Asked Questions section of the Kid$Shirt website at [email or web address] Remember, Kid$Shirt is NOT to be used for urgent needs. For medical emergencies, dial 911. Now available from your iPhone and Android! Please provide this summary of care documentation to your next provider. Your primary care clinician is listed as ELVIN BRITTON. If you have any questions after today's visit, please call [phone].

## 2018-06-14 NOTE — PROGRESS NOTES
1. Have you been to the ER, urgent care clinic since your last visit? Hospitalized since your last visit? 2. Have you seen or consulted any other health care providers outside of the [address] [place] [address] since your last visit? Include any pap smears or colon screening.  no

## 2018-07-05 ENCOUNTER — OFFICE VISIT (OUTPATIENT)
Dept: FAMILY MEDICINE CLINIC | Age: 63
End: 2018-07-05

## 2018-07-05 VITALS
HEIGHT: 68 IN | HEART RATE: 90 BPM | BODY MASS INDEX: 36.98 KG/M2 | DIASTOLIC BLOOD PRESSURE: 88 MMHG | SYSTOLIC BLOOD PRESSURE: 146 MMHG | RESPIRATION RATE: 16 BRPM | TEMPERATURE: 97.3 F | WEIGHT: 244 LBS

## 2018-07-05 DIAGNOSIS — Z20.7 EXPOSURE TO PARASITIC DISEASE: ICD-10-CM

## 2018-07-05 DIAGNOSIS — Z20.7 EXPOSURE TO PARASITIC DISEASE: Primary | ICD-10-CM

## 2018-07-05 DIAGNOSIS — R30.0 DYSURIA: ICD-10-CM

## 2018-07-05 LAB
BILIRUB UR QL STRIP: NEGATIVE
GLUCOSE UR-MCNC: NEGATIVE MG/DL
KETONES P FAST UR STRIP-MCNC: NEGATIVE MG/DL
PH UR STRIP: 5.5 [PH] (ref 4.6–8)
PROT UR QL STRIP: NEGATIVE
SP GR UR STRIP: 1.02 (ref 1–1.03)
UA UROBILINOGEN AMB POC: NORMAL (ref 0.2–1)
URINALYSIS CLARITY POC: CLEAR
URINALYSIS COLOR POC: YELLOW
URINE BLOOD POC: NEGATIVE
URINE LEUKOCYTES POC: NEGATIVE
URINE NITRITES POC: NEGATIVE

## 2018-07-05 NOTE — PATIENT INSTRUCTIONS
Please contact our office if you have any questions about your visit today. 1.) Please get stool sample and lab work done as soon as possible. 2.) Return after getting the labs and stool test done.

## 2018-07-05 NOTE — MR AVS SNAPSHOT
[address] [facility] 
 
 
 [address] [address] 
[phone] Patient: Mery Christianson MRN: [id number] KGS:5/48/1672 Visit Information Date & Time Provider Department Dept. Phone Encounter #  
 7/5/2018  3:00 PM Carleen Bee Martell [id number] Follow-up Instructions Return for Patient will call for follow up after lab tests . Your Appointments 8/27/2018  3:30 PM  
Follow Up with Carmelita Hatfield DO [address] [place], [place] [address] (--) Appt Note: 3 months follow up Davy Koch [address] [address]  
  
    
 6/20/2019 11:00 AM  
Follow Up with Jed Pizarro MD  
[facility] ([facility]) Appt Note: 1 yr f/up [place]rinivas[place] [phone] [address] [place] [address] [address] Upcoming Health Maintenance Date Due Pneumococcal 19-64 Highest Risk (1 of 3 - PCV13) 9/11/1974 DTaP/Tdap/Td series (1 - Tdap) 9/11/1976 ZOSTER VACCINE AGE 60> 7/11/2015 BREAST CANCER SCRN MAMMOGRAM 12/8/2016 Influenza Age 5 to Adult 8/1/2018 COLONOSCOPY 5/26/2020 Allergies as of 7/5/2018  Review Complete On: 0/3/9699 By: Pilo Hernandez. Daina Dennis LPN Severity Noted Reaction Type Reactions Omnicef [Cefdinir] High 07/26/2011    Hives Keflex [Cephalexin] Medium 07/21/2017    Hives Codeine  06/23/2011    Other (comments) Severe headache Ketek [Telithromycin]  09/21/2017    Other (comments) Passed out Levaquin [Levofloxacin]  10/12/2012    Unknown (comments) Did not tolerate Morphine  06/23/2011    Other (comments) Severe headache  
 Sulfa (Sulfonamide Antibiotics)  06/23/2011    Hives Topamax [Topiramate]  01/11/2016    Other (comments) Made blood vessels red and pain in eyes Current Immunizations  Reviewed on 12/4/2017 Name Date Hep A Vaccine (Adult) 12/4/2017, 5/30/2017 Influenza Vaccine (Quad) PF 12/4/2017, 9/28/2016 Not reviewed this visit You Were Diagnosed With   
  
 Codes Comments Nonintractable headache, unspecified chronicity pattern, unspecified headache type    -  Primary ICD-10-CM: R51 ICD-9-CM: 784.0 Exposure to parasitic disease     ICD-10-CM: Z20.7 ICD-9-CM: V01.9 Dysuria     ICD-10-CM: R30.0 ICD-9-CM: 230. 1 Vitals BP Pulse Temp Resp Height(growth percentile) Weight(growth percentile) 146/88 (BP 1 Location: Left arm, BP Patient Position: Sitting) 90 97.3 °F (36.3 °C) (Oral) 16 5' 8\" (1.727 m) 244 lb (110.7 kg) BMI OB Status Smoking Status 37.1 kg/m2 Hysterectomy Former Smoker BMI and BSA Data Body Mass Index Body Surface Area  
 37.1 kg/m 2 2.3 m 2 [facility]erred [facility]acy [id number] [address] [phone] Your Updated Medication List  
  
   
This list is accurate as of 7/5/18  4:27 PM.  Always use your most recent med list.  
  
  
  
  
 ALIGN 4 mg Cap Generic drug:  Bifidobacterium Infantis Take  by mouth daily. atorvastatin 10 mg tablet Commonly known as:  LIPITOR  
TAKE 1 TABLET BY MOUTH EVERY DAY Blood-Glucose Meter monitoring kit Check blood sugar TID PRN with meals and/or bouts of dizziness  
  
 clobetasol 0.05 % ointment Commonly known as:  Rosario Lunch Apply  to affected area two (2) times daily as needed for Skin Irritation or Itching. EPINEPHrine 0.3 mg/0.3 mL injection Commonly known as:  AUVI-Q  
0.3 mL by IntraMUSCular route once as needed for Anaphylaxis for up to 1 dose. glucose blood VI test strips strip Commonly known as:  blood glucose test  
Patient to check blood sugar TID PRN  
  
 ketoconazole 2 % topical cream  
 Commonly known as:  NIZORAL Apply thin layer to affected area bid Lancets Misc Check blood sugar TID PRN with meals and dizziness  
  
 levothyroxine 112 mcg tablet Commonly known as:  SYNTHROID Take 1 Tab by mouth Daily (before breakfast). nystatin-triamcinolone 100,000-0.1 unit/gram-% ointment Commonly known as:  [place] Pal[place] Apply to affected area bid OTHER Azelaic/Finast/Flutic/Minox(Scalp) 5/0.1% Apply to affected area of scalp daily 30 ml  
  
 permethrin 5 % topical cream  
Commonly known as:  ACTICIN  
apply sparingly as directed We Performed the Following AMB POC URINALYSIS DIP STICK AUTO W/ MICRO [03460 CPT(R)] Follow-up Instructions Return for Patient will call for follow up after lab tests . To-Do List   
 07/05/2018 Microbiology:  OVA & PARASITES, STOOL Around 10/03/2018 Lab:  CBC WITH AUTOMATED DIFF Around 10/03/2018 Lab:  SED RATE (ESR) Patient Instructions Please contact our office if you have any questions about your visit today. 1.) Please get stool sample and lab work done as soon as possible. 2.) Return after getting the labs and stool test done. Introducing \[facility]\"" & HEALTH SERVICES! Dear Isiah Thompson: Thank you for requesting a Zedmo account. Our records indicate that you already have an active Zedmo account. You can access your account anytime at [email or web address] Did you know that you can access your hospital and ER discharge instructions at any time in Zedmo? You can also review all of your test results from your hospital stay or ER visit. Additional Information If you have questions, please visit the Frequently Asked Questions section of the Zedmo website at [email or web address] Remember, Zedmo is NOT to be used for urgent needs. For medical emergencies, dial 911. Now available from your iPhone and Android! Please provide this summary of care documentation to your next provider. Your primary care clinician is listed as ELVIN BRITTON. If you have any questions after today's visit, please call [phone].

## 2018-07-05 NOTE — PROGRESS NOTES
Chief Complaint   Patient presents with    Follow-up    Headache     1. Have you been to the ER, urgent care clinic since your last visit? Hospitalized since your last visit? No    2. Have you seen or consulted any other health care providers outside of the [facility] since your last visit? Include any pap smears or colon screening.  No

## 2018-07-05 NOTE — PROGRESS NOTES
Progress Note    Patient: Yosvany Tobin MRN: [id number]  SSN: [id number]    YOB: 1955  Age: 58 y.o. Sex: female          Subjective:   Yosvany Tobin is a 58 y.o. female is here for follow up. The patient mentions that she had some inflammation around her jaw line when she was dealing with the scabies infestation. The patient is concerned about possible parasitic infection. She mentions that she went to the [facility] last September and thinks that this may have played a role. She mentions that she has been having some upset stomach. She mentions that she did ingest some water in the [facility]. She mentions that she has developed some scratches on the skin which were    She mentions that her stools have improved as well. Visit from 6/5/2018  Patient is here for an acute care visit. The patient mentions that she been trying to disinfect her house. She mentions that she has been using the [facility] and Ivermectin. She mentions that she called her dermatologist yesterday but could not speak to them. She mentions that she has been dealing with a persistent cough. She states that this harbors back to when she developed her scabies. She mentions that she does not have a lot of mucus coming up. Visit from 5/24/18  Patient is here for follow up to do her physical. She will have her mammogram within the year. She mentions that she had her colonoscopy within the past two years. Visit from 5/9/18   Patient is here for an acute care visit and follow up. She mentions that 6 months ago she had a cardiac event. She was seen at [facility] for this and her cardiac work up was negative. She was told that she had some esophageal erosions and has been seeing GI for this.    Now recently, she did not realize that she had scabies--suspected contracted from the hospital. She was initially thought to be a yeast. She mentions that she ended up in the ER in March and they discovered the scabies. She mentions that she has now developed skin burrows and a severe rash. She has seen the dermatologist about the scabies---Dr. Horacio Middleton. She mentions that she was not given adequate amounts of her medication. She mentions that she has had professional cleaning in her house. She has already done a round of Ivermectin and Permetherin. Objective:     Past Medical History:   Diagnosis Date    Abnormal nuclear cardiac imaging test 02/09/2012    Mid to distal anteroseptal and apical reversible defect concerning for ishcmeia. Mild-mod, mid-distal anterior & apical hypk. EF 67%. Borderline abnormal EKG w/1-mm inferolateral ST depression at 7 min exercise. Occasional PVCs.  Anxiety     Carotid duplex 72/84/4542    Mild 8-35% LICA stenosis.  Dengue fever     7/10 while in [place]    Diverticulosis     Dyslipidemia     GERD     Holter monitor study 04/29/2016    Sinus rhythm, avg HR 67 bpm (range  bpm). Rare ectopy.  Hypothyroidism     RLE venous duplex 07/23/2015    Right leg:  No DVT.  S/P cardiac catheterization 02/24/2012    Angiographically normal coronaries. Hyperdynamic. EF 70%. No RWMA.  Sinusitis     Tinnitus     Uterine cancer (HCC)     hysterectomy        Vitals:    07/05/18 1540   BP: 146/88   Pulse: 90   Resp: 16   Temp: 97.3 °F (36.3 °C)   TempSrc: Oral   Weight: 244 lb (110.7 kg)   Height: 5' 8\" (1.727 m)        Review of Systems:  Pertinent items are noted in the History of Present Illness. Physical Exam:   GENERAL: alert, cooperative, mild distress, appears stated age, morbidly obese  LUNG: clear to auscultation bilaterally  HEART: regular rate and rhythm, S1, S2 normal, no murmur, click, rub or gallop  ABDOMEN: soft, non-tender. Bowel sounds normal. No masses,  no organomegaly  EXTREMITIES:  extremities normal, atraumatic, no cyanosis or edema  SKIN: Mild excoriations under the jaws       Lab/Data Review:  No new labs to review. Assessment:     1.) Parasitic Exposure: Patient ordered Ova and Parasite Stool test. Sed Rate and CBC ordered. 2.) Dysuria: Patient ordered urinalysis. Patient will return after getting labs as well as stool studies.       Plan:     Orders Placed This Encounter    OVA & PARASITES, STOOL    SED RATE (ESR)    CBC WITH AUTOMATED DIFF    AMB POC URINALYSIS DIP STICK AUTO W/ MICRO         Signed By: Juan J Sage DO     July 5, 2018

## 2018-07-09 ENCOUNTER — HOSPITAL ENCOUNTER (OUTPATIENT)
Dept: LAB | Age: 63
Discharge: HOME | End: 2018-07-09

## 2018-07-09 LAB — SENTARA SPECIMEN COL,SENBCF: NORMAL

## 2018-07-09 PROCEDURE — 99001 SPECIMEN HANDLING PT-LAB: CPT | Performed by: INTERNAL MEDICINE

## 2018-07-10 LAB
ABSOLUTE LYMPHOCYTE COUNT, 10803: 2.6 K/UL (ref 1–4.8)
BASOPHILS # BLD: 0.1 K/UL (ref 0–0.2)
BASOPHILS NFR BLD: 1 % (ref 0–2)
EOSINOPHIL # BLD: 0.4 K/UL (ref 0–0.5)
EOSINOPHIL NFR BLD: 6 % (ref 0–6)
ERYTHROCYTE [DISTWIDTH] IN BLOOD BY AUTOMATED COUNT: 16 % (ref 10–15.5)
GRANULOCYTES,GRANS: 48 % (ref 40–75)
HCT VFR BLD AUTO: 44.2 % (ref 35.1–48)
HGB BLD-MCNC: 14 G/DL (ref 11.7–16)
LYMPHOCYTES, LYMLT: 38 % (ref 20–45)
MCH RBC QN AUTO: 27 PG (ref 26–34)
MCHC RBC AUTO-ENTMCNC: 32 G/DL (ref 31–36)
MCV RBC AUTO: 86 FL (ref 80–95)
MONOCYTES # BLD: 0.4 K/UL (ref 0.1–1)
MONOCYTES NFR BLD: 6 % (ref 3–12)
NEUTROPHILS # BLD AUTO: 3.2 K/UL (ref 1.8–7.7)
PLATELET # BLD AUTO: 197 K/UL (ref 140–440)
PMV BLD AUTO: 13.1 FL (ref 9–13)
RBC # BLD AUTO: 5.17 M/UL (ref 3.8–5.2)
SED RATE (ESR): 40 MM/HR (ref 0–30)
WBC # BLD AUTO: 6.7 K/UL (ref 4–11)

## 2018-07-12 ENCOUNTER — HOSPITAL ENCOUNTER (OUTPATIENT)
Dept: LAB | Age: 63
Discharge: HOME | End: 2018-07-12

## 2018-07-12 LAB — SENTARA SPECIMEN COL,SENBCF: NORMAL

## 2018-07-12 PROCEDURE — 99001 SPECIMEN HANDLING PT-LAB: CPT | Performed by: INTERNAL MEDICINE

## 2018-07-19 LAB — RESULT: NORMAL

## 2018-07-24 ENCOUNTER — OFFICE VISIT (OUTPATIENT)
Dept: FAMILY MEDICINE CLINIC | Age: 63
End: 2018-07-24

## 2018-07-24 VITALS
HEART RATE: 93 BPM | BODY MASS INDEX: 37.13 KG/M2 | TEMPERATURE: 97.1 F | SYSTOLIC BLOOD PRESSURE: 151 MMHG | RESPIRATION RATE: 17 BRPM | HEIGHT: 68 IN | WEIGHT: 245 LBS | DIASTOLIC BLOOD PRESSURE: 88 MMHG

## 2018-07-24 DIAGNOSIS — C54.8 MALIGNANT NEOPLASM OF OVERLAPPING SITES OF BODY OF UTERUS (HCC): ICD-10-CM

## 2018-07-24 DIAGNOSIS — L98.9 SKIN LESIONS: ICD-10-CM

## 2018-07-24 DIAGNOSIS — Z20.7 SCABIES EXPOSURE: Primary | ICD-10-CM

## 2018-07-24 DIAGNOSIS — R70.0 ELEVATED SED RATE: ICD-10-CM

## 2018-07-24 NOTE — PROGRESS NOTES
Progress Note    Patient: Yosvany Tobin MRN: [id number]  SSN: [id number]    YOB: 1955  Age: 58 y.o. Sex: female          Subjective:   Yosvany Tobin is a 58 y.o. female is here for follow up to review her bloodwork and stool sample results. She mentions that she developed some burrows in her back. She still feels like lice are running around her scalp. She has tried holistic approaches and also western medicine. She mentions that she has been dealing with this for 5 months. Visit from 7/5/2018  The patient mentions that she had some inflammation around her jaw line when she was dealing with the scabies infestation. The patient is concerned about possible parasitic infection. She mentions that she went to the [facility] last September and thinks that this may have played a role. She mentions that she has been having some upset stomach. She mentions that she did ingest some water in the [facility]. She mentions that she has developed some scratches on the skin which were    She mentions that her stools have improved as well. Visit from 6/5/2018  Patient is here for an acute care visit. The patient mentions that she been trying to disinfect her house. She mentions that she has been using the [facility] and Ivermectin. She mentions that she called her dermatologist yesterday but could not speak to them. She mentions that she has been dealing with a persistent cough. She states that this harbors back to when she developed her scabies. She mentions that she does not have a lot of mucus coming up. Visit from 5/24/18  Patient is here for follow up to do her physical. She will have her mammogram within the year. She mentions that she had her colonoscopy within the past two years. Visit from 5/9/18   Patient is here for an acute care visit and follow up. She mentions that 6 months ago she had a cardiac event.  She was seen at [facility] for this and her cardiac work up was negative. She was told that she had some esophageal erosions and has been seeing GI for this. Now recently, she did not realize that she had scabies--suspected contracted from the hospital. She was initially thought to be a yeast. She mentions that she ended up in the ER in March and they discovered the scabies. She mentions that she has now developed skin burrows and a severe rash. She has seen the dermatologist about the scabies---Dr. Lorin Amaya. She mentions that she was not given adequate amounts of her medication. She mentions that she has had professional cleaning in her house. She has already done a round of Ivermectin and Permetherin. Objective:     Past Medical History:   Diagnosis Date    Abnormal nuclear cardiac imaging test 02/09/2012    Mid to distal anteroseptal and apical reversible defect concerning for ishcmeia. Mild-mod, mid-distal anterior & apical hypk. EF 67%. Borderline abnormal EKG w/1-mm inferolateral ST depression at 7 min exercise. Occasional PVCs.  Anxiety     Carotid duplex 16/80/1944    Mild 9-82% LICA stenosis.  Dengue fever     7/10 while in [place]    Diverticulosis     Dyslipidemia     GERD     Holter monitor study 04/29/2016    Sinus rhythm, avg HR 67 bpm (range  bpm). Rare ectopy.  Hypothyroidism     RLE venous duplex 07/23/2015    Right leg:  No DVT.  S/P cardiac catheterization 02/24/2012    Angiographically normal coronaries. Hyperdynamic. EF 70%. No RWMA.  Sinusitis     Tinnitus     Uterine cancer (HCC)     hysterectomy        Vitals:    07/24/18 1510   BP: 151/88   Pulse: 93   Resp: 17   Temp: 97.1 °F (36.2 °C)   TempSrc: Oral   Weight: 245 lb (111.1 kg)   Height: 5' 8\" (1.727 m)        Review of Systems:  Pertinent items are noted in the History of Present Illness.     Physical Exam:   GENERAL: alert, cooperative, mild distress, appears stated age, morbidly obese  LUNG: clear to auscultation bilaterally  HEART: regular rate and rhythm, S1, S2 normal, no murmur, click, rub or gallop  ABDOMEN: soft, non-tender. Bowel sounds normal. No masses,  no organomegaly  EXTREMITIES:  extremities normal, atraumatic, no cyanosis or edema  SKIN: Mild excoriations under the jaws       Lab/Data Review:  No new labs to review. Assessment:     1.) Parasitic Exposure: Patient's Ova and Parasite Stool test was negative. Sed Rate and CBC ordered. Patient will return after getting labs as well as stool studies. Plan:     No orders of the defined types were placed in this encounter.         Signed By: [id number] Orlando Caldera DO     July 24, 2018

## 2018-07-24 NOTE — PATIENT INSTRUCTIONS
Please contact our office if you have any questions about your visit today. 1.) Continue to keep appointment with Dr. Esau Nuñez    2.) Please keep August appointment with me. Scabies: Care Instructions  Your Care Instructions  Scabies is a skin problem that can cause intense itching. It is caused by very tiny bugs called mites that dig just under the skin and lay eggs. An allergic reaction to the mites causes the itching. Scabies is usually spread by person-to-person contact. It is also possible, but not common, for scabies to spread through towels, clothes, and bedding. Everyone in your household should be treated. Scabies is treated with medicine. Itching may last for several weeks after treatment. Follow-up care is a key part of your treatment and safety. Be sure to make and go to all appointments, and call your doctor if you are having problems. It's also a good idea to know your test results and keep a list of the medicines you take. How can you care for yourself at home? · Use the lotion or cream your doctor recommends or prescribes. One treatment usually cures scabies. Do not use the cream again unless your doctor tells you to. · Wash all clothes, bedding, and towels that you used in the 3 days before you started treatment. Use hot water, and use the hot cycle in the dryer. Another option is to dry-clean these items. Or seal them in a plastic bag for 3 to 7 days. · Take an oral antihistamine, such as loratadine (Claritin) or diphenhydramine (Benadryl), to help stop itching. You also can use a nonprescription anti-itch cream. Read and follow all instructions on the label. · Do not have physical contact with other people or let anyone use your personal items until you have finished treatment. Do not use other people's personal items until your treatment is done. Tell people with whom you have close contact that they will need treatment if they have symptoms.   · Take an oatmeal bath to help relieve itching. Add a handful of oatmeal (ground to a powder) to your bath. Or you can try an oatmeal bath product, such as Aveeno. When should you call for help? Call your doctor now or seek immediate medical care if:    · You have signs of infection, such as:  ¨ Increased pain, swelling, warmth, or redness. ¨ Red streaks leading from the mite bites. ¨ Pus draining from a bite area. ¨ A fever.    Watch closely for changes in your health, and be sure to contact your doctor if:    · Anyone else in your family has itching.     · You do not get better within 2 weeks. Where can you learn more? Go to http://bonnie-justyna.info/. Enter W445 in the search box to learn more about \"Scabies: Care Instructions. \"  Current as of: October 5, 2017  Content Version: 11.7  © 0161-7063 [employer] system. Care instructions adapted under license by "[employer] (which disclaims liability or warranty for this information). If you have questions about a medical condition or this instruction, always ask your healthcare professional. [place] [address] any warranty or liability for your use of this information.

## 2018-07-24 NOTE — MR AVS SNAPSHOT
Sadie Barajas 
 
 
 [place] [address] [address] [address] [id number] [phone] Patient: Noe Hodgkin MRN: [id number] ZIP:[id number] Visit Information Date & Time Provider Department Dept. Phone Encounter #  
 7/24/2018  2:45 PM Vivi Fletcher Cheli Fer Miller [id number] Follow-up Instructions Return for Patient will keep regular August appointment. Your Appointments 7/27/2018 10:00 AM  
Office Visit with Ramesh Barajas MD  
[facility] [address]) Appt Note: infectious Disease referred by Dr. Natalie Mahan at [facility]  
 [address] [facility] 300 [facility] 455 [facility]  
  
   
 [address] [facility]  
  
    
 8/27/2018  3:30 PM  
Follow Up with Vivi Fletcher DO [facility] (--) Appt Note: 3 months follow up [place] [address] [facility] [address] [address]  
  
    
 6/20/2019 11:00 AM  
Follow Up with Arcadio Forbes MD  
[facility] ([address] [place] [address]) Appt Note: 1 yr f/up [place] [address] [address] [address] [phone] [address] Upcoming Health Maintenance Date Due Pneumococcal 19-64 Highest Risk (1 of 3 - PCV13) 9/11/1974 DTaP/Tdap/Td series (1 - Tdap) 9/11/1976 ZOSTER VACCINE AGE 60> 7/11/2015 BREAST CANCER SCRN MAMMOGRAM 12/8/2016 Influenza Age 5 to Adult 8/1/2018 COLONOSCOPY 5/26/2020 Allergies as of 7/24/2018  Review Complete On: 8/72/1503 By: Cruzito Dewitt. Maynor Parsons LPN Severity Noted Reaction Type Reactions Omnicef [Cefdinir] High 07/26/2011    Hives Keflex [Cephalexin] Medium 07/21/2017    Hives Codeine  06/23/2011    Other (comments) Severe headache Ketek [Telithromycin]  09/21/2017    Other (comments) Passed out Levaquin [Levofloxacin]  10/12/2012    Unknown (comments) Did not tolerate Morphine  06/23/2011    Other (comments) Severe headache  
 Sulfa (Sulfonamide Antibiotics)  06/23/2011    Hives Topamax [Topiramate]  01/11/2016    Other (comments) Made blood vessels red and pain in eyes Current Immunizations  Reviewed on 12/4/2017 Name Date Hep A Vaccine (Adult) 12/4/2017, 5/30/2017 Influenza Vaccine (Quad) PF 12/4/2017, 9/28/2016 Not reviewed this visit You Were Diagnosed With   
  
 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  
 37.25 kg/m 2 2.31 m 2 Preferred Pharmacy Pharmacy Name Phone [id number] [address] [facility]. Amberly Estrada, [address] [facility] [phone] Your Updated Medication List  
  
   
This list is accurate as of 7/24/18  3:45 PM.  Always use your most recent med list.  
  
  
  
  
 ALIGN 4 mg Cap Generic drug:  Bifidobacterium Infantis Take  by mouth daily. atorvastatin 10 mg tablet Commonly known as:  LIPITOR  
TAKE 1 TABLET BY MOUTH EVERY DAY Blood-Glucose Meter monitoring kit Check blood sugar TID PRN with meals and/or bouts of dizziness  
  
 clobetasol 0.05 % ointment Commonly known as:  Radha Leventhal Apply  to affected area two (2) times daily as needed for Skin Irritation or Itching. EPINEPHrine 0.3 mg/0.3 mL injection Commonly known as:  AUVI-Q  
0.3 mL by IntraMUSCular route once as needed for Anaphylaxis for up to 1 dose. glucose blood VI test strips strip Commonly known as:  blood glucose test  
Patient to check blood sugar TID PRN  
  
 ketoconazole 2 % topical cream  
Commonly known as:  NIZORAL Apply thin layer to affected area bid Lancets Misc Check blood sugar TID PRN with meals and dizziness  
  
 levothyroxine 112 mcg tablet Commonly known as:  SYNTHROID Take 1 Tab by mouth Daily (before breakfast). nystatin-triamcinolone 100,000-0.1 unit/gram-% ointment Commonly known as:  Keli Vinton Apply to affected area bid OTHER Azelaic/Finast/Flutic/Minox(Scalp) 5/0.1% Apply to affected area of scalp daily 30 ml  
  
 permethrin 5 % topical cream  
Commonly known as:  ACTICIN  
apply sparingly as directed Follow-up Instructions Return for Patient will keep regular August appointment. Patient Instructions Please contact our office if you have any questions about your visit today. 1.) Continue to keep appointment with Dr. Kody Mchugh 2.) Please keep August appointment with me. Scabies: Care Instructions Your Care Instructions Scabies is a skin problem that can cause intense itching. It is caused by very tiny bugs called mites that dig just under the skin and lay eggs. An allergic reaction to the mites causes the itching. Scabies is usually spread by person-to-person contact. It is also possible, but not common, for scabies to spread through towels, clothes, and bedding. Everyone in your household should be treated. Scabies is treated with medicine. Itching may last for several weeks after treatment. Follow-up care is a key part of your treatment and safety.  Be sure to make and go to all appointments, and call your doctor if you are having problems. It's also a good idea to know your test results and keep a list of the medicines you take. How can you care for yourself at home? · Use the lotion or cream your doctor recommends or prescribes. One treatment usually cures scabies. Do not use the cream again unless your doctor tells you to. · Wash all clothes, bedding, and towels that you used in the 3 days before you started treatment. Use hot water, and use the hot cycle in the dryer. Another option is to dry-clean these items. Or seal them in a plastic bag for 3 to 7 days. · Take an oral antihistamine, such as loratadine (Claritin) or diphenhydramine (Benadryl), to help stop itching. You also can use a nonprescription anti-itch cream. Read and follow all instructions on the label. · Do not have physical contact with other people or let anyone use your personal items until you have finished treatment. Do not use other people's personal items until your treatment is done. Tell people with whom you have close contact that they will need treatment if they have symptoms. · Take an oatmeal bath to help relieve itching. Add a handful of oatmeal (ground to a powder) to your bath. Or you can try an oatmeal bath product, such as Aveeno. When should you call for help? Call your doctor now or seek immediate medical care if: 
  · You have signs of infection, such as: 
¨ Increased pain, swelling, warmth, or redness. ¨ Red streaks leading from the mite bites. ¨ Pus draining from a bite area. ¨ A fever.  
 Watch closely for changes in your health, and be sure to contact your doctor if: 
  · Anyone else in your family has itching.  
  · You do not get better within 2 weeks. Where can you learn more? Go to http://bonnie-justyna.info/. Enter G925 in the search box to learn more about \"Scabies: Care Instructions. \" Current as of: October 5, 2017 Content Version: 11.7 © 8418-0040 Healthwise, Incorporated. Care instructions adapted under license by [employer] (which disclaims liability or warranty for this information). If you have questions about a medical condition or this instruction, always ask your healthcare professional. [address] any warranty or liability for your use of this information. Introducing \[facility] [place] [facility]\"" & HEALTH SERVICES! Dear Nii Held: Thank you for requesting a Moovit account. Our records indicate that you already have an active Moovit account. You can access your account anytime at [email or web address] Did you know that you can access your hospital and ER discharge instructions at any time in Moovit? You can also review all of your test results from your hospital stay or ER visit. Additional Information If you have questions, please visit the Frequently Asked Questions section of the Moovit website at [email or web address] Remember, Moovit is NOT to be used for urgent needs. For medical emergencies, dial 911. Now available from your iPhone and Android! Please provide this summary of care documentation to your next provider. Your primary care clinician is listed as ELVIN BRITTON. If you have any questions after today's visit, please call [phone].

## 2018-07-24 NOTE — PROGRESS NOTES
Chief Complaint   Patient presents with    Follow-up     states that we will see infectious disease     1. Have you been to the ER, urgent care clinic since your last visit? Hospitalized since your last visit? No    2. Have you seen or consulted any other health care providers outside of the [address] [place] [address] since your last visit? Include any pap smears or colon screening.  No

## 2018-07-27 ENCOUNTER — OFFICE VISIT (OUTPATIENT)
Dept: INTERNAL MEDICINE CLINIC | Age: 63
End: 2018-07-27

## 2018-07-27 VITALS
DIASTOLIC BLOOD PRESSURE: 90 MMHG | OXYGEN SATURATION: 97 % | BODY MASS INDEX: 35.61 KG/M2 | RESPIRATION RATE: 18 BRPM | SYSTOLIC BLOOD PRESSURE: 126 MMHG | HEART RATE: 78 BPM | TEMPERATURE: 98.9 F | WEIGHT: 235 LBS | HEIGHT: 68 IN

## 2018-07-27 DIAGNOSIS — F41.9 ANXIETY DISORDER, UNSPECIFIED TYPE: ICD-10-CM

## 2018-07-27 DIAGNOSIS — L23.9 ALLERGIC DERMATITIS: ICD-10-CM

## 2018-07-27 DIAGNOSIS — Z86.19 HISTORY OF SCABIES: ICD-10-CM

## 2018-07-27 DIAGNOSIS — L23.9 ALLERGIC DERMATITIS: Primary | ICD-10-CM

## 2018-07-27 PROBLEM — E66.01 SEVERE OBESITY (BMI 35.0-39.9): Status: ACTIVE | Noted: 2018-07-27

## 2018-07-27 RX ORDER — HYDROXYZINE HYDROCHLORIDE 10 MG/1
10 TABLET, FILM COATED ORAL
Qty: 30 TAB | Refills: 3 | Status: SHIPPED | OUTPATIENT
Start: 2018-07-27 | End: 2018-08-06

## 2018-07-27 RX ORDER — LORATADINE 10 MG/1
10 TABLET ORAL DAILY
Qty: 30 TAB | Refills: 6 | Status: SHIPPED | OUTPATIENT
Start: 2018-07-27 | End: 2022-10-04

## 2018-07-27 RX ORDER — MONTELUKAST SODIUM 10 MG/1
10 TABLET ORAL DAILY
Qty: 30 TAB | Refills: 3 | Status: SHIPPED | OUTPATIENT
Start: 2018-07-27 | End: 2018-09-05

## 2018-07-27 NOTE — MR AVS SNAPSHOT
[address] [facility] 
 
 
 [address] [employer], [address] [facility] 
[phone] Patient: Russ Earl MRN: [id number] BIN:5/69/[birth year]94 Visit Information Date & Time Provider Department Dept. Phone Encounter #  
 7/27/2018 10:00 AM Hari Bianchi MD [facility] [phone] Follow-up Instructions Return in about 2 weeks (around 8/10/2018). Your Appointments 8/14/2018  1:30 PM  
Office Visit with Hari Bianchi MD  
[facility]) Appt Note: 2 week follow up  
 [address] [facility] [address]  
  
   
 [address], [address]  
  
    
 8/27/2018  3:30 PM  
Follow Up with Danica Hernández DO [address] [place] [address] (--) Appt Note: 3 months follow up [place] 57 [facility] [address] [address]  
  
    
 6/20/2019 11:00 AM  
Follow Up with Will Peters MD  
[facility] ([address]) Appt Note: 1 yr f/up [place] [address] [address] [address] [phone] Replaced by [facility] [place] [address] Upcoming Health Maintenance Date Due Pneumococcal 19-64 Highest Risk (1 of 3 - PCV13) 9/11/1974 DTaP/Tdap/Td series (1 - Tdap) 9/11/1976 ZOSTER VACCINE AGE 60> 7/11/2015 BREAST CANCER SCRN MAMMOGRAM 12/8/2016 Influenza Age 5 to Adult 8/1/2018 COLONOSCOPY 5/26/2020 Allergies as of 7/27/2018  Review Complete On: 7/27/2018 By: Chrissie Morales Severity Noted Reaction Type Reactions Omnicef [Cefdinir] High 07/26/2011    Hives Keflex [Cephalexin] Medium 07/21/2017    Hives Codeine  06/23/2011    Other (comments) Severe headache Ketek [Telithromycin]  09/21/2017    Other (comments) Passed out Levaquin [Levofloxacin]  10/12/2012    Unknown (comments) Did not tolerate Morphine  06/23/2011    Other (comments) Severe headache  
 Sulfa (Sulfonamide Antibiotics)  06/23/2011    Hives Topamax [Topiramate]  01/11/2016    Other (comments) Made blood vessels red and pain in eyes Current Immunizations  Reviewed on 12/4/2017 Name Date Hep A Vaccine (Adult) 12/4/2017, 5/30/2017 Influenza Vaccine (Quad) PF 12/4/2017, 9/28/2016 Not reviewed this visit You Were Diagnosed With   
  
 Codes Comments Allergic dermatitis    -  Primary ICD-10-CM: L23.9 ICD-9-CM: 692.9 History of scabies     ICD-10-CM: Z86.19 ICD-9-CM: V12.09 Vitals BP Pulse Temp Resp Height(growth percentile) Weight(growth percentile) 126/90 (BP 1 Location: Left arm, BP Patient Position: Sitting) 78 98.9 °F (37.2 °C) (Oral) 18 5' 8\" (1.727 m) 235 lb (106.6 kg) SpO2 BMI OB Status Smoking Status 97% 35.73 kg/m2 Hysterectomy Former Smoker Vitals History BMI and BSA Data Body Mass Index Body Surface Area 35.73 kg/m 2 2.26 m 2 Preferred Pharmacy Pharmacy Name Phone [id number] [address] [facility]. Amadeo Goldberg, [address] [facility] [phone] Your Updated Medication List  
  
   
This list is accurate as of 7/27/18 11:29 AM.  Always use your most recent med list.  
  
  
  
  
 ALIGN 4 mg Cap Generic drug:  Bifidobacterium Infantis Take  by mouth daily. atorvastatin 10 mg tablet Commonly known as:  LIPITOR  
TAKE 1 TABLET BY MOUTH EVERY DAY Blood-Glucose Meter monitoring kit Check blood sugar TID PRN with meals and/or bouts of dizziness  
  
 clobetasol 0.05 % ointment Commonly known as:  Charmian [place] Apply  to affected area two (2) times daily as needed for Skin Irritation or Itching. EPINEPHrine 0.3 mg/0.3 mL injection Commonly known as:  AUVI-Q  
0.3 mL by IntraMUSCular route once as needed for Anaphylaxis for up to 1 dose.  
  
 glucose blood VI test strips strip Commonly known as:  blood glucose test  
Patient to check blood sugar TID PRN  
  
 hydrOXYzine HCl 10 mg tablet Commonly known as:  ATARAX Take 1 Tab by mouth three (3) times daily as needed for Itching for up to 10 days. ketoconazole 2 % topical cream  
Commonly known as:  NIZORAL Apply thin layer to affected area bid Lancets Misc Check blood sugar TID PRN with meals and dizziness  
  
 levothyroxine 112 mcg tablet Commonly known as:  SYNTHROID Take 1 Tab by mouth Daily (before breakfast). loratadine 10 mg tablet Commonly known as:  Ingirs Page Take 1 Tab by mouth daily. Indications: Allergic Rhinitis  
  
 montelukast 10 mg tablet Commonly known as:  SINGULAIR Take 1 Tab by mouth daily. nystatin-triamcinolone 100,000-0.1 unit/gram-% ointment Commonly known as:  Sonda Noa Apply to affected area bid OTHER Azelaic/Finast/Flutic/Minox(Scalp) 5/0.1% Apply to affected area of scalp daily 30 ml  
  
 permethrin 5 % topical cream  
Commonly known as:  ACTICIN  
apply sparingly as directed Prescriptions Sent to Pharmacy Refills  
 montelukast (SINGULAIR) 10 mg tablet 3 Sig: Take 1 Tab by mouth daily. Class: Normal  
 [id number] [address] Ph #: [phone] Route: Oral  
 hydrOXYzine HCl (ATARAX) 10 mg tablet 3 Sig: Take 1 Tab by mouth three (3) times daily as needed for Itching for up to 10 days. Class: Normal  
 Pharmacy: [id number] Ph #: [phone] Route: Oral  
 loratadine (CLARITIN) 10 mg tablet 6 Sig: Take 1 Tab by mouth daily. Indications: Allergic Rhinitis Class: Normal  
 Pharmacy: [id number] [address] Ph #: [phone] Route: Oral  
  
We Performed the Following CBC WITH AUTOMATED DIFF [16261 CPT(R)] HIV 1/2 AG/AB, 4TH GENERATION,W RFLX CONFIRM [id number] CPT(R)] Follow-up Instructions Return in about 2 weeks (around 8/10/2018). To-Do List   
 07/27/2018 Lab:  IMMUNOGLOBULIN E, QT Introducing [facility] & HEALTH SERVICES! Dear Isiah Thompson: Thank you for requesting a 50 Cubes account. Our records indicate that you already have an active 50 Cubes account. You can access your account anytime at [email or web address] Did you know that you can access your hospital and ER discharge instructions at any time in 50 Cubes? You can also review all of your test results from your hospital stay or ER visit. Additional Information If you have questions, please visit the Frequently Asked Questions section of the 50 Cubes website at [email or web address] Remember, 50 Cubes is NOT to be used for urgent needs. For medical emergencies, dial 911. Now available from your iPhone and Android! Please provide this summary of care documentation to your next provider. Your primary care clinician is listed as ELVIN BRITTON. If you have any questions after today's visit, please call [phone].

## 2018-07-27 NOTE — PROGRESS NOTES
Chief Complaint   Patient presents with    Skin Infection     New patient present for an infectious disease consult. Patient was referred by Dr. Ryan Melendrez for scabies exposure and skin lesions. Patient states she was diagnosed 4 months ago at [facility] for scabies, after [facility]. Patient states she has had a parasite in her hair for 4 months as well. She states she has used holistic and other over the counter things and nothing has helped. 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 1. Have you been to the ER, urgent care clinic or hospitalized within the last 6 months? YES.     2. Have you seen or consulted any other health care providers outside of the [address] [place] [address] within the last 6 months (Include any pap smears or colon screening)? YES, [facility], last seen in June 2018.

## 2018-07-28 LAB
ABSOLUTE LYMPHOCYTE COUNT, 10803: 2.1 K/UL (ref 1–4.8)
BASOPHILS # BLD: 0.1 K/UL (ref 0–0.2)
BASOPHILS NFR BLD: 1 % (ref 0–2)
EOSINOPHIL # BLD: 0.3 K/UL (ref 0–0.5)
EOSINOPHIL NFR BLD: 5 % (ref 0–6)
ERYTHROCYTE [DISTWIDTH] IN BLOOD BY AUTOMATED COUNT: 16.7 % (ref 10–15.5)
GRANULOCYTES,GRANS: 53 % (ref 40–75)
HCT VFR BLD AUTO: 43.4 % (ref 35.1–48)
HGB BLD-MCNC: 13.7 G/DL (ref 11.7–16)
HIV -1/0/2 AG/AB WITH REFLEX, 13463: NON REACTIVE
HIV 1 & 2 AB SER-IMP: NORMAL
LYMPHOCYTES, LYMLT: 32 % (ref 20–45)
MCH RBC QN AUTO: 27 PG (ref 26–34)
MCHC RBC AUTO-ENTMCNC: 32 G/DL (ref 31–36)
MCV RBC AUTO: 87 FL (ref 80–95)
MONOCYTES # BLD: 0.5 K/UL (ref 0.1–1)
MONOCYTES NFR BLD: 8 % (ref 3–12)
NEUTROPHILS # BLD AUTO: 3.4 K/UL (ref 1.8–7.7)
PLATELET # BLD AUTO: 190 K/UL (ref 140–440)
PMV BLD AUTO: 12.5 FL (ref 9–13)
RBC # BLD AUTO: 5.02 M/UL (ref 3.8–5.2)
WBC # BLD AUTO: 6.4 K/UL (ref 4–11)

## 2018-07-30 LAB — IMMUNOGLOBULIN E,TOTAL, 002173: 13 KU/L (ref 0–114)

## 2018-07-30 NOTE — PROGRESS NOTES
ROBBY Tobin is a 58 y.o. female with relevant past medical history of anxiety, obesity, GERD, Hypothyroidism, Uterine cancer, HLD, vertigo, diverticulosis who presents today infectious disease evaluation of presumed scabies. The patient says that around Feb 2018 she began having itching in her back, followed by a pruritic rash in her inguinal area and under her abdominal flap fold. She was treated for a fungal infection with topical nystatin cream. She says her inguinal rash disappeared but the pruritus in her back persisted. She has been seen by multiple medical providers including dermatologists. In the ED once she was told she had scabies and was given treatment with permethrin, which the patient has used multiple times, since then. She has also taken ivermectin. She is not very clear but it sounds like she still takes it on and off, and some times on a daily basis. She has cleaned her house extensively, and continuously, even hired professional  to prevent reinfection. She lives with her  and he has also used permethrin in the past. She says she feel very overwhelmed about the whole situation, she brings samples and pictures, which she shows me, none consistent with a bacteria, fungal or parasitic infection. The pictures are most consistent with an allergic dermatitis, scratch marks causing some erosion in the skin and dermatographism. She says she once felt something crawling on her cheek on the car, and she collected the sample and its a dark small spot, that looks like dust/debri. She says her whole family is very worried about her, she cannot sleep because she is worried and also due to the pruritus which seems to intensify at bedtime. She has used steroid creams with only temporary relief. She says she sometimes uses benadryl at night to help her sleep.  When asked about dermatology impression and management, she says they did not seem to believe she had scabies and gave her some steroid creams, she was not happy with their recommendations. Her PCP has also stopped prescribing any more scabies therapies and referred her here. ROS  As above included in HPI. Otherwise 11 point review of systems negative including constitutional, skin, HENT, eyes, respiratory, cardiovascular, gastrointestinal, genitourinary, musculoskeletal, endocrine, hematologic, allergy, and neurologic. Past Medical History  Past Medical History:   Diagnosis Date    Abnormal nuclear cardiac imaging test 02/09/2012    Mid to distal anteroseptal and apical reversible defect concerning for ishcmeia. Mild-mod, mid-distal anterior & apical hypk. EF 67%. Borderline abnormal EKG w/1-mm inferolateral ST depression at 7 min exercise. Occasional PVCs.  Anxiety     Carotid duplex 96/60/7558    Mild 2-76% LICA stenosis.  Dengue fever     7/10 while in [place]    Diverticulosis     Dyslipidemia     GERD     Holter monitor study 04/29/2016    Sinus rhythm, avg HR 67 bpm (range  bpm). Rare ectopy.  Hypothyroidism     RLE venous duplex 07/23/2015    Right leg:  No DVT.  S/P cardiac catheterization 02/24/2012    Angiographically normal coronaries. Hyperdynamic. EF 70%. No RWMA.       Scabies exposure 03/2018    Sinusitis     Tinnitus     Uterine cancer (HCC)     hysterectomy    Uterine carcinoma [facility])      Past Surgical History:   Procedure Laterality Date    HX CHOLECYSTECTOMY      2009    HX COLECTOMY      2008    HX HYSTERECTOMY          Family History  Family History   Problem Relation Age of Onset    Other Mother      anuerysm    Cancer Father      brain tumor    Hypertension Maternal Aunt     Diabetes Maternal Aunt      lung    Heart Disease Maternal Aunt     Stroke Maternal Grandmother     Other Maternal Grandfather      leg amputated due to phlebitis    Heart Attack Paternal Grandmother     Heart Disease Maternal Aunt        Social History  She  reports that she quit smoking about 19 years ago. Her smoking use included Cigarettes. She has a 20.00 pack-year smoking history. She has never used smokeless tobacco. History   Alcohol Use No       Immunization History  Immunization History   Administered Date(s) Administered    Hep A Vaccine (Adult) 05/30/2017, 12/04/2017    Influenza Vaccine (Quad) PF 09/28/2016, 12/04/2017       Allergies  Allergies   Allergen Reactions    Omnicef [Cefdinir] Hives    Keflex [Cephalexin] Hives    Codeine Other (comments)     Severe headache    Ketek [Telithromycin] Other (comments)     Passed out    Levaquin [Levofloxacin] Unknown (comments)     Did not tolerate      Morphine Other (comments)     Severe headache    Sulfa (Sulfonamide Antibiotics) Hives    Topamax [Topiramate] Other (comments)     Made blood vessels red and pain in eyes       Medications  Current Outpatient Prescriptions   Medication Sig    montelukast (SINGULAIR) 10 mg tablet Take 1 Tab by mouth daily.  hydrOXYzine HCl (ATARAX) 10 mg tablet Take 1 Tab by mouth three (3) times daily as needed for Itching for up to 10 days.  loratadine (CLARITIN) 10 mg tablet Take 1 Tab by mouth daily. Indications: Allergic Rhinitis    permethrin (ACTICIN) 5 % topical cream apply sparingly as directed    levothyroxine (SYNTHROID) 112 mcg tablet Take 1 Tab by mouth Daily (before breakfast).  ketoconazole (NIZORAL) 2 % topical cream Apply thin layer to affected area bid    atorvastatin (LIPITOR) 10 mg tablet TAKE 1 TABLET BY MOUTH EVERY DAY    Bifidobacterium Infantis (ALIGN) 4 mg cap Take  by mouth daily.  nystatin-triamcinolone (MYCOLOG) 100,000-0.1 unit/gram-% ointment Apply to affected area bid    clobetasol (TEMOVATE) 0.05 % ointment Apply  to affected area two (2) times daily as needed for Skin Irritation or Itching.     OTHER Azelaic/Finast/Flutic/Minox(Scalp) 5/0.1%  Apply to affected area of scalp daily  30 ml    EPINEPHrine (AUVI-Q) 0.3 mg/0.3 mL (1:1,000) injection 0.3 mL by IntraMUSCular route once as needed for Anaphylaxis for up to 1 dose.  Lancets Misc Check blood sugar TID PRN with meals and dizziness    Blood-Glucose Meter monitoring kit Check blood sugar TID PRN with meals and/or bouts of dizziness    glucose blood VI test strips (BLOOD GLUCOSE TEST) strip Patient to check blood sugar TID PRN     No current facility-administered medications for this visit. Visit Vitals    /90 (BP 1 Location: Left arm, BP Patient Position: Sitting)    Pulse 78    Temp 98.9 °F (37.2 °C) (Oral)    Resp 18    Ht 5' 8\" (1.727 m)    Wt 235 lb (106.6 kg)    SpO2 97%    BMI 35.73 kg/m2     Body mass index is 35.73 kg/(m^2). Physical Exam   Constitutional: She is oriented to person, place, and time. Obese, anxious female patient. HENT:   Right Ear: External ear normal.   Left Ear: External ear normal.   Nose: Nose normal.   Mouth/Throat: Oropharynx is clear and moist.   Eyes: Conjunctivae are normal. Pupils are equal, round, and reactive to light. Neck: Neck supple. Cardiovascular: Normal rate and regular rhythm. Pulmonary/Chest: Effort normal and breath sounds normal.   Abdominal: Soft. Bowel sounds are normal.   Musculoskeletal: Normal range of motion. She exhibits no edema. Lymphadenopathy:     She has no cervical adenopathy. Neurological: She is alert and oriented to person, place, and time. Skin: Skin is warm. No rash noted. Hair loss noted, scalp is irritated and has some excoriations around ears, the patient attributes to topical lotions she has been applying. No burrows, no rash. She has 3 circular scars of about 1x1cm on her left costal area- flank side,  from apparent papular lesions, that healed     Nursing note and vitals reviewed.         [address] Outpatient Visit on 07/12/2018   Component Date Value Ref Range Status    [facility] SPECIM[facility] COL 07/12/2018 Specimens collected/sent to [facility] Outpatient Visit on 07/09/2018   Component Date Value Ref Range Status    [facility] SPEC[facility] 07/09/2018 Specimens collected/sent to [facility]    Final   Orders Only on 07/09/2018   Component Date Value Ref Range Status    WBC 07/09/2018 6.7  4.0 - 11.0 K/uL Final    RBC 07/09/2018 5.17  3.80 - 5.20 M/uL Final    HGB 07/09/2018 14.0  11.7 - 16.0 g/dL Final    HCT 07/09/2018 44.2  35.1 - 48.0 % Final    MCV 07/09/2018 86  80 - 95 fL Final    MCH 07/09/2018 27  26 - 34 pg Final    MCHC 07/09/2018 32  31 - 36 g/dL Final    RDW 07/09/2018 16.0* 10.0 - 15.5 % Final    PLATELET 59/18/2159 910  140 - 440 K/uL Final    MPV 07/09/2018 13.1* 9.0 - 13.0 fL Final    NEUTROPHILS 07/09/2018 48  40 - 75 % Final    Lymphocytes 07/09/2018 38  20 - 45 % Final    MONOCYTES 07/09/2018 6  3 - 12 % Final    EOSINOPHILS 07/09/2018 6  0 - 6 % Final    BASOPHILS 07/09/2018 1  0 - 2 % Final    ABS. NEUTROPHILS 07/09/2018 3.2  1.8 - 7.7 K/uL Final    ABSOLUTE LYMPHOCYTE COUNT 07/09/2018 2.6  1.0 - 4.8 K/uL Final    ABS. MONOCYTES 07/09/2018 0.4  0.1 - 1.0 K/uL Final    ABS. EOSINOPHILS 07/09/2018 0.4  0.0 - 0.5 K/uL Final    ABS.  BASOPHILS 07/09/2018 0.1  0.0 - 0.2 K/uL Final    Sed rate (ESR) 07/09/2018 40* 0 - 30 mm/Hr Final   Office Visit on 07/05/2018   Component Date Value Ref Range Status    Color (UA POC) 07/05/2018 Yellow   Final    Clarity (UA POC) 07/05/2018 Clear   Final    Glucose (UA POC) 07/05/2018 Negative  Negative Final    Bilirubin (UA POC) 07/05/2018 Negative  Negative Final    Ketones (UA POC) 07/05/2018 Negative  Negative Final    Specific gravity (UA POC) 07/05/2018 1.020  1.001 - 1.035 Final    Blood (UA POC) 07/05/2018 Negative  Negative Final    pH (UA POC) 07/05/2018 5.5  4.6 - 8.0 Final    Protein (UA POC) 07/05/2018 Negative  Negative Final    Urobilinogen (UA POC) 07/05/2018 0.2 mg/dL  0.2 - 1 Final    Nitrites (UA POC) 07/05/2018 Negative  Negative Final    Leukocyte esterase (UA POC) 07/05/2018 Negative  Negative Final   Orders Only on 07/05/2018   Component Date Value Ref Range Status    RESULT 07/12/2018 Normal   Final         CT Results (most recent):    Results from [place] encounter on 12/08/17   CT ABD PELV W CONT   Narrative CT Abdomen And Pelvis With Enhancement    CPT CODE: 70684 and [id number]    INDICATION: Generalized abdominal pain. No other relevant clinical information  provided. Only available notes in the patient's electronic medical record from 4  days previous states patient presented for routine immunizations. .    TECHNIQUE: 5 mm collimation axial images obtained from the diaphragm to the  level of the pubic symphysis following the uneventful administration of oral and  100 cc's nonionic intravenous contrast.    All CT scans at this facility are performed using dose optimization technique as  appropriate to this specific exam, to include automated exposure control,  adjustment of the mA and/or KP according to patient size or use of iterative  reconstruction techniques. COMPARISON: Prior exam 7/21/2017. ABDOMEN FINDINGS:     Small volume dependent atelectasis at the lung bases. Prominent and bulbous  right lower lobe pulmonary vein partially included. No pleural effusion. Liver is diffusely low attenuation consistent with steatosis. The gallbladder  is absent. Normal size spleen. No adrenal mass. Pancreas grossly unremarkable. Kidneys demonstrate symmetric enhancement. No hydronephrosis. No calcified  stones. There is a 2.7 cm right parapelvic cyst.     Normal caliber abdominal aorta. No evidence of dissection. PELVIS FINDINGS:     No small bowel distention to suggest obstruction. Oral contrast has reached the  colon. There is diverticulosis of the cecum and right colon, without surrounding  inflammation. The appendix is visualized and is not enlarged.  Slightly prominent  lymph node in the right lower quadrant pelvic mesentery medial to the colon and  posterior to the cecum measures 1.3 cm, nonspecific. There is scattered mild transverse colon diverticulosis. There is extensive  descending colon and proximal sigmoid diverticulosis, without any convincing  evidence of adjacent inflammation or free fluid by imaging. There appears be a  surgical staple line at the level of the sigmoid, image 76, from presumed  previous bowel resection, for unknown reasons. The uterus is absent. Bladder not well distended for evaluation. Diastases of  the anterior abdominal wall midline pelvis, axial image 80 and sagittal image  46. This appears as a small focal or developing ventral hernia, 2.5 cm  craniocaudal on the sagittal reformatted images. Nondilated loop of small bowel  protrudes into this defect, without inflammation. There is severe disc space narrowing L5-S1 with complete loss of the disc space. Moderate narrowing L4-5 with vacuum disc change. Impression IMPRESSION:    1. Hepatic steatosis. Parapelvic right renal cyst. No renal stones or  hydronephrosis. 2. No small bowel or colon distention to suggest obstruction. Normal caliber  appendix. 3. Small midline ventral pelvic hernia containing some fat and a short segment  nondilated loop of small bowel, similar to prior exam.    4. Near pancolonic diverticulosis. No convincing evidence for acute inflammatory  change currently. No free fluid. Clinical correlation needed with patient's  symptoms and appropriate laboratory studies. XR Results (most recent):    Results from Hospital Encounter encounter on 09/21/17   XR CHEST PA LAT   Narrative INDICATION:  CP       EXAMINATION:  XR CHEST PA LAT    COMPARISON:  None    FINDINGS:  The study shows a normal sized heart. . The lungs are clear and well expanded.               Impression IMPRESSION:  Normal chest         CT   All Micro Results     None            DIAGNOSIS AND PLAN  Patient Active Problem List   Diagnosis Code    Dyslipidemia E78.5    Chest pain R07.89    Hypothyroidism E03.9    GERD K21.9    Vitamin D deficiency Y31.9    Helicobacter pylori antibody positive R76.8    Prediabetes R73.03    Benign positional vertigo H81.10    Palpitations R00.2    Encounter for long-term (current) use of other medications Z79.899    Anxiety F41.9    Insomnia G47.00    Intractable cluster headache syndrome G44.001    Tinnitus H93.19    Allergic rhinitis J30.9    Malignant neoplasm of overlapping sites of body of uterus ([facility]) C54.8    Severe obesity (BMI 35.0-39.9) ([facility]) E66.01     1. Allergic dermatitis  Dermatographism described by patient, she was under the impression it was a burrow, but upon seeing the pictures they are not burrows, rather dermatographism, ezcematoid allergic dermatitis, multiple scratch marks. I have recommended the following medications  in addition to her topical steroid creams as prescribed by dermatology. I will check HIV status, IgE and CBC- Eos.   - montelukast (SINGULAIR) 10 mg tablet; Take 1 Tab by mouth daily. Dispense: 30 Tab; Refill: 3  - hydrOXYzine HCl (ATARAX) 10 mg tablet; Take 1 Tab by mouth three (3) times daily as needed for Itching for up to 10 days. Dispense: 30 Tab; Refill: 3  - loratadine (CLARITIN) 10 mg tablet; Take 1 Tab by mouth daily. Indications: Allergic Rhinitis  Dispense: 30 Tab; Refill: 6  - IMMUNOGLOBULIN E, QT; Future  - HIV 1/2 AG/AB, 4TH GENERATION,W RFLX CONFIRM  - CBC WITH AUTOMATED DIFF    2. History of scabies  Unsure this was truly scabies based on her overall picture, but if it was, she has been treated extensively, and has done extensive infection control at home and even her  was treated. She has used permethrin multiple times, and ivermectin more than the usual recommended regimens. I have recommended her to stop using it as it can cause toxicity and dry up her skin worsening her pruritus.     3. Anxiety disorder, unspecified type  Patient has been very anxious and depressed due to persistent symptoms- pruritus and rash and the concern that this could be secondary to a parasitic disease. She was reassured that at this point in time I do not believe her symptoms are infectious in nature, rather allergic. I have offered to help with her anxiety, and after discussing different approaches we have agreed on trial of hydroxyzine in the evening which will help with anxiety, itching and sleep. I am concerned about delusional parasitosis and if delusion persist she may benefit from a low dose antipsychotic. I will monitor the patient closely and if feasible, depending on clinical progress consider referring to psychiatry. Follow-up Disposition:  Return in about 2 weeks (around 8/10/2018). Lacy Christianson MD

## 2018-08-02 ENCOUNTER — TELEPHONE (OUTPATIENT)
Dept: INTERNAL MEDICINE CLINIC | Age: 63
End: 2018-08-02

## 2018-08-02 NOTE — TELEPHONE ENCOUNTER
Pt calling about her lab results and said she is still experiencing symptoms. She would like Dr Sidney Keita to call her at [phone] regarding.

## 2018-08-03 NOTE — TELEPHONE ENCOUNTER
I would rather Dr Hector Diane call, according to her last note:    I am concerned about delusional parasitosis and if delusion persist she may benefit from a low dose antipsychotic. I will monitor the patient closely and if feasible, depending on clinical progress consider referring to psychiatry    I do not want to unintentionally say anything to her that make it worse as I am not really experienced in this area.

## 2018-08-03 NOTE — TELEPHONE ENCOUNTER
Labs reviewed within normal limits. Neg HIV, IgE Ab neg. Results discussed with patient. Patient persists with pruritus. Recommended to take atarax TID PRN.

## 2018-08-08 ENCOUNTER — OFFICE VISIT (OUTPATIENT)
Dept: FAMILY MEDICINE CLINIC | Age: 63
End: 2018-08-08

## 2018-08-08 VITALS
TEMPERATURE: 97.8 F | HEART RATE: 74 BPM | SYSTOLIC BLOOD PRESSURE: 138 MMHG | BODY MASS INDEX: 35.77 KG/M2 | DIASTOLIC BLOOD PRESSURE: 89 MMHG | WEIGHT: 236 LBS | RESPIRATION RATE: 17 BRPM | HEIGHT: 68 IN

## 2018-08-08 DIAGNOSIS — L23.9 ALLERGIC DERMATITIS: Primary | ICD-10-CM

## 2018-08-08 DIAGNOSIS — Z20.7 SCABIES EXPOSURE: ICD-10-CM

## 2018-08-08 NOTE — PROGRESS NOTES
Progress Note    Patient: Jose Ricci MRN: [id number]  SSN: [id number]    YOB: 1955  Age: 58 y.o. Sex: female          Subjective:   Jose Ricci is a 58 y.o. female is here for follow up on her visit to Infectious Disease. The patient did discuss her case with the Infectious Disease specialist and there was concern for parasitic infection. She was assessed and there was the initial assessment of allergic dermatitis. The patient mentions that she is a frequent beach goer and is concerned about a possible worm infection. She mentions that she is getting a biting feeling in her arms and on her legs. She mentions that she was offered Singulair as well as Claritin and Hydroxyzine. She mentions that when she takes hydroxyzine she sometimes felt the crawling sensation. Visit from 7/24/18  Patient is here to review her blood work and stool sample results. She mentions that she developed some burrows in her back. She still feels like lice are running around her scalp. She has tried holistic approaches and also western medicine. She mentions that she has been dealing with this for 5 months. Visit from 7/5/2018  The patient mentions that she had some inflammation around her jaw line when she was dealing with the scabies infestation. The patient is concerned about possible parasitic infection. She mentions that she went to the [facility] last September and thinks that this may have played a role. She mentions that she has been having some upset stomach. She mentions that she did ingest some water in the [facility]. She mentions that she has developed some scratches on the skin which were    She mentions that her stools have improved as well. Visit from 6/5/2018  Patient is here for an acute care visit. The patient mentions that she been trying to disinfect her house. She mentions that she has been using the [facility] and Ivermectin.  She mentions that she called her dermatologist yesterday but could not speak to them. She mentions that she has been dealing with a persistent cough. She states that this harbors back to when she developed her scabies. She mentions that she does not have a lot of mucus coming up. Visit from 5/24/18  Patient is here for follow up to do her physical. She will have her mammogram within the year. She mentions that she had her colonoscopy within the past two years. Visit from 5/9/18   Patient is here for an acute care visit and follow up. She mentions that 6 months ago she had a cardiac event. She was seen at [facility] for this and her cardiac work up was negative. She was told that she had some esophageal erosions and has been seeing GI for this. Now recently, she did not realize that she had scabies--suspected contracted from the hospital. She was initially thought to be a yeast. She mentions that she ended up in the ER in March and they discovered the scabies. She mentions that she has now developed skin burrows and a severe rash. She has seen the dermatologist about the scabies---Dr. Selwyn Courtney. She mentions that she was not given adequate amounts of her medication. She mentions that she has had professional cleaning in her house. She has already done a round of Ivermectin and Permetherin. Objective:     Past Medical History:   Diagnosis Date    Abnormal nuclear cardiac imaging test 02/09/2012    Mid to distal anteroseptal and apical reversible defect concerning for ishcmeia. Mild-mod, mid-distal anterior & apical hypk. EF 67%. Borderline abnormal EKG w/1-mm inferolateral ST depression at 7 min exercise. Occasional PVCs.  Anxiety     Carotid duplex 84/27/5684    Mild 5-91% LICA stenosis.  Dengue fever     7/10 while in [place]    Diverticulosis     Dyslipidemia     GERD     Holter monitor study 04/29/2016    Sinus rhythm, avg HR 67 bpm (range  bpm). Rare ectopy.       Hypothyroidism     RLE venous duplex 07/23/2015    Right leg:  No DVT.  S/P cardiac catheterization 02/24/2012    Angiographically normal coronaries. Hyperdynamic. EF 70%. No RWMA.  Scabies exposure 03/2018    Sinusitis     Tinnitus     Uterine cancer (HCC)     hysterectomy    Uterine carcinoma (HCC)         Vitals:    08/08/18 0909   BP: 138/89   Pulse: 74   Resp: 17   Temp: 97.8 °F (36.6 °C)   TempSrc: Oral   Weight: 236 lb (107 kg)   Height: 5' 8\" (1.727 m)        Review of Systems:  Pertinent items are noted in the History of Present Illness. Physical Exam:   GENERAL: alert, cooperative, mild distress, appears stated age, morbidly obese  LUNG: clear to auscultation bilaterally  HEART: regular rate and rhythm, S1, S2 normal, no murmur, click, rub or gallop  ABDOMEN: soft, non-tender. Bowel sounds normal. No masses,  no organomegaly  EXTREMITIES:  extremities normal, atraumatic, no cyanosis or edema  SKIN: Healing excoriations noted on the upper neck area       Lab/Data Review:    Lab Results   Component Value Date/Time    WBC 6.4 07/27/2018 11:23 AM    WBC 6.3 06/11/2012 12:00 AM    HGB 13.7 07/27/2018 11:23 AM    HCT 43.4 07/27/2018 11:23 AM    PLATELET 191 04/31/4822 11:23 AM    MCV 87 07/27/2018 11:23 AM         Assessment:     1.) Allergic Dermatitis: Patient was advised to continued on hydroxyzine. She will also continue on Loratadine and Montelukast.      2.) Parasitic Exposure: IgA and Sed Rate ordered to assess for any lingering scabies or parasitic infections. 3.) Preventive: I've personally reviewed and summarized all labs with the patient. This encounter including interview, exam, evaluation and discussion/ counseling was approximately 25 minutes. Patient will return for August 27 th appointment.      Plan:     Orders Placed This Encounter    IMMUNOGLOBULIN A    SED RATE (ESR)         Signed By: [id number] Orlando Caldera DO     August 8, 2018

## 2018-08-08 NOTE — MR AVS SNAPSHOT
[facility] 
 
 
 [address] [place] [address] [phone] Patient: Yosvany Tobin MRN: [id number] LISS:[id number] Visit Information Date & Time Provider Department Dept. Phone Encounter #  
 8/8/2018  8:45 AM Cheli Olvera Fer Miller 77 [id number] Follow-up Instructions Return for Patient will call for follow up . Your Appointments 8/14/2018  1:30 PM  
Office Visit with Ashley Haq MD  
[facility] [address]) Appt Note: 2 week follow up  
 [address], [address] [facility] [address]  
  
   
 [address], [address]  
  
    
 8/27/2018  3:30 PM  
Follow Up with Cara Carlos DO [facility] (--) Appt Note: 3 months follow up Davy Koch [address] [address]  
  
    
 6/20/2019 11:00 AM  
Follow Up with Tiffanie Flores MD  
[facility] ([address]) Appt Note: 1 yr f/up [place] [address]  
[phone] [facility] [place] [address] Upcoming Health Maintenance Date Due Pneumococcal 19-64 Highest Risk (1 of 3 - PCV13) 9/11/1974 DTaP/Tdap/Td series (1 - Tdap) 9/11/1976 ZOSTER VACCINE AGE 60> 7/11/2015 BREAST CANCER SCRN MAMMOGRAM 12/8/2016 Influenza Age 5 to Adult 8/1/2018 COLONOSCOPY 5/26/2020 Allergies as of 8/8/2018  Review Complete On: 1/6/3353 By: Nitin Nieto. Nickolas Snyder LPN Severity Noted Reaction Type Reactions Omnicef [Cefdinir] High 07/26/2011    Hives Keflex [Cephalexin] Medium 07/21/2017    Hives Codeine  06/23/2011    Other (comments) Severe headache Ketek [Telithromycin]  09/21/2017    Other (comments) Passed out Levaquin [Levofloxacin]  10/12/2012    Unknown (comments) Did not tolerate Morphine  06/23/2011    Other (comments) Severe headache  
 Sulfa (Sulfonamide Antibiotics)  06/23/2011    Hives Topamax [Topiramate]  01/11/2016    Other (comments) Made blood vessels red and pain in eyes Current Immunizations  Reviewed on 12/4/2017 Name Date Hep A Vaccine (Adult) 12/4/2017, 5/30/2017 Influenza Vaccine (Quad) PF 12/4/2017, 9/28/2016 Not reviewed this visit You Were Diagnosed With   
  
 Codes Comments Allergic dermatitis    -  Primary ICD-10-CM: L23.9 ICD-9-CM: 692.9 Scabies exposure     ICD-10-CM: Z20.89 ICD-9-CM: V01.89 Vitals BP Pulse Temp Resp Height(growth percentile) Weight(growth percentile) 138/89 (BP 1 Location: Right arm, BP Patient Position: Sitting) 74 97.8 °F (36.6 °C) (Oral) 17 5' 8\" (1.727 m) 236 lb (107 kg) BMI OB Status Smoking Status 35.88 kg/m2 Hysterectomy Former Smoker BMI and BSA Data Body Mass Index Body Surface Area  
 35.88 kg/m 2 2.27 m 2 Preferred Pharmacy Pharmacy Name Phone [id number] Amberly Estrada, [address] [facility] [phone] Your Updated Medication List  
  
   
This list is accurate as of 8/8/18  9:54 AM.  Always use your most recent med list.  
  
  
  
  
 ALIGN 4 mg Cap Generic drug:  Bifidobacterium Infantis Take  by mouth daily. atorvastatin 10 mg tablet Commonly known as:  LIPITOR  
TAKE 1 TABLET BY MOUTH EVERY DAY Blood-Glucose Meter monitoring kit Check blood sugar TID PRN with meals and/or bouts of dizziness  
  
 clobetasol 0.05 % ointment Commonly known as:  Radha Leventhal Apply  to affected area two (2) times daily as needed for Skin Irritation or Itching. EPINEPHrine 0.3 mg/0.3 mL injection Commonly known as:  AUVI-Q  
0.3 mL by IntraMUSCular route once as needed for Anaphylaxis for up to 1 dose. glucose blood VI test strips strip Commonly known as:  blood glucose test  
Patient to check blood sugar TID PRN  
  
 ketoconazole 2 % topical cream  
Commonly known as:  NIZORAL Apply thin layer to affected area bid Lancets Misc Check blood sugar TID PRN with meals and dizziness  
  
 levothyroxine 112 mcg tablet Commonly known as:  SYNTHROID Take 1 Tab by mouth Daily (before breakfast). loratadine 10 mg tablet Commonly known as:  Wyatt Carlos Take 1 Tab by mouth daily. Indications: Allergic Rhinitis  
  
 montelukast 10 mg tablet Commonly known as:  SINGULAIR Take 1 Tab by mouth daily. nystatin-triamcinolone 100,000-0.1 unit/gram-% ointment Commonly known as:  Aldona Jeffrey Apply to affected area bid OTHER Azelaic/Finast/Flutic/Minox(Scalp) 5/0.1% Apply to affected area of scalp daily 30 ml  
  
 permethrin 5 % topical cream  
Commonly known as:  ACTICIN  
apply sparingly as directed Follow-up Instructions Return for Patient will call for follow up . To-Do List   
 08/08/2018 Lab:  IMMUNOGLOBULIN A Around 11/06/2018 Lab:  SED RATE (ESR) Patient Instructions Please contact our office if you have any questions about your visit today. 1.) We will call you with lab results. Please continue on the Hydroxyzine as needed. Continue on the allergy medications. 2.) Please call for follow up.  
 
3.) Other doctor options: Dr. Viviana Clark [facility]! Dear Quique Lara: Thank you for requesting a MyWedding account. Our records indicate that you already have an active MyWedding account. You can access your account anytime at [email or web address]://"[employer]". Ecelles Carson/"[employer]" Did you know that you can access your hospital and ER discharge instructions at any time in GetMaid? You can also review all of your test results from your hospital stay or ER visit. Additional Information If you have questions, please visit the Frequently Asked Questions section of the GetMaid website at [email or web address] Remember, GetMaid is NOT to be used for urgent needs. For medical emergencies, dial 911. Now available from your iPhone and Android! Please provide this summary of care documentation to your next provider. Your primary care clinician is listed as ELVIN BRITTON. If you have any questions after today's visit, please call [phone].

## 2018-08-08 NOTE — PROGRESS NOTES
Chief Complaint   Patient presents with    Follow-up     has seen Infectious Disease and had labs drawn     1. Have you been to the ER, urgent care clinic since your last visit? Hospitalized since your last visit? No    2. Have you seen or consulted any other health care providers outside of the [address] [place] [address] since your last visit? Include any pap smears or colon screening.  No

## 2018-08-08 NOTE — PATIENT INSTRUCTIONS
Please contact our office if you have any questions about your visit today. 1.) We will call you with lab results. Please continue on the Hydroxyzine as needed. Continue on the allergy medications.      2.) Please call for follow up.     3.) Other doctor options: Dr. Trent Burkitt Dr. Melvena Crest Dr. Haynes Drier Dr. Julane Rom Dr. Louann Curtis

## 2018-08-14 ENCOUNTER — OFFICE VISIT (OUTPATIENT)
Dept: INTERNAL MEDICINE CLINIC | Age: 63
End: 2018-08-14

## 2018-08-14 VITALS
TEMPERATURE: 98.5 F | BODY MASS INDEX: 35.77 KG/M2 | WEIGHT: 236 LBS | RESPIRATION RATE: 17 BRPM | HEART RATE: 76 BPM | OXYGEN SATURATION: 98 % | SYSTOLIC BLOOD PRESSURE: 114 MMHG | HEIGHT: 68 IN | DIASTOLIC BLOOD PRESSURE: 80 MMHG

## 2018-08-14 DIAGNOSIS — L29.9 PRURITUS: ICD-10-CM

## 2018-08-14 DIAGNOSIS — J01.10 ACUTE FRONTAL SINUSITIS, RECURRENCE NOT SPECIFIED: Primary | ICD-10-CM

## 2018-08-14 DIAGNOSIS — R20.2 SENSATION OF SKIN CRAWLING: ICD-10-CM

## 2018-08-14 RX ORDER — CLINDAMYCIN HYDROCHLORIDE 300 MG/1
300 CAPSULE ORAL 3 TIMES DAILY
Qty: 21 CAP | Refills: 0 | Status: SHIPPED | OUTPATIENT
Start: 2018-08-14 | End: 2018-08-21

## 2018-08-14 NOTE — PATIENT INSTRUCTIONS
Advance Directives: Care Instructions  Your Care Instructions  An advance directive is a legal way to state your wishes at the end of your life. It tells your family and your doctor what to do if you can no longer say what you want. There are two main types of advance directives. You can change them any time that your wishes change. · A living will tells your family and your doctor your wishes about life support and other treatment. · A durable power of  for health care lets you name a person to make treatment decisions for you when you can't speak for yourself. This person is called a health care agent. If you do not have an advance directive, decisions about your medical care may be made by a doctor or a  who doesn't know you. It may help to think of an advance directive as a gift to the people who care for you. If you have one, they won't have to make tough decisions by themselves. Follow-up care is a key part of your treatment and safety. Be sure to make and go to all appointments, and call your doctor if you are having problems. It's also a good idea to know your test results and keep a list of the medicines you take. How can you care for yourself at home? · Discuss your wishes with your loved ones and your doctor. This way, there are no surprises. · Many states have a unique form. Or you might use a universal form that has been approved by many states. This kind of form can sometimes be completed and stored online. Your electronic copy will then be available wherever you have a connection to the Internet. In most cases, doctors will respect your wishes even if you have a form from a different state. · You don't need a  to do an advance directive. But you may want to get legal advice. · Think about these questions when you prepare an advance directive:  ¨ Who do you want to make decisions about your medical care if you are not able to?  Many people choose a family member or close friend. ¨ Do you know enough about life support methods that might be used? If not, talk to your doctor so you understand. ¨ What are you most afraid of that might happen? You might be afraid of having pain, losing your independence, or being kept alive by machines. ¨ Where would you prefer to die? Choices include your home, a hospital, or a nursing home. ¨ Would you like to have information about hospice care to support you and your family? ¨ Do you want to donate organs when you die? ¨ Do you want certain Rastafarian practices performed before you die? If so, put your wishes in the advance directive. · Read your advance directive every year, and make changes as needed. When should you call for help? Be sure to contact your doctor if you have any questions. Where can you learn more? Go to http://bonnie-justyna.info/. Enter R264 in the search box to learn more about \"Advance Directives: Care Instructions. \"  Current as of: October 6, 2017  Content Version: 11.7  © 1031-6763 [employer]. Care instructions adapted under license by [employer] (which disclaims liability or warranty for this information). If you have questions about a medical condition or this instruction, always ask your healthcare professional. [address] any warranty or liability for your use of this information.

## 2018-08-14 NOTE — PROGRESS NOTES
1. Have you been to the ER, urgent care clinic or hospitalized since your last visit? NO.     2. Have you seen or consulted any other health care providers outside of the [address] [place] [address] since your last visit (Include any pap smears or colon screening)? YES      Do you have an Advanced Directive? NO    Would you like information on Advanced Directives?  YES

## 2018-08-14 NOTE — PROGRESS NOTES
HPI     Jayden Klein is a 58 y.o. female with relevant past medical history of anxiety, obesity, GERD, Hypothyroidism, Uterine cancer, HLD, vertigo, diverticulosis, treated scabies. Persists with occasional crawling sensation in skin and pruritus. No new rashes or marks in skin. Scratch marks have healed. Took atarax as prescribed but stopped unsure if it was causing an allergic reaction causing at times seemingly worsening pruritus. No cardiopulmonary symptoms reported. Does c/o sinus congestion, worsening, associated with HA, low grade fevers and rhinorrhea. H/o recurrent infections, reports she usually improves with amox-clav course, despite history of hives with cephalosporins. ROS  As above included in HPI. Otherwise 11 point review of systems negative including constitutional, skin, HENT, eyes, respiratory, cardiovascular, gastrointestinal, genitourinary, musculoskeletal, endocrine, hematologic, allergy, and neurologic. Past Medical History  Past Medical History:   Diagnosis Date    Abnormal nuclear cardiac imaging test 02/09/2012    Mid to distal anteroseptal and apical reversible defect concerning for ishcmeia. Mild-mod, mid-distal anterior & apical hypk. EF 67%. Borderline abnormal EKG w/1-mm inferolateral ST depression at 7 min exercise. Occasional PVCs.  Anxiety     Carotid duplex 24/47/8243    Mild 0-33% LICA stenosis.  Dengue fever     7/10 while in [place]    Diverticulosis     Dyslipidemia     GERD     Holter monitor study 04/29/2016    Sinus rhythm, avg HR 67 bpm (range  bpm). Rare ectopy.  Hypothyroidism     RLE venous duplex 07/23/2015    Right leg:  No DVT.  S/P cardiac catheterization 02/24/2012    Angiographically normal coronaries. Hyperdynamic. EF 70%. No RWMA.       Scabies exposure 03/2018    Sinusitis     Tinnitus     [facility]terine [facility])     hysterectomy    Uterine carcinoma [facility])      Past Surgical History:   Procedure Laterality Date    HX CHOLECYSTECTOMY      2009    HX COLECTOMY      2008    HX HYSTERECTOMY          Family History  Family History   Problem Relation Age of Onset    Other Mother      anuerysm    Cancer Father      brain tumor    Hypertension Maternal Aunt     Diabetes Maternal Aunt      lung    Heart Disease Maternal Aunt     Stroke Maternal Grandmother     Other Maternal Grandfather      leg amputated due to phlebitis    Heart Attack Paternal Grandmother     Heart Disease Maternal Aunt        Social History  She  reports that she quit smoking about 19 years ago. Her smoking use included Cigarettes. She has a 20.00 pack-year smoking history. She has never used smokeless tobacco.   History   Alcohol Use No       Immunization History  Immunization History   Administered Date(s) Administered    Hep A Vaccine (Adult) 05/30/2017, 12/04/2017    Influenza Vaccine (Quad) PF 09/28/2016, 12/04/2017       Allergies  Allergies   Allergen Reactions    Omnicef [Cefdinir] Hives    Keflex [Cephalexin] Hives    Codeine Other (comments)     Severe headache    Ketek [Telithromycin] Other (comments)     Passed out    Levaquin [Levofloxacin] Unknown (comments)     Did not tolerate      Morphine Other (comments)     Severe headache    Sulfa (Sulfonamide Antibiotics) Hives    Topamax [Topiramate] Other (comments)     Made blood vessels red and pain in eyes       Medications  Current Outpatient Prescriptions   Medication Sig    clindamycin (CLEOCIN) 300 mg capsule Take 1 Cap by mouth three (3) times daily for 7 days.  montelukast (SINGULAIR) 10 mg tablet Take 1 Tab by mouth daily.  loratadine (CLARITIN) 10 mg tablet Take 1 Tab by mouth daily. Indications: Allergic Rhinitis    nystatin-triamcinolone (MYCOLOG) 100,000-0.1 unit/gram-% ointment Apply to affected area bid    levothyroxine (SYNTHROID) 112 mcg tablet Take 1 Tab by mouth Daily (before breakfast).     ketoconazole (NIZORAL) 2 % topical cream Apply thin layer to affected area bid    atorvastatin (LIPITOR) 10 mg tablet TAKE 1 TABLET BY MOUTH EVERY DAY    clobetasol (TEMOVATE) 0.05 % ointment Apply  to affected area two (2) times daily as needed for Skin Irritation or Itching.  OTHER Azelaic/Finast/Flutic/Minox(Scalp) 5/0.1%  Apply to affected area of scalp daily  30 ml    EPINEPHrine (AUVI-Q) 0.3 mg/0.3 mL (1:1,000) injection 0.3 mL by IntraMUSCular route once as needed for Anaphylaxis for up to 1 dose.  Lancets Misc Check blood sugar TID PRN with meals and dizziness    Blood-Glucose Meter monitoring kit Check blood sugar TID PRN with meals and/or bouts of dizziness    glucose blood VI test strips (BLOOD GLUCOSE TEST) strip Patient to check blood sugar TID PRN    Bifidobacterium Infantis (ALIGN) 4 mg cap Take  by mouth daily. No current facility-administered medications for this visit. Visit Vitals    /80 (BP 1 Location: Left arm, BP Patient Position: Sitting)    Pulse 76    Temp 98.5 °F (36.9 °C) (Oral)    Resp 17    Ht 5' 8\" (1.727 m)    Wt 236 lb (107 kg)    SpO2 98%    BMI 35.88 kg/m2     Body mass index is 35.88 kg/(m^2). Physical Exam   Constitutional: She is oriented to person, place, and time. Obese, anxious female patient. HENT:   Right Ear: External ear normal.   Left Ear: External ear normal.   Nose: Nose normal.   Mouth/Throat: Oropharynx is clear and moist.   Eyes: Conjunctivae are normal. Pupils are equal, round, and reactive to light. Neck: Neck supple. Cardiovascular: Normal rate and regular rhythm. Pulmonary/Chest: Effort normal and breath sounds normal.   Abdominal: Soft. Bowel sounds are normal.   Musculoskeletal: Normal range of motion. She exhibits no edema. Lymphadenopathy:     She has no cervical adenopathy. Neurological: She is alert and oriented to person, place, and time. Skin: Skin is warm. No rash noted.    Hair loss noted, scalp is irritated and has some excoriations around ears, the patient attributes to topical lotions she has been applying. No burrows, no rash. She has 3 circular scars of about 1x1cm on her left costal area- flank side,  from apparent papular lesions, that healed     Nursing note and vitals reviewed. REVIEW OF DATA    Labs  Office Visit on 07/27/2018   Component Date Value Ref Range Status    HIV -1/0/2 AG/AB WITH REFLEX 07/27/2018 Non Reactive  Non Reacti Final    HIV INTERPRETATION 07/27/2018 HIV-1 antigen and HIV 1/2 antibodies not detected. No laboratory evidence of   Final    WBC 07/27/2018 6.4  4.0 - 11.0 K/uL Final    RBC 07/27/2018 5.02  3.80 - 5.20 M/uL Final    HGB 07/27/2018 13.7  11.7 - 16.0 g/dL Final    HCT 07/27/2018 43.4  35.1 - 48.0 % Final    MCV 07/27/2018 87  80 - 95 fL Final    MCH 07/27/2018 27  26 - 34 pg Final    MCHC 07/27/2018 32  31 - 36 g/dL Final    RDW 07/27/2018 16.7* 10.0 - 15.5 % Final    PLATELET 37/52/5842 996  140 - 440 K/uL Final    MPV 07/27/2018 12.5  9.0 - 13.0 fL Final    NEUTROPHILS 07/27/2018 53  40 - 75 % Final    Lymphocytes 07/27/2018 32  20 - 45 % Final    MONOCYTES 07/27/2018 8  3 - 12 % Final    EOSINOPHILS 07/27/2018 5  0 - 6 % Final    BASOPHILS 07/27/2018 1  0 - 2 % Final    ABS. NEUTROPHILS 07/27/2018 3.4  1.8 - 7.7 K/uL Final    ABSOLUTE LYMPHOCYTE COUNT 07/27/2018 2.1  1.0 - 4.8 K/uL Final    ABS. MONOCYTES 07/27/2018 0.5  0.1 - 1.0 K/uL Final    ABS. EOSINOPHILS 07/27/2018 0.3  0.0 - 0.5 K/uL Final    ABS. BASOPHILS 07/27/2018 0.1  0.0 - 0.2 K/uL Final   Orders Only on 07/27/2018   Component Date Value Ref Range Status    Immunoglobulin E 07/27/2018 13  0 - 114 kU/L Final         CT Results (most recent):    Results from [place] encounter on 12/08/17   CT ABD PELV W CONT   Narrative CT Abdomen And Pelvis With Enhancement    CPT CODE: 36113 and 62852    INDICATION: Generalized abdominal pain. No other relevant clinical information  provided.  Only available notes in the patient's electronic medical record from 4  days previous states patient presented for routine immunizations. .    TECHNIQUE: 5 mm collimation axial images obtained from the diaphragm to the  level of the pubic symphysis following the uneventful administration of oral and  100 cc's nonionic intravenous contrast.    All CT scans at this facility are performed using dose optimization technique as  appropriate to this specific exam, to include automated exposure control,  adjustment of the mA and/or KP according to patient size or use of iterative  reconstruction techniques. COMPARISON: Prior exam 7/21/2017. ABDOMEN FINDINGS:     Small volume dependent atelectasis at the lung bases. Prominent and bulbous  right lower lobe pulmonary vein partially included. No pleural effusion. Liver is diffusely low attenuation consistent with steatosis. The gallbladder  is absent. Normal size spleen. No adrenal mass. Pancreas grossly unremarkable. Kidneys demonstrate symmetric enhancement. No hydronephrosis. No calcified  stones. There is a 2.7 cm right parapelvic cyst.     Normal caliber abdominal aorta. No evidence of dissection. PELVIS FINDINGS:     No small bowel distention to suggest obstruction. Oral contrast has reached the  colon. There is diverticulosis of the cecum and right colon, without surrounding  inflammation. The appendix is visualized and is not enlarged. Slightly prominent  lymph node in the right lower quadrant pelvic mesentery medial to the colon and  posterior to the cecum measures 1.3 cm, nonspecific. There is scattered mild transverse colon diverticulosis. There is extensive  descending colon and proximal sigmoid diverticulosis, without any convincing  evidence of adjacent inflammation or free fluid by imaging. There appears be a  surgical staple line at the level of the sigmoid, image 76, from presumed  previous bowel resection, for unknown reasons. The uterus is absent.  Bladder not well distended for evaluation. Diastases of  the anterior abdominal wall midline pelvis, axial image 80 and sagittal image  46. This appears as a small focal or developing ventral hernia, 2.5 cm  craniocaudal on the sagittal reformatted images. Nondilated loop of small bowel  protrudes into this defect, without inflammation. There is severe disc space narrowing L5-S1 with complete loss of the disc space. Moderate narrowing L4-5 with vacuum disc change. Impression IMPRESSION:    1. Hepatic steatosis. Parapelvic right renal cyst. No renal stones or  hydronephrosis. 2. No small bowel or colon distention to suggest obstruction. Normal caliber  appendix. 3. Small midline ventral pelvic hernia containing some fat and a short segment  nondilated loop of small bowel, similar to prior exam.    4. Near pancolonic diverticulosis. No convincing evidence for acute inflammatory  change currently. No free fluid. Clinical correlation needed with patient's  symptoms and appropriate laboratory studies. XR Results (most recent):    Results from Hospital Encounter encounter on 09/21/17   XR CHEST PA LAT   Narrative INDICATION:  CP       EXAMINATION:  XR CHEST PA LAT    COMPARISON:  None    FINDINGS:  The study shows a normal sized heart. . The lungs are clear and well expanded.               Impression IMPRESSION:  Normal chest         CT   All Micro Results     None            DIAGNOSIS AND PLAN  Patient Active Problem List   Diagnosis Code    Dyslipidemia E78.5    Chest pain R07.89    Hypothyroidism E03.9    GERD K21.9    Vitamin D deficiency P26.5    Helicobacter pylori antibody positive R76.8    Prediabetes R73.03    Benign positional vertigo H81.10    Palpitations R00.2    Encounter for long-term (current) use of other medications Z79.899    Anxiety F41.9    Insomnia G47.00    Intractable cluster headache syndrome G44.001    Tinnitus H93.19    Allergic rhinitis J30.9    Malignant neoplasm of overlapping sites of body of uterus ([facility] [address] C54.8    Severe obesity (BMI 35.0-39.9) ([facility]) E66.01     1. Acute frontal sinusitis  Course of clindamycin prescribed  Most common side effects discussed, including N/V/D, recommended to stop if having severe symptoms and seek immediate care    2. Pruritus/ crawling sensation  3. Allergic dermatitis  4. History of scabies    Reassured there is no clinical suspicion at this time for parasitic infection. Scabies treated. Pruritus and crawling sensation possibly a multifactorial, allergic dermatitis and possibly nerve irritation. Follow up with allergy and neurology recommended  HIV status neg, IgE and CBC- Eos. All unremarkable. Benadryl and topical steroids PRN            Follow-up Disposition:  Return in about 3 months (around 11/14/2018). Lacy Farr MD

## 2018-08-14 NOTE — MR AVS SNAPSHOT
[facility] 
 
 
 [address] [facility], [address] [place] [address] [facility] 
[phone] Patient: Karly Fraser MRN: [id number] MYCHAL:[birth year] Visit Information Date & Time Provider Department Dept. Phone Encounter #  
 8/14/2018  1:30 PM Joseph Shrestha MD [facility] [address] [place] [address] [phone] Follow-up Instructions Return in about 3 months (around 11/14/2018). Your Appointments 8/27/2018  3:30 PM  
Follow Up with Julien Ling DO [address] [place] [address] (--) Appt Note: 3 months follow up [address] [facility] [address] [phone]  
  
   
 [facility] [address]  
  
    
 11/14/2018  1:00 PM  
Office Visit with Joseph Shrestha MD  
[facility] [address] [place] [address] 365[address] [facility]) Appt Note: establish care per MR  
 [address] [facility], [address] 455 [facility]  
  
   
 [address], [address]  
  
    
 6/20/2019 11:00 AM  
Follow Up with Moncho Archuleta MD  
[facility] ([address]) Appt Note: 1 yr f/up [place] [facility] [address]  
[phone] [facility] [place] [address] Upcoming Health Maintenance Date Due Pneumococcal 19-64 Highest Risk (1 of 3 - PCV13) 9/11/1974 DTaP/Tdap/Td series (1 - Tdap) 9/11/1976 ZOSTER VACCINE AGE 60> 7/11/2015 BREAST CANCER SCRN MAMMOGRAM 12/8/2016 Influenza Age 5 to Adult 8/1/2018 COLONOSCOPY 5/26/2020 Allergies as of 8/14/2018  Review Complete On: 8/14/2018 By: Joseph Shrestha MD  
  
 Severity Noted Reaction Type Reactions Omnicef [Cefdinir] High 07/26/2011    Hives Keflex [Cephalexin] Medium 07/21/2017    Hives Codeine  06/23/2011    Other (comments) Severe headache Ketek [Telithromycin]  09/21/2017    Other (comments) Passed out Levaquin [Levofloxacin]  10/12/2012    Unknown (comments) Did not tolerate Morphine  06/23/2011    Other (comments) Severe headache  
 Sulfa (Sulfonamide Antibiotics)  06/23/2011    Hives Topamax [Topiramate]  01/11/2016    Other (comments) Made blood vessels red and pain in eyes Current Immunizations  Reviewed on 12/4/2017 Name Date Hep A Vaccine (Adult) 12/4/2017, 5/30/2017 Influenza Vaccine (Quad) PF 12/4/2017, 9/28/2016 Not reviewed this visit You Were Diagnosed With   
  
 Codes Comments Acute frontal sinusitis, recurrence not specified    -  Primary ICD-10-CM: J01.10 ICD-9-CM: 333.6 Pruritus     ICD-10-CM: L29.9 ICD-9-CM: 698.9 Sensation of skin crawling     ICD-10-CM: R20.2 ICD-9-CM: 372. 0 Vitals BP Pulse Temp Resp Height(growth percentile) Weight(growth percentile) 114/80 (BP 1 Location: Left arm, BP Patient Position: Sitting) 76 98.5 °F (36.9 °C) (Oral) 17 5' 8\" (1.727 m) 236 lb (107 kg) SpO2 BMI OB Status Smoking Status 98% 35.88 kg/m2 Hysterectomy Former Smoker Vitals History BMI and BSA Data Body Mass Index Body Surface Area  
 35.88 kg/m 2 2.27 m 2 Preferred Pharmacy Pharmacy Name Phone [id number] [address] [phone] Your Updated Medication List  
  
   
This list is accurate as of 8/14/18  2:09 PM.  Always use your most recent med list.  
  
  
  
  
 ALIGN 4 mg Cap Generic drug:  Bifidobacterium Infantis Take  by mouth daily. atorvastatin 10 mg tablet Commonly known as:  LIPITOR  
TAKE 1 TABLET BY MOUTH EVERY DAY Blood-Glucose Meter monitoring kit Check blood sugar TID PRN with meals and/or bouts of dizziness  
  
 clindamycin 300 mg capsule Commonly known as:  CLEOCIN Take 1 Cap by mouth three (3) times daily for 7 days. clobetasol 0.05 % ointment Commonly known as:  Carol Hatch Apply  to affected area two (2) times daily as needed for Skin Irritation or Itching. EPINEPHrine 0.3 mg/0.3 mL injection Commonly known as:  AUVI-Q  
0.3 mL by IntraMUSCular route once as needed for Anaphylaxis for up to 1 dose. glucose blood VI test strips strip Commonly known as:  blood glucose test  
Patient to check blood sugar TID PRN  
  
 ketoconazole 2 % topical cream  
Commonly known as:  NIZORAL Apply thin layer to affected area bid Lancets Misc Check blood sugar TID PRN with meals and dizziness  
  
 levothyroxine 112 mcg tablet Commonly known as:  SYNTHROID Take 1 Tab by mouth Daily (before breakfast). loratadine 10 mg tablet Commonly known as:  Rj Fleeting Take 1 Tab by mouth daily. Indications: Allergic Rhinitis  
  
 montelukast 10 mg tablet Commonly known as:  SINGULAIR Take 1 Tab by mouth daily. nystatin-triamcinolone 100,000-0.1 unit/gram-% ointment Commonly known as:  Drusilla Bright Apply to affected area bid OTHER Azelaic/Finast/Flutic/Minox(Scalp) 5/0.1% Apply to affected area of scalp daily 30 ml Prescriptions Sent to Pharmacy Refills  
 clindamycin (CLEOCIN) 300 mg capsule 0 Sig: Take 1 Cap by mouth three (3) times daily for 7 days. Class: Normal  
 Pharmacy: [id number] [address] Tapia[address] #: [phone] Route: Oral  
  
Follow-up Instructions Return in about 3 months (around 11/14/2018). Patient Instructions Advance Directives: Care Instructions Your Care Instructions An advance directive is a legal way to state your wishes at the end of your life. It tells your family and your doctor what to do if you can no longer say what you want. There are two main types of advance directives. You can change them any time that your wishes change. · A living will tells your family and your doctor your wishes about life support and other treatment. · A durable power of  for health care lets you name a person to make treatment decisions for you when you can't speak for yourself. This person is called a health care agent. If you do not have an advance directive, decisions about your medical care may be made by a doctor or a  who doesn't know you. It may help to think of an advance directive as a gift to the people who care for you. If you have one, they won't have to make tough decisions by themselves. Follow-up care is a key part of your treatment and safety. Be sure to make and go to all appointments, and call your doctor if you are having problems. It's also a good idea to know your test results and keep a list of the medicines you take. How can you care for yourself at home? · Discuss your wishes with your loved ones and your doctor. This way, there are no surprises. · Many states have a unique form. Or you might use a universal form that has been approved by many states. This kind of form can sometimes be completed and stored online. Your electronic copy will then be available wherever you have a connection to the Internet. In most cases, doctors will respect your wishes even if you have a form from a different state. · You don't need a  to do an advance directive. But you may want to get legal advice. · Think about these questions when you prepare an advance directive: ¨ Who do you want to make decisions about your medical care if you are not able to? Many people choose a family member or close friend. ¨ Do you know enough about life support methods that might be used? If not, talk to your doctor so you understand. ¨ What are you most afraid of that might happen? You might be afraid of having pain, losing your independence, or being kept alive by machines. ¨ Where would you prefer to die? Choices include your home, a hospital, or a nursing home.  
¨ Would you like to have information about hospice care to support you and your family? ¨ Do you want to donate organs when you die? ¨ Do you want certain Roman Catholic practices performed before you die? If so, put your wishes in the advance directive. · Read your advance directive every year, and make changes as needed. When should you call for help? Be sure to contact your doctor if you have any questions. Where can you learn more? Go to http://bonnie-justyna.info/. Enter R264 in the search box to learn more about \"Advance Directives: Care Instructions. \" Current as of: October 6, 2017 Content Version: 11.7 © 5811-4277 [employer]und[employer]. Care instructions adapted under license by [employer] (which disclaims liability or warranty for this information). If you have questions about a medical condition or this instruction, always ask your healthcare professional. [address] any warranty or liability for your use of this information. Introducing [facility] [place] & HEALTH SERVICES! Dear Melvin Mott: Thank you for requesting a Sprinkle account. Our records indicate that you already have an active Sprinkle account. You can access your account anytime at [email or web address] Did you know that you can access your hospital and ER discharge instructions at any time in Sprinkle? You can also review all of your test results from your hospital stay or ER visit. Additional Information If you have questions, please visit the Frequently Asked Questions section of the Sprinkle website at [email or web address] Remember, Sprinkle is NOT to be used for urgent needs. For medical emergencies, dial 911. Now available from your iPhone and Android! Please provide this summary of care documentation to your next provider. Your primary care clinician is listed as ELVIN BRITTON. If you have any questions after today's visit, please call [phone].

## 2018-08-27 ENCOUNTER — OFFICE VISIT (OUTPATIENT)
Dept: FAMILY MEDICINE CLINIC | Age: 63
End: 2018-08-27

## 2018-08-27 VITALS
HEART RATE: 76 BPM | RESPIRATION RATE: 17 BRPM | SYSTOLIC BLOOD PRESSURE: 139 MMHG | TEMPERATURE: 98 F | WEIGHT: 237 LBS | DIASTOLIC BLOOD PRESSURE: 87 MMHG | BODY MASS INDEX: 35.92 KG/M2 | HEIGHT: 68 IN

## 2018-08-27 DIAGNOSIS — R73.03 PREDIABETES: ICD-10-CM

## 2018-08-27 DIAGNOSIS — L30.9 ACUTE DERMATITIS: Primary | ICD-10-CM

## 2018-08-27 DIAGNOSIS — Z20.7 SCABIES EXPOSURE: ICD-10-CM

## 2018-08-27 DIAGNOSIS — Z01.419 WELL WOMAN EXAM: ICD-10-CM

## 2018-08-27 DIAGNOSIS — C54.8 MALIGNANT NEOPLASM OF OVERLAPPING SITES OF BODY OF UTERUS (HCC): ICD-10-CM

## 2018-08-27 DIAGNOSIS — K57.92 ACUTE DIVERTICULITIS: ICD-10-CM

## 2018-08-27 DIAGNOSIS — Z12.31 ENCOUNTER FOR SCREENING MAMMOGRAM FOR BREAST CANCER: ICD-10-CM

## 2018-08-27 RX ORDER — CIPROFLOXACIN 500 MG/1
500 TABLET ORAL 2 TIMES DAILY
Qty: 20 TAB | Refills: 0 | Status: SHIPPED | OUTPATIENT
Start: 2018-08-27 | End: 2018-09-06

## 2018-08-27 RX ORDER — METRONIDAZOLE 500 MG/1
500 TABLET ORAL 2 TIMES DAILY
Qty: 20 TAB | Refills: 0 | Status: SHIPPED | OUTPATIENT
Start: 2018-08-27 | End: 2018-09-06

## 2018-08-27 RX ORDER — PERMETHRIN 50 MG/G
CREAM TOPICAL
Qty: 60 G | Refills: 0 | Status: SHIPPED | OUTPATIENT
Start: 2018-08-27 | End: 2018-09-05 | Stop reason: ALTCHOICE

## 2018-08-27 NOTE — MR AVS SNAPSHOT
[address] [facility] 
 
 
 [address] [place] [address] 
[phone] Patient: Lemuel Reinoso MRN: [id number] UON:6/23/0652 Visit Information Date & Time Provider Department Dept. Phone Encounter #  
 8/27/2018  3:30 PM Cheli Tipton [id number] Follow-up Instructions Return for Patient will see Dr. Tomi Jones . Your Appointments 11/14/2018  1:00 PM  
Office Visit with Thu Enciso MD  
[facility] [address] [place] [address]) Appt Note: establish care per MR  
 54[address] [facility], [address] [place] [address] [place]  
  
   
 [address], [address]  
  
    
 6/20/2019 11:00 AM  
Follow Up with Paramjit Gordon MD  
[facility] ([address]) Appt Note: 1 yr f/up [place] [address]  
[phone] [address] Upcoming Health Maintenance Date Due Pneumococcal 19-64 Highest Risk (1 of 3 - PCV13) 9/11/1974 DTaP/Tdap/Td series (1 - Tdap) 9/11/1976 ZOSTER VACCINE AGE 60> 7/11/2015 BREAST CANCER SCRN MAMMOGRAM 12/8/2016 Influenza Age 5 to Adult 8/1/2018 COLONOSCOPY 5/26/2020 Allergies as of 8/27/2018  Review Complete On: 7/31/8491 By: Digna Bai. Estela Dover LPN Severity Noted Reaction Type Reactions Omnicef [Cefdinir] High 07/26/2011    Hives Keflex [Cephalexin] Medium 07/21/2017    Hives Codeine  06/23/2011    Other (comments) Severe headache Ketek [Telithromycin]  09/21/2017    Other (comments) Passed out Levaquin [Levofloxacin]  10/12/2012    Unknown (comments) Did not tolerate Morphine  06/23/2011    Other (comments) Severe headache  
 Sulfa (Sulfonamide Antibiotics)  06/23/2011    Hives Topamax [Topiramate]  01/11/2016    Other (comments) Made blood vessels red and pain in eyes Current Immunizations  Reviewed on 12/4/2017 Name Date Hep A Vaccine (Adult) 12/4/2017, 5/30/2017 Influenza Vaccine (Quad) PF 12/4/2017, 9/28/2016 Not reviewed this visit You Were Diagnosed With   
  
 Codes Comments Acute dermatitis    -  Primary ICD-10-CM: L30.9 ICD-9-CM: 692.9 Acute diverticulitis     ICD-10-CM: K57.92 
ICD-9-CM: 562.11 Scabies exposure     ICD-10-CM: Z20.89 ICD-9-CM: V01.89 Well woman exam     ICD-10-CM: W22.301 ICD-9-CM: V72.31 Encounter for screening mammogram for breast cancer     ICD-10-CM: Z12.31 
ICD-9-CM: V76.12 Prediabetes     ICD-10-CM: R73.03 
ICD-9-CM: 790.29 Malignant neoplasm of overlapping sites of body of uterus ([facility] [address]     ICD-10-CM: C54.8 ICD-9-CM: 182.8 Vitals BP Pulse Temp Resp Height(growth percentile) Weight(growth percentile) 139/87 (BP 1 Location: Right arm, BP Patient Position: Sitting) 76 98 °F (36.7 °C) (Oral) 17 5' 8\" (1.727 m) 237 lb (107.5 kg) BMI OB Status Smoking Status 36.04 kg/m2 Hysterectomy Former Smoker BMI and BSA Data Body Mass Index Body Surface Area 36.04 kg/m 2 2.27 m 2 [facility]erred [id number] Uzma Hardy, [address] [facility] [phone] Your Updated Medication List  
  
   
This list is accurate as of 8/27/18  4:44 PM.  Always use your most recent med list.  
  
  
  
  
 ALIGN 4 mg Cap Generic drug:  Bifidobacterium Infantis Take  by mouth daily. atorvastatin 10 mg tablet Commonly known as:  LIPITOR  
TAKE 1 TABLET BY MOUTH EVERY DAY Blood-Glucose Meter monitoring kit Check blood sugar TID PRN with meals and/or bouts of dizziness  
  
 ciprofloxacin HCl 500 mg tablet Commonly known as:  CIPRO Take 1 Tab by mouth two (2) times a day for 10 days. clobetasol 0.05 % ointment Commonly known as:  Lucy Gaytan Apply  to affected area two (2) times daily as needed for Skin Irritation or Itching. EPINEPHrine 0.3 mg/0.3 mL injection Commonly known as:  AUVI-Q  
0.3 mL by IntraMUSCular route once as needed for Anaphylaxis for up to 1 dose. glucose blood VI test strips strip Commonly known as:  blood glucose test  
Patient to check blood sugar TID PRN  
  
 ketoconazole 2 % topical cream  
Commonly known as:  NIZORAL Apply thin layer to affected area bid Lancets Misc Check blood sugar TID PRN with meals and dizziness  
  
 levothyroxine 112 mcg tablet Commonly known as:  SYNTHROID Take 1 Tab by mouth Daily (before breakfast). loratadine 10 mg tablet Commonly known as:  Wilson Brendon Take 1 Tab by mouth daily. Indications: Allergic Rhinitis  
  
 metroNIDAZOLE 500 mg tablet Commonly known as:  FLAGYL Take 1 Tab by mouth two (2) times a day for 10 days. montelukast 10 mg tablet Commonly known as:  SINGULAIR Take 1 Tab by mouth daily. nystatin-triamcinolone 100,000-0.1 unit/gram-% ointment Commonly known as:  [facility] Apply to affected area bid OTHER Azelaic/Finast/Flutic/Minox(Scalp) 5/0.1% Apply to affected area of scalp daily 30 ml  
  
 permethrin 5 % topical cream  
Commonly known as:  ACTICIN  
apply sparingly as directed Prescriptions Printed Refills  
 permethrin (ACTICIN) 5 % topical cream 0 Sig: apply sparingly as directed Class: Print Prescriptions Sent to Pharmacy Refills  
 ciprofloxacin HCl (CIPRO) 500 mg tablet 0 Sig: Take 1 Tab by mouth two (2) times a day for 10 days. Class: Normal  
 [facility]. Kerline Thomas, [address] [place] [address] #: [phone] Route: Oral  
 metroNIDAZOLE (FLAGYL) 500 mg tablet 0 Sig: Take 1 Tab by mouth two (2) times a day for 10 days. Class: Normal  
 Pharmacy: [id number].  Kerline Thomas, [address] [place] [address] [address]VD. Ph #: [phone] Route: Oral  
  
Follow-up Instructions Return for Patient will see Dr. Dedra Watson . Patient Instructions Please contact our office if you have any questions about your visit today. 1.) Don't fill Permethrin cream until you have a scabies flare. 2.) Please call Dr. Dedra Watosn. Introducing [facility] & [facility] SERVICES! Dear Demetra Hernandez: Thank you for requesting a Tallyfy account. Our records indicate that you already have an active Tallyfy account. You can access your account anytime at [email or web address] Gear Did you know that you can access your hospital and ER discharge instructions at any time in Tallyfy? You can also review all of your test results from your hospital stay or ER visit. Additional Information If you have questions, please visit the Frequently Asked Questions section of the Tallyfy website at [email or web address] Remember, Tallyfy is NOT to be used for urgent needs. For medical emergencies, dial 911. Now available from your iPhone and Android! Please provide this summary of care documentation to your next provider. Your primary care clinician is listed as ELVIN BRITTON. If you have any questions after today's visit, please call [phone].

## 2018-08-27 NOTE — PROGRESS NOTES
Progress Note    Patient: Caas Prasad MRN: [id number]  SSN: [id number]    YOB: 1955  Age: 58 y.o. Sex: female          Subjective:   Casa Prasad is a 58 y.o. female who is here for regular follow up. She mentions that she will follow up with Dr. David Cha. The patient mentions that she developed some lesions on her back recently. She is concerned that this may be a re-emergence of the scabies. She states that she also is concerned about a flare up of her diverticulitis. She states that she has been treated for this in the past. She states that she develops abdominal pain that is diffuse throughout her abdomen. She states that it can feel like someone is in her abdomen, scraping with a knife. She also mentions that her stools are irregular. She states that this past Thursday she started developing diverticular flare. Visit from 8/8/18  Patient is here for follow up on her visit to Infectious Disease. The patient did discuss her case with the Infectious Disease specialist and there was concern for parasitic infection. She was assessed and there was the initial assessment of allergic dermatitis. The patient mentions that she is a frequent beach goer and is concerned about a possible worm infection. She mentions that she is getting a biting feeling in her arms and on her legs. She mentions that she was offered Singulair as well as Claritin and Hydroxyzine. She mentions that when she takes hydroxyzine she sometimes felt the crawling sensation. Visit from 7/24/18  Patient is here to review her blood work and stool sample results. She mentions that she developed some burrows in her back. She still feels like lice are running around her scalp. She has tried holistic approaches and also western medicine. She mentions that she has been dealing with this for 5 months.       Visit from 7/5/2018  The patient mentions that she had some inflammation around her jaw line when she was dealing with the scabies infestation. The patient is concerned about possible parasitic infection. She mentions that she went to the [facility] last September and thinks that this may have played a role. She mentions that she has been having some upset stomach. She mentions that she did ingest some water in the [facility]. She mentions that she has developed some scratches on the skin which were    She mentions that her stools have improved as well. Visit from 6/5/2018  Patient is here for an acute care visit. The patient mentions that she been trying to disinfect her house. She mentions that she has been using the [facility] and Ivermectin. She mentions that she called her dermatologist yesterday but could not speak to them. She mentions that she has been dealing with a persistent cough. She states that this harbors back to when she developed her scabies. She mentions that she does not have a lot of mucus coming up. Visit from 5/24/18  Patient is here for follow up to do her physical. She will have her mammogram within the year. She mentions that she had her colonoscopy within the past two years. Visit from 5/9/18   Patient is here for an acute care visit and follow up. She mentions that 6 months ago she had a cardiac event. She was seen at [facility] for this and her cardiac work up was negative. She was told that she had some esophageal erosions and has been seeing GI for this. Now recently, she did not realize that she had scabies--suspected contracted from the hospital. She was initially thought to be a yeast. She mentions that she ended up in the ER in March and they discovered the scabies. She mentions that she has now developed skin burrows and a severe rash. She has seen the dermatologist about the scabies---Dr. Sola Santamaria. She mentions that she was not given adequate amounts of her medication. She mentions that she has had professional cleaning in her house.  She has already done a round of Ivermectin and Permetherin. Objective:     Past Medical History:   Diagnosis Date    Abnormal nuclear cardiac imaging test 02/09/2012    Mid to distal anteroseptal and apical reversible defect concerning for ishcmeia. Mild-mod, mid-distal anterior & apical hypk. EF 67%. Borderline abnormal EKG w/1-mm inferolateral ST depression at 7 min exercise. Occasional PVCs.  Anxiety     Carotid duplex 52/68/5323    Mild 6-27% LICA stenosis.  Dengue fever     7/10 while in [place]    Diverticulosis     Dyslipidemia     GERD     Holter monitor study 04/29/2016    Sinus rhythm, avg HR 67 bpm (range  bpm). Rare ectopy.  Hypothyroidism     RLE venous duplex 07/23/2015    Right leg:  No DVT.  S/P cardiac catheterization 02/24/2012    Angiographically normal coronaries. Hyperdynamic. EF 70%. No RWMA.  Scabies exposure 03/2018    Sinusitis     Tinnitus     Uterine cancer (HCC)     hysterectomy    Uterine carcinoma (HCC)         Vitals:    08/27/18 1538   BP: 139/87   Pulse: 76   Resp: 17   Temp: 98 °F (36.7 °C)   TempSrc: Oral   Weight: 237 lb (107.5 kg)   Height: 5' 8\" (1.727 m)        Review of Systems:  Pertinent items are noted in the History of Present Illness. Physical Exam:   GENERAL: alert, cooperative, mild distress, appears stated age, morbidly obese  LUNG: clear to auscultation bilaterally  HEART: regular rate and rhythm, S1, S2 normal, no murmur, click, rub or gallop  ABDOMEN: soft, non-tender. Bowel sounds normal. No masses,  no organomegaly  EXTREMITIES:  extremities normal, atraumatic, no cyanosis or edema  SKIN: Mild excoriation on the back thoracic region        Lab/Data Review:    Lab Results   Component Value Date/Time    WBC 6.4 07/27/2018 11:23 AM    WBC 6.3 06/11/2012 12:00 AM    HGB 13.7 07/27/2018 11:23 AM    HCT 43.4 07/27/2018 11:23 AM    PLATELET 217 36/15/6460 11:23 AM    MCV 87 07/27/2018 11:23 AM         Assessment:     1. Acute dermatitis  - Patient will continue on hyrdroxyzine     2. Acute diverticulitis    - ciprofloxacin HCl (CIPRO) 500 mg tablet; Take 1 Tab by mouth two (2) times a day for 10 days. Dispense: 20 Tab; Refill: 0  - metronidazole (FLAGYL) 500 mg tablet; Take 1 Tab by mouth two (2) times a day for 10 days. Dispense: 20 Tab; Refill: 0    3. Scabies exposure    - Permetherin (ACTICIN) 5 % topical cream; apply sparingly as directed  Dispense: 60 g; Refill: 0    4. Well woman exam      5. Encounter for screening mammogram for breast cancer      6. Prediabetes      7. Malignant neoplasm of overlapping sites of body of uterus [facility])    This encounter including interview, exam, evaluation and discussion/ counseling was approximately 25 minutes. Plan:     No orders of the defined types were placed in this encounter.         Signed By: Corey Solares DO     August 27, 2018

## 2018-08-27 NOTE — PROGRESS NOTES
Chief Complaint   Patient presents with    Hypertension     1. Have you been to the ER, urgent care clinic since your last visit? Hospitalized since your last visit? No    2. Have you seen or consulted any other health care providers outside of the [address] [place] [address] since your last visit? Include any pap smears or colon screening.  No

## 2018-08-31 DIAGNOSIS — Z20.7 SCABIES EXPOSURE: ICD-10-CM

## 2018-08-31 RX ORDER — PERMETHRIN 50 MG/G
CREAM TOPICAL
Qty: 60 G | Refills: 0 | OUTPATIENT
Start: 2018-08-31

## 2018-08-31 NOTE — TELEPHONE ENCOUNTER
Skin condition has come back- she wants 4 tubes since she says she needs to treat her  also.     Also wants ivermectin 3mg dosed according to weight- once a week medication- previously she has taken 8 pills the first day and 8 pills the next week

## 2018-09-04 NOTE — TELEPHONE ENCOUNTER
Please ask the patient to come in for evaluation. I will not refill this medication until evaluated in clinic.  Thanks

## 2018-09-04 NOTE — TELEPHONE ENCOUNTER
Pt says she needs the med asap. Wants to know when she can see you? Your schedule is full all week. Says if she doesn't get the med soon it will blossom. Please advise.

## 2018-09-04 NOTE — TELEPHONE ENCOUNTER
Pt called back about prev message concerning getting medication for the scabies, pls review Dr. Jaylan Moffett, pt stated she had a rough time and really needs the meds asap

## 2018-09-04 NOTE — TELEPHONE ENCOUNTER
Hi, if it is not possible to come in this week to see me, due to schedule issues, could other providers see her? Otherwise she may need to go to Urgent Care. I am just trying to avoid prescribing a medication which can be toxic unless there is a clear indication of use, which can only be determine by examination.  Thanks

## 2018-09-05 ENCOUNTER — OFFICE VISIT (OUTPATIENT)
Dept: INTERNAL MEDICINE CLINIC | Age: 63
End: 2018-09-05

## 2018-09-05 VITALS — HEIGHT: 68 IN | BODY MASS INDEX: 36.07 KG/M2 | WEIGHT: 238 LBS | RESPIRATION RATE: 15 BRPM

## 2018-09-05 DIAGNOSIS — Z86.19 HISTORY OF SCABIES: ICD-10-CM

## 2018-09-05 DIAGNOSIS — L29.9 PRURITUS: Primary | ICD-10-CM

## 2018-09-05 RX ORDER — VANCOMYCIN HYDROCHLORIDE 250 MG/1
250 CAPSULE ORAL 4 TIMES DAILY
Refills: 0 | COMMUNITY
Start: 2018-08-20 | End: 2018-11-14 | Stop reason: ALTCHOICE

## 2018-09-05 RX ORDER — CLOBETASOL PROPIONATE 0.05 G/100ML
SHAMPOO TOPICAL
Refills: 0 | COMMUNITY
Start: 2018-08-01 | End: 2019-10-16

## 2018-09-05 RX ORDER — BETAMETHASONE DIPROPIONATE 0.5 MG/G
LOTION TOPICAL
Refills: 1 | COMMUNITY
Start: 2018-08-02 | End: 2018-11-14 | Stop reason: ALTCHOICE

## 2018-09-05 NOTE — PROGRESS NOTES
HPI     Yosvany Tobin is a 58 y.o. female with relevant past medical history of anxiety, obesity, GERD, allergies, hypothyroidism, Uterine cancer, HLD, vertigo, diverticulosis, treated scabies. Persists with pruritus, worse at night. The patient though she was having scabies again so she used permethrin again last week in all her body. She took some pictures of what appears to be an erythematous line in her back with some red dots and she thought it was scabies coming back. She says that her  does not have any lesions in his skin and does not report pruritus but in the past has c/o crawling in his neck. She says she is worried she could be reinfected because her  who is presently asymptomatic did not use permethrin as well recently. She tells me she has cleaned thoroughly all surfaces and linens that her skin has come in contact with multiple times, before an after using the permethrin in multiple occasions. She had been calling for a refill on permethrin but she was told to come in for evaluation first.   She reports she took the clindamycin as prescribed and her sinusitis is much better. She denies any fever, chills, malaise. She was seen by Dr. Dionicio De La Fuente on 8/28/18 and was given a prescription for permethrin, and she says it did not help her pruritus. She was also given empirically cipro and metronidazol for suspected diverticulitis. ROS  As above included in HPI. Otherwise 11 point review of systems negative including constitutional, skin, HENT, eyes, respiratory, cardiovascular, gastrointestinal, genitourinary, musculoskeletal, endocrine, hematologic, allergy, and neurologic. Past Medical History  Past Medical History:   Diagnosis Date    Abnormal nuclear cardiac imaging test 02/09/2012    Mid to distal anteroseptal and apical reversible defect concerning for ishcmeia. Mild-mod, mid-distal anterior & apical hypk. EF 67%.   Borderline abnormal EKG w/1-mm inferolateral ST depression at 7 min exercise. Occasional PVCs.  Anxiety     Carotid duplex 97/43/1033    Mild 0-88% LICA stenosis.  Dengue fever     7/10 while in [place]    Diverticulosis     Dyslipidemia     GERD     Holter monitor study 04/29/2016    Sinus rhythm, avg HR 67 bpm (range  bpm). Rare ectopy.  Hypothyroidism     RLE venous duplex 07/23/2015    Right leg:  No DVT.  S/P cardiac catheterization 02/24/2012    Angiographically normal coronaries. Hyperdynamic. EF 70%. No RWMA.  Scabies exposure 03/2018    Sinusitis     Tinnitus     Uterine cancer (HCC)     hysterectomy    Uterine carcinoma [facility])      Past Surgical History:   Procedure Laterality Date    HX CHOLECYSTECTOMY      2009    HX COLECTOMY      2008    HX HYSTERECTOMY          Family History  Family History   Problem Relation Age of Onset    Other Mother      anuerysm    Cancer Father      brain tumor    Hypertension Maternal Aunt     Diabetes Maternal Aunt      lung    Heart Disease Maternal Aunt     Stroke Maternal Grandmother     Other Maternal Grandfather      leg amputated due to phlebitis    Heart Attack Paternal Grandmother     Heart Disease Maternal Aunt        Social History  She  reports that she quit smoking about 19 years ago. Her smoking use included Cigarettes. She has a 20.00 pack-year smoking history.  She has never used smokeless tobacco.   History   Alcohol Use No       Immunization History  Immunization History   Administered Date(s) Administered    Hep A Vaccine (Adult) 05/30/2017, 12/04/2017    Influenza Vaccine (Quad) PF 09/28/2016, 12/04/2017       Allergies  Allergies   Allergen Reactions    Omnicef [Cefdinir] Hives    Keflex [Cephalexin] Hives    Codeine Other (comments)     Severe headache    Ketek [Telithromycin] Other (comments)     Passed out    Levaquin [Levofloxacin] Unknown (comments)     Did not tolerate      Morphine Other (comments)     Severe headache    Sulfa (Sulfonamide Antibiotics) Hives    Topamax [Topiramate] Other (comments)     Made blood vessels red and pain in eyes       Medications  Current Outpatient Prescriptions   Medication Sig    betamethasone dipropionate (DIPROLENE) 0.05 % topical lotion     clobetasol 0.05 % sham WASH THE SCALP 2 TO 3 TIMES A WEEK. LATHER FOR AT LEAST 5 MINUTES BEFORE RINSING    vancomycin (VANCOCIN) 250 mg capsule Take 250 mg by mouth four (4) times daily.  ciprofloxacin HCl (CIPRO) 500 mg tablet Take 1 Tab by mouth two (2) times a day for 10 days.  metroNIDAZOLE (FLAGYL) 500 mg tablet Take 1 Tab by mouth two (2) times a day for 10 days.  montelukast (SINGULAIR) 10 mg tablet Take 1 Tab by mouth daily.  loratadine (CLARITIN) 10 mg tablet Take 1 Tab by mouth daily. Indications: Allergic Rhinitis    nystatin-triamcinolone (MYCOLOG) 100,000-0.1 unit/gram-% ointment Apply to affected area bid    levothyroxine (SYNTHROID) 112 mcg tablet Take 1 Tab by mouth Daily (before breakfast).  ketoconazole (NIZORAL) 2 % topical cream Apply thin layer to affected area bid    atorvastatin (LIPITOR) 10 mg tablet TAKE 1 TABLET BY MOUTH EVERY DAY    clobetasol (TEMOVATE) 0.05 % ointment Apply  to affected area two (2) times daily as needed for Skin Irritation or Itching.  OTHER Azelaic/Finast/Flutic/Minox(Scalp) 5/0.1%  Apply to affected area of scalp daily  30 ml    EPINEPHrine (AUVI-Q) 0.3 mg/0.3 mL (1:1,000) injection 0.3 mL by IntraMUSCular route once as needed for Anaphylaxis for up to 1 dose.  Lancets Misc Check blood sugar TID PRN with meals and dizziness    Blood-Glucose Meter monitoring kit Check blood sugar TID PRN with meals and/or bouts of dizziness    glucose blood VI test strips (BLOOD GLUCOSE TEST) strip Patient to check blood sugar TID PRN    Bifidobacterium Infantis (ALIGN) 4 mg cap Take  by mouth daily.     permethrin (ACTICIN) 5 % topical cream apply sparingly as directed     No current facility-administered medications for this visit. Visit Vitals    Resp 15    Ht 5' 8\" (1.727 m)    Wt 238 lb (108 kg)    BMI 36.19 kg/m2     Body mass index is 36.19 kg/(m^2). Physical Exam   Constitutional: She is oriented to person, place, and time. Obese, anxious female patient. HENT:   Right Ear: External ear normal.   Left Ear: External ear normal.   Nose: Nose normal.   Mouth/Throat: Oropharynx is clear and moist.   Eyes: Conjunctivae are normal. Pupils are equal, round, and reactive to light. Neck: Neck supple. Cardiovascular: Normal rate and regular rhythm. Pulmonary/Chest: Effort normal and breath sounds normal.   Abdominal: Soft. Bowel sounds are normal.   Musculoskeletal: Normal range of motion. She exhibits no edema. Lymphadenopathy:     She has no cervical adenopathy. Neurological: She is alert and oriented to person, place, and time. Skin: Skin is warm. No rash noted. Hair loss noted, scalp does not have any lesions, mites, eggs. No burrows in any areas, including hands, wrists, elbows, areaolar area, back. She has 3 circular scars of about 1x1cm on her left costal area- flank side,  from apparent papular lesions, that healed and are now hyperpigmented. She has a small laceration healing in her R areola in her upper middle quadrant. She also has an isolated small papular, tan lesion in her R thumb, dorsal PIP. Nursing note and vitals reviewed. REVIEW OF DATA    Labs  No visits with results within 1 Month(s) from this visit. Latest known visit with results is:    Office Visit on 07/27/2018   Component Date Value Ref Range Status    HIV -1/0/2 AG/AB WITH REFLEX 07/27/2018 Non Reactive  Non Reacti Final    HIV INTERPRETATION 07/27/2018 HIV-1 antigen and HIV 1/2 antibodies not detected.  No laboratory evidence of   Final    WBC 07/27/2018 6.4  4.0 - 11.0 K/uL Final    RBC 07/27/2018 5.02  3.80 - 5.20 M/uL Final    HGB 07/27/2018 13.7  11.7 - 16.0 g/dL Final    HCT 07/27/2018 43.4  35.1 - 48.0 % Final    MCV 07/27/2018 87  80 - 95 fL Final    MCH 07/27/2018 27  26 - 34 pg Final    MCHC 07/27/2018 32  31 - 36 g/dL Final    RDW 07/27/2018 16.7* 10.0 - 15.5 % Final    PLATELET 23/44/9317 595  140 - 440 K/uL Final    MPV 07/27/2018 12.5  9.0 - 13.0 fL Final    NEUTROPHILS 07/27/2018 53  40 - 75 % Final    Lymphocytes 07/27/2018 32  20 - 45 % Final    MONOCYTES 07/27/2018 8  3 - 12 % Final    EOSINOPHILS 07/27/2018 5  0 - 6 % Final    BASOPHILS 07/27/2018 1  0 - 2 % Final    ABS. NEUTROPHILS 07/27/2018 3.4  1.8 - 7.7 K/uL Final    ABSOLUTE LYMPHOCYTE COUNT 07/27/2018 2.1  1.0 - 4.8 K/uL Final    ABS. MONOCYTES 07/27/2018 0.5  0.1 - 1.0 K/uL Final    ABS. EOSINOPHILS 07/27/2018 0.3  0.0 - 0.5 K/uL Final    ABS. BASOPHILS 07/27/2018 0.1  0.0 - 0.2 K/uL Final         CT Results (most recent):    Results from [place] encounter on 12/08/17   CT ABD PELV W CONT   Narrative CT Abdomen And Pelvis With Enhancement    CPT CODE: 78829 and 07361    INDICATION: Generalized abdominal pain. No other relevant clinical information  provided. Only available notes in the patient's electronic medical record from 4  days previous states patient presented for routine immunizations. .    TECHNIQUE: 5 mm collimation axial images obtained from the diaphragm to the  level of the pubic symphysis following the uneventful administration of oral and  100 cc's nonionic intravenous contrast.    All CT scans at this facility are performed using dose optimization technique as  appropriate to this specific exam, to include automated exposure control,  adjustment of the mA and/or KP according to patient size or use of iterative  reconstruction techniques. COMPARISON: Prior exam 7/21/2017. ABDOMEN FINDINGS:     Small volume dependent atelectasis at the lung bases. Prominent and bulbous  right lower lobe pulmonary vein partially included. No pleural effusion.     Liver is diffusely low attenuation consistent with steatosis. The gallbladder  is absent. Normal size spleen. No adrenal mass. Pancreas grossly unremarkable. Kidneys demonstrate symmetric enhancement. No hydronephrosis. No calcified  stones. There is a 2.7 cm right parapelvic cyst.     Normal caliber abdominal aorta. No evidence of dissection. PELVIS FINDINGS:     No small bowel distention to suggest obstruction. Oral contrast has reached the  colon. There is diverticulosis of the cecum and right colon, without surrounding  inflammation. The appendix is visualized and is not enlarged. Slightly prominent  lymph node in the right lower quadrant pelvic mesentery medial to the colon and  posterior to the cecum measures 1.3 cm, nonspecific. There is scattered mild transverse colon diverticulosis. There is extensive  descending colon and proximal sigmoid diverticulosis, without any convincing  evidence of adjacent inflammation or free fluid by imaging. There appears be a  surgical staple line at the level of the sigmoid, image 76, from presumed  previous bowel resection, for unknown reasons. The uterus is absent. Bladder not well distended for evaluation. Diastases of  the anterior abdominal wall midline pelvis, axial image 80 and sagittal image  46. This appears as a small focal or developing ventral hernia, 2.5 cm  craniocaudal on the sagittal reformatted images. Nondilated loop of small bowel  protrudes into this defect, without inflammation. There is severe disc space narrowing L5-S1 with complete loss of the disc space. Moderate narrowing L4-5 with vacuum disc change. Impression IMPRESSION:    1. Hepatic steatosis. Parapelvic right renal cyst. No renal stones or  hydronephrosis. 2. No small bowel or colon distention to suggest obstruction. Normal caliber  appendix.     3. Small midline ventral pelvic hernia containing some fat and a short segment  nondilated loop of small bowel, similar to prior exam.    4. Near pancolonic diverticulosis. No convincing evidence for acute inflammatory  change currently. No free fluid. Clinical correlation needed with patient's  symptoms and appropriate laboratory studies. XR Results (most recent):    Results from Hospital Encounter encounter on 09/21/17   XR CHEST PA LAT   Narrative INDICATION:  CP       EXAMINATION:  XR CHEST PA LAT    COMPARISON:  None    FINDINGS:  The study shows a normal sized heart. . The lungs are clear and well expanded. Impression IMPRESSION:  Normal chest         CT   All Micro Results     None            DIAGNOSIS AND PLAN  Patient Active Problem List   Diagnosis Code    Dyslipidemia E78.5    Chest pain R07.89    Hypothyroidism E03.9    GERD K21.9    Vitamin D deficiency C32.5    Helicobacter pylori antibody positive R76.8    Prediabetes R73.03    Benign positional vertigo H81.10    Palpitations R00.2    Encounter for long-term (current) use of other medications Z79.899    Anxiety F41.9    Insomnia G47.00    Intractable cluster headache syndrome G44.001    Tinnitus H93.19    Allergic rhinitis J30.9    Malignant neoplasm of overlapping sites of body of uterus ([facility]) C54.8    Severe obesity (BMI 35.0-39.9) ([facility]) E66.01     1. Acute frontal sinusitis  Course of clindamycin completed. Symptoms improved. 2. Pruritus/ crawling sensation  3. Allergic dermatitis  4. History of scabies    Reassured there is no clinical suspicion at this time for parasitic infection or scabies. There are no lesions or rash consistent with scabies at this time. Pruritus and crawling sensation possibly  multifactorial, I suspect highly this is an allergic manifestation and have encouraged her to see her allergist for work up and management. She tells me he had recommended \"allergy shots\" in the past but she declined at the time. Follow up with allergy reminded, antihistaminics PRN. HIV status neg, IgE and CBC- Eos. All unremarkable.    If patient develops new lesions she is welcomed to be seen in clinic to evaluate lesions, and recommended as well to be seen by dermatology for dermascope or skin scraping for an official diagnosis. Follow-up Disposition: Not on File     Lacy El MD

## 2018-09-05 NOTE — MR AVS SNAPSHOT
[facility] 
 
 
 [address], [address] [facility] 
[phone] Patient: Rajni Jj MRN: [id number] ABX:8/97/3467 Visit Information Date & Time Provider Department Dept. Phone Encounter #  
 9/5/2018  2:00 PM Kamille Scanlon MD [facility] [address] [place] [address] [phone] [id number] Your Appointments 11/14/2018  1:00 PM  
Office Visit with Kamille Scanlon MD  
[facility] [address] [place] [address] [facility]) Appt Note: establish care per MR  
 [id number] [facility], [address] [address] [address] [facility]  
  
   
 [address], [address]  
  
    
 6/20/2019 11:00 AM  
Follow Up with Omar Shaikh MD  
[facility] ([facility]) Appt Note: 1 yr f/up [place] [address] [address] [address] [phone] [address] [facility] [address] Upcoming Health Maintenance Date Due Pneumococcal 19-64 Highest Risk (1 of 3 - PCV13) 9/11/1974 DTaP/Tdap/Td series (1 - Tdap) 9/11/1976 ZOSTER VACCINE AGE 60> 7/11/2015 BREAST CANCER SCRN MAMMOGRAM 12/8/2016 Influenza Age 5 to Adult 8/1/2018 COLONOSCOPY 5/26/2020 Allergies as of 9/5/2018  Review Complete On: 9/5/2018 By: Shelbie Gandhi LPN Severity Noted Reaction Type Reactions Omnicef [Cefdinir] High 07/26/2011    Hives Keflex [Cephalexin] Medium 07/21/2017    Hives Codeine  06/23/2011    Other (comments) Severe headache Ketek [Telithromycin]  09/21/2017    Other (comments) Passed out Levaquin [Levofloxacin]  10/12/2012    Unknown (comments) Did not tolerate Morphine  06/23/2011    Other (comments) Severe headache  
 Sulfa (Sulfonamide Antibiotics)  06/23/2011    Hives Topamax [Topiramate]  01/11/2016    Other (comments) Made blood vessels red and pain in eyes Current Immunizations  Reviewed on 12/4/2017 Name Date Hep A Vaccine (Adult) 12/4/2017, 5/30/2017 Influenza Vaccine (Quad) PF 12/4/2017, 9/28/2016 Not reviewed this visit Vitals Resp Height(growth percentile) Weight(growth percentile) BMI OB Status Smoking Status 15 5' 8\" (1.727 m) 238 lb (108 kg) 36.19 kg/m2 Hysterectomy Former Smoker BMI and BSA Data Body Mass Index Body Surface Area  
 36.19 kg/m 2 2.28 m 2 Preferred Pharmacy Pharmacy Name Phone [id number] Adryan Garcia, [address] . [phone] Your Updated Medication List  
  
   
This list is accurate as of 9/5/18  3:23 PM.  Always use your most recent med list.  
  
  
  
  
 ALIGN 4 mg Cap Generic drug:  Bifidobacterium Infantis Take  by mouth daily. atorvastatin 10 mg tablet Commonly known as:  LIPITOR  
TAKE 1 TABLET BY MOUTH EVERY DAY  
  
 betamethasone dipropionate 0.05 % topical lotion Commonly known as:  Braulio Cueva Blood-Glucose Meter monitoring kit Check blood sugar TID PRN with meals and/or bouts of dizziness  
  
 ciprofloxacin HCl 500 mg tablet Commonly known as:  CIPRO Take 1 Tab by mouth two (2) times a day for 10 days. * clobetasol 0.05 % ointment Commonly known as:  Beola Adolfo Apply  to affected area two (2) times daily as needed for Skin Irritation or Itching. * clobetasol 0.05 % Sham  
WASH THE SCALP 2 TO 3 TIMES A WEEK. LATHER FOR AT LEAST 5 MINUTES BEFORE RINSING  
  
 EPINEPHrine 0.3 mg/0.3 mL injection Commonly known as:  AUVI-Q  
0.3 mL by IntraMUSCular route once as needed for Anaphylaxis for up to 1 dose. glucose blood VI test strips strip Commonly known as:  blood glucose test  
Patient to check blood sugar TID PRN  
  
 ketoconazole 2 % topical cream  
Commonly known as:  NIZORAL Apply thin layer to affected area bid Lancets Misc Check blood sugar TID PRN with meals and dizziness levothyroxine 112 mcg tablet Commonly known as:  SYNTHROID Take 1 Tab by mouth Daily (before breakfast). loratadine 10 mg tablet Commonly known as:  Ferny Dixon Take 1 Tab by mouth daily. Indications: Allergic Rhinitis  
  
 metroNIDAZOLE 500 mg tablet Commonly known as:  FLAGYL Take 1 Tab by mouth two (2) times a day for 10 days. montelukast 10 mg tablet Commonly known as:  SINGULAIR Take 1 Tab by mouth daily. nystatin-triamcinolone 100,000-0.1 unit/gram-% ointment Commonly known as:  Onetha Necessary Apply to affected area bid OTHER Azelaic/Finast/Flutic/Minox(Scalp) 5/0.1% Apply to affected area of scalp daily 30 ml  
  
 permethrin 5 % topical cream  
Commonly known as:  ACTICIN  
apply sparingly as directed  
  
 vancomycin 250 mg capsule Commonly known as:  [employer] Take 250 mg by mouth four (4) times daily. * Notice: This list has 2 medication(s) that are the same as other medications prescribed for you. Read the directions carefully, and ask your doctor or other care provider to review them with you. Introducing [facility]! Dear Beto Mckeon: Thank you for requesting a [employer] account. Our records indicate that you already have an active [employer] account. You can access your account anytime at [email or web address]. [employer] Did you know that you can access your hospital and ER discharge instructions at any time in [employer]? You can also review all of your test results from your hospital stay or ER visit. Additional Information If you have questions, please visit the Frequently Asked Questions section of the [employer] website at [email or web address] [email or web address]/. Remember, [employer] is NOT to be used for urgent needs. For medical emergencies, dial 911. Now available from your iPhone and Android! Please provide this summary of care documentation to your next provider. Your primary care clinician is listed as Javad Ott. If you have any questions after today's visit, please call [phone].

## 2018-09-05 NOTE — PROGRESS NOTES
1. Have you been to the ER, urgent care clinic or hospitalized since your last visit? NO.     2. Have you seen or consulted any other health care providers outside of the [address] [place] [address] since your last visit (Include any pap smears or colon screening)? YES      Do you have an Advanced Directive? NO    Would you like information on Advanced Directives?  NO

## 2018-09-11 ENCOUNTER — TELEPHONE (OUTPATIENT)
Dept: FAMILY MEDICINE CLINIC | Age: 63
End: 2018-09-11

## 2018-09-11 NOTE — TELEPHONE ENCOUNTER
Patient would like for you to call her concerning her medications did she not want to discuss with me her issue.  Please advise

## 2018-09-13 ENCOUNTER — TELEPHONE (OUTPATIENT)
Dept: FAMILY MEDICINE CLINIC | Age: 63
End: 2018-09-13

## 2018-09-19 ENCOUNTER — DOCUMENTATION ONLY (OUTPATIENT)
Dept: INTERNAL MEDICINE CLINIC | Age: 63
End: 2018-09-19

## 2018-09-19 NOTE — PROGRESS NOTES
Patient transferred care out of [address].   I told her that I hope that she was able to find the care she wanted and needed

## 2018-09-21 RX ORDER — IVERMECTIN 3 MG/1
TABLET ORAL
Qty: 16 TAB | Refills: 0 | OUTPATIENT
Start: 2018-09-21

## 2018-09-21 RX ORDER — PERMETHRIN 50 MG/G
CREAM TOPICAL
Qty: 60 G | Refills: 1 | OUTPATIENT
Start: 2018-09-21

## 2018-09-21 NOTE — TELEPHONE ENCOUNTER
Pt was seen by Dr Darío Collier (ID). Her note documents that she does not feel these meds are indicated. I cannot prescribe without an adequate indication. If pt feels she has an active infection at this time, she will need to get seen either here, an urgent care, or at [facility] as recommended by Dr Darío Collier.

## 2018-10-03 DIAGNOSIS — Z20.7 EXPOSURE TO PARASITIC DISEASE: ICD-10-CM

## 2018-10-15 DIAGNOSIS — E78.5 DYSLIPIDEMIA: ICD-10-CM

## 2018-10-15 RX ORDER — ATORVASTATIN CALCIUM 10 MG/1
TABLET, FILM COATED ORAL
Qty: 90 TAB | Refills: 3 | OUTPATIENT
Start: 2018-10-15

## 2018-10-15 NOTE — TELEPHONE ENCOUNTER
Also need new rx for Alprazolam .25 mg 30 tabs last filled 4/2017    needs both meds to go to [facility] [address] at [phone] phone

## 2018-10-25 ENCOUNTER — CLINICAL SUPPORT (OUTPATIENT)
Dept: INTERNAL MEDICINE CLINIC | Age: 63
End: 2018-10-25

## 2018-10-25 DIAGNOSIS — E78.5 DYSLIPIDEMIA: ICD-10-CM

## 2018-10-25 DIAGNOSIS — Z23 ENCOUNTER FOR IMMUNIZATION: Primary | ICD-10-CM

## 2018-10-25 NOTE — TELEPHONE ENCOUNTER
Pt called requesting refill for medication . Requested Prescriptions     Pending Prescriptions Disp Refills    atorvastatin (LIPITOR) 10 mg tablet 90 Tab 3     Sig: TAKE 1 TABLET BY MOUTH EVERY DAY    diazePAM (VALIUM) 2 mg tablet 30 Tab 0     Pt also wants a refill of Zanax     Patient has a new PCP but cant get in until 2nd week of Nov office informed patient that she would have to go to old PCP to get refills until then.      Pt was last seen on 8/8/2018 with Dr. Salazar Madrigal

## 2018-10-26 RX ORDER — DIAZEPAM 2 MG/1
TABLET ORAL
Qty: 30 TAB | Refills: 0 | OUTPATIENT
Start: 2018-10-26

## 2018-10-26 RX ORDER — ATORVASTATIN CALCIUM 10 MG/1
TABLET, FILM COATED ORAL
Qty: 90 TAB | Refills: 3 | OUTPATIENT
Start: 2018-10-26

## 2018-10-26 NOTE — TELEPHONE ENCOUNTER
I received notification that this patient was leaving our practice. I have not ordered this medications for her in the past. If the patient would like to return to our office she is welcome. I cannot however prescribe this medications until evaluation in clinic again, I am not sure if she plans to return.  Thanks

## 2018-10-26 NOTE — TELEPHONE ENCOUNTER
PCP: Dahlia Blanco MD    Last appt: 8/27/2018  Future Appointments   Date Time Provider Jet Nogueira   11/14/2018  1:00 PM Dahlia Blanco MD [address]   6/20/2019 11:00 AM Debbie Whtimore MD [address] [place] [address]       Requested Prescriptions     Pending Prescriptions Disp Refills    atorvastatin (LIPITOR) 10 mg tablet 90 Tab 3     Sig: TAKE 1 TABLET BY MOUTH EVERY DAY    diazePAM (VALIUM) 2 mg tablet 30 Tab 0

## 2018-10-31 DIAGNOSIS — F41.9 ANXIETY: ICD-10-CM

## 2018-10-31 RX ORDER — DIAZEPAM 2 MG/1
1 TABLET ORAL
COMMUNITY
End: 2018-10-31

## 2018-10-31 RX ORDER — DIAZEPAM 2 MG/1
2 TABLET ORAL
OUTPATIENT
Start: 2018-10-31

## 2018-10-31 RX ORDER — ALPRAZOLAM 0.25 MG/1
TABLET ORAL
Qty: 14 TAB | Refills: 0 | Status: SHIPPED | OUTPATIENT
Start: 2018-10-31 | End: 2018-12-19 | Stop reason: SDUPTHER

## 2018-10-31 RX ORDER — ATORVASTATIN CALCIUM 10 MG/1
TABLET, FILM COATED ORAL
Qty: 90 TAB | Refills: 0 | Status: SHIPPED | OUTPATIENT
Start: 2018-10-31 | End: 2018-11-05 | Stop reason: SDUPTHER

## 2018-10-31 NOTE — TELEPHONE ENCOUNTER
PCP: Rhea Sue MD    Last appt: 8/27/2018  Future Appointments   Date Time Provider Jet Nogueira   11/14/2018  1:00 PM Rhea Sue MD [facility] [address]   6/20/2019 11:00 AM Geri Yates  [facility]       Requested Prescriptions     Pending Prescriptions Disp Refills    atorvastatin (LIPITOR) 10 mg tablet 90 Tab 3     Sig: TAKE 1 TABLET BY MOUTH EVERY DAY    diazePAM (VALIUM) 2 mg tablet       Sig: Take 1 Tab by mouth.      Refused Prescriptions Disp Refills    atorvastatin (LIPITOR) 10 mg tablet 90 Tab 3     Sig: TAKE 1 TABLET BY MOUTH EVERY DAY     Refused By: Lucille Aly     Reason for Refusal: Patient never under prescriber care    diazePAM (VALIUM) 2 mg tablet 30 Tab 0     Refused By: Lucille Aly     Reason for Refusal: Patient never under prescriber care

## 2018-10-31 NOTE — TELEPHONE ENCOUNTER
Pt called and stated that she needs all three filled the xanax and Lipitor and Valium. Please advise.

## 2018-11-01 DIAGNOSIS — E78.5 DYSLIPIDEMIA: ICD-10-CM

## 2018-11-01 DIAGNOSIS — F41.9 ANXIETY: ICD-10-CM

## 2018-11-01 NOTE — PROGRESS NOTES
Pt transferring care due to departure of her pcp from [facility]. Requesting refills. Will be seen soon by new pcp who has only provided specialist care to pt in the past.   Pt unknown to me. Pt requesting BOTH xanax and valium. Review of records seems to indicate that she was on xanax most recently. Will bridge until 11/14/18 appt to establish care with new pcp.

## 2018-11-01 NOTE — TELEPHONE ENCOUNTER
PCP: Renzo Keys MD    Last appt: 8/27/2018  Future Appointments   Date Time Provider Jet Nogueira   11/14/2018  1:00 PM Tommy Tello MD [facility] [address]   6/20/2019 11:00 AM Argentina Whitmore MD [address] [place] [address]       Requested Prescriptions     Pending Prescriptions Disp Refills    ALPRAZolam (XANAX) 0.25 mg tablet 14 Tab 0     Sig: TAKE 1 TABLET BY MOUTH EVERY DAY AS NEEDED FOR ANXIETY    atorvastatin (LIPITOR) 10 mg tablet 90 Tab 0     Sig: TAKE 1 TABLET BY MOUTH EVERY DAY    diazePAM (VALIUM) 2 mg tablet 30 Tab 0     Sig: Take 1 Tab by mouth as needed for Anxiety. Max Daily Amount: 192 mg.

## 2018-11-05 DIAGNOSIS — E78.5 DYSLIPIDEMIA: ICD-10-CM

## 2018-11-05 RX ORDER — DIAZEPAM 2 MG/1
2 TABLET ORAL AS NEEDED
Qty: 30 TAB | Refills: 0 | OUTPATIENT
Start: 2018-11-05

## 2018-11-05 RX ORDER — ALPRAZOLAM 0.25 MG/1
TABLET ORAL
Qty: 14 TAB | Refills: 0 | OUTPATIENT
Start: 2018-11-05

## 2018-11-05 RX ORDER — ATORVASTATIN CALCIUM 10 MG/1
TABLET, FILM COATED ORAL
Qty: 90 TAB | Refills: 0 | OUTPATIENT
Start: 2018-11-05

## 2018-11-05 RX ORDER — ATORVASTATIN CALCIUM 10 MG/1
TABLET, FILM COATED ORAL
Qty: 90 TAB | Refills: 0 | Status: SHIPPED | OUTPATIENT
Start: 2018-11-05 | End: 2019-12-19 | Stop reason: SDUPTHER

## 2018-11-14 ENCOUNTER — OFFICE VISIT (OUTPATIENT)
Dept: INTERNAL MEDICINE CLINIC | Age: 63
End: 2018-11-14

## 2018-11-14 VITALS
DIASTOLIC BLOOD PRESSURE: 92 MMHG | SYSTOLIC BLOOD PRESSURE: 134 MMHG | OXYGEN SATURATION: 98 % | HEART RATE: 71 BPM | RESPIRATION RATE: 14 BRPM | HEIGHT: 68 IN | BODY MASS INDEX: 36.68 KG/M2 | WEIGHT: 242 LBS | TEMPERATURE: 97.7 F

## 2018-11-14 DIAGNOSIS — F41.9 ANXIETY DISORDER, UNSPECIFIED TYPE: ICD-10-CM

## 2018-11-14 DIAGNOSIS — R09.81 SINUS CONGESTION: Primary | ICD-10-CM

## 2018-11-14 DIAGNOSIS — Z00.00 WELLNESS EXAMINATION: ICD-10-CM

## 2018-11-14 DIAGNOSIS — R73.03 PREDIABETES: ICD-10-CM

## 2018-11-14 DIAGNOSIS — E66.9 OBESITY, UNSPECIFIED CLASSIFICATION, UNSPECIFIED OBESITY TYPE, UNSPECIFIED WHETHER SERIOUS COMORBIDITY PRESENT: ICD-10-CM

## 2018-11-14 DIAGNOSIS — E03.9 HYPOTHYROIDISM, UNSPECIFIED TYPE: ICD-10-CM

## 2018-11-14 DIAGNOSIS — A04.8 H. PYLORI INFECTION: ICD-10-CM

## 2018-11-14 DIAGNOSIS — R35.0 URINARY FREQUENCY: ICD-10-CM

## 2018-11-14 DIAGNOSIS — L23.9 ALLERGIC DERMATITIS: ICD-10-CM

## 2018-11-14 DIAGNOSIS — E55.9 VITAMIN D DEFICIENCY: ICD-10-CM

## 2018-11-14 DIAGNOSIS — E78.5 DYSLIPIDEMIA: ICD-10-CM

## 2018-11-14 DIAGNOSIS — J30.9 ALLERGIC RHINITIS, UNSPECIFIED SEASONALITY, UNSPECIFIED TRIGGER: ICD-10-CM

## 2018-11-14 NOTE — PROGRESS NOTES
HPI     Enrique Leventhal is a 61 y.o. female with relevant past medical history of obesity, uterine CA, diverticulitis s/p partial hemicolectomy, anxiety disorder, allergic sinusitis, allergic dermatitis, alleged h/o scabies. Here for follow up. Reports sinus congestion, with maxillary sinus pressure sensation, denies any high fevers, malaise, SOB, purulent discharge, has had them in the past on and off but not recently. Has an allergy appointment tomorrow with Dr. Abida Lowry. The patient admits to poor diet and non compliance with statin, she had left the practice so no refills had been done, she wants to establish care again today. The patient reports no active rashes today, but reminds me of h/o presumed scabies, and what she considers was also a parasitic infection in her skin and GI tract, with no active symptoms at this time. She does bring her Ipad and shows me again similar images she showed me in the past with what appears to be scratch marks in different stages of healing and some apparent dematographism. She would like a refill on alprazolam she has been taking PRN for severe anxiety due to health concerns. ROS  As above included in HPI. Otherwise 11 point review of systems negative including constitutional, skin, HENT, eyes, respiratory, cardiovascular, gastrointestinal, genitourinary, musculoskeletal, endocrine, hematologic, allergy, and neurologic. Past Medical History  Past Medical History:   Diagnosis Date    Abnormal nuclear cardiac imaging test 02/09/2012    Mid to distal anteroseptal and apical reversible defect concerning for ishcmeia. Mild-mod, mid-distal anterior & apical hypk. EF 67%. Borderline abnormal EKG w/1-mm inferolateral ST depression at 7 min exercise. Occasional PVCs.  Anxiety     Carotid duplex 18/43/1347    Mild 2-48% LICA stenosis.       Dengue fever     7/10 while in [place]    Diverticulosis     Dyslipidemia     GERD     Holter monitor study 04/29/2016 Sinus rhythm, avg HR 67 bpm (range  bpm). Rare ectopy.  Hypothyroidism     RLE venous duplex 07/23/2015    Right leg:  No DVT.  S/P cardiac catheterization 02/24/2012    Angiographically normal coronaries. Hyperdynamic. EF 70%. No RWMA.  Scabies exposure 03/2018    Sinusitis     Tinnitus     Uterine cancer (HCC)     hysterectomy    Uterine carcinoma [facility])      Past Surgical History:   Procedure Laterality Date    HX CHOLECYSTECTOMY      2009    HX COLECTOMY      2008    HX HYSTERECTOMY          Family History  Family History   Problem Relation Age of Onset    Other Mother         anuerysm    Cancer Father         brain tumor    Hypertension Maternal Aunt     Diabetes Maternal Aunt         lung    Heart Disease Maternal Aunt     Stroke Maternal Grandmother     Other Maternal Grandfather         leg amputated due to phlebitis    Heart Attack Paternal Grandmother     Heart Disease Maternal Aunt        Social History  She  reports that she quit smoking about 19 years ago. Her smoking use included cigarettes. She has a 20.00 pack-year smoking history.  she has never used smokeless tobacco.   Social History     Substance and Sexual Activity   Alcohol Use No    Alcohol/week: 0.0 oz       Immunization History  Immunization History   Administered Date(s) Administered    Hep A Vaccine (Adult) 05/30/2017, 12/04/2017    Influenza Vaccine (Quad) PF 09/28/2016, 12/04/2017, 10/25/2018       Allergies  Allergies   Allergen Reactions    Omnicef [Cefdinir] Hives    Keflex [Cephalexin] Hives    Codeine Other (comments)     Severe headache    Ketek [Telithromycin] Other (comments)     Passed out    Levaquin [Levofloxacin] Unknown (comments)     Did not tolerate      Morphine Other (comments)     Severe headache    Sulfa (Sulfonamide Antibiotics) Hives    Topamax [Topiramate] Other (comments)     Made blood vessels red and pain in eyes       Medications  Current Outpatient Medications Medication Sig    atorvastatin (LIPITOR) 10 mg tablet TAKE 1 TABLET BY MOUTH EVERY DAY    ALPRAZolam (XANAX) 0.25 mg tablet TAKE 1 TABLET BY MOUTH EVERY DAY AS NEEDED FOR ANXIETY    clobetasol 0.05 % sham WASH THE SCALP 2 TO 3 TIMES A WEEK. LATHER FOR AT LEAST 5 MINUTES BEFORE RINSING    loratadine (CLARITIN) 10 mg tablet Take 1 Tab by mouth daily. Indications: Allergic Rhinitis    levothyroxine (SYNTHROID) 112 mcg tablet Take 1 Tab by mouth Daily (before breakfast). No current facility-administered medications for this visit. Visit Vitals  BP (!) 134/92 (BP 1 Location: Right arm, BP Patient Position: Sitting)   Pulse 71   Temp 97.7 °F (36.5 °C) (Oral)   Resp 14   Ht 5' 8\" (1.727 m)   Wt 242 lb (109.8 kg)   LMP  (LMP Unknown)   SpO2 98%   BMI 36.80 kg/m²     Body mass index is 36.8 kg/m². Physical Exam   Constitutional: She is oriented to person, place, and time and well-developed, well-nourished, and in no distress. No distress. HENT:   Head: Normocephalic and atraumatic. Right Ear: External ear normal.   Left Ear: External ear normal.   Nose: Nose normal.   Mouth/Throat: Oropharynx is clear and moist. No oropharyngeal exudate. Eyes: Conjunctivae and EOM are normal. Pupils are equal, round, and reactive to light. Neck: Normal range of motion. Neck supple. Cardiovascular: Normal rate and regular rhythm. Pulmonary/Chest: Effort normal and breath sounds normal.   Abdominal: Soft. Bowel sounds are normal.   Musculoskeletal: She exhibits no edema. Lymphadenopathy:     She has no cervical adenopathy. Neurological: She is alert and oriented to person, place, and time. Skin: Skin is warm. She is not diaphoretic. Hyperpigmentation in abdominal folds on both flanks, waist area and mid back. No burrows, micropapular or papular lesions noted anywhere in skin today. Some healed excoriation with hyperpigmented macules in abdomen and left leg distal third dorsum.           REVIEW OF DATA    Labs  No visits with results within 1 Month(s) from this visit. Latest known visit with results is:   Orders Only on 07/27/2018   Component Date Value Ref Range Status    Immunoglobulin E 07/27/2018 13  0 - 114 kU/L Final         CT Results (most recent):  Results from [place] encounter on 12/08/17   CT ABD PELV W CONT    Narrative CT Abdomen And Pelvis With Enhancement    CPT CODE: 27483 and 36949    INDICATION: Generalized abdominal pain. No other relevant clinical information  provided. Only available notes in the patient's electronic medical record from 4  days previous states patient presented for routine immunizations. .    TECHNIQUE: 5 mm collimation axial images obtained from the diaphragm to the  level of the pubic symphysis following the uneventful administration of oral and  100 cc's nonionic intravenous contrast.    All CT scans at this facility are performed using dose optimization technique as  appropriate to this specific exam, to include automated exposure control,  adjustment of the mA and/or KP according to patient size or use of iterative  reconstruction techniques. COMPARISON: Prior exam 7/21/2017. ABDOMEN FINDINGS:     Small volume dependent atelectasis at the lung bases. Prominent and bulbous  right lower lobe pulmonary vein partially included. No pleural effusion. Liver is diffusely low attenuation consistent with steatosis. The gallbladder  is absent. Normal size spleen. No adrenal mass. Pancreas grossly unremarkable. Kidneys demonstrate symmetric enhancement. No hydronephrosis. No calcified  stones. There is a 2.7 cm right parapelvic cyst.     Normal caliber abdominal aorta. No evidence of dissection. PELVIS FINDINGS:     No small bowel distention to suggest obstruction. Oral contrast has reached the  colon. There is diverticulosis of the cecum and right colon, without surrounding  inflammation. The appendix is visualized and is not enlarged.  Slightly prominent  lymph node in the right lower quadrant pelvic mesentery medial to the colon and  posterior to the cecum measures 1.3 cm, nonspecific. There is scattered mild transverse colon diverticulosis. There is extensive  descending colon and proximal sigmoid diverticulosis, without any convincing  evidence of adjacent inflammation or free fluid by imaging. There appears be a  surgical staple line at the level of the sigmoid, image 76, from presumed  previous bowel resection, for unknown reasons. The uterus is absent. Bladder not well distended for evaluation. Diastases of  the anterior abdominal wall midline pelvis, axial image 80 and sagittal image  46. This appears as a small focal or developing ventral hernia, 2.5 cm  craniocaudal on the sagittal reformatted images. Nondilated loop of small bowel  protrudes into this defect, without inflammation. There is severe disc space narrowing L5-S1 with complete loss of the disc space. Moderate narrowing L4-5 with vacuum disc change. Impression IMPRESSION:    1. Hepatic steatosis. Parapelvic right renal cyst. No renal stones or  hydronephrosis. 2. No small bowel or colon distention to suggest obstruction. Normal caliber  appendix. 3. Small midline ventral pelvic hernia containing some fat and a short segment  nondilated loop of small bowel, similar to prior exam.    4. Near pancolonic diverticulosis. No convincing evidence for acute inflammatory  change currently. No free fluid. Clinical correlation needed with patient's  symptoms and appropriate laboratory studies. XR Results (most recent):  Results from Hospital Encounter encounter on 09/21/17   XR CHEST PA LAT    Narrative INDICATION:  CP       EXAMINATION:  XR CHEST PA LAT    COMPARISON:  None    FINDINGS:  The study shows a normal sized heart. . The lungs are clear and well expanded.            Impression IMPRESSION:  Normal chest        CT   All Micro Results     None DIAGNOSIS AND PLAN  Patient Active Problem List   Diagnosis Code    Dyslipidemia E78.5    Chest pain R07.89    Hypothyroidism E03.9    GERD K21.9    Vitamin D deficiency H00.6    Helicobacter pylori antibody positive R76.8    Prediabetes R73.03    Benign positional vertigo H81.10    Palpitations R00.2    Encounter for long-term (current) use of other medications Z79.899    Anxiety F41.9    Insomnia G47.00    Intractable cluster headache syndrome G44.001    Tinnitus H93.19    Allergic rhinitis J30.9    Malignant neoplasm of overlapping sites of body of uterus (HCC) C54.8    Severe obesity (BMI 35.0-39. 9) E66.01     1. Sinus congestion  2. Allergic rhinitis, unspecified seasonality, unspecified trigger   Continue with loratadine daily. Will see ENT/allergy tomorrow, additional therapy deferred to them. 3. Allergic dermatitis  Patient who reports h/o scabies and parasitic infection, not confirmed on clinical evaluation or test results. Patient very anxious about diagnosis, who was given treatment with ivermectin and permethrin by other providers multiple times. Patient once more reassured there is no clinical indication for further testing or therapy as there is no concern for parasitic or scabies infection at this time. Her symptoms of itching and scratch marks on multiple pictures she brings are most likely due to allergies. Recommended to follow up with allergy- will see them tomorrow. Recommended to do immunotherapy in the past by allergy as patient tested positive for multiple enviromental allergies. 4. Hypothyroidism, unspecified type  Will monitor TFTs. Last done 5/2018 [facility]. C/w levothyroxine 112mcg daily. 5. Anxiety disorder, unspecified type  Refill on alprazolam prescription for 10 tablets only to use in case of severe anxiety/panic attacks. Reminded not to take regularly.  If noticing that anxiety is often offered to try other anxiolytics without potential for addiction and abuse.     6. Prediabetes  HbA1c was 6.1 on 5/9/18. Has gained 7lb since June 2018. 7. Vitamin D deficiency  Vit D levels today. 8. Dyslipidemia  Lipid panel today. Last done 5/2018 well controlled. Patient admits since then her diet and weight have worsened and she has not bee compliant with statin for a while. 9. Urinary frequency  Intermittent Urinary frequency. Denies any dysuria, discharge, hematuria, lower abdominal pain. Will do UA. 10. H. pylori infection  Patient says she has recently completed therapy for H. Pylori, around mid October, would like DAVID done here. - H PYLORI AG, STOOL; Future    11. Obesity, unspecified classification, unspecified obesity type, unspecified whether serious comorbidity present  Regular exercise and healthier diet recommended, weight loss encouraged    12. Wellness examination  Physical exam done. Screening labs as ordered. Due for mammogram and pap, patient reports she has follow up with GYN soon. Last colonoscopy 2015, next due 2020 according to patient. Follow-up Disposition:  Return in about 6 months (around 5/14/2019). Lacy Esquivel MD

## 2018-11-14 NOTE — PROGRESS NOTES
ROOM # 12    Td Arnett presents today for   Chief Complaint   Patient presents with    Complete Physical       Td Arnett preferred language for health care discussion is english/other. Is someone accompanying this pt? No    Is the patient using any DME equipment during OV? No    Depression Screening:  PHQ over the last two weeks 11/14/2018 11/14/2018 8/8/2018 10/23/2017 9/28/2016 6/25/2015 5/15/2014   Little interest or pleasure in doing things Several days Several days Not at all Not at all Not at all Not at all Not at all   Feeling down, depressed, irritable, or hopeless More than half the days More than half the days Not at all Not at all Not at all Not at all Not at all   Total Score PHQ 2 3 3 0 0 0 0 0       Learning Assessment:  Learning Assessment 8/27/2018 6/8/2017 6/10/2016 5/15/2014 3/6/2013   PRIMARY LEARNER Patient Patient Patient Patient Patient   HIGHEST LEVEL OF EDUCATION - PRIMARY LEARNER  > 4 YEARS OF COLLEGE - - > 4 YEARS OF COLLEGE > 4 [place] LEARNER NONE - - [address] [facility] CAREGIVER No - - No No   PRIMARY LANGUAGE ENGLISH ENGLISH ENGLISH ENGLISH ENGLISH   LEARNER PREFERENCE PRIMARY LISTENING DEMONSTRATION DEMONSTRATION DEMONSTRATION DEMONSTRATION     - - - READING LISTENING     - - - VIDEOS PICTURES     - - - LISTENING READING     - - - PICTURES VIDEOS   ANSWERED BY patient Patient Patient patient pt   RELATIONSHIP SELF SELF SELF SELF SELF       Abuse Screening:  Abuse Screening Questionnaire 8/8/2018 5/15/2014   Do you ever feel afraid of your partner? N N   Are you in a relationship with someone who physically or mentally threatens you? N N   Is it safe for you to go home? Y Y       Fall Risk  Fall Risk Assessment, last 12 mths 8/8/2018   Able to walk? Yes   Fall in past 12 months?  No       Visit Vitals  BP (!) 134/92 (BP 1 Location: Right arm, BP Patient Position: Sitting)   Pulse 71   Temp 97.7 °F (36.5 °C) (Oral)   Resp 14   Ht 5' 8\" (1.727 m)   Wt 242 lb (109.8 kg)   SpO2 98%   BMI 36.80 kg/m²       Health Maintenance reviewed and discussed per provider. Yes    Chelita Puente is due for   Health Maintenance Due   Topic Date Due    Pneumococcal 19-64 Highest Risk (1 of 3 - PCV13) 09/11/1974    DTaP/Tdap/Td series (1 - Tdap) 09/11/1976    Shingrix Vaccine Age 50> (1 of 2) 09/11/2005    BREAST CANCER SCRN MAMMOGRAM  12/08/2016     Please order/place referral if appropriate. Advance Directive:  1. Do you have an advance directive in place? Patient Reply: No    2. If not, would you like material regarding how to put one in place? Patient Reply: No    Coordination of Care:  1. Have you been to the ER, urgent care clinic since your last visit? Hospitalized since your last visit? No    2. Have you seen or consulted any other health care providers outside of the [address] [place] [address] since your last visit? Include any pap smears or colon screening.  No

## 2018-12-05 DIAGNOSIS — E03.9 HYPOTHYROIDISM, UNSPECIFIED TYPE: ICD-10-CM

## 2018-12-05 NOTE — TELEPHONE ENCOUNTER
Last Visit: 11/14/2018 with MD Eric Levy  Next Appointment: 12/12/2018 with MD Eric Levy  Previous Refill Encounter(s): 12/04/2017 per MD Negin Ma #[id number] with 3 refills    Requested Prescriptions     Pending Prescriptions Disp Refills    levothyroxine (SYNTHROID) 112 mcg tablet 90 Tab 3     Sig: Take 1 Tab by mouth Daily (before breakfast).

## 2018-12-06 RX ORDER — LEVOTHYROXINE SODIUM 112 UG/1
112 TABLET ORAL
Qty: 90 TAB | Refills: 3 | Status: SHIPPED | OUTPATIENT
Start: 2018-12-06 | End: 2019-12-02 | Stop reason: SDUPTHER

## 2018-12-17 ENCOUNTER — TELEPHONE (OUTPATIENT)
Dept: INTERNAL MEDICINE CLINIC | Age: 63
End: 2018-12-17

## 2018-12-17 ENCOUNTER — OFFICE VISIT (OUTPATIENT)
Dept: INTERNAL MEDICINE CLINIC | Age: 63
End: 2018-12-17

## 2018-12-17 VITALS
HEIGHT: 68 IN | OXYGEN SATURATION: 98 % | BODY MASS INDEX: 37.28 KG/M2 | DIASTOLIC BLOOD PRESSURE: 100 MMHG | HEART RATE: 73 BPM | RESPIRATION RATE: 16 BRPM | TEMPERATURE: 98 F | SYSTOLIC BLOOD PRESSURE: 140 MMHG | WEIGHT: 246 LBS

## 2018-12-17 DIAGNOSIS — B89 PARASITIC INFECTION: ICD-10-CM

## 2018-12-17 DIAGNOSIS — R21 RASH AND OTHER NONSPECIFIC SKIN ERUPTION: Primary | ICD-10-CM

## 2018-12-17 RX ORDER — ALBENDAZOLE 200 MG/1
TABLET, FILM COATED ORAL
Qty: 6 TAB | Refills: 0 | Status: SHIPPED | OUTPATIENT
Start: 2018-12-17 | End: 2019-01-04

## 2018-12-17 NOTE — PROGRESS NOTES
ROOM # 6    Teri Donis presents today for   Chief Complaint   Patient presents with    Nausea    Skin Problem     Patient went to the DR and Elliot Stephens preferred language for health care discussion is english/other. Depression Screening:  PHQ over the last two weeks 12/17/2018 11/14/2018 11/14/2018 8/8/2018 10/23/2017 9/28/2016 6/25/2015   Little interest or pleasure in doing things Not at all Several days Several days Not at all Not at all Not at all Not at all   Feeling down, depressed, irritable, or hopeless Not at all More than half the days More than half the days Not at all Not at all Not at all Not at all   Total Score PHQ 2 0 3 3 0 0 0 0       Learning Assessment:  Learning Assessment 8/27/2018 6/8/2017 6/10/2016 5/15/2014 3/6/2013   PRIMARY LEARNER Patient Patient Patient Patient Patient   HIGHEST LEVEL OF EDUCATION - PRIMARY LEARNER  > 4 YEARS OF COLLEGE - - > 4 YEARS OF COLLEGE > 4 YEARS OF COLLEGE   BARRIERS PRIMARY LEARNER NONE - - [address] [facility] CAREGIVER No - - No No   PRIMARY LANGUAGE ENGLISH ENGLISH ENGLISH ENGLISH ENGLISH   LEARNER PREFERENCE PRIMARY LISTENING DEMONSTRATION DEMONSTRATION DEMONSTRATION DEMONSTRATION     - - - READING LISTENING     - - - VIDEOS PICTURES     - - - LISTENING READING     - - - PICTURES VIDEOS   ANSWERED BY patient Patient Patient patient pt   RELATIONSHIP SELF SELF SELF SELF SELF       Abuse Screening:  Abuse Screening Questionnaire 8/8/2018 5/15/2014   Do you ever feel afraid of your partner? N N   Are you in a relationship with someone who physically or mentally threatens you? N N   Is it safe for you to go home? Y Y       Fall Risk  Fall Risk Assessment, last 12 mths 8/8/2018   Able to walk? Yes   Fall in past 12 months?  No       Visit Vitals  BP (!) 140/100 (BP 1 Location: Left arm, BP Patient Position: Sitting)   Pulse 73   Temp 98 °F (36.7 °C) (Oral)   Resp 16   Ht 5' 8\" (1.727 m)   Wt 246 lb (111.6 kg)   SpO2 98%   BMI 37.40 kg/m²       Health Maintenance reviewed and discussed per provider. Yes    Aaron Najera is due for   Health Maintenance Due   Topic Date Due    Pneumococcal 19-64 Highest Risk (1 of 3 - PCV13) 09/11/1974    DTaP/Tdap/Td series (1 - Tdap) 09/11/1976    Shingrix Vaccine Age 50> (1 of 2) 09/11/2005    BREAST CANCER SCRN MAMMOGRAM  12/08/2016     Please order/place referral if appropriate. Advance Directive:  1. Do you have an advance directive in place? Patient Reply: No    2. If not, would you like material regarding how to put one in place? Patient Reply: No    Coordination of Care:  1. Have you been to the ER, urgent care clinic since your last visit? Hospitalized since your last visit?  No

## 2018-12-17 NOTE — TELEPHONE ENCOUNTER
Pt was here today and saw [facility]. Was told to see you for a follow up on Friday. No open appt. Please review her note and advise when you need to see the patient. She said she also had labs done today.

## 2018-12-17 NOTE — PROGRESS NOTES
Silvina Garcia is a 61 y.o.  female and presents with    Chief Complaint   Patient presents with    Nausea    Skin Problem     [place] went [place] the [place] and [place]       Subjective:  HPI   Mrs. Guaman presents today with complaints of skin eruptions. She reports traveled to [facility] and [place] x months ago. She reports took a shower at the spa ingested some of the water and got violently ill. She was placed on Rifaximin. She was diagnosed with larva migrans. She was placed on Ivermectin and reports with improvement prescribed by Dr. Clyde Dean. She reports Eosinophils were normal per Dr. Vanna Tao. She reports feelings of being bitten, spontaneous scratches (Shows up with a scab then a space repeated.) that have never resolved fully. She reports out of no where a scratch on the cheek, Woke this morning with petechiae to chest.  She reports unable to sleep, scared to go to sleep, \"feeling of biting sensation\", \"pin pricks', takes a warm shower, keeps a cool house, takes Benadryl to help her sleep but it does not help the symptoms. She has been to dermatology had scrapings and biopsies. She reports when things started also had excessive nose bleeds and excessive cough. This is all x 1 year presentation. She is concerned that she may have hook worm and has read that Ivermectin does not cover hook worm. Today she denies fever, chills, gi symptoms, she reports is on Augmentin for acute on chronic sinus infection with nausea. Reviewed 11/14/18 labs. SED rate at 40. LFT unremarkable. Urine unremarkable. Was positive Cdif 8/8/2018. Ova and para negative. Additional Concerns: none     ROS   Review of Systems   Constitutional: Negative for chills, fever and malaise/fatigue. Respiratory: Negative. Cardiovascular: Negative. Gastrointestinal: Negative. Musculoskeletal: Negative. Skin: Positive for itching and rash. Neurological: Negative.         Allergies   Allergen Reactions  Omnicef [Cefdinir] Hives    Keflex [Cephalexin] Hives    Codeine Other (comments)     Severe headache    Ketek [Telithromycin] Other (comments)     Passed out    Levaquin [Levofloxacin] Unknown (comments)     Did not tolerate      Morphine Other (comments)     Severe headache    Sulfa (Sulfonamide Antibiotics) Hives    Topamax [Topiramate] Other (comments)     Made blood vessels red and pain in eyes       Current Outpatient Medications   Medication Sig Dispense Refill    albendazole (ALBENZA) 200 mg tablet Take 2 tablets by mouth daily for 3 days. 6 Tab 0    levothyroxine (SYNTHROID) 112 mcg tablet Take 1 Tab by mouth Daily (before breakfast). 90 Tab 3    atorvastatin (LIPITOR) 10 mg tablet TAKE 1 TABLET BY MOUTH EVERY DAY 90 Tab 0    ALPRAZolam (XANAX) 0.25 mg tablet TAKE 1 TABLET BY MOUTH EVERY DAY AS NEEDED FOR ANXIETY 14 Tab 0    loratadine (CLARITIN) 10 mg tablet Take 1 Tab by mouth daily. Indications: Allergic Rhinitis 30 Tab 6    clobetasol 0.05 % sham WASH THE SCALP 2 TO 3 TIMES A WEEK.  LATHER FOR AT LEAST 5 MINUTES BEFORE RINSING  0       Social History     Socioeconomic History    Marital status:      Spouse name: Not on file    Number of children: Not on file    Years of education: Not on file    Highest education level: Not on file   Social Needs    Financial resource strain: Not on file    Food insecurity - worry: Not on file    Food insecurity - inability: Not on file    Transportation needs - medical: Not on file   [employer] needs - non-medical: Not on file   Occupational History    Not on file   Tobacco Use    Smoking status: Former Smoker     Packs/day: 1.00     Years: 20.00     Pack years: 20.00     Types: Cigarettes     Last attempt to quit: 1999     Years since quittin.4    Smokeless tobacco: Never Used   Substance and Sexual Activity    Alcohol use: No     Alcohol/week: 0.0 oz    Drug use: No    Sexual activity: Not Currently   Other Topics Concern    Not on file   Social History Narrative    Not on file       Past Medical History:   Diagnosis Date    Abnormal nuclear cardiac imaging test 02/09/2012    Mid to distal anteroseptal and apical reversible defect concerning for ishcmeia. Mild-mod, mid-distal anterior & apical hypk. EF 67%. Borderline abnormal EKG w/1-mm inferolateral ST depression at 7 min exercise. Occasional PVCs.  Anxiety     Carotid duplex 53/98/1818    Mild 1-24% LICA stenosis.  Dengue fever     7/10 while in [place]    Diverticulosis     Dyslipidemia     GERD     Holter monitor study 04/29/2016    Sinus rhythm, avg HR 67 bpm (range  bpm). Rare ectopy.  Hypothyroidism     RLE venous duplex 07/23/2015    Right leg:  No DVT.  S/P cardiac catheterization 02/24/2012    Angiographically normal coronaries. Hyperdynamic. EF 70%. No RWMA.       Scabies exposure 03/2018    Sinusitis     Tinnitus     Uterine cancer (HCC)     hysterectomy    Uterine carcinoma (HCC)        Past Surgical History:   Procedure Laterality Date    HX CHOLECYSTECTOMY      2009    HX COLECTOMY      2008    HX HYSTERECTOMY         Family History   Problem Relation Age of Onset    Other Mother         anuerysm    Cancer Father         brain tumor    Hypertension Maternal Aunt     Diabetes Maternal Aunt         lung    Heart Disease Maternal Aunt     Stroke Maternal Grandmother     Other Maternal Grandfather         leg amputated due to phlebitis    Heart Attack Paternal Grandmother     Heart Disease Maternal Aunt        Objective:  Vitals:    12/17/18 1030   BP: (!) 140/100   Pulse: 73   Resp: 16   Temp: 98 °F (36.7 °C)   TempSrc: Oral   SpO2: 98%   Weight: 246 lb (111.6 kg)   Height: 5' 8\" (1.727 m)   PainSc:   0 - No pain       LABS   Results for orders placed or performed in visit on 07/27/18   IMMUNOGLOBULIN E, QT   Result Value Ref Range    Immunoglobulin E 13 0 - 114 kU/L       TESTS  none    PE  Physical Exam Constitutional: She is oriented to person, place, and time. She appears well-developed and well-nourished. No distress. HENT:   Head: Normocephalic and atraumatic. Pulmonary/Chest: Effort normal.   Neurological: She is alert and oriented to person, place, and time. Skin: Skin is warm and dry. She is not diaphoretic. She does have linear lesions to the lower abdomen, upper extremity, nothing noted to the cheek. The lesions are mildly erythematous and scabbed over. She is with mild petechiae to the chest.    Psychiatric: She has a normal mood and affect. Her behavior is normal. Judgment and thought content normal.   Vitals reviewed. Assessment/Plan:    1. Chronic skin eruption/possible parasitic infection- unspecified- Albendazole prescribed x 3 days per up to date. CBC and SED rate repeated as she was on Rifaximin and Ivermectin during last draw. I feel this is more psychosocial at this point. Will call with results and will speak with PCP Dr. Matt Koehler to discuss case further. Lab review: orders written for new lab studies as appropriate; see orders    Today's Visit:   Diagnoses and all orders for this visit:    1. Rash and other nonspecific skin eruption  -     albendazole (ALBENZA) 200 mg tablet; Take 2 tablets by mouth daily for 3 days. -     CBC WITH AUTOMATED DIFF; Future  -     SED RATE (ESR); Future      Health Maintenance: Deferred to PCP. I have discussed the diagnosis with the patient and the intended plan as seen in the above orders. The patient has received an after-visit summary and questions were answered concerning future plans. I have discussed medication side effects and warnings with the patient as well. I have reviewed the plan of care with the patient, accepted their input and they are in agreement with the treatment goals.        Follow-up Disposition: Not on File   More than 1/2 of this 30 minute visit was spent in counseling and coordination of care, as described above.    Unknown SELENA Boyer  Internist of [address] [place] [address], [facility] [address]  Phone: [phone]  Fax: [phone]

## 2018-12-18 LAB
ABSOLUTE LYMPHOCYTE COUNT, 10803: 3.1 K/UL (ref 1–4.8)
BASOPHILS # BLD: 0.1 K/UL (ref 0–0.2)
BASOPHILS NFR BLD: 1 % (ref 0–2)
EOSINOPHIL # BLD: 0.4 K/UL (ref 0–0.5)
EOSINOPHIL NFR BLD: 5 % (ref 0–6)
ERYTHROCYTE [DISTWIDTH] IN BLOOD BY AUTOMATED COUNT: 15.5 % (ref 10–15.5)
GRANULOCYTES,GRANS: 46 % (ref 40–75)
HCT VFR BLD AUTO: 42.6 % (ref 35.1–48)
HGB BLD-MCNC: 13.5 G/DL (ref 11.7–16)
LYMPHOCYTES, LYMLT: 41 % (ref 20–45)
MCH RBC QN AUTO: 28 PG (ref 26–34)
MCHC RBC AUTO-ENTMCNC: 32 G/DL (ref 31–36)
MCV RBC AUTO: 88 FL (ref 80–95)
MONOCYTES # BLD: 0.5 K/UL (ref 0.1–1)
MONOCYTES NFR BLD: 6 % (ref 3–12)
NEUTROPHILS # BLD AUTO: 3.5 K/UL (ref 1.8–7.7)
PLATELET # BLD AUTO: 198 K/UL (ref 140–440)
PMV BLD AUTO: 12.5 FL (ref 9–13)
RBC # BLD AUTO: 4.84 M/UL (ref 3.8–5.2)
SED RATE (ESR): 25 MM/HR (ref 0–30)
WBC # BLD AUTO: 7.5 K/UL (ref 4–11)

## 2018-12-18 NOTE — TELEPHONE ENCOUNTER
No need to follow up with me for this problem, please keep follow up in 6 months. Also please tell the patient that if she is having any new skin lesions to please contact dermatology and allergy as they will be able to address her concerns and if necessary dermatology can obtain biopsies that we cannot do here. Thank you.

## 2018-12-19 ENCOUNTER — TELEPHONE (OUTPATIENT)
Dept: INTERNAL MEDICINE CLINIC | Age: 63
End: 2018-12-19

## 2018-12-19 DIAGNOSIS — F41.9 ANXIETY: ICD-10-CM

## 2018-12-19 RX ORDER — ALPRAZOLAM 0.25 MG/1
TABLET ORAL
Qty: 14 TAB | Refills: 0 | Status: CANCELLED | OUTPATIENT
Start: 2018-12-19

## 2018-12-19 NOTE — TELEPHONE ENCOUNTER
Patient called back so I gave her the message from Dr. Cat Knight. She was surprised because she stated this is not a dermatology issue. I told her I could get another message to you but the patient refused.

## 2018-12-19 NOTE — TELEPHONE ENCOUNTER
Can't seem to order it from her chart, says its pending for a new encounter? ?. Could you send me the message to approve med. Carmita, thanks.

## 2018-12-19 NOTE — TELEPHONE ENCOUNTER
The patient requests a prescription for Xanax. Please advise.      Last Visit: 12/17/2018 with HENRY Marcus  Next Appointment: 01/23/2019 with MD Len Castillo

## 2018-12-19 NOTE — TELEPHONE ENCOUNTER
Thank you. I disagree with the patient on the approach for her skin issue. I have sent Chip Buckle a message about this.

## 2018-12-20 RX ORDER — ALPRAZOLAM 0.25 MG/1
TABLET ORAL
Qty: 14 TAB | Refills: 0 | OUTPATIENT
Start: 2018-12-20 | End: 2019-06-20 | Stop reason: ALTCHOICE

## 2018-12-20 NOTE — TELEPHONE ENCOUNTER
Please let the patient know this will be the last prescription for this medication, unfortunately is a controlled substance, and I feel that she needs to be evaluated by psychiatry for the management of her anxiety. I am happy to drop a referral if she agrees, they will be able to address her anxiety and prescribe the necessary medications, if indicated for this reason.  Thanks

## 2018-12-26 ENCOUNTER — DOCUMENTATION ONLY (OUTPATIENT)
Dept: INTERNAL MEDICINE CLINIC | Age: 63
End: 2018-12-26

## 2018-12-26 ENCOUNTER — TELEPHONE (OUTPATIENT)
Dept: INTERNAL MEDICINE CLINIC | Age: 63
End: 2018-12-26

## 2018-12-26 NOTE — PROGRESS NOTES
PA for albendazole was denied. Pt does not meet the established medication specific criteria or guidelines for Albenza at this time.

## 2018-12-26 NOTE — TELEPHONE ENCOUNTER
Patient called to follow up her [address], we received the form from [employer] but it was incorrectly filled out. Please write down Possible parasitic infection instead of a rash. That is why the authorization was denied. [place] will re-fax same authorization paperwork for correction.

## 2019-01-02 ENCOUNTER — TELEPHONE (OUTPATIENT)
Dept: INTERNAL MEDICINE CLINIC | Age: 64
End: 2019-01-02

## 2019-01-02 NOTE — TELEPHONE ENCOUNTER
Patient called in regards to her [address] [place] [address]. [employer] told her that the wording needs to change into possible parasitic infection in order for the [employer] to authorize the medication.

## 2019-01-04 ENCOUNTER — TELEPHONE (OUTPATIENT)
Dept: INTERNAL MEDICINE CLINIC | Age: 64
End: 2019-01-04

## 2019-01-04 RX ORDER — ALBENDAZOLE 200 MG/1
200 TABLET, FILM COATED ORAL 2 TIMES DAILY
Qty: 6 TAB | Refills: 0 | Status: SHIPPED | OUTPATIENT
Start: 2019-01-04 | End: 2019-01-07

## 2019-01-04 NOTE — TELEPHONE ENCOUNTER
Please inform the patient that I have reworded the script and my note to try to get the medication approved.

## 2019-01-09 NOTE — TELEPHONE ENCOUNTER
Pt calling again. Says she just talked with her insurance and they said we have not contacted them. I gave her info the nurse had put in and advised we were waiting for additional info from doctor on what they want to try.

## 2019-01-09 NOTE — TELEPHONE ENCOUNTER
Jose Krishnamurthy? Please have your nurse call her and advise if there is no other med being called in.

## 2019-01-09 NOTE — TELEPHONE ENCOUNTER
It looks like her insurance will not cover unless it is hookworm or pinworm infection, it is not a medication we can get covered

## 2019-01-10 NOTE — TELEPHONE ENCOUNTER
I changed my note to reflect that we are covering for a possible parasitic infection as she had reported per her insurance. Do I need to rewrite the script or is insurance still denying?

## 2019-01-10 NOTE — TELEPHONE ENCOUNTER
Spoke with [employer], they said the FDA has not approved the medication for skin rash, Dx changed to parasitic infection and resubmitted to them via fax, they do not do prior auths over the phone normal...

## 2019-01-18 DIAGNOSIS — F41.9 ANXIETY: Primary | ICD-10-CM

## 2019-01-18 RX ORDER — ALPRAZOLAM 0.25 MG/1
TABLET ORAL
Qty: 14 TAB | Refills: 0 | OUTPATIENT
Start: 2019-01-18

## 2019-01-18 NOTE — TELEPHONE ENCOUNTER
Phone in & Check     Last Visit: 11/14/18  Next Appt: 1/23/19  Previous Refill Encounter: 12/20-14+0    Requested Prescriptions     Pending Prescriptions Disp Refills    ALPRAZolam (XANAX) 0.25 mg tablet 14 Tab 0     Sig: TAKE 1 TABLET BY MOUTH EVERY DAY AS NEEDED FOR ANXIETY

## 2019-01-21 NOTE — TELEPHONE ENCOUNTER
Nurse please call and discuss psychiatry patient per Dr. Jennie Randolph like she had sent a message in December also to discuss with patient.

## 2019-01-21 NOTE — TELEPHONE ENCOUNTER
Can we refill until she sees psych? Sometimes it takes a month or 2 to get into the practice. The message in December to see psych was not relayed to the patient, it looks like we got the 2 [facility] messages stating the medication was approved  before Dr Fortune's message (hers was the 3rd one down), we did not see it unless you continued to scroll down. I spoke with patient, she agrees to see psych but was upset about it. She said her last PCP did not make her see psych. I explained to her that rather than giving her medications, psych can help her work through her anxiety with different tools and techniques. She asked that I cancel her appt for this week. I asked her if we can reschedule and said said \"no, not at this time. She said she will call us when psych contacts her.  Referral sent to [facility]

## 2019-01-22 NOTE — TELEPHONE ENCOUNTER
Dorthea Duane,    I sent you a message via email about this patient a while back. I think the doctor-patient relationship is affected by disagreement on core issues about patient's diagnosis and plan recommendations, It feel there is mistrust from her part with my medical judgement and this is not good for patient care. She is a very sweet patient that needs help, but I don't believe I am good fit for her, I feel she would be better off with a different practicioner. Do you think you can reach out to her, or do you prefer I call her and explain to her I will also no longer be doing infectious diseases, which is how she found me and switched care to me.     Thanks

## 2019-06-06 DIAGNOSIS — F41.9 ANXIETY: ICD-10-CM

## 2019-06-06 RX ORDER — ALPRAZOLAM 0.25 MG/1
TABLET ORAL
Qty: 14 TAB | Refills: 0 | OUTPATIENT
Start: 2019-06-06

## 2019-06-06 NOTE — TELEPHONE ENCOUNTER
Last Visit: 12/17/18 with HENRY Blum  Next Appointment: none  Previous Refill Encounter(s): 12/20/18 #14    Requested Prescriptions     Pending Prescriptions Disp Refills    ALPRAZolam (XANAX) 0.25 mg tablet 14 Tab 0     Sig: TAKE 1 TABLET BY MOUTH EVERY DAY AS NEEDED FOR ANXIETY

## 2019-06-20 ENCOUNTER — OFFICE VISIT (OUTPATIENT)
Dept: CARDIOLOGY CLINIC | Age: 64
End: 2019-06-20

## 2019-06-20 VITALS
SYSTOLIC BLOOD PRESSURE: 136 MMHG | BODY MASS INDEX: 36.07 KG/M2 | DIASTOLIC BLOOD PRESSURE: 88 MMHG | HEART RATE: 75 BPM | OXYGEN SATURATION: 98 % | WEIGHT: 238 LBS | HEIGHT: 68 IN

## 2019-06-20 DIAGNOSIS — I49.3 PVC (PREMATURE VENTRICULAR CONTRACTION): ICD-10-CM

## 2019-06-20 DIAGNOSIS — R03.0 ELEVATED BLOOD PRESSURE READING: ICD-10-CM

## 2019-06-20 DIAGNOSIS — R00.2 PALPITATIONS: Primary | ICD-10-CM

## 2019-06-20 DIAGNOSIS — E03.9 HYPOTHYROIDISM, UNSPECIFIED TYPE: ICD-10-CM

## 2019-06-20 DIAGNOSIS — E78.5 DYSLIPIDEMIA: ICD-10-CM

## 2019-06-20 RX ORDER — DIAZEPAM 2 MG/1
2 TABLET ORAL 3 TIMES DAILY
COMMUNITY
Start: 2019-06-19 | End: 2019-09-17 | Stop reason: SDUPTHER

## 2019-06-20 NOTE — PROGRESS NOTES
HPI:  A 40-year-old female here for follow up. She had esophagitis September 2017, started on a PPI and did well. Chest pain resolved. She has some occasional atypical chest pain at times which is unchanged. Her major complaint this time is her significant rash. When I saw her back in June 2018 she was treated for scabies. She was seen by a dermatologist and multiple physicians but unable to make a definitive diagnosis. She does tell me she traveled to the [place] not too long prior to this, had a cough and perhaps even a rash on her foot. She also told me that she was concerned about a migratory organism on her right cheek at one point. Impression/Plan:  1. History of recurrent rash concerning for parasitic infection after travel to the [place]. She is trying to get an appointment with a tropical medicine specialist at [facility] which I agree with. 2. History of PVCs and palpitations, stable. 3. Remote syncope with no recurrence. 4. Atypical chest pain with gastroesophageal reflux disease 2017 status post treatment, remote ulcers. 5. History of dengue fever in 2010 by report. 6. Thyroid disorder. 7. Remote uterine cancer and hysterectomy with chemotherapy and radiation. Her blood pressure is reasonable. She EKG reveals sinus rhythm with minimal changes and symptoms in regard to her palpitations. Her discussion revolved mostly around her rash, description and symptoms. I agree with the referral to tropical medicine for further evaluation as long as Dr. Marvia Gottron agrees. I said I would resend a note, but again will leave the final decision after their review. Past Medical History:   Diagnosis Date    Abnormal nuclear cardiac imaging test 02/09/2012    Mid to distal anteroseptal and apical reversible defect concerning for ishcmeia. Mild-mod, mid-distal anterior & apical hypk. EF 67%. Borderline abnormal EKG w/1-mm inferolateral ST depression at 7 min exercise. Occasional PVCs.     Anxiety     Carotid duplex 84/77/0044    Mild 8-10% LICA stenosis.  Dengue fever     7/10 while in [place]    Diverticulosis     Dyslipidemia     GERD     Holter monitor study 04/29/2016    Sinus rhythm, avg HR 67 bpm (range  bpm). Rare ectopy.  Hypothyroidism     RLE venous duplex 07/23/2015    Right leg:  No DVT.  S/P cardiac catheterization 02/24/2012    Angiographically normal coronaries. Hyperdynamic. EF 70%. No RWMA.  Scabies exposure 03/2018    Sinusitis     Tinnitus     Uterine cancer (HCC)     hysterectomy    Uterine carcinoma (HCC)        Current Outpatient Medications   Medication Sig Dispense Refill    diazePAM (VALIUM) 2 mg tablet Take 2 mg by mouth three (3) times daily.  levothyroxine (SYNTHROID) 112 mcg tablet Take 1 Tab by mouth Daily (before breakfast). 90 Tab 3    atorvastatin (LIPITOR) 10 mg tablet TAKE 1 TABLET BY MOUTH EVERY DAY 90 Tab 0    clobetasol 0.05 % sham WASH THE SCALP 2 TO 3 TIMES A WEEK. LATHER FOR AT LEAST 5 MINUTES BEFORE RINSING  0    loratadine (CLARITIN) 10 mg tablet Take 1 Tab by mouth daily. Indications: Allergic Rhinitis 30 Tab 6       Social History   reports that she quit smoking about 20 years ago. Her smoking use included cigarettes. She has a 20.00 pack-year smoking history. She has never used smokeless tobacco.   reports that she does not drink alcohol. Family History  family history includes Cancer in her father; Diabetes in her maternal aunt; Heart Attack in her paternal grandmother; Heart Disease in her maternal aunt and maternal aunt; Hypertension in her maternal aunt; Other in her maternal grandfather and mother; Stroke in her maternal grandmother. Review of Systems  Except as stated above include:  Constitutional: Negative for fever, chills and malaise/fatigue. HEENT: No congestion or recent URI. Gastrointestinal: No nausea, vomiting, abdominal pain, bloody stools. Pulmonary:  Negative except as stated above.   Cardiac: Negative except as stated above. Musculoskeletal: Negative except as stated above. Neurological:  No localized symptoms. Skin:  Negative except as stated above. Psych:  Negative except as stated above. Endocrine:  Negative except as stated above. PHYSICAL EXAM  BP Readings from Last 3 Encounters:   06/20/19 136/88   12/17/18 (!) 140/100   11/14/18 (!) 134/92     Pulse Readings from Last 3 Encounters:   06/20/19 75   12/17/18 73   11/14/18 71     Wt Readings from Last 3 Encounters:   06/20/19 108 kg (238 lb)   12/17/18 111.6 kg (246 lb)   11/14/18 109.8 kg (242 lb)     General:   Well developed, well groomed. Head/Neck:   No jugular venous distention     No carotid bruits. No evidence of xanthelasma. Lungs:   No respiratory distress. Clear bilaterally. Heart:    Regular rate and rhythm. Normal S1/S2. Palpation of heart with normal point of maximum impulse. No significant murmurs, rubs or gallops. Abdomen:   Soft and nontender. No palpable abdominal mass or bruits. Extremities:   Intact peripheral pulses. No significant edema. Neurological:   Alert and oriented to person, place, time. No focal neurological deficit visually. Skin:   No obvious rash    Blood Pressure Metric:  Monitor recommended and adjustments stated if needed.

## 2019-07-12 ENCOUNTER — TELEPHONE (OUTPATIENT)
Dept: CARDIOLOGY CLINIC | Age: 64
End: 2019-07-12

## 2019-07-12 NOTE — TELEPHONE ENCOUNTER
----- Message from Tera Robledo MD sent at 6/26/2019  4:11 PM EDT -----  Regarding: RE: Your recommendation   Contact: [phone]  Yes, we can send my office note. thx    ----- Message -----  From: Laure Angel LPN  Sent: 1/40/7176  11:37 AM  To: Tera Robledo MD  Subject: Your recommendation                              Patient would like to know if you would be willing to write a recommendation for the tropical parasite infectious disease specialist rustam Weller as her PCP has only seen her twice and not fully onboard.  She states you were able to identify what it could be causing her rash and feel that it will be beneficial for her to see that type of specialist.     Thanks,    Marian

## 2019-07-22 ENCOUNTER — HOSPITAL ENCOUNTER (OUTPATIENT)
Dept: LAB | Age: 64
Discharge: HOME | End: 2019-07-22

## 2019-07-22 LAB — SENTARA SPECIMEN COL,SENBCF: NORMAL

## 2019-07-22 PROCEDURE — 99001 SPECIMEN HANDLING PT-LAB: CPT

## 2019-09-16 ENCOUNTER — OFFICE VISIT (OUTPATIENT)
Dept: FAMILY MEDICINE CLINIC | Age: 64
End: 2019-09-16

## 2019-09-16 VITALS
OXYGEN SATURATION: 99 % | WEIGHT: 245 LBS | TEMPERATURE: 97.9 F | SYSTOLIC BLOOD PRESSURE: 101 MMHG | DIASTOLIC BLOOD PRESSURE: 70 MMHG | BODY MASS INDEX: 37.13 KG/M2 | HEIGHT: 68 IN | RESPIRATION RATE: 16 BRPM | HEART RATE: 71 BPM

## 2019-09-16 DIAGNOSIS — E78.5 DYSLIPIDEMIA: ICD-10-CM

## 2019-09-16 DIAGNOSIS — B88.9: ICD-10-CM

## 2019-09-16 DIAGNOSIS — R21 SKIN RASH: Primary | ICD-10-CM

## 2019-09-16 DIAGNOSIS — R73.03 PREDIABETES: ICD-10-CM

## 2019-09-16 DIAGNOSIS — E03.9 HYPOTHYROIDISM, UNSPECIFIED TYPE: ICD-10-CM

## 2019-09-16 DIAGNOSIS — B89 DISEASE CAUSED BY PARASITE: ICD-10-CM

## 2019-09-16 DIAGNOSIS — F41.9 ANXIETY: ICD-10-CM

## 2019-09-16 RX ORDER — ALBENDAZOLE 200 MG/1
400 TABLET, FILM COATED ORAL 2 TIMES DAILY
COMMUNITY
End: 2019-12-19

## 2019-09-16 NOTE — PROGRESS NOTES
Chief Complaint   Patient presents with   [place] Establish Care     1. Have you been to the ER, urgent care clinic since your last visit? Hospitalized since your last visit? No    2. Have you seen or consulted any other health care providers outside of the [address] [place] [address] since your last visit? Include any pap smears or colon screening. No     HPI  Jamal Laguerre comes in to establish care. Patient's main concern today is to discuss skin rash. She has a chronic skin rash that has been present for more than a year. Patient states that she has been seen by various dermatologist for the same without any resolution to her symptoms. She does have various pictures on her iPad which she proceeds to show me of the rash. These have been taken over a long time. These are mainly linear images with dermatographism and areas of excoriations in the healing scabs in linear fashion over the torso, upper extremities and lower extremities. Patient states that the rash is very itchy. He has been given various anti-itch medications without much relief. Itchiness is severe especially at night and at times she is unable to sleep. Feels as if there are parasites crawling under her skin. She states that in her view she actually does have a parasitic infection that is causing the rash. She has been through various courses of ivermectin and has also been taking albendazole. States that these medications did help with symptoms but only transiently. She has been seen by different dermatologist and has even had various testings done including screening biopsies. She has been put on permethrin in the past.  Symptoms have persisted. Patient states that she had traveled previously to the [place] and this is what triggered her symptoms. She believes she must have gotten a tropical parasite that is causing this.   She would like to be referred to a topical medicine specialist.  [facility] for [facility].  Patient was seen by her cardiologist and he did happen to look at the skin rash. He did recommend that the patient be seen by tropical medicine specialist.  Patient does would like a referral for this. After discussion with the patient I will place a referral to topical medicine specialist/infectious disease. She would benefit from this evaluation. Uterine cancer:  Patient has a history of uterine cancer and is a survivor of the same. She has had radical hysterectomy. Patient has a history of colon resection due to diverticulitis. She is followed up by the gastroenterologist.  Patient has anxiety. When she gets severe anxiety she takes Valium but only uses this as needed. I did discuss with her about the use of Valium for anxiety. This is not a standard medication that we would use and we might have to try different medication. She states that she only takes this as needed and not long-term. She has tinnitus both ears and the Valium has helped with this. States that she has been on this medication for a long time. She would like a refill of the same. I will give 20 pills to use only as needed. Dyslipidemia: Patient is on atorvastatin. Continue current treatment plan. We will get her previous labs. We will check her lipid panel. Hypothyroidism: Patient is on levothyroxine. Takes 112 mcg daily. Continue current treatment plan. Plan is to monitor labs and adjust medications as needed. Prediabetes: Patient has a history of prediabetes. We will recheck her HbA1c. She should continue with lifestyle and dietary modification. Past Medical History  Past Medical History:   Diagnosis Date    Abnormal nuclear cardiac imaging test 02/09/2012    Mid to distal anteroseptal and apical reversible defect concerning for ishcmeia. Mild-mod, mid-distal anterior & apical hypk. EF 67%. Borderline abnormal EKG w/1-mm inferolateral ST depression at 7 min exercise. Occasional PVCs.     Anxiety     Carotid duplex 87/25/8110    Mild 9-84% LICA stenosis.  Dengue fever     7/10 while in [place]    Diverticulosis     Dyslipidemia     GERD     Holter monitor study 04/29/2016    Sinus rhythm, avg HR 67 bpm (range  bpm). Rare ectopy.  Hypothyroidism     RLE venous duplex 07/23/2015    Right leg:  No DVT.  S/P cardiac catheterization 02/24/2012    Angiographically normal coronaries. Hyperdynamic. EF 70%. No RWMA.  Scabies exposure 03/2018    Sinusitis     Tinnitus     Uterine cancer [facility])     hysterectomy    Uterine carcinoma [facility])        Surgical History  Past Surgical History:   Procedure Laterality Date    HX CHOLECYSTECTOMY      2009    HX COLECTOMY      2008    HX HYSTERECTOMY          Medications  Current Outpatient Medications   Medication Sig Dispense Refill    albendazole (ALBENZA) 200 mg tablet Take 400 mg by mouth two (2) times a day.  diazePAM (VALIUM) 2 mg tablet Take 2 mg by mouth three (3) times daily.  levothyroxine (SYNTHROID) 112 mcg tablet Take 1 Tab by mouth Daily (before breakfast). 90 Tab 3    atorvastatin (LIPITOR) 10 mg tablet TAKE 1 TABLET BY MOUTH EVERY DAY 90 Tab 0    loratadine (CLARITIN) 10 mg tablet Take 1 Tab by mouth daily. Indications: Allergic Rhinitis 30 Tab 6    clobetasol 0.05 % sham WASH THE SCALP 2 TO 3 TIMES A WEEK.  LATHER FOR AT LEAST 5 MINUTES BEFORE RINSING  0       Allergies  Allergies   Allergen Reactions    Omnicef [Cefdinir] Hives    Keflex [Cephalexin] Hives    Codeine Other (comments)     Severe headache    Ketek [Telithromycin] Other (comments)     Passed out    Levaquin [Levofloxacin] Unknown (comments)     Did not tolerate      Morphine Other (comments)     Severe headache    Sulfa (Sulfonamide Antibiotics) Hives    Topamax [Topiramate] Other (comments)     Made blood vessels red and pain in eyes       Family History  Family History   Problem Relation Age of Onset    Other Mother         anuerysm   [address] [facility] Father         brain tumor    Hypertension Maternal Aunt     Diabetes Maternal Aunt         lung    Heart Disease Maternal Aunt     Stroke Maternal Grandmother     Other Maternal Grandfather         leg amputated due to phlebitis    Heart Attack Paternal Grandmother     Heart Disease Maternal Aunt        Social History  Social History     Socioeconomic History    Marital status:      Spouse name: Not on file    Number of children: Not on file    Years of education: Not on file    Highest education level: Not on file   Occupational History    Not on file   Social Needs    Financial resource strain: Not on file    Food insecurity:     Worry: Not on file     Inability: Not on file    Transportation needs:     Medical: Not on file     Non-medical: Not on file   Tobacco Use    Smoking status: Former Smoker     Packs/day: 1.00     Years: 20.00     Pack years: 20.00     Types: Cigarettes     Last attempt to quit: 1999     Years since quittin.2    Smokeless tobacco: Never Used   Substance and Sexual Activity    Alcohol use: No     Alcohol/week: 0.0 standard drinks    Drug use: No     Types: Prescription, OTC    Sexual activity: Not Currently   Lifestyle    Physical activity:     Days per week: Not on file     Minutes per session: Not on file    Stress: Not on file   Relationships    Social connections:     Talks on phone: Not on file     Gets together: Not on file     Attends Holiness service: Not on file     Active member of club or organization: Not on file     Attends meetings of clubs or organizations: Not on file     Relationship status: Not on file    Intimate partner violence:     Fear of current or ex partner: Not on file     Emotionally abused: Not on file     Physically abused: Not on file     Forced sexual activity: Not on file   Other Topics Concern    Not on file   Social History Narrative    Not on file       Review of Systems  Review of Systems - Review of all systems is negative except as noted above in the HPI. Vital Signs  Visit Vitals  /70 (BP 1 Location: Left arm, BP Patient Position: Sitting)   Pulse 71   Temp 97.9 °F (36.6 °C) (Oral)   Resp 16   Ht 5' 8\" (1.727 m)   Wt 245 lb (111.1 kg)   LMP  (LMP Unknown)   SpO2 99%   BMI 37.25 kg/m²         Physical Exam  Physical Examination: General appearance - oriented to person, place, and time, overweight and acyanotic, in no respiratory distress  Mental status - alert, oriented to person, place, and time, affect appropriate to mood  Neck - supple, no significant adenopathy  Lymphatics - no palpable lymphadenopathy, no hepatosplenomegaly  Chest - clear to auscultation, no wheezes, rales or rhonchi, symmetric air entry  Heart - S1 and S2 normal  Musculoskeletal - no muscular tenderness noted  Extremities - intact peripheral pulses    Results  Results for orders placed or performed during the hospital encounter of 07/22/19   [address] [place] [address]. Result Value Ref Range    [facility]         ASSESSMENT and PLAN    ICD-10-CM ICD-9-CM    1. Skin rash R21 782.1 REFERRAL TO INFECTIOUS DISEASE   2. Disease caused by parasite B89 134.9    3. Infestation, parasite, skin B88.9 134.9 REFERRAL TO INFECTIOUS DISEASE   4. Dyslipidemia M10.1 366.9 METABOLIC PANEL, COMPREHENSIVE      LIPID PANEL      LIPID PANEL      METABOLIC PANEL, COMPREHENSIVE   5. Prediabetes R73.03 790.29 CBC WITH AUTOMATED DIFF      CBC WITH AUTOMATED DIFF      AMB POC URINALYSIS DIP STICK AUTO W/O MICRO   6. Hypothyroidism, unspecified type E03.9 244.9 TSH 3RD GENERATION      T4, FREE      T4, FREE      TSH 3RD GENERATION   7.  Anxiety F41.9 300.00 diazePAM (VALIUM) 2 mg tablet     reviewed diet, exercise and weight control  very strongly urged to quit smoking to reduce cardiovascular risk  reviewed medications and side effects in detail    I have discussed the diagnosis with the patient and the intended plan of care as seen in the above orders. The patient has received an after-visit summary and questions were answered concerning future plans. I have discussed medication, side effects, and warnings with the patient in detail. The patient verbalized understanding and is in agreement with the plan of care. The patient will contact the office with any additional concerns. More than 50% of 50-minute visit spent counseling and coordinating care with patient face to face on skin rash, differential diagnosis, treatment modalities and specialist referral.  We also discussed anxiety and management options. Discussed need for compliance with treatment regimen, follow-up, and taking responsibility for her disease process. Kosta Espinoza MD    PLEASE NOTE:   This document has been produced using voice recognition software.  Unrecognized errors in transcription may be present

## 2019-09-17 LAB
A-G RATIO,AGRAT: 2.2 RATIO (ref 1.1–2.6)
ABSOLUTE LYMPHOCYTE COUNT, 10803: 3.1 K/UL (ref 1–4.8)
ALBUMIN SERPL-MCNC: 4.6 G/DL (ref 3.5–5)
ALP SERPL-CCNC: 76 U/L (ref 40–120)
ALT SERPL-CCNC: 42 U/L (ref 5–40)
ANION GAP SERPL CALC-SCNC: 15 MMOL/L
AST SERPL W P-5'-P-CCNC: 34 U/L (ref 10–37)
BASOPHILS # BLD: 0.1 K/UL (ref 0–0.2)
BASOPHILS NFR BLD: 2 % (ref 0–2)
BILIRUB SERPL-MCNC: 0.3 MG/DL (ref 0.2–1.2)
BILIRUB UR QL STRIP: NEGATIVE
BUN SERPL-MCNC: 14 MG/DL (ref 6–22)
CALCIUM SERPL-MCNC: 9.6 MG/DL (ref 8.4–10.5)
CHLORIDE SERPL-SCNC: 109 MMOL/L (ref 98–110)
CHOLEST SERPL-MCNC: 143 MG/DL (ref 110–200)
CO2 SERPL-SCNC: 19 MMOL/L (ref 20–32)
CREAT SERPL-MCNC: 0.7 MG/DL (ref 0.8–1.4)
EOSINOPHIL # BLD: 0.3 K/UL (ref 0–0.5)
EOSINOPHIL NFR BLD: 4 % (ref 0–6)
ERYTHROCYTE [DISTWIDTH] IN BLOOD BY AUTOMATED COUNT: 16 % (ref 10–15.5)
GFRAA, 66117: >60
GFRNA, 66118: >60
GLOBULIN,GLOB: 2.1 G/DL (ref 2–4)
GLUCOSE SERPL-MCNC: 101 MG/DL (ref 70–99)
GLUCOSE UR-MCNC: NEGATIVE MG/DL
GRANULOCYTES,GRANS: 46 % (ref 40–75)
HCT VFR BLD AUTO: 42.3 % (ref 35.1–48)
HDLC SERPL-MCNC: 3.6 MG/DL (ref 0–5)
HDLC SERPL-MCNC: 40 MG/DL (ref 40–59)
HGB BLD-MCNC: 13.5 G/DL (ref 11.7–16)
KETONES P FAST UR STRIP-MCNC: NEGATIVE MG/DL
LDLC SERPL CALC-MCNC: 86 MG/DL (ref 50–99)
LYMPHOCYTES, LYMLT: 39 % (ref 20–45)
MCH RBC QN AUTO: 28 PG (ref 26–34)
MCHC RBC AUTO-ENTMCNC: 32 G/DL (ref 31–36)
MCV RBC AUTO: 87 FL (ref 80–95)
MONOCYTES # BLD: 0.7 K/UL (ref 0.1–1)
MONOCYTES NFR BLD: 9 % (ref 3–12)
NEUTROPHILS # BLD AUTO: 3.7 K/UL (ref 1.8–7.7)
PH UR STRIP: 5.5 [PH] (ref 4.6–8)
PLATELET # BLD AUTO: 182 K/UL (ref 140–440)
PMV BLD AUTO: 12.5 FL (ref 9–13)
POTASSIUM SERPL-SCNC: 4.2 MMOL/L (ref 3.5–5.5)
PROT SERPL-MCNC: 6.7 G/DL (ref 6.2–8.1)
PROT UR QL STRIP: NEGATIVE
RBC # BLD AUTO: 4.86 M/UL (ref 3.8–5.2)
SODIUM SERPL-SCNC: 143 MMOL/L (ref 133–145)
SP GR UR STRIP: 1.02 (ref 1–1.03)
T4 FREE SERPL-MCNC: 1.4 NG/DL (ref 0.9–1.8)
TRIGL SERPL-MCNC: 86 MG/DL (ref 40–149)
TSH SERPL DL<=0.005 MIU/L-ACNC: 0.81 MCU/ML (ref 0.27–4.2)
UA UROBILINOGEN AMB POC: NORMAL (ref 0.2–1)
URINALYSIS CLARITY POC: CLEAR
URINALYSIS COLOR POC: YELLOW
URINE BLOOD POC: NEGATIVE
URINE LEUKOCYTES POC: NORMAL
URINE NITRITES POC: NEGATIVE
VLDLC SERPL CALC-MCNC: 17 MG/DL (ref 8–30)
WBC # BLD AUTO: 7.9 K/UL (ref 4–11)

## 2019-09-17 RX ORDER — DIAZEPAM 2 MG/1
2 TABLET ORAL
Qty: 20 TAB | Refills: 0 | Status: SHIPPED | OUTPATIENT
Start: 2019-09-17 | End: 2019-10-16 | Stop reason: SDUPTHER

## 2019-10-03 ENCOUNTER — CLINICAL SUPPORT (OUTPATIENT)
Dept: FAMILY MEDICINE CLINIC | Age: 64
End: 2019-10-03

## 2019-10-03 ENCOUNTER — TELEPHONE (OUTPATIENT)
Dept: FAMILY MEDICINE CLINIC | Age: 64
End: 2019-10-03

## 2019-10-03 DIAGNOSIS — Z23 ENCOUNTER FOR IMMUNIZATION: Primary | ICD-10-CM

## 2019-10-03 NOTE — TELEPHONE ENCOUNTER
----- Message from Vasyl Gallegos MD sent at 9/25/2019  1:19 PM EDT -----  Please let patient know her lab results are reassuring.   Vasyl Gallegos MD

## 2019-10-03 NOTE — PROGRESS NOTES
Patient in today for flu vaccine at this time. Vaccination given to right deltoid at this time. Patient tolerated well.  No other concerns noted

## 2019-10-16 ENCOUNTER — OFFICE VISIT (OUTPATIENT)
Dept: FAMILY MEDICINE CLINIC | Age: 64
End: 2019-10-16

## 2019-10-16 VITALS
SYSTOLIC BLOOD PRESSURE: 137 MMHG | OXYGEN SATURATION: 96 % | TEMPERATURE: 95.7 F | DIASTOLIC BLOOD PRESSURE: 79 MMHG | HEIGHT: 68 IN | WEIGHT: 240 LBS | BODY MASS INDEX: 36.37 KG/M2 | HEART RATE: 62 BPM | RESPIRATION RATE: 16 BRPM

## 2019-10-16 DIAGNOSIS — R73.9 HYPERGLYCEMIA: ICD-10-CM

## 2019-10-16 DIAGNOSIS — F41.9 ANXIETY: ICD-10-CM

## 2019-10-16 DIAGNOSIS — E78.5 DYSLIPIDEMIA: ICD-10-CM

## 2019-10-16 DIAGNOSIS — E66.01 SEVERE OBESITY WITH BODY MASS INDEX (BMI) OF 35.0 TO 39.9 WITH SERIOUS COMORBIDITY (HCC): ICD-10-CM

## 2019-10-16 DIAGNOSIS — R21 RASH: Primary | ICD-10-CM

## 2019-10-16 DIAGNOSIS — E55.9 HYPOVITAMINOSIS D: ICD-10-CM

## 2019-10-16 LAB — HBA1C MFR BLD HPLC: 5.8 %

## 2019-10-16 RX ORDER — DIAZEPAM 2 MG/1
2 TABLET ORAL
Qty: 20 TAB | Refills: 0 | Status: SHIPPED | OUTPATIENT
Start: 2019-10-16 | End: 2019-12-19 | Stop reason: SDUPTHER

## 2019-10-16 NOTE — PROGRESS NOTES
Chief Complaint   Patient presents with    Follow-up     1. Have you been to the ER, urgent care clinic since your last visit? Hospitalized since your last visit? No    2. Have you seen or consulted any other health care providers outside of the [address] [place] [address] since your last visit? Include any pap smears or colon screening. No     HPI  Verónica Philippe comes in for f/u care. Dyslipidemia: Patient has dyslipidemia. She is on atorvastatin. Lipid panel has been stable. We will continue with the current treatment plan. Hypovitaminosis D: Patient has a history of low vitamin D. I will recheck her vitamin D level today. Prediabetes: Did an HbA1c. This is 5.8. Patient is doing lifestyle and dietary modification. She is trying to exercise more and effort to lose weight. Rash: Patient has rash that has been chronic for her. This rash usually appears as linear lesions with surrounding erythema and tracks. This has been chronic for her. She does have an appointment to be seen at [facility] for evaluation of possible parasitic infestation. Yesterday she noticed the rash. Was sitting at home when she developed itchiness and irritation lower abdomen and the right leg. Noticed a linear rash on the shin right leg measures about 5 cm and the similar rash lower abdomen to the right side that is also linear and measures about 7 cm. It has features of track marks. No surrounding erythema at the moment. Patient states that she took albendazole and this has helped with the surrounding redness. She has been seen in the past by various providers for this. Now await specialist evaluation at [facility]. Has an appointment in 2 weeks. We will await to see their recommendations. Anxiety: Patient has a history of anxiety. Does get anxiety attacks on and off. She is on diazepam that she takes only as needed. Would like a refill of medication.   We have discussed different medications for anxiety in the past.  I will give her the Valium to take only as needed. Hypothyroidism: She is on levothyroxine 112 mcg daily. TSH has been stable. We will continue with the current treatment plan. Maintenance: Patient has had mammogram done. This was done by Dr. Vanessa Carty. Past Medical History  Past Medical History:   Diagnosis Date    Abnormal nuclear cardiac imaging test 02/09/2012    Mid to distal anteroseptal and apical reversible defect concerning for ishcmeia. Mild-mod, mid-distal anterior & apical hypk. EF 67%. Borderline abnormal EKG w/1-mm inferolateral ST depression at 7 min exercise. Occasional PVCs.  Anxiety     Carotid duplex 13/11/4665    Mild 2-04% LICA stenosis.  Dengue fever     7/10 while in [place]    Diverticulosis     Dyslipidemia     GERD     Holter monitor study 04/29/2016    Sinus rhythm, avg HR 67 bpm (range  bpm). Rare ectopy.  Hypothyroidism     RLE venous duplex 07/23/2015    Right leg:  No DVT.  S/P cardiac catheterization 02/24/2012    Angiographically normal coronaries. Hyperdynamic. EF 70%. No RWMA.  Scabies exposure 03/2018    Sinusitis     Tinnitus     [facility])     hysterectomy    Uterine carcinoma [facility])        Surgical History  Past Surgical History:   Procedure Laterality Date    HX CHOLECYSTECTOMY      2009    HX COLECTOMY      2008    HX HYSTERECTOMY          Medications  Current Outpatient Medications   Medication Sig Dispense Refill    diazePAM (VALIUM) 2 mg tablet Take 1 Tab by mouth daily as needed for Anxiety. 20 Tab 0    albendazole (ALBENZA) 200 mg tablet Take 400 mg by mouth two (2) times a day.  levothyroxine (SYNTHROID) 112 mcg tablet Take 1 Tab by mouth Daily (before breakfast). 90 Tab 3    atorvastatin (LIPITOR) 10 mg tablet TAKE 1 TABLET BY MOUTH EVERY DAY 90 Tab 0    loratadine (CLARITIN) 10 mg tablet Take 1 Tab by mouth daily. Indications:  Allergic Rhinitis 30 Tab 6       Allergies  Allergies Allergen Reactions    Omnicef [Cefdinir] Hives    Keflex [Cephalexin] Hives    Codeine Other (comments)     Severe headache    Ketek [Telithromycin] Other (comments)     Passed out    Levaquin [Levofloxacin] Unknown (comments)     Did not tolerate      Morphine Other (comments)     Severe headache    Sulfa (Sulfonamide Antibiotics) Hives    Topamax [Topiramate] Other (comments)     Made blood vessels red and pain in eyes       Family History  Family History   Problem Relation Age of Onset    Other Mother         anuerysm    Hypertension Maternal Aunt     Heart Disease Maternal Aunt     Stroke Maternal Grandmother     Other Maternal Grandfather         leg amputated due to phlebitis    Heart Attack Paternal Grandmother     Heart Disease Maternal Aunt        Social History  Social History     Socioeconomic History    Marital status:      Spouse name: Not on file    Number of children: Not on file    Years of education: Not on file    Highest education level: Not on file   Occupational History    Not on file   Social Needs    Financial resource strain: Not on file    Food insecurity:     Worry: Not on file     Inability: Not on file    Transportation needs:     Medical: Not on file     Non-medical: Not on file   Tobacco Use    Smoking status: Former Smoker     Packs/day: 1.00     Years: 20.00     Pack years: 20.00     Types: Cigarettes     Last attempt to quit: 1999     Years since quittin.3    Smokeless tobacco: Never Used   Substance and Sexual Activity    Alcohol use: No     Alcohol/week: 0.0 standard drinks    Drug use: No     Types: Prescription, OTC    Sexual activity: Not Currently   Lifestyle    Physical activity:     Days per week: Not on file     Minutes per session: Not on file    Stress: Not on file   Relationships    Social connections:     Talks on phone: Not on file     Gets together: Not on file     Attends Zoroastrianism service: Not on file     Active member of club or organization: Not on file     Attends meetings of clubs or organizations: Not on file     Relationship status: Not on file    Intimate partner violence:     Fear of current or ex partner: Not on file     Emotionally abused: Not on file     Physically abused: Not on file     Forced sexual activity: Not on file   Other Topics Concern    Not on file   Social History Narrative    Not on file       Review of Systems  Review of Systems - Review of all systems is negative except as noted above in the HPI. Vital Signs  Visit Vitals  /79 (BP 1 Location: Left arm, BP Patient Position: Sitting)   Pulse 62   Temp 95.7 °F (35.4 °C) (Oral)   Resp 16   Ht 5' 8\" (1.727 m)   Wt 240 lb (108.9 kg)   LMP  (LMP Unknown)   SpO2 96%   BMI 36.49 kg/m²         Physical Exam  Physical Examination: General appearance - alert, well appearing, and in no distress, oriented to person, place, and time and acyanotic, in no respiratory distress  Mental status - alert, oriented to person, place, and time, affect appropriate to mood  Neck - supple, no significant adenopathy  Lymphatics - no palpable lymphadenopathy  Chest - clear to auscultation, no wheezes, rales or rhonchi, symmetric air entry  Heart - normal rate and regular rhythm, S1 and S2 normal  Abdomen - no rebound tenderness noted  Neurological - motor and sensory grossly normal bilaterally  Musculoskeletal - full range of motion without pain  Extremities - intact peripheral pulses  Skin - linear rash on the shin right leg measures about 5 cm and the similar rash lower abdomen to the right side that is also linear and measures about 7 cm. It has features of track marks. No surrounding erythema at the moment.      Results  Results for orders placed or performed in visit on 09/16/19   T4, FREE   Result Value Ref Range    T4, Free 1.4 0.9 - 1.8 ng/dL   TSH 3RD GENERATION   Result Value Ref Range    TSH 0.81 0.27 - 4.20 mcU/mL   LIPID PANEL   Result Value Ref Range Triglyceride 86 40 - 149 mg/dL    HDL Cholesterol 40 40 - 59 mg/dL    Cholesterol, total 143 110 - 200 mg/dL    CHOLESTEROL/HDL 3.6 0.0 - 5.0    LDL, calculated 86 50 - 99 mg/dL    VLDL, calculated 17 8 - 30 mg/dL   METABOLIC PANEL, COMPREHENSIVE   Result Value Ref Range    Glucose 101 (H) 70 - 99 mg/dL    BUN 14 6 - 22 mg/dL    Creatinine 0.7 (L) 0.8 - 1.4 mg/dL    Sodium 143 133 - 145 mmol/L    Potassium 4.2 3.5 - 5.5 mmol/L    Chloride 109 98 - 110 mmol/L    CO2 19 (L) 20 - 32 mmol/L    AST (SGOT) 34 10 - 37 U/L    ALT (SGPT) 42 (H) 5 - 40 U/L    Alk. phosphatase 76 40 - 120 U/L    Bilirubin, total 0.3 0.2 - 1.2 mg/dL    Calcium 9.6 8.4 - 10.5 mg/dL    Protein, total 6.7 6.2 - 8.1 g/dL    Albumin 4.6 3.5 - 5.0 g/dL    A-G Ratio 2.2 1.1 - 2.6 ratio    Globulin 2.1 2.0 - 4.0 g/dL    Anion gap 15.0 mmol/L    GFRAA >60.0 >60.0    GFRNA >60.0 >60.0   CBC WITH AUTOMATED DIFF   Result Value Ref Range    WBC 7.9 4.0 - 11.0 K/uL    RBC 4.86 3.80 - 5.20 M/uL    HGB 13.5 11.7 - 16.0 g/dL    HCT 42.3 35.1 - 48.0 %    MCV 87 80 - 95 fL    MCH 28 26 - 34 pg    MCHC 32 31 - 36 g/dL    RDW 16.0 (H) 10.0 - 15.5 %    PLATELET 470 079 - 521 K/uL    MPV 12.5 9.0 - 13.0 fL    NEUTROPHILS 46 40 - 75 %    Lymphocytes 39 20 - 45 %    MONOCYTES 9 3 - 12 %    EOSINOPHILS 4 0 - 6 %    BASOPHILS 2 0 - 2 %    ABS. NEUTROPHILS 3.7 1.8 - 7.7 K/uL    ABSOLUTE LYMPHOCYTE COUNT 3.1 1.0 - 4.8 K/uL    ABS. MONOCYTES 0.7 0.1 - 1.0 K/uL    ABS. EOSINOPHILS 0.3 0.0 - 0.5 K/uL    ABS.  BASOPHILS 0.1 0.0 - 0.2 K/uL   AMB POC URINALYSIS DIP STICK AUTO W/O MICRO   Result Value Ref Range    Color (UA POC) Yellow     Clarity (UA POC) Clear     Glucose (UA POC) Negative Negative    Bilirubin (UA POC) Negative Negative    Ketones (UA POC) Negative Negative    Specific gravity (UA POC) 1.025 1.001 - 1.035    Blood (UA POC) Negative Negative    pH (UA POC) 5.5 4.6 - 8.0    Protein (UA POC) Negative Negative    Urobilinogen (UA POC) 0.2 mg/dL 0.2 - 1 Nitrites (UA POC) Negative Negative    Leukocyte esterase (UA POC) 1+ Negative       ASSESSMENT and PLAN    ICD-10-CM ICD-9-CM    1. Rash R21 782.1    2. Hyperglycemia R73.9 790.29 AMB POC HEMOGLOBIN A1C      COLLECTION VENOUS BLOOD,VENIPUNCTURE   3. Hypovitaminosis D E55.9 268.9 VITAMIN D, 25 HYDROXY      VITAMIN D, 25 HYDROXY      COLLECTION VENOUS BLOOD,VENIPUNCTURE   4. Anxiety F41.9 300.00 diazePAM (VALIUM) 2 mg tablet      COLLECTION VENOUS BLOOD,VENIPUNCTURE   5. Dyslipidemia E78.5 272.4    6. Severe obesity with body mass index (BMI) of 35.0 to 39.9 with serious comorbidity (HCC) E66.01 278.01    Discussed the patient's BMI with her. The BMI follow up plan is as follows:     dietary management education, guidance, and counseling  encourage exercise  monitor weight  lab results and schedule of future lab studies reviewed with patient  reviewed diet, exercise and weight control  cardiovascular risk and specific lipid/LDL goals reviewed  reviewed medications and side effects in detail      I have discussed the diagnosis with the patient and the intended plan of care as seen in the above orders. The patient has received an after-visit summary and questions were answered concerning future plans. I have discussed medication, side effects, and warnings with the patient in detail. The patient verbalized understanding and is in agreement with the plan of care. The patient will contact the office with any additional concerns. Blaze Aguila MD    PLEASE NOTE:   This document has been produced using voice recognition software.  Unrecognized errors in transcription may be present

## 2019-10-16 NOTE — PATIENT INSTRUCTIONS
Body Mass Index: Care Instructions Your Care Instructions Body mass index (BMI) can help you see if your weight is raising your risk for health problems. It uses a formula to compare how much you weigh with how tall you are. · A BMI lower than 18.5 is considered underweight. · A BMI between 18.5 and 24.9 is considered healthy. · A BMI between 25 and 29.9 is considered overweight. A BMI of 30 or higher is considered obese. If your BMI is in the normal range, it means that you have a lower risk for weight-related health problems. If your BMI is in the overweight or obese range, you may be at increased risk for weight-related health problems, such as high blood pressure, heart disease, stroke, arthritis or joint pain, and diabetes. If your BMI is in the underweight range, you may be at increased risk for health problems such as fatigue, lower protection (immunity) against illness, muscle loss, bone loss, hair loss, and hormone problems. BMI is just one measure of your risk for weight-related health problems. You may be at higher risk for health problems if you are not active, you eat an unhealthy diet, or you drink too much alcohol or use tobacco products. Follow-up care is a key part of your treatment and safety. Be sure to make and go to all appointments, and call your doctor if you are having problems. It's also a good idea to know your test results and keep a list of the medicines you take. How can you care for yourself at home? · Practice healthy eating habits. This includes eating plenty of fruits, vegetables, whole grains, lean protein, and low-fat dairy. · If your doctor recommends it, get more exercise. Walking is a good choice. Bit by bit, increase the amount you walk every day. Try for at least 30 minutes on most days of the week. · Do not smoke. Smoking can increase your risk for health problems.  If you need help quitting, talk to your doctor about stop-smoking programs and medicines. These can increase your chances of quitting for good. · Limit alcohol to 2 drinks a day for men and 1 drink a day for women. Too much alcohol can cause health problems. If you have a BMI higher than 25 · Your doctor may do other tests to check your risk for weight-related health problems. This may include measuring the distance around your waist. A waist measurement of more than 40 inches in men or 35 inches in women can increase the risk of weight-related health problems. · Talk with your doctor about steps you can take to stay healthy or improve your health. You may need to make lifestyle changes to lose weight and stay healthy, such as changing your diet and getting regular exercise. If you have a BMI lower than 18.5 · Your doctor may do other tests to check your risk for health problems. · Talk with your doctor about steps you can take to stay healthy or improve your health. You may need to make lifestyle changes to gain or maintain weight and stay healthy, such as getting more healthy foods in your diet and doing exercises to build muscle. Where can you learn more? Go to http://bonnie-justyna.info/. Enter S176 in the search box to learn more about \"Body Mass Index: Care Instructions. \" Current as of: October 13, 2016 Content Version: 11.4 © 9100-4368 [employer]. Care instructions adapted under license by "[employer] (which disclaims liability or warranty for this information). If you have questions about a medical condition or this instruction, always ask your healthcare professional. [address] any warranty or liability for your use of this information.

## 2019-10-16 NOTE — PROGRESS NOTES
Please let patient know her HbA1c is 5.8. This indicates prediabetes. She should intensify lifestyle and dietary modification. Recheck in 3 to 6 months.   Vincenzo Castillo MD

## 2019-10-16 NOTE — PROGRESS NOTES
Pt here for visit. Venipuncture done in left AC. Blood specimen obtained. Pt tolerated well. No comps.

## 2019-10-17 LAB — 25(OH)D3 SERPL-MCNC: 27.9 NG/ML (ref 32–100)

## 2019-10-17 NOTE — PROGRESS NOTES
Spoke with patient at this time. No answer noted at this time.  LVM informing patient to return phone call

## 2019-10-22 ENCOUNTER — TELEPHONE (OUTPATIENT)
Dept: FAMILY MEDICINE CLINIC | Age: 64
End: 2019-10-22

## 2019-10-22 RX ORDER — ERGOCALCIFEROL 1.25 MG/1
50000 CAPSULE ORAL
Qty: 13 CAP | Refills: 3 | Status: SHIPPED | OUTPATIENT
Start: 2019-10-22 | End: 2020-09-29 | Stop reason: SDUPTHER

## 2019-10-22 NOTE — TELEPHONE ENCOUNTER
Patient called, states returning call to discuss more lab results. Verified name and . Advised of message below from Dr. Zohra Del Real. She expresses understanding. \"Please let patient know her vitamin D D level is low.  I will send in replacement therapy to take weekly.  Recheck in 3 months.   Hesed Elizabeth Reynoso MD\"

## 2019-10-22 NOTE — PROGRESS NOTES
Please let patient know her vitamin D D level is low. I will send in replacement therapy to take weekly. Recheck in 3 months.   Rosa Enrique MD

## 2019-10-22 NOTE — PROGRESS NOTES
Call made to pt, no answer at this time. Left message to give our office a call at [phone] to obtain lab result.

## 2019-12-02 DIAGNOSIS — E03.9 HYPOTHYROIDISM, UNSPECIFIED TYPE: ICD-10-CM

## 2019-12-03 RX ORDER — LEVOTHYROXINE SODIUM 112 UG/1
TABLET ORAL
Qty: 90 TAB | Refills: 0 | Status: SHIPPED | OUTPATIENT
Start: 2019-12-03 | End: 2020-03-03

## 2019-12-13 ENCOUNTER — TELEPHONE (OUTPATIENT)
Dept: FAMILY MEDICINE CLINIC | Age: 64
End: 2019-12-13

## 2019-12-13 NOTE — TELEPHONE ENCOUNTER
Patient called requesting a call back from Dr. Jim Andre regarding a reoccurring skin issue. She is aware that Dr. Jim Andre is out of the office until Monday.

## 2019-12-17 NOTE — TELEPHONE ENCOUNTER
Return patient call at this time. Patient states she wanted to speak with the provider briefly. Asked patient if its something I can help her with. Patient states she rather speak with provider. Informed patient normally provider don't take phone calls but you can scheduled an appointment. Patient declined. Also encourage patient she could send a message on JumpChat. Informed patient I will send call to provide to see if he can return her phone call but is not guaranteed. Patient states matter is personal

## 2019-12-18 NOTE — TELEPHONE ENCOUNTER
Called and spoke to patient. Patient has been seen at [facility]. She has an appointment to come in tomorrow to the clinic. She wonders about taking ivermectin. States that she has scabies. She had been referred to a dermatologist in [place] but has been unable to go due to logistics but she will still try and get there. She has also been looking for different dermatologist closer that she can go to. She has used the permethrin cream.  States that the rashes have become more extensive. We will discuss this at tomorrow's visit.   Mariaelena Valdivia MD

## 2019-12-19 ENCOUNTER — OFFICE VISIT (OUTPATIENT)
Dept: FAMILY MEDICINE CLINIC | Age: 64
End: 2019-12-19

## 2019-12-19 VITALS
TEMPERATURE: 98 F | HEART RATE: 70 BPM | BODY MASS INDEX: 37.28 KG/M2 | DIASTOLIC BLOOD PRESSURE: 85 MMHG | WEIGHT: 246 LBS | SYSTOLIC BLOOD PRESSURE: 137 MMHG | RESPIRATION RATE: 16 BRPM | HEIGHT: 68 IN | OXYGEN SATURATION: 98 %

## 2019-12-19 DIAGNOSIS — E78.5 DYSLIPIDEMIA: ICD-10-CM

## 2019-12-19 DIAGNOSIS — B86 SCABIES: ICD-10-CM

## 2019-12-19 DIAGNOSIS — R21 SKIN RASH: Primary | ICD-10-CM

## 2019-12-19 DIAGNOSIS — F41.9 ANXIETY: ICD-10-CM

## 2019-12-19 RX ORDER — IVERMECTIN 10 MG/G
CREAM TOPICAL
Qty: 45 G | Refills: 1 | Status: SHIPPED | OUTPATIENT
Start: 2019-12-19 | End: 2020-06-10 | Stop reason: SDUPTHER

## 2019-12-19 RX ORDER — IVERMECTIN 3 MG/1
TABLET ORAL
Qty: 56 TAB | Refills: 0 | Status: SHIPPED | OUTPATIENT
Start: 2019-12-19 | End: 2019-12-19 | Stop reason: SDUPTHER

## 2019-12-19 RX ORDER — ATORVASTATIN CALCIUM 10 MG/1
TABLET, FILM COATED ORAL
Qty: 90 TAB | Refills: 0 | Status: SHIPPED | OUTPATIENT
Start: 2019-12-19 | End: 2019-12-19 | Stop reason: SDUPTHER

## 2019-12-19 RX ORDER — ATORVASTATIN CALCIUM 10 MG/1
TABLET, FILM COATED ORAL
Qty: 90 TAB | Refills: 0 | Status: SHIPPED | OUTPATIENT
Start: 2019-12-19 | End: 2020-11-10 | Stop reason: SDUPTHER

## 2019-12-19 RX ORDER — IVERMECTIN 3 MG/1
TABLET ORAL
Qty: 56 TAB | Refills: 0 | Status: SHIPPED | OUTPATIENT
Start: 2019-12-19 | End: 2020-06-10 | Stop reason: SDUPTHER

## 2019-12-19 RX ORDER — DIAZEPAM 2 MG/1
2 TABLET ORAL
Qty: 20 TAB | Refills: 0 | Status: SHIPPED | OUTPATIENT
Start: 2019-12-19 | End: 2020-01-30 | Stop reason: SDUPTHER

## 2019-12-19 NOTE — PROGRESS NOTES
Chief Complaint   Patient presents with    Skin Problem     1. Have you been to the ER, urgent care clinic since your last visit? Hospitalized since your last visit? No    2. Have you seen or consulted any other health care providers outside of the [address] [place] [address] since your last visit? Include any pap smears or colon screening. No   HPI  Fabiola Soto is in accompanied by her  for follow-up care. Patient has chronic skin rash. I have seen her previously for this and she has seen several providers with the same. These are skin lesions that look like scratch marks. Patient states that this is tunneling due to scabies. Has seen several providers who she says have concurred that this is scabies. Most recently I did place referral for her and she was seen at [facility] with infectious disease. She wanted an evaluation for infectious disease causing the rash. She thought she may have had a parasite. Tests run she says were negative. I am yet to receive the report. She was told that this looks like scabies and there was advised to follow-up with a dermatologist.  She is yet to set up that appointment but has the details for the dermatologist.  I did concur that she needs to follow-up with a specialist as referred. In the meantime we had a long discussion about scabies. She has taken a lot of medication again scabies and has been doing a lot of preventative measures. 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 I did let her know that there has not been any definitive diagnosis for scabies and this might not be scabies. I will however give the prescription for ivermectin. She will be treated for this and her  will also be treated. She states that he thinks her  has been incompletely treated for scabies as he has thick skin and ichthyosis.   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  She states that this is tunneling due to the scabies parasite. Patient has anxiety. She is at times tearful given her skin condition has changed her life. She is afraid to have her children and grandchildren visiting her as he does not want him to undergo the same. She states that her daughter came to visit her recently and she ended up being treated for scabies as she was having itching that was generalized. With patient itches it is disabling and she is unable to sleep. She states that she and her  do not even touch each other and they sleep in separate bedrooms in an effort to try and isolate scabies. I did try to reassure her. She takes diazepam for anxiety. She would like a refill of medication. She only takes this as needed. In the past we have talked about using other medications. Given her level of anxiety I will give her a refill of medication. A medication like hydroxyzine might be better given it due to anxiety and itching. Patient has dyslipidemia. She takes atorvastatin. She would like a refill of medication. Prescription will be given. Past Medical History  Past Medical History:   Diagnosis Date    Abnormal nuclear cardiac imaging test 02/09/2012    Mid to distal anteroseptal and apical reversible defect concerning for ishcmeia. Mild-mod, mid-distal anterior & apical hypk. EF 67%. Borderline abnormal EKG w/1-mm inferolateral ST depression at 7 min exercise. Occasional PVCs.  Anxiety     Carotid duplex 31/02/9801    Mild 3-26% LICA stenosis.  Dengue fever     7/10 while in [place]    Diverticulosis     Dyslipidemia     GERD     Holter monitor study 04/29/2016    Sinus rhythm, avg HR 67 bpm (range  bpm). Rare ectopy.  Hypothyroidism     RLE venous duplex 07/23/2015    Right leg:  No DVT.       S/P cardiac catheterization 02/24/2012 Angiographically normal coronaries. Hyperdynamic. EF 70%. No RWMA.  Scabies exposure 03/2018    Sinusitis     Tinnitus     Uterine cancer [facility])     hysterectomy    Uterine carcinoma [facility])        Surgical History  Past Surgical History:   Procedure Laterality Date    HX CHOLECYSTECTOMY      2009    HX COLECTOMY      2008    HX HYSTERECTOMY          Medications  Current Outpatient Medications   Medication Sig Dispense Refill    diazePAM (VALIUM) 2 mg tablet Take 1 Tab by mouth daily as needed for Anxiety. 20 Tab 0    ivermectin 1 % crea Apply thin layer every 2-3 days. 45 g 1    ivermectin (STROMECTOL) 3 mg tablet Take 24 mg (8 tabs) on  days 0,1,7,8,14,21 and 28. 56 Tab 0    atorvastatin (LIPITOR) 10 mg tablet TAKE 1 TABLET BY MOUTH EVERY DAY 90 Tab 0    SYNTHROID 112 mcg tablet TAKE 1 TABLET BY MOUTH DAILY BEFORE BREAKFAST 90 Tab 0    ergocalciferol (ERGOCALCIFEROL) 50,000 unit capsule Take 1 Cap by mouth every seven (7) days. 13 Cap 3    loratadine (CLARITIN) 10 mg tablet Take 1 Tab by mouth daily. Indications:  Allergic Rhinitis 30 Tab 6       Allergies  Allergies   Allergen Reactions    Omnicef [Cefdinir] Hives    Keflex [Cephalexin] Hives    Codeine Other (comments)     Severe headache    Ketek [Telithromycin] Other (comments)     Passed out    Levaquin [Levofloxacin] Unknown (comments)     Did not tolerate      Morphine Other (comments)     Severe headache    Sulfa (Sulfonamide Antibiotics) Hives    Topamax [Topiramate] Other (comments)     Made blood vessels red and pain in eyes       Family History  Family History   Problem Relation Age of Onset    Other Mother         anuerysm    Hypertension Maternal Aunt     Heart Disease Maternal Aunt     Stroke Maternal Grandmother     Other Maternal Grandfather         leg amputated due to phlebitis    Heart Attack Paternal Grandmother     Heart Disease Maternal Aunt        Social History  Social History     Socioeconomic History    Marital status:      Spouse name: Not on file    Number of children: Not on file    Years of education: Not on file    Highest education level: Not on file   Occupational History    Not on file   Social Needs    Financial resource strain: Not on file    Food insecurity:     Worry: Not on file     Inability: Not on file    Transportation needs:     Medical: Not on file     Non-medical: Not on file   Tobacco Use    Smoking status: Former Smoker     Packs/day: 1.00     Years: 20.00     Pack years: 20.00     Types: Cigarettes     Last attempt to quit: 1999     Years since quittin.5    Smokeless tobacco: Never Used   Substance and Sexual Activity    Alcohol use: No     Alcohol/week: 0.0 standard drinks    Drug use: No     Types: Prescription, OTC    Sexual activity: Not Currently   Lifestyle    Physical activity:     Days per week: Not on file     Minutes per session: Not on file    Stress: Not on file   Relationships    Social connections:     Talks on phone: Not on file     Gets together: Not on file     Attends Islam service: Not on file     Active member of club or organization: Not on file     Attends meetings of clubs or organizations: Not on file     Relationship status: Not on file    Intimate partner violence:     Fear of current or ex partner: Not on file     Emotionally abused: Not on file     Physically abused: Not on file     Forced sexual activity: Not on file   Other Topics Concern    Not on file   Social History Narrative    Not on file       Review of Systems  Review of Systems - Review of all systems is negative except as noted above in the HPI.     Vital Signs  Visit Vitals  /85 (BP 1 Location: Left arm, BP Patient Position: Sitting)   Pulse 70   Temp 98 °F (36.7 °C) (Oral)   Resp 16   Ht 5' 8\" (1.727 m)   Wt 246 lb (111.6 kg)   LMP  (LMP Unknown)   SpO2 98%   BMI 37.40 kg/m²         Physical Exam  Physical Examination: General appearance - oriented to person, place, and time, anxious and crying  Mental status - depressed mood, anxious  Chest - clear to auscultation, no wheezes, rales or rhonchi, symmetric air entry  Heart - normal rate and regular rhythm, S1 and S2 normal  Neurological - motor and sensory grossly normal bilaterally  Musculoskeletal - full range of motion without pain  Extremities - intact peripheral pulses  Skin -patient has multiple scratch marks with tracts on her back, breasts and forearms. This should various stages of healing and scab formation. Results  Results for orders placed or performed in visit on 10/16/19   VITAMIN D, 25 HYDROXY   Result Value Ref Range    VITAMIN D, 25-HYDROXY 27.9 (L) 32.0 - 100.0 ng/mL   AMB POC HEMOGLOBIN A1C   Result Value Ref Range    Hemoglobin A1c (POC) 5.8 %       ASSESSMENT and PLAN    ICD-10-CM ICD-9-CM    1. Skin rash R21 782.1    2. Scabies B86 133.0 ivermectin 1 % crea      ivermectin (STROMECTOL) 3 mg tablet      DISCONTINUED: ivermectin (STROMECTOL) 3 mg tablet   3. Dyslipidemia E78.5 272.4 atorvastatin (LIPITOR) 10 mg tablet      DISCONTINUED: atorvastatin (LIPITOR) 10 mg tablet   4. Anxiety F41.9 300.00 diazePAM (VALIUM) 2 mg tablet     reviewed diet, exercise and weight control  reviewed medications and side effects in detail    I have discussed the diagnosis with the patient and the intended plan of care as seen in the above orders. The patient has received an after-visit summary and questions were answered concerning future plans. I have discussed medication, side effects, and warnings with the patient in detail. The patient verbalized understanding and is in agreement with the plan of care. The patient will contact the office with any additional concerns. More than 50% of 50-minute visit spent counseling and coordinating care with patient face to face on scabies, mood disorder and management options and diagnosis.  Discussed need for compliance with treatment regimen, follow-up, and taking responsibility for her disease process. Sharon Santana MD    PLEASE NOTE:   This document has been produced using voice recognition software.  Unrecognized errors in transcription may be present

## 2019-12-23 NOTE — TELEPHONE ENCOUNTER
Patient called stating the pharmacy gave her and her  6 days supply of medication instead of 7 days to complete treatment.  She states someone needs to call [employer] to OK the cream.

## 2019-12-26 ENCOUNTER — TELEPHONE (OUTPATIENT)
Dept: FAMILY MEDICINE CLINIC | Age: 64
End: 2019-12-26

## 2019-12-26 NOTE — TELEPHONE ENCOUNTER
Patient would like to be contacted concerning a prior authorization. Stated she has the info to give to Anisha for the authorization.  Please contact when available

## 2019-12-30 NOTE — TELEPHONE ENCOUNTER
Spoke with Insurance at this time and insurance states they need proof that patient has tried metronidazole.  Will  follow up on matter tomorrow

## 2020-01-02 NOTE — TELEPHONE ENCOUNTER
Information sent to prior [facility] department for a second review for approval of medication. Waiting on response of approval or denial chart notes submitted at this time

## 2020-01-16 ENCOUNTER — OFFICE VISIT (OUTPATIENT)
Dept: FAMILY MEDICINE CLINIC | Age: 65
End: 2020-01-16

## 2020-01-16 VITALS
SYSTOLIC BLOOD PRESSURE: 139 MMHG | WEIGHT: 243 LBS | HEART RATE: 68 BPM | OXYGEN SATURATION: 95 % | RESPIRATION RATE: 16 BRPM | TEMPERATURE: 96.6 F | BODY MASS INDEX: 36.83 KG/M2 | DIASTOLIC BLOOD PRESSURE: 82 MMHG | HEIGHT: 68 IN

## 2020-01-16 DIAGNOSIS — E03.9 HYPOTHYROIDISM, UNSPECIFIED TYPE: ICD-10-CM

## 2020-01-16 DIAGNOSIS — E78.5 DYSLIPIDEMIA: ICD-10-CM

## 2020-01-16 DIAGNOSIS — E55.9 HYPOVITAMINOSIS D: ICD-10-CM

## 2020-01-16 DIAGNOSIS — B86 SCABIES: Primary | ICD-10-CM

## 2020-01-16 DIAGNOSIS — F41.9 ANXIETY: ICD-10-CM

## 2020-01-16 RX ORDER — PERMETHRIN 50 MG/G
CREAM TOPICAL
Qty: 120 G | Refills: 2 | Status: SHIPPED | OUTPATIENT
Start: 2020-01-16 | End: 2020-03-03 | Stop reason: SDUPTHER

## 2020-01-16 RX ORDER — PERMETHRIN 50 MG/G
CREAM TOPICAL
Qty: 60 G | Refills: 0 | Status: SHIPPED | OUTPATIENT
Start: 2020-01-16 | End: 2020-01-16 | Stop reason: SDUPTHER

## 2020-01-16 NOTE — PROGRESS NOTES
Chief Complaint   Patient presents with    Follow-up     1. Have you been to the ER, urgent care clinic since your last visit? Hospitalized since your last visit? No    2. Have you seen or consulted any other health care providers outside of the [address] [place] [address] since your last visit? Include any pap smears or colon screening. no      [facility] comes in for follow-up care. Patient has a history of skin rash. This has been ongoing for years. She has been seen by various specialist.  Has been told that this looks like scabies and she is currently taking ivermectin. She does state that that she has scar tissue and keloids from previous surgeries and she thinks that the scabies parasite have been hiding in those areas thus the recurrence of symptoms. She does show me pictures that he says she took lately when she was having the mites and tracks on her skin. She has tried to scrub the skin without much improvement. She states that using ivermectin however has helped. She has been using other topical treatments to try and help the situation. She would like to get permethrin to use. Patient has an appointment to be seen by dermatologist in March. I will follow-up after that. She has a history of hypothyroidism and is on thyroid replacement therapy. We will continue with this medication. Patient has anxiety. She takes Valium as needed. She also has dyslipidemia and is on Lipitor. Denies muscle aches or cramps. Will continue with the current treatment plan. Patient has a history of low vitamin D and she takes weekly vitamin D replacement therapy. Plan to recheck this at next visit. Past Medical History  Past Medical History:   Diagnosis Date    Abnormal nuclear cardiac imaging test 02/09/2012    Mid to distal anteroseptal and apical reversible defect concerning for ishcmeia. Mild-mod, mid-distal anterior & apical hypk. EF 67%.   Borderline abnormal EKG w/1-mm inferolateral ST depression at 7 min exercise. Occasional PVCs.  Anxiety     Carotid duplex 93/63/9250    Mild 5-28% LICA stenosis.  Dengue fever     7/10 while in [place]    Diverticulosis     Dyslipidemia     GERD     Holter monitor study 04/29/2016    Sinus rhythm, avg HR 67 bpm (range  bpm). Rare ectopy.  Hypothyroidism     RLE venous duplex 07/23/2015    Right leg:  No DVT.  S/P cardiac catheterization 02/24/2012    Angiographically normal coronaries. Hyperdynamic. EF 70%. No RWMA.  Scabies exposure 03/2018    Sinusitis     Tinnitus     Uterine cancer (HCC)     hysterectomy    Uterine carcinoma [facility])        Surgical History  Past Surgical History:   Procedure Laterality Date    HX CHOLECYSTECTOMY      2009    HX COLECTOMY      2008    HX HYSTERECTOMY          Medications  Current Outpatient Medications   Medication Sig Dispense Refill    permethrin (ACTICIN) 5 % topical cream apply sparingly as directed, Apply to entire body; leave on for 8 to 14 hours before washing off with water 120 g 2    diazePAM (VALIUM) 2 mg tablet Take 1 Tab by mouth daily as needed for Anxiety. 20 Tab 0    ivermectin (STROMECTOL) 3 mg tablet Take 24 mg (8 tabs) on  days 0,1,7,8,14,21 and 28. 56 Tab 0    atorvastatin (LIPITOR) 10 mg tablet TAKE 1 TABLET BY MOUTH EVERY DAY 90 Tab 0    SYNTHROID 112 mcg tablet TAKE 1 TABLET BY MOUTH DAILY BEFORE BREAKFAST 90 Tab 0    ergocalciferol (ERGOCALCIFEROL) 50,000 unit capsule Take 1 Cap by mouth every seven (7) days. 13 Cap 3    loratadine (CLARITIN) 10 mg tablet Take 1 Tab by mouth daily. Indications: Allergic Rhinitis 30 Tab 6    ivermectin 1 % crea Apply thin layer every 2-3 days.  45 g 1       Allergies  Allergies   Allergen Reactions    Omnicef [Cefdinir] Hives    Keflex [Cephalexin] Hives    Codeine Other (comments)     Severe headache    Ketek [Telithromycin] Other (comments)     Passed out    Levaquin [Levofloxacin] Unknown (comments)     Did not tolerate      Morphine Other (comments)     Severe headache    Sulfa (Sulfonamide Antibiotics) Hives    Topamax [Topiramate] Other (comments)     Made blood vessels red and pain in eyes       Family History  Family History   Problem Relation Age of Onset    Other Mother         anuerysm    Hypertension Maternal Aunt     Heart Disease Maternal Aunt     Stroke Maternal Grandmother     Other Maternal Grandfather         leg amputated due to phlebitis    Heart Attack Paternal Grandmother     Heart Disease Maternal Aunt        Social History  Social History     Socioeconomic History    Marital status:      Spouse name: Not on file    Number of children: Not on file    Years of education: Not on file    Highest education level: Not on file   Occupational History    Not on file   Social Needs    Financial resource strain: Not on file    Food insecurity:     Worry: Not on file     Inability: Not on file    Transportation needs:     Medical: Not on file     Non-medical: Not on file   Tobacco Use    Smoking status: Former Smoker     Packs/day: 1.00     Years: 20.00     Pack years: 20.00     Types: Cigarettes     Last attempt to quit: 1999     Years since quittin.5    Smokeless tobacco: Never Used   Substance and Sexual Activity    Alcohol use: No     Alcohol/week: 0.0 standard drinks    Drug use: No     Types: Prescription, OTC    Sexual activity: Not Currently   Lifestyle    Physical activity:     Days per week: Not on file     Minutes per session: Not on file    Stress: Not on file   Relationships    Social connections:     Talks on phone: Not on file     Gets together: Not on file     Attends [employer] service: Not on file     Active member of club or organization: Not on file     Attends meetings of clubs or organizations: Not on file     Relationship status: Not on file    Intimate partner violence:     Fear of current or ex partner: Not on file     Emotionally abused: Not on file     Physically abused: Not on file     Forced sexual activity: Not on file   Other Topics Concern    Not on file   Social History Narrative    Not on file       Review of Systems  Review of Systems - Review of all systems is negative except as noted above in the HPI. Vital Signs  Visit Vitals  /82 (BP 1 Location: Left arm, BP Patient Position: Sitting)   Pulse 68   Temp 96.6 °F (35.9 °C) (Oral)   Resp 16   Ht 5' 8\" (1.727 m)   Wt 243 lb (110.2 kg)   LMP  (LMP Unknown)   SpO2 95%   BMI 36.95 kg/m²         Physical Exam  Physical Examination: General appearance - oriented to person, place, and time, overweight and acyanotic, in no respiratory distress  Mental status - alert, oriented to person, place, and time, affect appropriate to mood  Chest - clear to auscultation, no wheezes, rales or rhonchi, symmetric air entry  Heart - normal rate and regular rhythm, S1 and S2 normal  Back exam - limited range of motion  Neurological - motor and sensory grossly normal bilaterally  Musculoskeletal - no muscular tenderness noted  Extremities - intact peripheral pulses  Skin -erythematous rash abdominal wall with scratch marks and tracks noted. Results  Results for orders placed or performed in visit on 10/16/19   VITAMIN D, 25 HYDROXY   Result Value Ref Range    VITAMIN D, 25-HYDROXY 27.9 (L) 32.0 - 100.0 ng/mL   AMB POC HEMOGLOBIN A1C   Result Value Ref Range    Hemoglobin A1c (POC) 5.8 %       ASSESSMENT and PLAN    ICD-10-CM ICD-9-CM    1. Scabies B86 133.0 permethrin (ACTICIN) 5 % topical cream      DISCONTINUED: permethrin (ACTICIN) 5 % topical cream   2. Anxiety F41.9 300.00    3. Dyslipidemia E78.5 272.4    4. Hypovitaminosis D E55.9 268.9    5. Hypothyroidism, unspecified type E03.9 244.9      reviewed diet, exercise and weight control  reviewed medications and side effects in detail      I have discussed the diagnosis with the patient and the intended plan of care as seen in the above orders.  The patient has received an after-visit summary and questions were answered concerning future plans. I have discussed medication, side effects, and warnings with the patient in detail. The patient verbalized understanding and is in agreement with the plan of care. The patient will contact the office with any additional concerns. Richard Zamora MD    PLEASE NOTE:   This document has been produced using voice recognition software.  Unrecognized errors in transcription may be present

## 2020-01-30 DIAGNOSIS — F41.9 ANXIETY: ICD-10-CM

## 2020-01-30 RX ORDER — DIAZEPAM 2 MG/1
2 TABLET ORAL
Qty: 20 TAB | Refills: 0 | Status: SHIPPED | OUTPATIENT
Start: 2020-01-30 | End: 2020-03-17 | Stop reason: SDUPTHER

## 2020-01-30 NOTE — TELEPHONE ENCOUNTER
Requested Prescriptions     Pending Prescriptions Disp Refills    diazePAM (VALIUM) 2 mg tablet 20 Tab 0     Sig: Take 1 Tab by mouth daily as needed for Anxiety. Patient is aware these medications cannot be e-scribed and will receive a phone call when it's ready to be picked up.

## 2020-02-29 DIAGNOSIS — B86 SCABIES: ICD-10-CM

## 2020-02-29 DIAGNOSIS — E03.9 HYPOTHYROIDISM, UNSPECIFIED TYPE: ICD-10-CM

## 2020-03-03 ENCOUNTER — TELEPHONE (OUTPATIENT)
Dept: FAMILY MEDICINE CLINIC | Age: 65
End: 2020-03-03

## 2020-03-03 RX ORDER — LEVOTHYROXINE SODIUM 112 UG/1
TABLET ORAL
Qty: 90 TAB | Refills: 0 | Status: SHIPPED | OUTPATIENT
Start: 2020-03-03 | End: 2020-06-01

## 2020-03-03 RX ORDER — PERMETHRIN 50 MG/G
CREAM TOPICAL
Qty: 120 G | Refills: 2 | Status: SHIPPED | OUTPATIENT
Start: 2020-03-03 | End: 2020-06-01 | Stop reason: SDUPTHER

## 2020-03-03 NOTE — TELEPHONE ENCOUNTER
Spoke with patient at this time. Refill request sent to NP Patsie Guardian at this time. Inoformed patient 24-48 hours and also informed patient she will have to wait until provider return for diazepam

## 2020-03-03 NOTE — TELEPHONE ENCOUNTER
Please contact patient to inform when she can expect her refill on synthroid. Stated the pharmacy sent in a request 02/29. Advised pt that Dr Suzanna Thornton was out of the office this week. Patient would like to be contacted to discuss other medications as well. Please contact when available.

## 2020-03-17 DIAGNOSIS — F41.9 ANXIETY: ICD-10-CM

## 2020-03-17 NOTE — TELEPHONE ENCOUNTER
Requested Prescriptions     Pending Prescriptions Disp Refills    diazePAM (Valium) 2 mg tablet 20 Tab 0     Sig: Take 1 Tab by mouth daily as needed for Anxiety.    Patient is requesting to be contacted when prescription is ready for

## 2020-03-18 ENCOUNTER — TELEPHONE (OUTPATIENT)
Dept: FAMILY MEDICINE CLINIC | Age: 65
End: 2020-03-18

## 2020-03-18 DIAGNOSIS — F41.9 ANXIETY: ICD-10-CM

## 2020-03-18 RX ORDER — DIAZEPAM 2 MG/1
2 TABLET ORAL
Qty: 20 TAB | Refills: 0 | Status: SHIPPED | OUTPATIENT
Start: 2020-03-18 | End: 2020-03-18 | Stop reason: SDUPTHER

## 2020-03-18 RX ORDER — CLARITHROMYCIN 500 MG/1
500 TABLET, FILM COATED ORAL 2 TIMES DAILY
Qty: 28 TAB | Refills: 0 | Status: SHIPPED | OUTPATIENT
Start: 2020-03-18 | End: 2020-04-01

## 2020-03-18 RX ORDER — DIAZEPAM 2 MG/1
2 TABLET ORAL
Qty: 20 TAB | Refills: 0 | Status: SHIPPED | OUTPATIENT
Start: 2020-03-18 | End: 2020-05-04

## 2020-03-18 NOTE — TELEPHONE ENCOUNTER
Please find out if patient has used clarithromycin in the past since her record shows she is allergic to a similar medication that could cause an allergy to clarithromycin.   Tyrone Corea MD

## 2020-03-18 NOTE — TELEPHONE ENCOUNTER
Patient stated she has bronchitis and want to know if Dr Rashad Boss can do a skype, Advised pt that he doesn't do skype, patient want to know if he can call her in something for the bronchitis. Please contact patient to inform if Dr Rashad Boss can call he in something or if she needs an appointment. Patient stated that she did not want to come into the office because of her symptoms.  Please be advised

## 2020-03-18 NOTE — TELEPHONE ENCOUNTER
Spoke with patient at this time. Patient is requesting for Clarithromycin 500 mg x14 days at this time for cough, slight SOB at this time. No fever noted at this time. Patient states she has not traveled nor has she been around someone who has been sick at this time.  Patient has had symptoms for 3 days at this time with no improvement with supportive care

## 2020-03-20 ENCOUNTER — HOSPITAL ENCOUNTER (OUTPATIENT)
Dept: GENERAL RADIOLOGY | Age: 65
Discharge: HOME | End: 2020-03-20
Payer: COMMERCIAL

## 2020-03-20 DIAGNOSIS — R05.9 COUGH: ICD-10-CM

## 2020-03-20 PROCEDURE — 71046 X-RAY EXAM CHEST 2 VIEWS: CPT

## 2020-04-16 ENCOUNTER — VIRTUAL VISIT (OUTPATIENT)
Dept: FAMILY MEDICINE CLINIC | Age: 65
End: 2020-04-16

## 2020-04-16 DIAGNOSIS — E03.9 HYPOTHYROIDISM, UNSPECIFIED TYPE: ICD-10-CM

## 2020-04-16 DIAGNOSIS — R21 RASH: ICD-10-CM

## 2020-04-16 DIAGNOSIS — J40 BRONCHITIS: Primary | ICD-10-CM

## 2020-04-16 DIAGNOSIS — F41.9 ANXIETY: ICD-10-CM

## 2020-04-16 RX ORDER — CLARITHROMYCIN 500 MG/1
500 TABLET, FILM COATED ORAL 2 TIMES DAILY
Qty: 28 TAB | Refills: 0 | Status: SHIPPED | OUTPATIENT
Start: 2020-04-16 | End: 2020-04-30

## 2020-04-16 NOTE — PROGRESS NOTES
Consent: Vic Paris, who was seen by synchronous (real-time) audio-video technology, and/or her healthcare decision maker, is aware that this patient-initiated, Telehealth encounter on 4/16/2020 is a billable service, with coverage as determined by her insurance carrier. She is aware that she may receive a bill and has provided verbal consent to proceed: Yes. Assessment & Plan:       ICD-10-CM ICD-9-CM    1. Bronchitis J40 490 clarithromycin (BIAXIN) 500 mg tablet   2. Rash R21 782.1    3. Anxiety F41.9 300.00    4. Hypothyroidism, unspecified type E03.9 244.9      More than 50% of 30 minute visit spent counseling and coordinating care with patient face to face on chronic cough, chronic skin rash and management options. Discussed need for compliance with treatment regimen, follow-up, and taking responsibility for her disease process. Subjective:   Vic Paris is a 59 y.o. female who was seen for Cough and Rash    Patient is seen for follow-up care. She has had a cough that has been longstanding. Cough is productive of mucopurulent phlegm. She did use a course of clarithromycin and this made the symptoms better. States that occasionally she has fever and chills. Has malaise and fatigue. Symptoms have improved slightly. She was seen by her allergist and had a chest x-ray done that was negative for pneumonia. She denies wheeze or shortness of breath. She however would like another two-week course of clarithromycin. We did discuss bronchitis and management options. I will send in a 2-week supply of clarithromycin. I did let her know that if symptoms persist or get worse she does need to go into the emergency room for an evaluation. Patient was agreeable to this. Patient has a history of chronic skin rash. This rash does appear like scabies like tracks and excoriations. Also she does have some dermatographia.   She has been seen at [facility] and was referred to see the dermatologist. She awaits an appointment by the dermatologist.  She was asked to send in her records and she is trying to compile these and send them into the specialist.  Patient has a history of anxiety and is on diazepam as needed. She has hypothyroidism and takes Synthroid. We will continue with this medication. Prior to Admission medications    Medication Sig Start Date End Date Taking? Authorizing Provider   clarithromycin (BIAXIN) 500 mg tablet Take 1 Tab by mouth two (2) times a day for 14 days. 4/16/20 4/30/20 Yes Andrea Greene MD   diazePAM (Valium) 2 mg tablet Take 1 Tab by mouth daily as needed for Anxiety. 3/18/20   Andrea Greene MD   SYNTHROID 112 mcg tablet TAKE 1 TABLET BY MOUTH DAILY BEFORE BREAKFAST 3/3/20   Rosalina Hopeugene, HENRY   permethrin (ACTICIN) 5 % topical cream apply sparingly as directed, Apply to entire body; leave on for 8 to 14 hours before washing off with water 3/3/20   Rosalina Hopping, NP   ivermectin 1 % crea Apply thin layer every 2-3 days. 12/19/19   Andrea Greene MD   ivermectin (STROMECTOL) 3 mg tablet Take 24 mg (8 tabs) on  days 0,1,7,8,14,21 and 28. 12/19/19   Andrea Greene MD   atorvastatin (LIPITOR) 10 mg tablet TAKE 1 TABLET BY MOUTH EVERY DAY 12/19/19   Andrea Greene MD   ergocalciferol (ERGOCALCIFEROL) 50,000 unit capsule Take 1 Cap by mouth every seven (7) days. 10/22/19   Andrea Greene MD   loratadine (CLARITIN) 10 mg tablet Take 1 Tab by mouth daily. Indications:  Allergic Rhinitis 7/27/18   Beto Cage MD     Allergies   Allergen Reactions   Jose Cotta [Cefdinir] Hives    Keflex [Cephalexin] Hives    Codeine Other (comments)     Severe headache    Ketek [Telithromycin] Other (comments)     Passed out    Levaquin [Levofloxacin] Unknown (comments)     Did not tolerate      Morphine Other (comments)     Severe headache    Sulfa (Sulfonamide Antibiotics) Hives    Topamax [Topiramate] Other (comments)     Made blood vessels red and pain in eyes       ROS Review of all systems is negative except as noted above in the HPI. Objective:   Vital Signs: (As obtained by patient/caregiver at home)  Visit Vitals  LMP  (LMP Unknown)      Constitutional: [x] Appears well-developed and well-nourished [x] No apparent distress    Mental status: [x] Alert and awake  [x] Oriented to person/place/time [x] Able to follow commands      HENT: [x] Normocephalic, atraumatic  Pulmonary/Chest: [x] Respiratory effort normal   [x] No visualized signs of difficulty breathing or respiratory distress  Neurological:        [x] No Facial Asymmetry (Cranial nerve 7 motor function) (limited exam due to video visit)          [x] No gaze palsy                Psychiatric:       [x] Normal Affect    We discussed the expected course, resolution and complications of the diagnosis(es) in detail. Medication risks, benefits, costs, interactions, and alternatives were discussed as indicated. I advised her to contact the office if her condition worsens, changes or fails to improve as anticipated. She expressed understanding with the diagnosis(es) and plan. Roberto Alcazar is a 59 y.o. female being evaluated by a video visit encounter for concerns as above. A caregiver was present when appropriate. Due to this being a TeleHealth encounter (During [facility] public [facility] emergency), evaluation of the following organ systems was limited: Vitals/Constitutional/EENT/Resp/CV/GI//MS/Neuro/Skin/Heme-Lymph-Imm. Pursuant to the emergency declaration under the [facility] waiver authority and the [employer] Act, this Virtual  Visit was conducted, with patient's (and/or legal guardian's) consent, to reduce the patient's risk of exposure to COVID-19 and provide necessary medical care.      Services were provided through a video synchronous discussion virtually to substitute for in-person clinic visit. Patient and provider were located at their individual homes.         Maxime Wiggins MD

## 2020-04-30 DIAGNOSIS — F41.9 ANXIETY: ICD-10-CM

## 2020-05-04 DIAGNOSIS — Z12.31 ENCOUNTER FOR SCREENING MAMMOGRAM FOR MALIGNANT NEOPLASM OF BREAST: Primary | ICD-10-CM

## 2020-05-04 RX ORDER — DIAZEPAM 2 MG/1
TABLET ORAL
Qty: 20 TAB | Refills: 0 | Status: SHIPPED | OUTPATIENT
Start: 2020-05-04 | End: 2020-06-05 | Stop reason: SDUPTHER

## 2020-05-04 NOTE — TELEPHONE ENCOUNTER
[facility]    SECOND PRESCRIPTION REFILL REQUEST    DIAZEPAM 2MG TABLETS  QTY 20  TAKE 1 TABLET BY MOUTH DAILY AS NEEDED FOR ANXIETY

## 2020-06-01 DIAGNOSIS — B86 SCABIES: ICD-10-CM

## 2020-06-01 DIAGNOSIS — E03.9 HYPOTHYROIDISM, UNSPECIFIED TYPE: ICD-10-CM

## 2020-06-01 RX ORDER — LEVOTHYROXINE SODIUM 112 UG/1
TABLET ORAL
Qty: 90 TAB | Refills: 0 | Status: SHIPPED | OUTPATIENT
Start: 2020-06-01 | End: 2020-08-31

## 2020-06-01 NOTE — TELEPHONE ENCOUNTER
Requested Prescriptions     Pending Prescriptions Disp Refills    permethrin (ACTICIN) 5 % topical cream 120 g 2     Sig: apply sparingly as directed, Apply to entire body; leave on for 8 to 14 hours before washing off with water   pt is requesting this medication. Stated her dermatologist ad was pushed back from May to July.  Patient is requesting enough of the skin creme for her  as well who is also Dr Srikanth Mcgee pt

## 2020-06-02 RX ORDER — PERMETHRIN 50 MG/G
CREAM TOPICAL
Qty: 120 G | Refills: 2 | Status: SHIPPED | OUTPATIENT
Start: 2020-06-02 | End: 2020-08-26

## 2020-06-05 DIAGNOSIS — F41.9 ANXIETY: ICD-10-CM

## 2020-06-05 NOTE — TELEPHONE ENCOUNTER
Requested Prescriptions     Pending Prescriptions Disp Refills    diazePAM (VALIUM) 2 mg tablet 20 Tab 0   patient state the pharmacy may need Dr. Jane Ng authorization.  Please be advised

## 2020-06-08 RX ORDER — DIAZEPAM 2 MG/1
TABLET ORAL
Qty: 20 TAB | Refills: 0 | Status: SHIPPED | OUTPATIENT
Start: 2020-06-08 | End: 2020-09-29 | Stop reason: SDUPTHER

## 2020-06-10 DIAGNOSIS — B86 SCABIES: ICD-10-CM

## 2020-06-10 RX ORDER — IVERMECTIN 3 MG/1
TABLET ORAL
Qty: 56 TAB | Refills: 0 | Status: SHIPPED | OUTPATIENT
Start: 2020-06-10 | End: 2021-10-04

## 2020-06-10 RX ORDER — IVERMECTIN 10 MG/G
CREAM TOPICAL
Qty: 45 G | Refills: 1 | Status: SHIPPED | OUTPATIENT
Start: 2020-06-10 | End: 2020-08-26

## 2020-06-10 NOTE — TELEPHONE ENCOUNTER
Requested Prescriptions     Pending Prescriptions Disp Refills    ivermectin (STROMECTOL) 3 mg tablet 56 Tab 0     Sig: Take 24 mg (8 tabs) on  days 0,1,7,8,14,21 and 28.  ivermectin 1 % crea 45 g 1     Sig: Apply thin layer every 2-3 days.

## 2020-06-10 NOTE — TELEPHONE ENCOUNTER
I called and spoke to patient. She has recalcitrant rash. Has been seen by dermatologist in the past.  She had tried to get in to see a dermatologist in [facility] and in [place] but he states that they have refused to see her. He believes she has skin infection after ingesting parasites/worms. She believes this causing her skin manifestations. 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 I will not prescribe the ivermectin for her at the moment. I have done this in the past.  There is no proof that he has this infection. I did let her know of this. She will try and get a dermatologist in [place] for evaluation. States that she is coordinating with her daughter for this.     Robert Blanco MD

## 2020-07-06 ENCOUNTER — TELEPHONE (OUTPATIENT)
Dept: FAMILY MEDICINE CLINIC | Age: 65
End: 2020-07-06

## 2020-07-06 DIAGNOSIS — Z01.419 WELL WOMAN EXAM: ICD-10-CM

## 2020-07-06 DIAGNOSIS — E03.9 HYPOTHYROIDISM, UNSPECIFIED TYPE: Primary | ICD-10-CM

## 2020-07-06 DIAGNOSIS — E55.9 HYPOVITAMINOSIS D: ICD-10-CM

## 2020-07-06 DIAGNOSIS — E78.5 DYSLIPIDEMIA: ICD-10-CM

## 2020-07-06 DIAGNOSIS — R73.9 HYPERGLYCEMIA: ICD-10-CM

## 2020-07-06 NOTE — TELEPHONE ENCOUNTER
Pt call to ask if Dr Becca Manzanares can put in lab orders before her upcoming ad so the results can be in by the time of her ad 07/15. ..  Please contact pt for clarification when available

## 2020-07-07 ENCOUNTER — TELEPHONE (OUTPATIENT)
Dept: FAMILY MEDICINE CLINIC | Age: 65
End: 2020-07-07

## 2020-07-08 DIAGNOSIS — E61.1 IRON DEFICIENCY: Primary | ICD-10-CM

## 2020-07-08 NOTE — TELEPHONE ENCOUNTER
Spoke with patient at this time and patient is requesting to add on iron profile and cardiac profile at this time

## 2020-07-20 ENCOUNTER — TELEPHONE (OUTPATIENT)
Dept: FAMILY MEDICINE CLINIC | Age: 65
End: 2020-07-20

## 2020-07-20 NOTE — TELEPHONE ENCOUNTER
Patient has an appointment in August. She is requesting for additional labs to be ordered:    Iron  Eocinophil

## 2020-08-03 ENCOUNTER — HOSPITAL ENCOUNTER (OUTPATIENT)
Dept: LAB | Age: 65
Discharge: HOME | End: 2020-08-03

## 2020-08-03 PROCEDURE — 99001 SPECIMEN HANDLING PT-LAB: CPT

## 2020-08-12 LAB — XX-LABCORP SPECIMEN COL,LCBCF: NORMAL

## 2020-08-26 ENCOUNTER — OFFICE VISIT (OUTPATIENT)
Dept: CARDIOLOGY CLINIC | Age: 65
End: 2020-08-26

## 2020-08-26 VITALS
DIASTOLIC BLOOD PRESSURE: 72 MMHG | BODY MASS INDEX: 35.77 KG/M2 | HEIGHT: 68 IN | WEIGHT: 236 LBS | OXYGEN SATURATION: 98 % | SYSTOLIC BLOOD PRESSURE: 122 MMHG | HEART RATE: 65 BPM

## 2020-08-26 DIAGNOSIS — I49.3 PVC (PREMATURE VENTRICULAR CONTRACTION): ICD-10-CM

## 2020-08-26 DIAGNOSIS — R21 RASH AND NONSPECIFIC SKIN ERUPTION: ICD-10-CM

## 2020-08-26 DIAGNOSIS — E78.5 DYSLIPIDEMIA: ICD-10-CM

## 2020-08-26 DIAGNOSIS — R00.2 PALPITATIONS: Primary | ICD-10-CM

## 2020-08-26 DIAGNOSIS — E03.9 HYPOTHYROIDISM, UNSPECIFIED TYPE: ICD-10-CM

## 2020-08-26 NOTE — PROGRESS NOTES
HPI: A 35-year-old female here for follow up. I last saw her July 2019. She has been doing well at least from a cardiac standpoint. She has had no new chest pain, dyspnea, orthopnea, PND or edema. She has had no palpitations. Her major concern today is her recurring evaluation for her rash and itching. She had had dengue fever remotely and then scabies and possibly hookworm and she continues to try to follow up with tropical medicine. Impression/Plan:  1. History of recurrent rash concerning for a parasitic infection previously with remote dengue fever as well as possible scabies and hookworm. 2. History of palpitations, PVCs, stable. 3. Remote syncope without recurrence. 4. History of atypical chest pain in 2017, improved. 5. Thyroid disorder. 6. Remote uterine cancer and hysterectomy with chemotherapy and radiation in remission. From a cardiac standpoint she is doing well. Her blood pressure is controlled. She has no significant palpitations. Again, her major concern is the recurrent rash and she continues to be concerned about a parasitic infection that has been treated empirically in the past. She is wondering about any other evaluation or workup that I might recommend although unfortunately, I do not have any good answers for her. I will see her back from a cardiac standpoint annually. Past Medical History:   Diagnosis Date    Abnormal nuclear cardiac imaging test 02/09/2012    Mid to distal anteroseptal and apical reversible defect concerning for ishcmeia. Mild-mod, mid-distal anterior & apical hypk. EF 67%. Borderline abnormal EKG w/1-mm inferolateral ST depression at 7 min exercise. Occasional PVCs.  Anxiety     Carotid duplex 71/30/9554    Mild 5-87% LICA stenosis.  Dengue fever     7/10 while in [place]    Diverticulosis     Dyslipidemia     GERD     Holter monitor study 04/29/2016    Sinus rhythm, avg HR 67 bpm (range  bpm). Rare ectopy.       Hypothyroidism     RLE venous duplex 07/23/2015    Right leg:  No DVT.  S/P cardiac catheterization 02/24/2012    Angiographically normal coronaries. Hyperdynamic. EF 70%. No RWMA.  Scabies exposure 03/2018    Sinusitis     Tinnitus     Uterine cancer (HCC)     hysterectomy    Uterine carcinoma (HCC)        Current Outpatient Medications   Medication Sig Dispense Refill    ivermectin (STROMECTOL) 3 mg tablet Take 24 mg (8 tabs) on  days 0,1,7,8,14,21 and 28. 56 Tab 0    diazePAM (VALIUM) 2 mg tablet TAKE 1 TABLET BY MOUTH DAILY AS NEEDED FOR ANXIETY 20 Tab 0    Synthroid 112 mcg tablet TAKE 1 TABLET BY MOUTH DAILY BEFORE BREAKFAST 90 Tab 0    atorvastatin (LIPITOR) 10 mg tablet TAKE 1 TABLET BY MOUTH EVERY DAY 90 Tab 0    ergocalciferol (ERGOCALCIFEROL) 50,000 unit capsule Take 1 Cap by mouth every seven (7) days. 13 Cap 3    loratadine (CLARITIN) 10 mg tablet Take 1 Tab by mouth daily. Indications: Allergic Rhinitis 30 Tab 6       Social History   reports that she quit smoking about 21 years ago. Her smoking use included cigarettes. She has a 20.00 pack-year smoking history. She has never used smokeless tobacco.   reports no history of alcohol use. Family History  family history includes Heart Attack in her paternal grandmother; Heart Disease in her maternal aunt and maternal aunt; Hypertension in her maternal aunt; Other in her maternal grandfather and mother; Stroke in her maternal grandmother. Review of Systems  Except as stated above include:  Constitutional: Negative for fever, chills and malaise/fatigue. HEENT: No congestion or recent URI. Gastrointestinal: No nausea, vomiting, abdominal pain, bloody stools. Pulmonary:  Negative except as stated above. Cardiac:  Negative except as stated above. Musculoskeletal: Negative except as stated above. Neurological:  No localized symptoms. Skin:  Negative except as stated above. Psych:  Negative except as stated above.   Endocrine:  Negative except as stated above. PHYSICAL EXAM  BP Readings from Last 3 Encounters:   08/26/20 122/72   01/16/20 139/82   12/19/19 137/85     Pulse Readings from Last 3 Encounters:   08/26/20 65   01/16/20 68   12/19/19 70     Wt Readings from Last 3 Encounters:   08/26/20 107 kg (236 lb)   01/16/20 110.2 kg (243 lb)   12/19/19 111.6 kg (246 lb)     General:   Well developed, well groomed. Head/Neck:   No jugular venous distention     No carotid bruits. No evidence of xanthelasma. Lungs:   No respiratory distress. Clear bilaterally. Heart:    Regular rate and rhythm. Normal S1/S2. Palpation of heart with normal point of maximum impulse. No significant murmurs, rubs or gallops. Abdomen:   Soft and nontender. No palpable abdominal mass or bruits. Extremities:   Intact peripheral pulses. No significant edema. Neurological:   Alert and oriented to person, place, time. No focal neurological deficit visually. Skin:   No obvious rash    Blood Pressure Metric:  Monitor recommended and adjustments stated if needed.

## 2020-08-26 NOTE — PROGRESS NOTES
Lisbeth Young presents today for   Chief Complaint   Patient presents with    Follow-up     overdue follow up       Lisbeth Young preferred language for health care discussion is english/other. Is someone accompanying this pt? no    Is the patient using any DME equipment during [address]? no    Depression Screening:  3 most recent PHQ Screens 8/26/2020   Little interest or pleasure in doing things Not at all   Feeling down, depressed, irritable, or hopeless Not at all   Total Score PHQ 2 0       Learning Assessment:  Learning Assessment 1/16/2020   PRIMARY LEARNER Patient   HIGHEST LEVEL OF EDUCATION - PRIMARY LEARNER  > [facility] PRIMARY LEARNER NONE   CO-LEARNER CAREGIVER No   PRIMARY LANGUAGE ENGLISH   LEARNER PREFERENCE PRIMARY READING     -     -     -     -   ANSWERED BY patient    RELATIONSHIP SELF       Abuse Screening:  Abuse Screening Questionnaire 8/26/2020   Do you ever feel afraid of your partner? N   Are you in a relationship with someone who physically or mentally threatens you? N   Is it safe for you to go home? Y       Fall Risk  Fall Risk Assessment, last 12 mths 8/26/2020   Able to walk? Yes   Fall in past 12 months? -       Pt currently taking Anticoagulant therapy? no    Coordination of Care:  1. Have you been to the ER, urgent care clinic since your last visit? Hospitalized since your last visit? no    2. Have you seen or consulted any other health care providers outside of the [address] [place] [address] since your last visit? Include any pap smears or colon screening.  no

## 2020-08-29 DIAGNOSIS — E03.9 HYPOTHYROIDISM, UNSPECIFIED TYPE: ICD-10-CM

## 2020-08-31 RX ORDER — LEVOTHYROXINE SODIUM 112 UG/1
TABLET ORAL
Qty: 90 TAB | Refills: 0 | Status: SHIPPED | OUTPATIENT
Start: 2020-08-31 | End: 2020-11-10 | Stop reason: SDUPTHER

## 2020-09-18 ENCOUNTER — HOSPITAL ENCOUNTER (OUTPATIENT)
Dept: LAB | Age: 65
Discharge: HOME | End: 2020-09-18

## 2020-09-18 LAB — HBA1C MFR BLD HPLC: 5.9 %

## 2020-09-18 PROCEDURE — 99001 SPECIMEN HANDLING PT-LAB: CPT

## 2020-09-19 LAB
25(OH)D3 SERPL-MCNC: 44.8 NG/ML (ref 32–100)
A-G RATIO,AGRAT: 1.8 RATIO (ref 1.1–2.6)
ABSOLUTE LYMPHOCYTE COUNT, 10803: 2.6 K/UL (ref 1–4.8)
ALBUMIN SERPL-MCNC: 4.2 G/DL (ref 3.5–5)
ALP SERPL-CCNC: 78 U/L (ref 40–120)
ALT SERPL-CCNC: 28 U/L (ref 5–40)
ANION GAP SERPL CALC-SCNC: 14 MMOL/L (ref 3–15)
AST SERPL W P-5'-P-CCNC: 19 U/L (ref 10–37)
AVG GLU, 10930: 123 MG/DL (ref 91–123)
BASOPHILS # BLD: 0.1 K/UL (ref 0–0.2)
BASOPHILS NFR BLD: 1 % (ref 0–2)
BILIRUB SERPL-MCNC: 0.8 MG/DL (ref 0.2–1.2)
BUN SERPL-MCNC: 16 MG/DL (ref 6–22)
CALCIUM SERPL-MCNC: 9.4 MG/DL (ref 8.4–10.5)
CHLORIDE SERPL-SCNC: 107 MMOL/L (ref 98–110)
CHOLEST SERPL-MCNC: 141 MG/DL (ref 110–200)
CO2 SERPL-SCNC: 21 MMOL/L (ref 20–32)
CREAT SERPL-MCNC: 0.8 MG/DL (ref 0.8–1.4)
EOSINOPHIL # BLD: 0.3 K/UL (ref 0–0.5)
EOSINOPHIL NFR BLD: 3 % (ref 0–6)
ERYTHROCYTE [DISTWIDTH] IN BLOOD BY AUTOMATED COUNT: 15.9 % (ref 10–15.5)
FE % SATURATION,PSAT: 21 % (ref 20–50)
GFRAA, 66117: >60
GFRNA, 66118: >60
GLOBULIN,GLOB: 2.4 G/DL (ref 2–4)
GLUCOSE SERPL-MCNC: 101 MG/DL (ref 70–99)
GRANULOCYTES,GRANS: 57 % (ref 40–75)
HBA1C MFR BLD HPLC: 5.9 % (ref 4.8–5.6)
HCT VFR BLD AUTO: 42.2 % (ref 35.1–48.3)
HDLC SERPL-MCNC: 3.6 MG/DL (ref 0–5)
HDLC SERPL-MCNC: 39 MG/DL
HGB BLD-MCNC: 13.2 G/DL (ref 11.7–16.1)
IRON,IRN: 66 MCG/DL (ref 30–160)
LDL/HDL RATIO,LDHD: 2.2
LDLC SERPL CALC-MCNC: 88 MG/DL (ref 50–99)
LYMPHOCYTES, LYMLT: 31 % (ref 20–45)
MCH RBC QN AUTO: 27 PG (ref 26–34)
MCHC RBC AUTO-ENTMCNC: 31 G/DL (ref 31–36)
MCV RBC AUTO: 86 FL (ref 81–99)
MONOCYTES # BLD: 0.6 K/UL (ref 0.1–1)
MONOCYTES NFR BLD: 7 % (ref 3–12)
NEUTROPHILS # BLD AUTO: 4.8 K/UL (ref 1.8–7.7)
NON-HDL CHOLESTEROL, 011976: 102 MG/DL
PLATELET # BLD AUTO: 198 K/UL (ref 140–440)
PMV BLD AUTO: 12.6 FL (ref 9–13)
POTASSIUM SERPL-SCNC: 4.1 MMOL/L (ref 3.5–5.5)
PROT SERPL-MCNC: 6.6 G/DL (ref 6.2–8.1)
RBC # BLD AUTO: 4.91 M/UL (ref 3.8–5.2)
SODIUM SERPL-SCNC: 142 MMOL/L (ref 133–145)
T4 FREE SERPL-MCNC: 1.7 NG/DL (ref 0.9–1.8)
TIBC,TIBC: 310 MCG/DL (ref 228–428)
TRIGL SERPL-MCNC: 70 MG/DL (ref 40–149)
TSH SERPL DL<=0.005 MIU/L-ACNC: 0.46 MCU/ML (ref 0.27–4.2)
UIBC SERPL-MCNC: 244 MCG/DL (ref 110–370)
VLDLC SERPL CALC-MCNC: 14 MG/DL (ref 8–30)
WBC # BLD AUTO: 8.3 K/UL (ref 4–11)

## 2020-09-28 LAB — SENTARA SPECIMEN COL,SENBCF: NORMAL

## 2020-09-29 ENCOUNTER — OFFICE VISIT (OUTPATIENT)
Dept: FAMILY MEDICINE CLINIC | Age: 65
End: 2020-09-29
Payer: COMMERCIAL

## 2020-09-29 VITALS
DIASTOLIC BLOOD PRESSURE: 77 MMHG | OXYGEN SATURATION: 95 % | HEIGHT: 68 IN | RESPIRATION RATE: 18 BRPM | TEMPERATURE: 97.7 F | BODY MASS INDEX: 35.77 KG/M2 | SYSTOLIC BLOOD PRESSURE: 133 MMHG | WEIGHT: 236 LBS | HEART RATE: 74 BPM

## 2020-09-29 DIAGNOSIS — R21 RASH: Primary | ICD-10-CM

## 2020-09-29 DIAGNOSIS — E66.01 SEVERE OBESITY WITH BODY MASS INDEX (BMI) OF 35.0 TO 39.9 WITH SERIOUS COMORBIDITY (HCC): ICD-10-CM

## 2020-09-29 DIAGNOSIS — E03.9 HYPOTHYROIDISM, UNSPECIFIED TYPE: ICD-10-CM

## 2020-09-29 DIAGNOSIS — E78.5 DYSLIPIDEMIA: ICD-10-CM

## 2020-09-29 DIAGNOSIS — Z23 ENCOUNTER FOR IMMUNIZATION: ICD-10-CM

## 2020-09-29 DIAGNOSIS — F41.9 ANXIETY: ICD-10-CM

## 2020-09-29 DIAGNOSIS — E55.9 HYPOVITAMINOSIS D: ICD-10-CM

## 2020-09-29 DIAGNOSIS — R73.03 PREDIABETES: ICD-10-CM

## 2020-09-29 PROCEDURE — 99215 OFFICE O/P EST HI 40 MIN: CPT | Performed by: FAMILY MEDICINE

## 2020-09-29 PROCEDURE — 90694 VACC AIIV4 NO PRSRV 0.5ML IM: CPT | Performed by: FAMILY MEDICINE

## 2020-09-29 PROCEDURE — 90471 IMMUNIZATION ADMIN: CPT | Performed by: FAMILY MEDICINE

## 2020-09-29 RX ORDER — DIAZEPAM 2 MG/1
TABLET ORAL
Qty: 20 TAB | Refills: 0 | Status: SHIPPED | OUTPATIENT
Start: 2020-09-29 | End: 2020-12-21 | Stop reason: SDUPTHER

## 2020-09-29 RX ORDER — ERGOCALCIFEROL 1.25 MG/1
50000 CAPSULE ORAL
Qty: 13 CAP | Refills: 3 | Status: SHIPPED | OUTPATIENT
Start: 2020-09-29 | End: 2020-10-15 | Stop reason: SDUPTHER

## 2020-09-29 NOTE — PROGRESS NOTES
Chief Complaint   Patient presents with    Follow-up    Medication Refill     1. Have you been to the ER, urgent care clinic since your last visit? Hospitalized since your last visit? No    2. Have you seen or consulted any other health care providers outside of the [address] [place] [address] since your last visit? Include any pap smears or colon screening. No     HPI  Maria Elena Young comes in for follow-up care. Skin rash: Patient has a chronic skin rash that appears sporadically. It causes tracks and the erythematous lesions on her skin and she has taken multiple images of the same. This seems like dermatographia. Patient states that she thinks these are parasites that are crawling underneath her skin. She has not helped from various providers including dermatologist or infectious disease specialist.  She currently has an appointment to see a dermatologist within [place]. She has an appointment on November 4. 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 She will let me know exactly what this medication is but she has also been put on  metronidazole. Her insurance company did not approve albendazole. Overall we discussed this on the a long time. I have seen her previously for this and she has taken a lot of medications including ivermectin and has treated herself for scabies. She states that that she has traveled extensively in areas in [place] and in the tropical countries and this likely given the infestation of cutaneous level migrans or type of parasite with dermatological manifestation. She wonders what test might be done to help identify the parasite. She has been seen by a gastroenterologist in the past and has a follow-up with Dr. Davi Ruffin. She has a colonoscopy scheduled. She states that she occasionally has loose stools.   A lot of the stool cultures will reveal organisms that do not have cutaneous manifestations and I did let her know of this.  She wanted to get radiological testing of the abdomen but I am not sure what this would show. Patient thinks that this would show parasites in the intestine but I did let her know that is not the case. We would usually do stool for ova, cysts and parasites. I will look at her previous records to confirm if this has been done in the past.  I will recommend that she discuss with her dermatologist about getting a skin biopsy of the lesions. This would give us a more definitive answer as to what could be happening. I will also recommend that her dermatologist continue with management of this skin condition at the moment as it has been ongoing for a long time and the output rash is consistent with the specialist recommendations. She will let me know in a week or 2 if the symptoms have improved. Patient does state that the symptoms have improved. Hypovitaminosis D: Patient has a history of hypovitaminosis D. Most recent vitamin D levels were stable. She takes 50,000 units of vitamin D replacement weekly. We will continue with this medication. Hypothyroidism: Patient has a history of hypothyroidism. She is on thyroid replacement therapy. TSH levels are stable. We will continue with the current treatment plan. Prediabetes: Patient has a BMI of 5.9. This is prediabetes. She will intensify lifestyle and dietary modification. Recheck in 3 to 6 months. Dyslipidemia: Patient has dyslipidemia. Lipid panel has been stable. She takes Lipitor 10 mg daily. We will continue with this medication. Obesity: Patient has a BMI of 35.88. She will intensify lifestyle and dietary modification and effort to cut down on her weight. Anxiety: Patient has anxiety. She has taken diazepam as needed for the anxiety. Would like a refill of medication. Prescription will be sent in. Allergy: Patient has a history of allergy. She gets nasal congestion and sneezing. She takes Claritin daily.   We will continue with this medication. Refill will be sent into the pharmacy. Health maintenance: Patient is due to have a colonoscopy done by her gastroenterologist.  She will get the flu vaccine today. Past Medical History  Past Medical History:   Diagnosis Date    Abnormal nuclear cardiac imaging test 02/09/2012    Mid to distal anteroseptal and apical reversible defect concerning for ishcmeia. Mild-mod, mid-distal anterior & apical hypk. EF 67%. Borderline abnormal EKG w/1-mm inferolateral ST depression at 7 min exercise. Occasional PVCs.  Anxiety     Carotid duplex 22/34/8668    Mild 9-66% LICA stenosis.  Dengue fever     7/10 while in [place]    Diverticulosis     Dyslipidemia     GERD     Holter monitor study 04/29/2016    Sinus rhythm, avg HR 67 bpm (range  bpm). Rare ectopy.  Hypothyroidism     RLE venous duplex 07/23/2015    Right leg:  No DVT.  S/P cardiac catheterization 02/24/2012    Angiographically normal coronaries. Hyperdynamic. EF 70%. No RWMA.  Scabies exposure 03/2018    Sinusitis     Tinnitus     Uterine [facility])     hysterectomy    Uterine carcinoma [facility])        Surgical History  Past Surgical History:   Procedure Laterality Date    HX CHOLECYSTECTOMY      2009    HX COLECTOMY      2008    HX HYSTERECTOMY          Medications  Current Outpatient Medications   Medication Sig Dispense Refill    ergocalciferol (ERGOCALCIFEROL) 1,250 mcg (50,000 unit) capsule Take 1 Cap by mouth every seven (7) days. 13 Cap 3    diazePAM (VALIUM) 2 mg tablet TAKE 1 TABLET BY MOUTH DAILY AS NEEDED FOR ANXIETY 20 Tab 0    Synthroid 112 mcg tablet TAKE 1 TABLET BY MOUTH DAILY BEFORE BREAKFAST 90 Tab 0    ivermectin (STROMECTOL) 3 mg tablet Take 24 mg (8 tabs) on  days 0,1,7,8,14,21 and 28. 56 Tab 0    atorvastatin (LIPITOR) 10 mg tablet TAKE 1 TABLET BY MOUTH EVERY DAY 90 Tab 0    loratadine (CLARITIN) 10 mg tablet Take 1 Tab by mouth daily. Indications:  Allergic Rhinitis 30 Tab 6       Allergies  Allergies   Allergen Reactions    Omnicef [Cefdinir] Hives    Keflex [Cephalexin] Hives    Codeine Other (comments)     Severe headache    Ketek [Telithromycin] Other (comments)     Passed out    Levaquin [Levofloxacin] Unknown (comments)     Did not tolerate      Morphine Other (comments)     Severe headache    Sulfa (Sulfonamide Antibiotics) Hives    Topamax [Topiramate] Other (comments)     Made blood vessels red and pain in eyes       Family History  Family History   Problem Relation Age of Onset    Other Mother         anuerysm    Hypertension Maternal Aunt     Heart Disease Maternal Aunt     Stroke Maternal Grandmother     Other Maternal Grandfather         leg amputated due to phlebitis    Heart Attack Paternal Grandmother     Heart Disease Maternal Aunt        Social History  Social History     Socioeconomic History    Marital status:      Spouse name: Not on file    Number of children: Not on file    Years of education: Not on file    Highest education level: Not on file   Occupational History    Not on file   Social Needs    Financial resource strain: Not on file    Food insecurity     Worry: Not on file     Inability: Not on file    Transportation needs     Medical: Not on file     Non-medical: Not on file   Tobacco Use    Smoking status: Former Smoker     Packs/day: 1.00     Years: 20.00     Pack years: 20.00     Types: Cigarettes     Last attempt to quit: 1999     Years since quittin.2    Smokeless tobacco: Never Used   Substance and Sexual Activity    Alcohol use: No     Alcohol/week: 0.0 standard drinks    Drug use: No     Types: Prescription, OTC    Sexual activity: Not Currently   Lifestyle    Physical activity     Days per week: Not on file     Minutes per session: Not on file    Stress: Not on file   Relationships    Social connections     Talks on phone: Not on file     Gets together: Not on file     Attends Synagogue service: Not on file     Active member of club or organization: Not on file     Attends meetings of clubs or organizations: Not on file     Relationship status: Not on file    Intimate partner violence     Fear of current or ex partner: Not on file     Emotionally abused: Not on file     Physically abused: Not on file     Forced sexual activity: Not on file   Other Topics Concern    Not on file   Social History Narrative    Not on file       Review of Systems  Review of Systems - Review of all systems is negative except as noted above in the HPI.     Vital Signs  Visit Vitals  /77 (BP 1 Location: Right arm, BP Patient Position: Sitting)   Pulse 74   Temp 97.7 °F (36.5 °C) (Oral)   Resp 18   Ht 5' 8\" (1.727 m)   Wt 236 lb (107 kg)   LMP  (LMP Unknown)   SpO2 95%   BMI 35.88 kg/m²         Physical Exam  Physical Examination: General appearance - oriented to person, place, and time, overweight, acyanotic, in no respiratory distress and well hydrated  Mental status - alert, oriented to person, place, and time, affect appropriate to mood  Chest - clear to auscultation, no wheezes, rales or rhonchi, symmetric air entry  Heart - normal rate and regular rhythm, S1 and S2 normal  Back exam - limited range of motion  Neurological - motor and sensory grossly normal bilaterally  Extremities - no pedal edema noted, intact peripheral pulses      Results  Results for orders placed or performed in visit on 09/18/20   LIPID PANEL   Result Value Ref Range    Triglyceride 70 40 - 149 mg/dL    HDL Cholesterol 39 (L) >=40 mg/dL    Cholesterol, total 141 110 - 200 mg/dL    CHOLESTEROL/HDL 3.6 0.0 - 5.0    Non-HDL Cholesterol 102 <130 mg/dL    LDL, calculated 88 50 - 99 mg/dL    VLDL, calculated 14 8 - 30 mg/dL    LDL/HDL Ratio 2.2    METABOLIC PANEL, COMPREHENSIVE   Result Value Ref Range    Glucose 101 (H) 70 - 99 mg/dL    BUN 16 6 - 22 mg/dL    Creatinine 0.8 0.8 - 1.4 mg/dL    Sodium 142 133 - 145 mmol/L    Potassium 4.1 3.5 - 5.5 mmol/L    Chloride 107 98 - 110 mmol/L    CO2 21 20 - 32 mmol/L    AST (SGOT) 19 10 - 37 U/L    ALT (SGPT) 28 5 - 40 U/L    Alk. phosphatase 78 40 - 120 U/L    Bilirubin, total 0.8 0.2 - 1.2 mg/dL    Calcium 9.4 8.4 - 10.5 mg/dL    Protein, total 6.6 6.2 - 8.1 g/dL    Albumin 4.2 3.5 - 5.0 g/dL    A-G Ratio 1.8 1.1 - 2.6 ratio    Globulin 2.4 2.0 - 4.0 g/dL    Anion gap 14.0 3.0 - 15.0 mmol/L    GFRAA >60.0 >60.0    GFRNA >60.0 >60.0   IRON PROFILE   Result Value Ref Range    Iron 66 30 - 160 mcg/dL    UIBC 244 110 - 370 mcg/dL    TIBC 310 228 - 428 mcg/dL    Iron % saturation 21 20 - 50 %   T4, FREE   Result Value Ref Range    T4, Free 1.7 0.9 - 1.8 ng/dL   VITAMIN D, 25 HYDROXY   Result Value Ref Range    VITAMIN D, 25-HYDROXY 44.8 32.0 - 100.0 ng/mL   TSH 3RD GENERATION   Result Value Ref Range    TSH 0.46 0.27 - 4.20 mcU/mL   CBC WITH AUTOMATED DIFF   Result Value Ref Range    WBC 8.3 4.0 - 11.0 K/uL    RBC 4.91 3.80 - 5.20 M/uL    HGB 13.2 11.7 - 16.1 g/dL    HCT 42.2 35.1 - 48.3 %    MCV 86 81 - 99 fL    MCH 27 26 - 34 pg    MCHC 31 31 - 36 g/dL    RDW 15.9 (H) 10.0 - 15.5 %    PLATELET 131 714 - 680 K/uL    MPV 12.6 9.0 - 13.0 fL    NEUTROPHILS 57 40 - 75 %    Lymphocytes 31 20 - 45 %    MONOCYTES 7 3 - 12 %    EOSINOPHILS 3 0 - 6 %    BASOPHILS 1 0 - 2 %    ABS. NEUTROPHILS 4.8 1.8 - 7.7 K/uL    ABSOLUTE LYMPHOCYTE COUNT 2.6 1.0 - 4.8 K/uL    ABS. MONOCYTES 0.6 0.1 - 1.0 K/uL    ABS. EOSINOPHILS 0.3 0.0 - 0.5 K/uL    ABS. BASOPHILS 0.1 0.0 - 0.2 K/uL   HEMOGLOBIN A1C W/O EAG   Result Value Ref Range    Hemoglobin A1c 5.9 (H) 4.8 - 5.6 %    AVG  91 - 123 mg/dL       ASSESSMENT and PLAN  Encounter Diagnoses   Name Primary?     Rash Yes    Severe obesity with body mass index (BMI) of 35.0 to 39.9 with serious comorbidity ([facility] [address]     Anxiety     Encounter for immunization     Hypothyroidism, unspecified type     Dyslipidemia     Hypovitaminosis D     Prediabetes      lab results and schedule of future lab studies reviewed with patient  reviewed diet, exercise and weight control  cardiovascular risk and specific lipid/LDL goals reviewed  reviewed medications and side effects in detail      I have discussed the diagnosis with the patient and the intended plan of care as seen in the above orders. The patient has received an after-visit summary and questions were answered concerning future plans. I have discussed medication, side effects, and warnings with the patient in detail. The patient verbalized understanding and is in agreement with the plan of care. The patient will contact the office with any additional concerns. I spent at least 55 minutes on this visit with this established patient. Mariaelena Valdivia MD    PLEASE NOTE:   This document has been produced using voice recognition software.  Unrecognized errors in transcription may be present

## 2020-10-01 ENCOUNTER — TELEPHONE (OUTPATIENT)
Dept: FAMILY MEDICINE CLINIC | Age: 65
End: 2020-10-01

## 2020-10-01 NOTE — TELEPHONE ENCOUNTER
Pt called to give Dr Josesito Dial the name ot the medication she took for stomach discomfort (Xifaxan). Stated Dr Ashish Osborn was expecting her to call back with that info.  Please be advised

## 2020-10-15 RX ORDER — ERGOCALCIFEROL 1.25 MG/1
50000 CAPSULE ORAL
Qty: 13 CAP | Refills: 3 | Status: SHIPPED | OUTPATIENT
Start: 2020-10-15 | End: 2021-02-16 | Stop reason: SDUPTHER

## 2020-10-15 NOTE — TELEPHONE ENCOUNTER
Requested Prescriptions     Pending Prescriptions Disp Refills    ergocalciferol (ERGOCALCIFEROL) 1,250 mcg (50,000 unit) capsule 13 Cap 3     Sig: Take 1 Cap by mouth every seven (7) days.

## 2020-11-10 ENCOUNTER — TELEPHONE (OUTPATIENT)
Dept: FAMILY MEDICINE CLINIC | Age: 65
End: 2020-11-10

## 2020-11-10 DIAGNOSIS — E03.9 HYPOTHYROIDISM, UNSPECIFIED TYPE: ICD-10-CM

## 2020-11-10 DIAGNOSIS — E78.5 DYSLIPIDEMIA: ICD-10-CM

## 2020-11-10 RX ORDER — LEVOTHYROXINE SODIUM 112 UG/1
TABLET ORAL
Qty: 90 TAB | Refills: 0 | Status: SHIPPED | OUTPATIENT
Start: 2020-11-10 | End: 2021-02-16 | Stop reason: SDUPTHER

## 2020-11-10 RX ORDER — ATORVASTATIN CALCIUM 10 MG/1
TABLET, FILM COATED ORAL
Qty: 90 TAB | Refills: 0 | Status: SHIPPED | OUTPATIENT
Start: 2020-11-10 | End: 2021-05-26

## 2020-11-10 NOTE — TELEPHONE ENCOUNTER
[facility]    SYNTHROID 0.112MG TAB  QTY: 90  TAKE 1 TAB BY MOUTH DAILY BEFORE BREAKFAST    ATORVASTATIN 10MG TAB  QTY: 90  TAKE 1 TAB BY MOUTH EVERY DAY

## 2020-11-16 ENCOUNTER — TRANSCRIBE ORDER (OUTPATIENT)
Dept: SCHEDULING | Age: 65
End: 2020-11-16

## 2020-11-16 DIAGNOSIS — R19.7 DIARRHEA: ICD-10-CM

## 2020-11-16 DIAGNOSIS — R10.84 GENERALIZED ABDOMINAL PAIN: Primary | ICD-10-CM

## 2020-11-16 DIAGNOSIS — Z86.010 PERSONAL HISTORY OF COLONIC POLYPS: ICD-10-CM

## 2020-11-16 DIAGNOSIS — K21.9 GERD (GASTROESOPHAGEAL REFLUX DISEASE): ICD-10-CM

## 2020-12-01 ENCOUNTER — HOSPITAL ENCOUNTER (OUTPATIENT)
Dept: GENERAL RADIOLOGY | Age: 65
Discharge: HOME | End: 2020-12-01
Attending: INTERNAL MEDICINE
Payer: COMMERCIAL

## 2020-12-01 DIAGNOSIS — Z86.010 PERSONAL HISTORY OF COLONIC POLYPS: ICD-10-CM

## 2020-12-01 DIAGNOSIS — R19.7 DIARRHEA: ICD-10-CM

## 2020-12-01 DIAGNOSIS — R10.84 GENERALIZED ABDOMINAL PAIN: ICD-10-CM

## 2020-12-01 DIAGNOSIS — K21.9 GERD (GASTROESOPHAGEAL REFLUX DISEASE): ICD-10-CM

## 2020-12-01 PROCEDURE — 74011000250 HC RX REV CODE- 250: Performed by: INTERNAL MEDICINE

## 2020-12-01 PROCEDURE — 74011000255 HC RX REV CODE- 255: Performed by: INTERNAL MEDICINE

## 2020-12-01 PROCEDURE — 74248 X-RAY SM INT F-THRU STD: CPT

## 2020-12-01 RX ADMIN — BARIUM SULFATE 135 ML: 980 POWDER, FOR SUSPENSION ORAL at 10:00

## 2020-12-01 RX ADMIN — BARIUM SULFATE 176 G: 960 POWDER, FOR SUSPENSION ORAL at 10:00

## 2020-12-01 RX ADMIN — ANTACID/ANTIFLATULENT 8 G: 380; 550; 10; 10 GRANULE, EFFERVESCENT ORAL at 10:00

## 2020-12-21 DIAGNOSIS — F41.9 ANXIETY: ICD-10-CM

## 2020-12-21 NOTE — TELEPHONE ENCOUNTER
Requested Prescriptions     Pending Prescriptions Disp Refills    diazePAM (VALIUM) 2 mg tablet 20 Tab 0     Sig: TAKE 1 TABLET BY MOUTH DAILY AS NEEDED FOR ANXIETY

## 2020-12-23 RX ORDER — DIAZEPAM 2 MG/1
TABLET ORAL
Qty: 20 TAB | Refills: 0 | Status: SHIPPED | OUTPATIENT
Start: 2020-12-23 | End: 2021-01-21 | Stop reason: SDUPTHER

## 2021-01-21 DIAGNOSIS — F41.9 ANXIETY: ICD-10-CM

## 2021-01-21 RX ORDER — DIAZEPAM 2 MG/1
TABLET ORAL
Qty: 20 TAB | Refills: 0 | Status: SHIPPED | OUTPATIENT
Start: 2021-01-21 | End: 2021-03-16 | Stop reason: SDUPTHER

## 2021-01-21 NOTE — TELEPHONE ENCOUNTER
Requested Prescriptions     Pending Prescriptions Disp Refills    diazePAM (VALIUM) 2 mg tablet 20 Tab 0     Sig: TAKE 1 TABLET BY MOUTH DAILY AS NEEDED FOR ANXIETY     Patient requesting the following medication refill         Date last prescribed: 12/23/20    Date patient last seen: 09/29/20    Follow up appointment scheduled: 02/16/2021    Please Advise

## 2021-01-21 NOTE — TELEPHONE ENCOUNTER
Patient would like lab orders placed, so that they can be discussed at upcoming visit 2/16/2021. Patient would like phone call when order is placed, so they can go to  to have labs drawn.     Requested Prescriptions     Pending Prescriptions Disp Refills    diazePAM (VALIUM) 2 mg tablet 20 Tab 0     Sig: TAKE 1 TABLET BY MOUTH DAILY AS NEEDED FOR ANXIETY

## 2021-01-22 ENCOUNTER — TELEPHONE (OUTPATIENT)
Dept: FAMILY MEDICINE CLINIC | Age: 66
End: 2021-01-22

## 2021-01-22 NOTE — TELEPHONE ENCOUNTER
Pt called again to check the status of the lab orders that she want Dr Susanne Kumar to place, advised pt that it has not been done yet.  Pt would like to be contacted once the orders have been placed

## 2021-01-24 DIAGNOSIS — R73.03 PREDIABETES: Primary | ICD-10-CM

## 2021-01-24 DIAGNOSIS — E03.9 HYPOTHYROIDISM, UNSPECIFIED TYPE: ICD-10-CM

## 2021-01-24 DIAGNOSIS — E78.5 DYSLIPIDEMIA: ICD-10-CM

## 2021-01-24 DIAGNOSIS — E55.9 HYPOVITAMINOSIS D: ICD-10-CM

## 2021-01-25 ENCOUNTER — PATIENT MESSAGE (OUTPATIENT)
Dept: FAMILY MEDICINE CLINIC | Age: 66
End: 2021-01-25

## 2021-02-08 ENCOUNTER — HOSPITAL ENCOUNTER (OUTPATIENT)
Dept: LAB | Age: 66
Discharge: HOME | End: 2021-02-08

## 2021-02-08 LAB
25(OH)D3 SERPL-MCNC: 43 NG/ML (ref 32–100)
A-G RATIO,AGRAT: 1.8 RATIO (ref 1.1–2.6)
ABSOLUTE LYMPHOCYTE COUNT, 10803: 2.4 K/UL (ref 1–4.8)
ALBUMIN SERPL-MCNC: 4.2 G/DL (ref 3.5–5)
ALP SERPL-CCNC: 80 U/L (ref 40–120)
ALT SERPL-CCNC: 28 U/L (ref 5–40)
ANION GAP SERPL CALC-SCNC: 11 MMOL/L (ref 3–15)
AST SERPL W P-5'-P-CCNC: 22 U/L (ref 10–37)
AVG GLU, 10930: 123 MG/DL (ref 91–123)
BASOPHILS # BLD: 0.1 K/UL (ref 0–0.2)
BASOPHILS NFR BLD: 1 % (ref 0–2)
BILIRUB SERPL-MCNC: 0.6 MG/DL (ref 0.2–1.2)
BUN SERPL-MCNC: 12 MG/DL (ref 6–22)
CALCIUM SERPL-MCNC: 9.4 MG/DL (ref 8.4–10.5)
CHLORIDE SERPL-SCNC: 108 MMOL/L (ref 98–110)
CHOLEST SERPL-MCNC: 151 MG/DL (ref 110–200)
CO2 SERPL-SCNC: 22 MMOL/L (ref 20–32)
CREAT SERPL-MCNC: 0.7 MG/DL (ref 0.8–1.4)
CREATININE, EXTERNAL: 0.7
EOSINOPHIL # BLD: 0.3 K/UL (ref 0–0.5)
EOSINOPHIL NFR BLD: 6 % (ref 0–6)
ERYTHROCYTE [DISTWIDTH] IN BLOOD BY AUTOMATED COUNT: 14.7 % (ref 10–15.5)
GFRAA, 66117: >60
GFRNA, 66118: >60
GLOBULIN,GLOB: 2.3 G/DL (ref 2–4)
GLUCOSE SERPL-MCNC: 104 MG/DL (ref 70–99)
GRANULOCYTES,GRANS: 42 % (ref 40–75)
HBA1C MFR BLD HPLC: 5.9 %
HBA1C MFR BLD HPLC: 5.9 % (ref 4.8–5.6)
HCT VFR BLD AUTO: 40.9 % (ref 35.1–48.3)
HDLC SERPL-MCNC: 38 MG/DL
HDLC SERPL-MCNC: 4 MG/DL (ref 0–5)
HGB BLD-MCNC: 12.9 G/DL (ref 11.7–16.1)
IMMATURE PLATELET FRACTION: 12.2 % (ref 1.1–7.1)
LDL-C, EXTERNAL: 95
LDL/HDL RATIO,LDHD: 2.5
LDLC SERPL CALC-MCNC: 95 MG/DL (ref 50–99)
LYMPHOCYTES, LYMLT: 42 % (ref 20–45)
MCH RBC QN AUTO: 27 PG (ref 26–34)
MCHC RBC AUTO-ENTMCNC: 32 G/DL (ref 31–36)
MCV RBC AUTO: 86 FL (ref 81–99)
MONOCYTES # BLD: 0.5 K/UL (ref 0.1–1)
MONOCYTES NFR BLD: 9 % (ref 3–12)
NEUTROPHILS # BLD AUTO: 2.5 K/UL (ref 1.8–7.7)
NON-HDL CHOLESTEROL, 011976: 113 MG/DL
PLATELET # BLD AUTO: 171 K/UL (ref 140–440)
PMV BLD AUTO: 13.1 FL (ref 9–13)
POTASSIUM SERPL-SCNC: 4 MMOL/L (ref 3.5–5.5)
PROT SERPL-MCNC: 6.5 G/DL (ref 6.2–8.1)
RBC # BLD AUTO: 4.78 M/UL (ref 3.8–5.2)
SENTARA SPECIMEN COL,SENBCF: NORMAL
SODIUM SERPL-SCNC: 141 MMOL/L (ref 133–145)
TRIGL SERPL-MCNC: 87 MG/DL (ref 40–149)
TSH SERPL DL<=0.005 MIU/L-ACNC: 0.24 MCU/ML (ref 0.27–4.2)
VLDLC SERPL CALC-MCNC: 17 MG/DL (ref 8–30)
WBC # BLD AUTO: 5.8 K/UL (ref 4–11)

## 2021-02-08 PROCEDURE — 99001 SPECIMEN HANDLING PT-LAB: CPT

## 2021-02-11 NOTE — PROGRESS NOTES
Please let patient know her TSH level is slightly low. I would recommend rechecking thyroid labs in 6 weeks. If the TSH level still remains low we may have to cut back on her levothyroxine to 100 mcg daily. Her glucose level is 104. She has an HbA1c of 5.9. She is in the prediabetes range. Rest of her labs are reassuring.   Zuly Reyes MD

## 2021-02-11 NOTE — PROGRESS NOTES
Spoke with patient. Patient was given lab results. Patient verbally stated understanding. Patient didn't have any questions or concerns.

## 2021-02-16 ENCOUNTER — VIRTUAL VISIT (OUTPATIENT)
Dept: FAMILY MEDICINE CLINIC | Age: 66
End: 2021-02-16
Payer: COMMERCIAL

## 2021-02-16 DIAGNOSIS — C54.8 MALIGNANT NEOPLASM OF OVERLAPPING SITES OF BODY OF UTERUS (HCC): ICD-10-CM

## 2021-02-16 DIAGNOSIS — R21 RASH: ICD-10-CM

## 2021-02-16 DIAGNOSIS — B02.9 HERPES ZOSTER WITHOUT COMPLICATION: Primary | ICD-10-CM

## 2021-02-16 DIAGNOSIS — E03.9 HYPOTHYROIDISM, UNSPECIFIED TYPE: ICD-10-CM

## 2021-02-16 DIAGNOSIS — E55.9 HYPOVITAMINOSIS D: ICD-10-CM

## 2021-02-16 DIAGNOSIS — E78.5 DYSLIPIDEMIA: ICD-10-CM

## 2021-02-16 DIAGNOSIS — E66.01 SEVERE OBESITY WITH BODY MASS INDEX (BMI) OF 35.0 TO 39.9 WITH SERIOUS COMORBIDITY (HCC): ICD-10-CM

## 2021-02-16 DIAGNOSIS — R73.03 PREDIABETES: ICD-10-CM

## 2021-02-16 PROCEDURE — 99214 OFFICE O/P EST MOD 30 MIN: CPT | Performed by: FAMILY MEDICINE

## 2021-02-16 RX ORDER — VALACYCLOVIR HYDROCHLORIDE 1 G/1
TABLET, FILM COATED ORAL
COMMUNITY
Start: 2021-02-14 | End: 2021-10-04

## 2021-02-16 RX ORDER — ERGOCALCIFEROL 1.25 MG/1
50000 CAPSULE ORAL
Qty: 26 CAP | Refills: 1 | Status: SHIPPED | OUTPATIENT
Start: 2021-02-16 | End: 2021-10-04 | Stop reason: SDUPTHER

## 2021-02-16 RX ORDER — LEVOTHYROXINE SODIUM 112 UG/1
TABLET ORAL
Qty: 90 TAB | Refills: 0 | Status: SHIPPED | OUTPATIENT
Start: 2021-02-16 | End: 2021-02-22 | Stop reason: DRUGHIGH

## 2021-02-16 NOTE — PROGRESS NOTES
Chief Complaint   Patient presents with    Follow Up Chronic Condition     1. Have you been to the ER, urgent care clinic since your last visit? Hospitalized since your last visit? Yes When: 02/21 [facility] [address] Reason for visit: [place]     2. Have you seen or consulted any other health care providers outside of the [address] [place] [address] since your last visit? Include any pap smears or colon screening.  No

## 2021-02-17 NOTE — PROGRESS NOTES
Ceasar Fan is a 72 y.o. female who was seen by synchronous (real-time) audio-video technology on 2/16/2021 for Follow Up Chronic Condition        Assessment & Plan:       ICD-10-CM ICD-9-CM    1. Herpes zoster without complication  M54.1 249.1    2. Rash  R21 782.1    3. Hypothyroidism, unspecified type  E03.9 244.9 levothyroxine (Synthroid) 112 mcg tablet      TSH 3RD GENERATION      T4, FREE   4. Severe obesity with body mass index (BMI) of 35.0 to 39.9 with serious comorbidity (HCC)  E66.01 278.01    5. Malignant neoplasm of overlapping sites of body of uterus (HCC)  C54.8 182.8    6. Hypovitaminosis D  E55.9 268.9 ergocalciferol (ERGOCALCIFEROL) 1,250 mcg (50,000 unit) capsule   7. Dyslipidemia  E78.5 272.4    8. Prediabetes  R73.03 790.29      Subjective:   Ceasar Fan is seen for follow-up care. Shingles: Patient had shingles rash of the scalp. Was seen in urgent care and put on Valtrex. She is currently taking the Valtrex. Still has some vesicles and the irritation. She will continue with the current treatment plan. Skin rash: Patient has chronic recalcitrant skin rash and has been seen by different providers. This is an erythematous rash that seems more like dermatographism. Patient states that this is due to parasitic infection of the skin. She in the past probably slightly to contribute to topics and believes this is where she might have gotten the infection. She has also been treated for scabies in the past.  She has been seen previously by dermatologist and is looking for someone who is specialized in infectious disease to help make a diagnosis of what is going on with her skin rash. It is disabling for her. She has been on various courses of medications. Most recently she has been on pyrantel pamoate and has also taken ivermectin. We will try and get her to see a different dermatologist or specialist who can help with the diagnosis.   Gastroenterology: Patient is followed up with a gastroenterologist.  She recently had a colonoscopy done. We will request the records. Stool test for ova, cysts and parasites have been negative. Hypothyroidism: Patient has hypothyroidism. Most recent TSH was low. She is on levothyroxine 112 mcg. We discussed cutting back on the levothyroxine but she would prefer to hold off on this. We will recheck labs and the make adjustments as needed then. Dyslipidemia: Patient has dyslipidemia. She is on atorvastatin 10 mg daily. She will continue with this medication. Hypovitaminosis D: Patient has a history of hypovitaminosis D. She is on vitamin D replacement therapy. She will continue with this medication. Obesity: Patient has a BMI of 35.88. She will intensify lifestyle and dietary modification. Gynecology: Patient has a history of cervical cancer. Has had hysterectomy done. She continues to follow-up with a gynecologist.  She gets Pap smears yearly and had one done recently. We will follow-up with the recommendations as per the gynecologist.  Prediabetes: Patient has prediabetes. HbA1c was 5.9. She will intensify lifestyle and dietary modification. Prior to Admission medications    Medication Sig Start Date End Date Taking? Authorizing Provider   valACYclovir (VALTREX) 1 gram tablet TAKE 1 TABLET BY MOUTH THREE TIMES DAILY FOR 7 DAYS 2/14/21  Yes Provider, Historical   levothyroxine (Synthroid) 112 mcg tablet TAKE 1 TABLET BY MOUTH DAILY BEFORE BREAKFAST 2/16/21  Yes Eneida Gandhi MD   ergocalciferol (ERGOCALCIFEROL) 1,250 mcg (50,000 unit) capsule Take 1 Cap by mouth every seven (7) days.  2/16/21  Yes Eneida Gandhi MD   diazePAM (VALIUM) 2 mg tablet TAKE 1 TABLET BY MOUTH DAILY AS NEEDED FOR ANXIETY 1/21/21  Yes Andrea Augustin MD   atorvastatin (LIPITOR) 10 mg tablet TAKE 1 TABLET BY MOUTH EVERY DAY 11/10/20  Yes Andrea Augustin MD   ivermectin (STROMECTOL) 3 mg tablet Take 24 mg (8 tabs) on  days 0,1,7,8,14,21 and 28. 6/10/20 Yes Andrea Greene MD   loratadine (CLARITIN) 10 mg tablet Take 1 Tab by mouth daily. Indications: Allergic Rhinitis 7/27/18  Yes MD KORIN Lua Review of all systems is negative except as noted above in the HPI. Objective:   No flowsheet data found. Constitutional: [x] No apparent distress     Mental status: [x] Alert and awake  [x] Oriented to person/place/time [x] Able to follow commands    Pulmonary/Chest: [x] Respiratory effort normal   [x] No visualized signs of difficulty breathing or respiratory distress            Neurological:        [x] No Facial Asymmetry (Cranial nerve 7 motor function) (limited exam due to video visit)                     Psychiatric:       [x] Normal Affect    We discussed the expected course, resolution and complications of the diagnosis(es) in detail. Medication risks, benefits, costs, interactions, and alternatives were discussed as indicated. I advised her to contact the office if her condition worsens, changes or fails to improve as anticipated. She expressed understanding with the diagnosis(es) and plan. Roberta Pham, who was evaluated through a patient-initiated, synchronous (real-time) audio-video encounter, and/or her healthcare decision maker, is aware that it is a billable service, with coverage as determined by her insurance carrier. She provided verbal consent to proceed: Yes, and patient identification was verified. It was conducted pursuant to the emergency declaration under the [facility], [address] [place] [address] authority and the [employer] Act. A caregiver was present when appropriate. Ability to conduct physical exam was limited. I was in the office. The patient was at home.       Rhonda Dave MD

## 2021-02-18 ENCOUNTER — TELEPHONE (OUTPATIENT)
Dept: FAMILY MEDICINE CLINIC | Age: 66
End: 2021-02-18

## 2021-02-18 NOTE — TELEPHONE ENCOUNTER
Patient states since taking valtrex for shingles she has start having palpitation and heart racing after taking medication at this time. Patient would like advice for

## 2021-02-18 NOTE — TELEPHONE ENCOUNTER
Pt is requesting to speak with Dr Minna Sotelo she was diagnose with Shingles and was prescribed medication that is causing heart palpitations and she stopped taking the medication. Ms Banksmonique Ponce would like to speak to the nurse for advice.  Please follow up when available

## 2021-02-22 ENCOUNTER — TELEPHONE (OUTPATIENT)
Dept: FAMILY MEDICINE CLINIC | Age: 66
End: 2021-02-22

## 2021-02-22 RX ORDER — LEVOTHYROXINE SODIUM 100 UG/1
100 TABLET ORAL
Qty: 30 TAB | Refills: 3 | Status: SHIPPED | OUTPATIENT
Start: 2021-02-22 | End: 2021-08-30 | Stop reason: DRUGHIGH

## 2021-02-22 NOTE — TELEPHONE ENCOUNTER
Spoke with patient at this time. Patient states she is still have pounding heart rate at this time. Patient states she thought  it was the valtrex but now she think it is her thyroid. Patient is requesting medication to be lowered to 100 at this time.  Virtual appointment scheduled

## 2021-02-23 ENCOUNTER — VIRTUAL VISIT (OUTPATIENT)
Dept: FAMILY MEDICINE CLINIC | Age: 66
End: 2021-02-23
Payer: COMMERCIAL

## 2021-02-23 DIAGNOSIS — B02.9 HERPES ZOSTER WITHOUT COMPLICATION: ICD-10-CM

## 2021-02-23 DIAGNOSIS — E03.9 HYPOTHYROIDISM, UNSPECIFIED TYPE: ICD-10-CM

## 2021-02-23 DIAGNOSIS — R00.2 PALPITATIONS: Primary | ICD-10-CM

## 2021-02-23 DIAGNOSIS — E78.5 DYSLIPIDEMIA: ICD-10-CM

## 2021-02-23 PROCEDURE — 99213 OFFICE O/P EST LOW 20 MIN: CPT | Performed by: FAMILY MEDICINE

## 2021-02-23 NOTE — PROGRESS NOTES
Chief Complaint   Patient presents with    Follow-up     discuss concerns      1. Have you been to the ER, urgent care clinic since your last visit? Hospitalized since your last visit? No    2. Have you seen or consulted any other health care providers outside of the [address] [place] [address] since your last visit? Include any pap smears or colon screening. No  Santiago Real is a 72 y.o. female who was seen by synchronous (real-time) audio-video technology on 2/23/2021 for Follow-up (discuss concerns )        Assessment & Plan:       ICD-10-CM ICD-9-CM    1. Palpitations  R00.2 785.1    2. Hypothyroidism, unspecified type  E03.9 244.9    3. Herpes zoster without complication  K21.6 706.3    4. Dyslipidemia  E78.5 272.4      Subjective:   Santiago Real is seen for follow-up care. Palpitations: Patient had episode of palpitations. These have now resolved. She was on Valtrex and thinks this triggered the palpitations. She denies syncope, chest pain or diaphoresis. She is due for follow-up with the cardiologist.  Shingles: Patient has a shingles rash of the scalp. Was given Valtrex. She took this for about 4 days and later developed palpitations and felt flushed. Has discontinued the medication. The shingles rash has crusted over and dried out. She will discontinue the medication. Hypothyroidism: Patient has hypothyroidism. She had palpitations. TSH was like elevated. Palpitations have resolved. She would prefer to stay on 112 mcg of levothyroxine and we will recheck labs at next visit. Dyslipidemia: Patient has dyslipidemia. She is on atorvastatin 10 mg daily. We will recheck lipid panel at next visit. Prior to Admission medications    Medication Sig Start Date End Date Taking? Authorizing Provider   levothyroxine (SYNTHROID) 100 mcg tablet Take 1 Tab by mouth Daily (before breakfast).  2/22/21  Yes Karl Flores MD   valACYclovir (VALTREX) 1 gram tablet TAKE 1 TABLET BY MOUTH THREE TIMES DAILY FOR 7 DAYS 2/14/21  Yes Provider, Historical   ergocalciferol (ERGOCALCIFEROL) 1,250 mcg (50,000 unit) capsule Take 1 Cap by mouth every seven (7) days. 2/16/21  Yes Zaina Díaz MD   diazePAM (VALIUM) 2 mg tablet TAKE 1 TABLET BY MOUTH DAILY AS NEEDED FOR ANXIETY 1/21/21  Yes Andrea Tom MD   atorvastatin (LIPITOR) 10 mg tablet TAKE 1 TABLET BY MOUTH EVERY DAY 11/10/20  Yes Andrea Tom MD   ivermectin (STROMECTOL) 3 mg tablet Take 24 mg (8 tabs) on  days 0,1,7,8,14,21 and 28. 6/10/20  Yes Andrea Greene MD   loratadine (CLARITIN) 10 mg tablet Take 1 Tab by mouth daily. Indications: Allergic Rhinitis 7/27/18  Yes Rukhsana Amaro MD     ROS Review of all systems is negative except as noted above in the HPI. Objective:   No flowsheet data found. Constitutional: [x] No apparent distress     Mental status: [x] Alert and awake  [x] Oriented to person/place/time [x] Able to follow commands    HENT: [x] Normocephalic, atraumatic    Pulmonary/Chest: [x] Respiratory effort normal   [x] No visualized signs of difficulty breathing or respiratory distress           Neurological:        [x] No Facial Asymmetry (Cranial nerve 7 motor function) (limited exam due to video visit)                     Psychiatric:       [x] Normal Affect    We discussed the expected course, resolution and complications of the diagnosis(es) in detail. Medication risks, benefits, costs, interactions, and alternatives were discussed as indicated. I advised her to contact the office if her condition worsens, changes or fails to improve as anticipated. She expressed understanding with the diagnosis(es) and plan. Priscilla Fishman, who was evaluated through a patient-initiated, synchronous (real-time) audio-video encounter, and/or her healthcare decision maker, is aware that it is a billable service, with coverage as determined by her insurance carrier.  She provided verbal consent to proceed: Yes, and patient identification was verified. It was conducted pursuant to the emergency declaration under the [facility] [address] and the [employer] and [employer] General Act. A caregiver was present when appropriate. Ability to conduct physical exam was limited. I was in the office. The patient was at home.       Jacqueline Bloom MD

## 2021-03-15 ENCOUNTER — TELEPHONE (OUTPATIENT)
Dept: FAMILY MEDICINE CLINIC | Age: 66
End: 2021-03-15

## 2021-03-16 ENCOUNTER — VIRTUAL VISIT (OUTPATIENT)
Dept: FAMILY MEDICINE CLINIC | Age: 66
End: 2021-03-16
Payer: COMMERCIAL

## 2021-03-16 DIAGNOSIS — R07.89 CHEST WALL PAIN: Primary | ICD-10-CM

## 2021-03-16 DIAGNOSIS — F41.9 ANXIETY: ICD-10-CM

## 2021-03-16 DIAGNOSIS — T14.8XXA BRUISE: ICD-10-CM

## 2021-03-16 DIAGNOSIS — Z23 ENCOUNTER FOR IMMUNIZATION: Primary | ICD-10-CM

## 2021-03-16 PROCEDURE — 99213 OFFICE O/P EST LOW 20 MIN: CPT | Performed by: FAMILY MEDICINE

## 2021-03-16 RX ORDER — DIAZEPAM 2 MG/1
TABLET ORAL
Qty: 20 TAB | Refills: 0 | Status: SHIPPED | OUTPATIENT
Start: 2021-03-16 | End: 2021-04-22 | Stop reason: SDUPTHER

## 2021-03-16 NOTE — PROGRESS NOTES
Vito Uribe is a 72 y.o. female who was seen by synchronous (real-time) audio-video technology on 3/16/2021 for Fall (x 3 days ago) and Rib Pain        Assessment & Plan:       ICD-10-CM ICD-9-CM    1. Chest wall pain  R07.89 786.52    2. Bruise  T14. 8XXA 924.9    3. Anxiety  F41.9 300.00 diazePAM (VALIUM) 2 mg tablet     Subjective:   Vito Uribe is seen for follow-up care. Chest wall pain: Patient fell and the sustained injury to the left chest wall. She now has a sharp pain just underneath her breast.  This is like a stabbing pain and it comes at particular movements with deep inhalation or movement. [facility] and the had chest x-ray done that was negative for thoracic wall/rib fracture. Patient continues to have pain. She has taken Tylenol for this. She is also doing supportive care measures. We will request records from urgent care. If pain persists she will let us know. Bruise left arm: Patient has a bruise left arm and forearm. This is on the dorsal aspect and around the elbow. This she sustained after a fall. She should continue with supportive care. She can apply ice pack to the area as needed. Anxiety: Patient has anxiety. She takes Valium as needed for this. She would like a refill of medication. Prior to Admission medications    Medication Sig Start Date End Date Taking? Authorizing Provider   ergocalciferol (ERGOCALCIFEROL) 1,250 mcg (50,000 unit) capsule Take 1 Cap by mouth every seven (7) days. 2/16/21  Yes Varghese More MD   diazePAM (VALIUM) 2 mg tablet TAKE 1 TABLET BY MOUTH DAILY AS NEEDED FOR ANXIETY 1/21/21  Yes Andrea Goldberg MD   atorvastatin (LIPITOR) 10 mg tablet TAKE 1 TABLET BY MOUTH EVERY DAY 11/10/20  Yes Andrea Greene MD   loratadine (CLARITIN) 10 mg tablet Take 1 Tab by mouth daily. Indications:  Allergic Rhinitis 7/27/18  Yes Magaly Fitzgerald MD   levothyroxine (SYNTHROID) 100 mcg tablet Take 1 Tab by mouth Daily (before breakfast). 2/22/21   Andrea Greene MD   valACYclovir (VALTREX) 1 gram tablet TAKE 1 TABLET BY MOUTH THREE TIMES DAILY FOR 7 DAYS 2/14/21   Provider, Keshawn   ivermectin (STROMECTOL) 3 mg tablet Take 24 mg (8 tabs) on  days 0,1,7,8,14,21 and 28. 6/10/20   Andrea Greene MD     ROS Review of all systems is negative except as noted above in the HPI. Objective:     Patient-Reported Vitals 3/16/2021   Patient-Reported Weight 236   Patient-Reported LMP n/a      Constitutional: [x] Appears well-developed and well-nourished [x] No apparent distress     Mental status: [x] Alert and awake  [x] Oriented to person/place/time [x] Able to follow commands     HENT: [x] Normocephalic, atraumatic      Pulmonary/Chest: [x] Respiratory effort normal   [x] No visualized signs of difficulty breathing or respiratory distress             Musculoskeletal:   [x]  Abnormal -bruising left arm and forearm dorsal aspect around the elbow. Neurological:        [x] No Facial Asymmetry (Cranial nerve 7 motor function) (limited exam due to video visit)                     Psychiatric:       [x] Normal Affect    We discussed the expected course, resolution and complications of the diagnosis(es) in detail. Medication risks, benefits, costs, interactions, and alternatives were discussed as indicated. I advised her to contact the office if her condition worsens, changes or fails to improve as anticipated. She expressed understanding with the diagnosis(es) and plan. Amie Mendenhall, was evaluated through a synchronous (real-time) audio-video encounter. The patient (or guardian if applicable) is aware that this is a billable service. Verbal consent to proceed has been obtained within the past 12 months.  The visit was conducted pursuant to the emergency declaration under the [address] waiver authority and the [employer] Appropriations Act. Patient identification was verified, and a caregiver was present when appropriate. The patient was located in a state where the provider was credentialed to provide care.       Luzmaria Chapa MD

## 2021-03-16 NOTE — PROGRESS NOTES
Deisy Co presents today for   Chief Complaint   Patient presents with    Fall     x 3 days ago    Rib Pain       Is someone accompanying this pt? no    Is the patient using any DME equipment during OV? no    Depression Screening:  3 most recent PHQ Screens 3/16/2021   Little interest or pleasure in doing things Not at all   Feeling down, depressed, irritable, or hopeless Not at all   Total Score PHQ 2 0   Trouble falling or staying asleep, or sleeping too much Not at all   Feeling tired or having little energy Not at all   Poor appetite, weight loss, or overeating Not at all   Feeling bad about yourself - or that you are a failure or have let yourself or your family down Not at all   Trouble concentrating on things such as school, work, reading, or watching TV Not at all   Moving or speaking so slowly that other people could have noticed; or the opposite being so fidgety that others notice Not at all   Thoughts of being better off dead, or hurting yourself in some way Not at all   PHQ 9 Score 0   How difficult have these problems made it for you to do your work, take care of your home and get along with others Not difficult at all       Learning Assessment:  Learning Assessment 1/16/2020   PRIMARY LEARNER Patient   HIGHEST LEVEL OF EDUCATION - PRIMARY LEARNER  > 4 [facility] CAREGIVER No   PRIMARY LANGUAGE ENGLISH   LEARNER PREFERENCE PRIMARY READING     -     -     -     -   ANSWERED BY patient    RELATIONSHIP SELF       Abuse Screening:  Abuse Screening Questionnaire 2/23/2021   Do you ever feel afraid of your partner? N   Are you in a relationship with someone who physically or mentally threatens you? N   Is it safe for you to go home? Y       Fall Risk  Fall Risk Assessment, last 12 mths 2/23/2021   Able to walk? Yes   Fall in past 12 months? 0   Do you feel unsteady?  0   Are you worried about falling 0       Health Maintenance reviewed and discussed and ordered per Provider. Health Maintenance Due   Topic Date Due    COVID-19 Vaccine (1) Never done    DTaP/Tdap/Td series (1 - Tdap) Never done    Shingrix Vaccine Age 50> (1 of 2) Never done    Breast Cancer Screen Mammogram  12/08/2017    GLAUCOMA SCREENING Q2Y  09/11/2020    Bone Densitometry (Dexa) Screening  Never done   . Coordination of Care:  1. Have you been to the ER, urgent care clinic since your last visit? Hospitalized since your last visit? [facility]  [facility]-    2. Have you seen or consulted any other health care providers outside of the [address] [place] [address] since your last visit? Include any pap smears or colon screening.  no

## 2021-03-16 NOTE — TELEPHONE ENCOUNTER
Left message on voicemail to schedule an appointment or follow up with urgent care
Patient called to speak to the nurse regarding fall she had on Friday.
Please call patient she may need an appointment
Alphonso Chapman)

## 2021-04-07 DIAGNOSIS — E78.5 DYSLIPIDEMIA: ICD-10-CM

## 2021-04-07 DIAGNOSIS — E03.9 HYPOTHYROIDISM, UNSPECIFIED TYPE: ICD-10-CM

## 2021-04-07 DIAGNOSIS — R73.03 PREDIABETES: ICD-10-CM

## 2021-04-07 DIAGNOSIS — E55.9 HYPOVITAMINOSIS D: ICD-10-CM

## 2021-04-22 DIAGNOSIS — F41.9 ANXIETY: ICD-10-CM

## 2021-04-22 NOTE — TELEPHONE ENCOUNTER
Patient called stating she needs to have her blood drawn to check her thyroid. She wants to know if provider will add order to check her sugar levels. She would like to have her blood drawn next week.      Requested Prescriptions     Pending Prescriptions Disp Refills    diazePAM (VALIUM) 2 mg tablet 20 Tab 0     Sig: TAKE 1 TABLET BY MOUTH DAILY AS NEEDED FOR ANXIETY

## 2021-04-22 NOTE — TELEPHONE ENCOUNTER
Requested Prescriptions     Pending Prescriptions Disp Refills    diazePAM (VALIUM) 2 mg tablet 20 Tab 0     Sig: TAKE 1 TABLET BY MOUTH DAILY AS NEEDED FOR ANXIETY     Jose Guaman called for their medication refill.     Last Office visit:  09-  Last labs:  02-  Follow up visit:  No follow up scheduled  Last date prescribe 03-    Please Advise

## 2021-04-25 RX ORDER — DIAZEPAM 2 MG/1
TABLET ORAL
Qty: 20 TAB | Refills: 0 | Status: SHIPPED | OUTPATIENT
Start: 2021-04-25 | End: 2021-05-26

## 2021-05-25 DIAGNOSIS — F41.9 ANXIETY: ICD-10-CM

## 2021-05-25 DIAGNOSIS — E78.5 DYSLIPIDEMIA: ICD-10-CM

## 2021-05-25 DIAGNOSIS — E03.9 HYPOTHYROIDISM, UNSPECIFIED TYPE: ICD-10-CM

## 2021-05-26 RX ORDER — LEVOTHYROXINE SODIUM 112 UG/1
TABLET ORAL
Qty: 90 TABLET | Refills: 0 | Status: SHIPPED | OUTPATIENT
Start: 2021-05-26 | End: 2021-08-30

## 2021-05-26 RX ORDER — ATORVASTATIN CALCIUM 10 MG/1
TABLET, FILM COATED ORAL
Qty: 90 TABLET | Refills: 0 | Status: SHIPPED | OUTPATIENT
Start: 2021-05-26 | End: 2021-11-29

## 2021-05-26 RX ORDER — DIAZEPAM 2 MG/1
TABLET ORAL
Qty: 20 TABLET | Refills: 2 | Status: SHIPPED | OUTPATIENT
Start: 2021-05-26 | End: 2021-11-29

## 2021-06-21 ENCOUNTER — TELEPHONE (OUTPATIENT)
Dept: FAMILY MEDICINE CLINIC | Age: 66
End: 2021-06-21

## 2021-06-21 NOTE — TELEPHONE ENCOUNTER
Patient went to have her blood drawn. She was told her orders have . Please advise or place new orders.

## 2021-06-25 ENCOUNTER — TELEPHONE (OUTPATIENT)
Dept: FAMILY MEDICINE CLINIC | Age: 66
End: 2021-06-25

## 2021-06-25 NOTE — TELEPHONE ENCOUNTER
Patient called and wanted to have her thyroid levels and blood sugar  levels checks again and needs order puts in system so she can go to the lab over in [facility] to have them checked. Last time she tried the orders were not in the system and they would not draw her blood. Please give her a call if there is a problem ordering these test for her.

## 2021-08-18 ENCOUNTER — TELEPHONE (OUTPATIENT)
Dept: FAMILY MEDICINE CLINIC | Age: 66
End: 2021-08-18

## 2021-08-18 NOTE — TELEPHONE ENCOUNTER
Pt is requesting Dr Cathy Farrell to place lab orders to do a full work up for her upcoming lab ad she scheduled.  Please contact for any questions or concerns of this matter when available

## 2021-08-20 DIAGNOSIS — E03.9 HYPOTHYROIDISM, UNSPECIFIED TYPE: ICD-10-CM

## 2021-08-20 DIAGNOSIS — E55.9 HYPOVITAMINOSIS D: ICD-10-CM

## 2021-08-20 DIAGNOSIS — E78.5 DYSLIPIDEMIA: Primary | ICD-10-CM

## 2021-08-20 DIAGNOSIS — R73.03 PREDIABETES: ICD-10-CM

## 2021-08-20 DIAGNOSIS — R73.9 HYPERGLYCEMIA: ICD-10-CM

## 2021-08-20 DIAGNOSIS — E61.1 IRON DEFICIENCY: ICD-10-CM

## 2021-08-26 DIAGNOSIS — E03.9 HYPOTHYROIDISM, UNSPECIFIED TYPE: ICD-10-CM

## 2021-08-26 NOTE — TELEPHONE ENCOUNTER
Attempted to contact patient. Patient didn't answer at this time. Patient was left a voice message informing her that labs was placed.

## 2021-08-30 RX ORDER — LEVOTHYROXINE SODIUM 112 UG/1
TABLET ORAL
Qty: 90 TABLET | Refills: 0 | Status: SHIPPED | OUTPATIENT
Start: 2021-08-30 | End: 2021-11-29

## 2021-09-09 LAB
25(OH)D3 SERPL-MCNC: 58.4 NG/ML (ref 32–100)
A-G RATIO,AGRAT: 1.8 RATIO (ref 1.1–2.6)
ABSOLUTE LYMPHOCYTE COUNT, 10803: 2.5 K/UL (ref 1–4.8)
ALBUMIN SERPL-MCNC: 4.3 G/DL (ref 3.5–5)
ALP SERPL-CCNC: 82 U/L (ref 40–120)
ALT SERPL-CCNC: 34 U/L (ref 5–40)
ANION GAP SERPL CALC-SCNC: 13 MMOL/L (ref 3–15)
AST SERPL W P-5'-P-CCNC: 32 U/L (ref 10–37)
BASOPHILS # BLD: 0.1 K/UL (ref 0–0.2)
BASOPHILS NFR BLD: 1 % (ref 0–2)
BILIRUB SERPL-MCNC: 0.4 MG/DL (ref 0.2–1.2)
BUN SERPL-MCNC: 16 MG/DL (ref 6–22)
CALCIUM SERPL-MCNC: 9.6 MG/DL (ref 8.4–10.5)
CHLORIDE SERPL-SCNC: 104 MMOL/L (ref 98–110)
CHOLEST SERPL-MCNC: 132 MG/DL (ref 110–200)
CO2 SERPL-SCNC: 22 MMOL/L (ref 20–32)
CREAT SERPL-MCNC: 0.7 MG/DL (ref 0.8–1.4)
EOSINOPHIL # BLD: 0.2 K/UL (ref 0–0.5)
EOSINOPHIL NFR BLD: 3 % (ref 0–6)
ERYTHROCYTE [DISTWIDTH] IN BLOOD BY AUTOMATED COUNT: 16.3 % (ref 10–15.5)
FE % SATURATION,PSAT: 12 % (ref 20–50)
FERRITIN SERPL-MCNC: 199 NG/ML (ref 10–291)
GFRAA, 66117: >60
GFRNA, 66118: >60
GLOBULIN,GLOB: 2.4 G/DL (ref 2–4)
GLUCOSE SERPL-MCNC: 113 MG/DL (ref 70–99)
GRANULOCYTES,GRANS: 55 % (ref 40–75)
HCT VFR BLD AUTO: 44.7 % (ref 35.1–48.3)
HDLC SERPL-MCNC: 3.7 MG/DL (ref 0–5)
HDLC SERPL-MCNC: 36 MG/DL
HGB BLD-MCNC: 13.6 G/DL (ref 11.7–16.1)
IMMATURE PLATELET FRACTION: 12.5 % (ref 1.1–7.1)
IRON,IRN: 38 MCG/DL (ref 30–160)
LDL/HDL RATIO,LDHD: 2.3
LDLC SERPL CALC-MCNC: 82 MG/DL (ref 50–99)
LYMPHOCYTES, LYMLT: 33 % (ref 20–45)
MCH RBC QN AUTO: 28 PG (ref 26–34)
MCHC RBC AUTO-ENTMCNC: 30 G/DL (ref 31–36)
MCV RBC AUTO: 92 FL (ref 81–99)
MONOCYTES # BLD: 0.6 K/UL (ref 0.1–1)
MONOCYTES NFR BLD: 8 % (ref 3–12)
NEUTROPHILS # BLD AUTO: 4.1 K/UL (ref 1.8–7.7)
NON-HDL CHOLESTEROL, 011976: 96 MG/DL
PLATELET # BLD AUTO: 164 K/UL (ref 140–440)
PMV BLD AUTO: 13 FL (ref 9–13)
POTASSIUM SERPL-SCNC: 4.1 MMOL/L (ref 3.5–5.5)
PROT SERPL-MCNC: 6.7 G/DL (ref 6.2–8.1)
RBC # BLD AUTO: 4.87 M/UL (ref 3.8–5.2)
SODIUM SERPL-SCNC: 139 MMOL/L (ref 133–145)
T4 FREE SERPL-MCNC: 1.8 NG/DL (ref 0.9–1.8)
TIBC,TIBC: 317 MCG/DL (ref 228–428)
TRIGL SERPL-MCNC: 67 MG/DL (ref 40–149)
TSH SERPL DL<=0.005 MIU/L-ACNC: 0.32 MCU/ML (ref 0.27–4.2)
UIBC SERPL-MCNC: 279 MCG/DL (ref 110–370)
VLDLC SERPL CALC-MCNC: 13 MG/DL (ref 8–30)
WBC # BLD AUTO: 7.4 K/UL (ref 4–11)

## 2021-09-20 NOTE — PROGRESS NOTES
Please let patient know her blood glucose was slightly elevated. She has prediabetes. She should intensify lifestyle and dietary modification. We will check a HbA1c when she comes into the clinic. Rest of her labs are reassuring.   Robel Lezama MD

## 2021-10-04 ENCOUNTER — OFFICE VISIT (OUTPATIENT)
Dept: FAMILY MEDICINE CLINIC | Age: 66
End: 2021-10-04
Payer: COMMERCIAL

## 2021-10-04 VITALS
SYSTOLIC BLOOD PRESSURE: 123 MMHG | OXYGEN SATURATION: 97 % | HEART RATE: 70 BPM | BODY MASS INDEX: 35.77 KG/M2 | TEMPERATURE: 96 F | WEIGHT: 236 LBS | DIASTOLIC BLOOD PRESSURE: 73 MMHG | RESPIRATION RATE: 20 BRPM | HEIGHT: 68 IN

## 2021-10-04 DIAGNOSIS — R21 RASH AND NONSPECIFIC SKIN ERUPTION: ICD-10-CM

## 2021-10-04 DIAGNOSIS — E03.9 HYPOTHYROIDISM, UNSPECIFIED TYPE: Primary | ICD-10-CM

## 2021-10-04 DIAGNOSIS — E66.01 SEVERE OBESITY WITH BODY MASS INDEX (BMI) OF 35.0 TO 39.9 WITH SERIOUS COMORBIDITY (HCC): ICD-10-CM

## 2021-10-04 DIAGNOSIS — R73.9 HYPERGLYCEMIA: ICD-10-CM

## 2021-10-04 DIAGNOSIS — F41.9 ANXIETY: ICD-10-CM
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
## 2021-10-04 DIAGNOSIS — E55.9 HYPOVITAMINOSIS D: ICD-10-CM

## 2021-10-04 DIAGNOSIS — Z23 ENCOUNTER FOR IMMUNIZATION: ICD-10-CM

## 2021-10-04 DIAGNOSIS — E78.5 DYSLIPIDEMIA: ICD-10-CM

## 2021-10-04 DIAGNOSIS — K90.0 CELIAC DISEASE: ICD-10-CM

## 2021-10-04 DIAGNOSIS — T14.8XXA ABRASION: ICD-10-CM

## 2021-10-04 LAB — HBA1C MFR BLD HPLC: 5.9 %

## 2021-10-04 PROCEDURE — 83036 HEMOGLOBIN GLYCOSYLATED A1C: CPT | Performed by: FAMILY MEDICINE

## 2021-10-04 PROCEDURE — 90471 IMMUNIZATION ADMIN: CPT | Performed by: FAMILY MEDICINE

## 2021-10-04 PROCEDURE — 90694 VACC AIIV4 NO PRSRV 0.5ML IM: CPT | Performed by: FAMILY MEDICINE

## 2021-10-04 PROCEDURE — 99214 OFFICE O/P EST MOD 30 MIN: CPT | Performed by: FAMILY MEDICINE

## 2021-10-04 RX ORDER — BACITRACIN ZINC 500 UNIT/G
OINTMENT (GRAM) TOPICAL 2 TIMES DAILY
Qty: 60 G | Refills: 0 | Status: SHIPPED | OUTPATIENT
Start: 2021-10-04

## 2021-10-04 RX ORDER — ERGOCALCIFEROL 1.25 MG/1
50000 CAPSULE ORAL
Qty: 26 CAPSULE | Refills: 1 | Status: SHIPPED | OUTPATIENT
Start: 2021-10-04 | End: 2021-12-08 | Stop reason: SDUPTHER

## 2021-10-04 NOTE — PROGRESS NOTES
1. Have you been to the ER, urgent care clinic since your last visit? Hospitalized since your last visit? No    2. Have you seen or consulted any other health care providers outside of the [address] [place] [address] since your last visit? Include any pap smears or colon screening. No     HPI  Kermitt Knee comes in for f/u care. Dermatology: Patient has chronic skin rash and a history of dermatographia. She is followed up with a dermatologist.  Continues to have itching and skin irritation. The dermatologist did request that she get tested for Lyme's disease and celiac disease. She would like this done. Request is placed. She will schedule lab work. Dyslipidemia: Patient has dyslipidemia. She is on atorvastatin. Lipid panel has been stable. She will continue current treatment plan. Abrasion: Patient fell and sustained an abrasion left knee. Area is healing well. I will send in bacitracin to apply. Discussed supportive care and wound care. Hypothyroidism: Patient has hypothyroidism. TSH has been stable. She is on levothyroxine. We will continue current treatment plan. Prediabetes: Patient has a history of prediabetes. We will check her HbA1c today. This is stable at 5.9. Anxiety: Patient has a history of anxiety. She has been on Valium in the past for this. She takes it only sporadically and needed. Obesity: Patient has a BMI of 35.88. She will intensify lifestyle and dietary modification. Health maintenance: Patient will get the flu vaccine today. She will come in next week for the PPSV23 vaccine. Past Medical History  Past Medical History:   Diagnosis Date    Abnormal nuclear cardiac imaging test 02/09/2012    Mid to distal anteroseptal and apical reversible defect concerning for ishcmeia. Mild-mod, mid-distal anterior & apical hypk. EF 67%. Borderline abnormal EKG w/1-mm inferolateral ST depression at 7 min exercise. Occasional PVCs.     Anxiety     Carotid duplex 58/06/4606    Mild 1-88% LICA stenosis.  Dengue fever     7/10 while in [place]    Diverticulosis     Dyslipidemia     GERD     Holter monitor study 04/29/2016    Sinus rhythm, avg HR 67 bpm (range  bpm). Rare ectopy.  Hypothyroidism     RLE venous duplex 07/23/2015    Right leg:  No DVT.  S/P cardiac catheterization 02/24/2012    Angiographically normal coronaries. Hyperdynamic. EF 70%. No RWMA.  Scabies exposure 03/2018    Sinusitis     Tinnitus     Uterine cancer ([address]     hysterectomy    Uterine carcinoma [facility])        Surgical History  Past Surgical History:   Procedure Laterality Date    HX CHOLECYSTECTOMY      2009    HX HYSTERECTOMY      HX TOTAL COLECTOMY      2008        Medications  Current Outpatient Medications   Medication Sig Dispense Refill    diazePAM (VALIUM) 2 mg tablet TAKE 1 TABLET BY MOUTH DAILY AS NEEDED FOR ANXIETY 20 Tablet 2    atorvastatin (LIPITOR) 10 mg tablet TAKE 1 TABLET BY MOUTH EVERY DAY 90 Tablet 0    ergocalciferol (ERGOCALCIFEROL) 1,250 mcg (50,000 unit) capsule Take 1 Cap by mouth every seven (7) days. 26 Cap 1    loratadine (CLARITIN) 10 mg tablet Take 1 Tab by mouth daily. Indications: Allergic Rhinitis 30 Tab 6    levothyroxine (Synthroid) 112 mcg tablet TAKE 1 TABLET BY MOUTH DAILY BEFORE BREAKFAST (Patient not taking: Reported on 10/4/2021) 90 Tablet 0    valACYclovir (VALTREX) 1 gram tablet TAKE 1 TABLET BY MOUTH THREE TIMES DAILY FOR 7 DAYS (Patient not taking: Reported on 10/4/2021)      ivermectin (STROMECTOL) 3 mg tablet Take 24 mg (8 tabs) on  days 0,1,7,8,14,21 and 28.  (Patient not taking: Reported on 10/4/2021) 56 Tab 0       Allergies  Allergies   Allergen Reactions    Omnicef [Cefdinir] Hives    Keflex [Cephalexin] Hives    Codeine Other (comments)     Severe headache    Ketek [Telithromycin] Other (comments)     Passed out    Levaquin [Levofloxacin] Unknown (comments)     Did not tolerate      Morphine Other (comments)     Severe headache    Sulfa (Sulfonamide Antibiotics) Hives    Topamax [Topiramate] Other (comments)     Made blood vessels red and pain in eyes    Valtrex [Valacyclovir] Palpitations       Family History  Family History   Problem Relation Age of Onset    Other Mother         anuerysm    Hypertension Maternal Aunt     Heart Disease Maternal Aunt     Stroke Maternal Grandmother     Other Maternal Grandfather         leg amputated due to phlebitis    Heart Attack Paternal Grandmother     Heart Disease Maternal Aunt        Social History  Social History     Socioeconomic History    Marital status:      Spouse name: Not on file    Number of children: Not on file    Years of education: Not on file    Highest education level: Not on file   Occupational History    Not on file   Tobacco Use    Smoking status: Former Smoker     Packs/day: 1.00     Years: 20.00     Pack years: 20.00     Types: Cigarettes     Quit date: 1999     Years since quittin.2    Smokeless tobacco: Never Used   Vaping Use    Vaping Use: Never used   Substance and Sexual Activity    Alcohol use: No     Alcohol/week: 0.0 standard drinks    Drug use: No     Types: Prescription, OTC    Sexual activity: Not Currently   Other Topics Concern    Not on file   Social History Narrative    Not on file     Social Determinants of Health     Financial Resource Strain:     Difficulty of Paying Living Expenses:    Food Insecurity:     Worried About Running Out of Food in the Last Year:     Ran Out of Food in the Last Year:    Transportation Needs:     Lack of Transportation (Medical):      Lack of Transportation (Non-Medical):    Physical Activity:     Days of Exercise per Week:     Minutes of Exercise per Session:    Stress:     Feeling of Stress :    Social Connections:     Frequency of Communication with Friends and Family:     Frequency of Social Gatherings with Friends and Family:     Attends Rastafari Services:     Active Member of Clubs or Organizations:     Attends Club or Organization Meetings:     Marital Status:    Intimate Partner Violence:     Fear of Current or Ex-Partner:     Emotionally Abused:     Physically Abused:     Sexually Abused:        Review of Systems  Review of Systems - Review of all systems is negative except as noted above in the HPI.     Vital Signs  Visit Vitals  /73 (BP 1 Location: Right upper arm, BP Patient Position: Sitting, BP Cuff Size: Adult long)   Pulse 70   Temp (!) 96 °F (35.6 °C) (Oral)   Resp 20   Ht 5' 8\" (1.727 m)   Wt 236 lb (107 kg)   LMP  (LMP Unknown)   SpO2 97%   BMI 35.88 kg/m²         Physical Exam  Physical Examination: General appearance - alert, well appearing, and in no distress, oriented to person, place, and time, overweight, acyanotic, in no respiratory distress and well hydrated  Mental status - alert, oriented to person, place, and time, normal mood, behavior, speech, dress, motor activity, and thought processes  Neck - supple, no significant adenopathy  Lymphatics - no palpable lymphadenopathy, no hepatosplenomegaly  Chest - clear to auscultation, no wheezes, rales or rhonchi, symmetric air entry  Heart - normal rate, regular rhythm, normal S1, S2, no murmurs, rubs, clicks or gallops  Abdomen - no rebound tenderness noted  Back exam - limited range of motion  Neurological - abnormal neurological exam unchanged from prior examinations  Musculoskeletal - osteoarthritic changes noted in both hands  Extremities - no pedal edema noted, intact peripheral pulses      Results  Results for orders placed or performed in visit on 09/08/21   T4, FREE   Result Value Ref Range    T4, Free 1.8 0.9 - 1.8 ng/dL   TSH 3RD GENERATION   Result Value Ref Range    TSH 0.32 0.27 - 4.20 mcU/mL   LIPID PANEL   Result Value Ref Range    Triglyceride 67 40 - 149 mg/dL    HDL Cholesterol 36 (L) >=40 mg/dL    Cholesterol, total 132 110 - 200 mg/dL    CHOLESTEROL/HDL 3.7 0.0 - 5.0    Non-HDL Cholesterol 96 <130 mg/dL    LDL, calculated 82 50 - 99 mg/dL    VLDL, calculated 13 8 - 30 mg/dL    LDL/HDL Ratio 2.3    METABOLIC PANEL, COMPREHENSIVE   Result Value Ref Range    Glucose 113 (H) 70 - 99 mg/dL    BUN 16 6 - 22 mg/dL    Creatinine 0.7 (L) 0.8 - 1.4 mg/dL    Sodium 139 133 - 145 mmol/L    Potassium 4.1 3.5 - 5.5 mmol/L    Chloride 104 98 - 110 mmol/L    CO2 22 20 - 32 mmol/L    AST (SGOT) 32 10 - 37 U/L    ALT (SGPT) 34 5 - 40 U/L    Alk. phosphatase 82 40 - 120 U/L    Bilirubin, total 0.4 0.2 - 1.2 mg/dL    Calcium 9.6 8.4 - 10.5 mg/dL    Protein, total 6.7 6.2 - 8.1 g/dL    Albumin 4.3 3.5 - 5.0 g/dL    A-G Ratio 1.8 1.1 - 2.6 ratio    Globulin 2.4 2.0 - 4.0 g/dL    Anion gap 13.0 3.0 - 15.0 mmol/L    GFRAA >60.0 >60.0    GFRNA >60.0 >60.0   IRON PROFILE   Result Value Ref Range    Iron 38 30 - 160 mcg/dL    UIBC 279 110 - 370 mcg/dL    TIBC 317 228 - 428 mcg/dL    Iron % saturation 12 (L) 20 - 50 %   FERRITIN   Result Value Ref Range    Ferritin 199 10 - 291 ng/mL   VITAMIN D, 25 HYDROXY   Result Value Ref Range    VITAMIN D, 25-HYDROXY 58.4 32.0 - 100.0 ng/mL   CBC WITH AUTOMATED DIFF   Result Value Ref Range    WBC 7.4 4.0 - 11.0 K/uL    RBC 4.87 3.80 - 5.20 M/uL    HGB 13.6 11.7 - 16.1 g/dL    HCT 44.7 35.1 - 48.3 %    MCV 92 81 - 99 fL    MCH 28 26 - 34 pg    MCHC 30 (L) 31 - 36 g/dL    RDW 16.3 (H) 10.0 - 15.5 %    PLATELET 002 457 - 092 K/uL    MPV 13.0 9.0 - 13.0 fL    NEUTROPHILS 55 40 - 75 %    Lymphocytes 33 20 - 45 %    MONOCYTES 8 3 - 12 %    EOSINOPHILS 3 0 - 6 %    BASOPHILS 1 0 - 2 %    ABS. NEUTROPHILS 4.1 1.8 - 7.7 K/uL    ABSOLUTE LYMPHOCYTE COUNT 2.5 1.0 - 4.8 K/uL    ABS. MONOCYTES 0.6 0.1 - 1.0 K/uL    ABS. EOSINOPHILS 0.2 0.0 - 0.5 K/uL    ABS. BASOPHILS 0.1 0.0 - 0.2 K/uL    IMMATURE PLATELET FRACTION 96.2 (H) 1.1 - 7.1 %       ASSESSMENT and PLAN    ICD-10-CM ICD-9-CM    1. Hypothyroidism, unspecified type  E03.9 244.9    2.  Rash and nonspecific skin eruption  R21 782.1    3. Hypovitaminosis D  E55.9 268.9 ergocalciferol (ERGOCALCIFEROL) 1,250 mcg (50,000 unit) capsule   4. Abrasion  T14. 8XXA 919.0 bacitracin zinc (BACITRACIN) ointment   5. Tick bite, unspecified site, initial encounter  W57. XXXA 919.4 LYME AB TOTAL W/RFLX W BLOT     E906.4    6. Celiac disease  K90.0 579.0 CELIAC ANTIBODY PROFILE   7. Anxiety  F41.9 300.00    8. Dyslipidemia  E78.5 272.4    9. Severe obesity with body mass index (BMI) of 35.0 to 39.9 with serious comorbidity (HCC)  E66.01 278.01    10. Hyperglycemia  R73.9 790.29 AMB POC HEMOGLOBIN A1C   11. Encounter for immunization  Z23 V03.89 FLU (FLUAD QUAD INFLUENZA VACCINE,QUAD,ADJUVANTED)     lab results and schedule of future lab studies reviewed with patient  reviewed diet, exercise and weight control  cardiovascular risk and specific lipid/LDL goals reviewed  reviewed medications and side effects in detail      I have discussed the diagnosis with the patient and the intended plan of care as seen in the above orders. The patient has received an after-visit summary and questions were answered concerning future plans. I have discussed medication, side effects, and warnings with the patient in detail. The patient verbalized understanding and is in agreement with the plan of care. The patient will contact the office with any additional concerns. Shira Estrada MD    PLEASE NOTE:   This document has been produced using voice recognition software.  Unrecognized errors in transcription may be present

## 2021-10-04 NOTE — PROGRESS NOTES
After obtaining consent, and per orders of Dr. Marco A Kimble, injection of [facility] given by Daina Dolan. Patient instructed to remain in clinic for 20 minutes afterwards, and to report any adverse reaction to me immediately.

## 2021-10-11 ENCOUNTER — CLINICAL SUPPORT (OUTPATIENT)
Dept: FAMILY MEDICINE CLINIC | Age: 66
End: 2021-10-11
Payer: COMMERCIAL

## 2021-10-11 DIAGNOSIS — Z23 ENCOUNTER FOR IMMUNIZATION: Primary | ICD-10-CM

## 2021-10-11 PROCEDURE — 90471 IMMUNIZATION ADMIN: CPT | Performed by: FAMILY MEDICINE

## 2021-10-11 PROCEDURE — 90732 PPSV23 VACC 2 YRS+ SUBQ/IM: CPT | Performed by: FAMILY MEDICINE

## 2021-10-11 NOTE — PROGRESS NOTES
After obtaining consent, and per orders of Dr. [place], injection of pneumonia given by Vern Mckenna. Patient instructed to remain in clinic for 20 minutes afterwards, and to report any adverse reaction to me immediately.

## 2021-10-29 ENCOUNTER — HOSPITAL ENCOUNTER (OUTPATIENT)
Dept: LAB | Age: 66
Discharge: HOME | End: 2021-10-29

## 2021-10-29 LAB — SENTARA SPECIMEN COL,SENBCF: NORMAL

## 2021-10-29 PROCEDURE — 99001 SPECIMEN HANDLING PT-LAB: CPT

## 2021-11-01 LAB
GLIADIN IGA, GIGA: <0.5 U/ML
GLIADIN IGG, GIGG: <0.5 U/ML
IMMUNOGLOBULIN A, QN, SERUM, 001784: 149 MG/DL (ref 70–400)
LYME AB IGG/IGM, 20154: <0.2 AI
T-TRANSGLUTAMINASE, IGA, 164643: <0.5 U/ML
TTG IGG SER-ACNC: <0.8 U/ML

## 2021-11-02 NOTE — PROGRESS NOTES
Please let patient know celiac antibody profile is within normal.  Lyme antibodies within normal.  Andrea Savage MD

## 2021-11-26 DIAGNOSIS — F41.9 ANXIETY: ICD-10-CM

## 2021-11-26 DIAGNOSIS — E78.5 DYSLIPIDEMIA: ICD-10-CM

## 2021-11-26 DIAGNOSIS — E03.9 HYPOTHYROIDISM, UNSPECIFIED TYPE: ICD-10-CM

## 2021-11-29 RX ORDER — DIAZEPAM 2 MG/1
TABLET ORAL
Qty: 20 TABLET | Refills: 0 | Status: SHIPPED | OUTPATIENT
Start: 2021-11-29 | End: 2022-01-20

## 2021-11-29 RX ORDER — LEVOTHYROXINE SODIUM 112 UG/1
TABLET ORAL
Qty: 90 TABLET | Refills: 0 | Status: SHIPPED | OUTPATIENT
Start: 2021-11-29 | End: 2022-02-20

## 2021-11-29 RX ORDER — ATORVASTATIN CALCIUM 10 MG/1
TABLET, FILM COATED ORAL
Qty: 90 TABLET | Refills: 0 | Status: SHIPPED | OUTPATIENT
Start: 2021-11-29 | End: 2022-05-18

## 2021-12-08 ENCOUNTER — VIRTUAL VISIT (OUTPATIENT)
Dept: FAMILY MEDICINE CLINIC | Age: 66
End: 2021-12-08
Payer: COMMERCIAL

## 2021-12-08 DIAGNOSIS — R21 RASH AND NONSPECIFIC SKIN ERUPTION: Primary | ICD-10-CM

## 2021-12-08 DIAGNOSIS — E03.9 HYPOTHYROIDISM, UNSPECIFIED TYPE: ICD-10-CM

## 2021-12-08 DIAGNOSIS — E55.9 HYPOVITAMINOSIS D: ICD-10-CM

## 2021-12-08 PROCEDURE — 99214 OFFICE O/P EST MOD 30 MIN: CPT | Performed by: FAMILY MEDICINE

## 2021-12-08 RX ORDER — ERGOCALCIFEROL 1.25 MG/1
50000 CAPSULE ORAL
Qty: 26 CAPSULE | Refills: 1 | Status: SHIPPED | OUTPATIENT
Start: 2021-12-08 | End: 2022-09-07 | Stop reason: SDUPTHER

## 2021-12-08 NOTE — PROGRESS NOTES
1. \"Have you been to the ER, urgent care clinic since your last visit? Hospitalized since your last visit? \" No    2. \"Have you seen or consulted any other health care providers outside of the [address] [place] [address] since your last visit? \" No     3. For patients aged 39-70: Has the patient had a colonoscopy / FIT/ Cologuard? Yes, HM satisfied with blue hyperlink     If the patient is female:    4. For patients aged 41-77: Has the patient had a mammogram within the past 2 years? No    5. For patients aged 21-65: Has the patient had a pap smear?  No

## 2021-12-08 NOTE — PROGRESS NOTES
Pippa Rosas is a 77 y.o. female who was seen by synchronous (real-time) audio-video technology on 12/8/2021 for Skin Problem (recurring)        Assessment & Plan:       ICD-10-CM ICD-9-CM    1. Rash and nonspecific skin eruption  R21 782.1    2. Hypovitaminosis D  E55.9 268.9 ergocalciferol (ERGOCALCIFEROL) 1,250 mcg (50,000 unit) capsule   3. Hypothyroidism, unspecified type  E03.9 244.9      I spent at least 30 minutes on this visit with this established patient. Subjective:   Pippa Rosas had virtual visit for follow-up care. Patient has chronic skin condition. She has had this for years. She has seen various skin specialist.  States that her gastroenterologist and cardiologist told her that this is likely parasitic infection. She has been on various medications to get rid of the parasites without much relief. There has been no evaluation that also revealed parasites. She wonders about strongyloidiasis infection or even lower lower. She states that in the past she traveled to [facility] country and she may have been infected. Today she would like me to see a lesion on her right buttock. She saw this a few days ago. It is a linear lesion that looks like a track. She states that this looks like it is a parasite. She is trying to get in to see a specialist at [facility] in [place], [place] as they have a parasitology center. She states that she has not been able to get any help elsewhere. In the meantime she will do supportive care for the rash. This rash looks more like dermatographia type rash. She has over the years collected images of the this rashes to present to a dermatologist and  once she is able to get in for an appointment. Hypovitaminosis D: Patient has a history of hypovitaminosis D. I will send a refill of vitamin D. Hypothyroidism: Patient has hypothyroidism. She is on thyroid replacement therapy.   We will recheck labs at next visit.    Prior to Admission medications    Medication Sig Start Date End Date Taking? Authorizing Provider   ergocalciferol (ERGOCALCIFEROL) 1,250 mcg (50,000 unit) capsule Take 1 Capsule by mouth every seven (7) days. 12/8/21  Yes Eneida Gandhi MD   atorvastatin (LIPITOR) 10 mg tablet TAKE 1 TABLET BY MOUTH EVERY DAY 11/29/21  Yes Andrea Augustin MD   levothyroxine (Synthroid) 112 mcg tablet TAKE 1 TABLET BY MOUTH DAILY BEFORE BREAKFAST 11/29/21  Yes Andrea Augustin MD   diazePAM (VALIUM) 2 mg tablet TAKE 1 TABLET BY MOUTH DAILY AS NEEDED FOR ANXIETY 11/29/21  Yes Andrea Augustin MD   bacitracin zinc (BACITRACIN) ointment Apply  to affected area two (2) times a day. 10/4/21  Yes Andrea Greene MD   loratadine (CLARITIN) 10 mg tablet Take 1 Tab by mouth daily. Indications: Allergic Rhinitis 7/27/18  Yes Rachell Watkins MD   ergocalciferol (ERGOCALCIFEROL) 1,250 mcg (50,000 unit) capsule Take 1 Capsule by mouth every seven (7) days. 10/4/21 12/8/21  Andrea Greene MD     ROS Review of all systems is negative except as noted above in the HPI. Objective:     Patient-Reported Vitals 12/8/2021   Patient-Reported Weight 220   Patient-Reported LMP -   Constitutional: [x] Appears well-developed and well-nourished [x] No apparent distress    Mental status: [x] Alert and awake  [x] Oriented to person/place/time [x] Able to follow commands    HENT: [x] Normocephalic, atraumatic    Pulmonary/Chest: [x] Respiratory effort normal   [x] No visualized signs of difficulty breathing or respiratory distress           Neurological:        [x] No Facial Asymmetry (Cranial nerve 7 motor function) (limited exam due to video visit)                Skin:        [x] No significant exanthematous lesions or discoloration noted on facial skin         [x] Abnormal -patient has linear erythematous track like lesion right buttock.            Psychiatric:       [x] Normal Affect    We discussed the expected course, resolution and complications of the diagnosis(es) in detail. Medication risks, benefits, costs, interactions, and alternatives were discussed as indicated. I advised her to contact the office if her condition worsens, changes or fails to improve as anticipated. She expressed understanding with the diagnosis(es) and plan. Brooklynmitt Knee, was evaluated through a synchronous (real-time) audio-video encounter. The patient (or guardian if applicable) is aware that this is a billable service. Verbal consent to proceed has been obtained within the past 12 months. The visit was conducted pursuant to the emergency declaration under the [address] [place] [address], [address] [place] [address] authority and the [employer] General Act. Patient identification was verified, and a caregiver was present when appropriate. The patient was located in a state where the provider was credentialed to provide care.       Ahmet Saeed MD

## 2021-12-16 ENCOUNTER — OFFICE VISIT (OUTPATIENT)
Dept: CARDIOLOGY CLINIC | Age: 66
End: 2021-12-16
Payer: COMMERCIAL

## 2021-12-16 VITALS
OXYGEN SATURATION: 98 % | HEIGHT: 68 IN | HEART RATE: 60 BPM | BODY MASS INDEX: 35.92 KG/M2 | SYSTOLIC BLOOD PRESSURE: 122 MMHG | WEIGHT: 237 LBS | DIASTOLIC BLOOD PRESSURE: 78 MMHG

## 2021-12-16 DIAGNOSIS — I49.3 PVC (PREMATURE VENTRICULAR CONTRACTION): Primary | ICD-10-CM

## 2021-12-16 DIAGNOSIS — E78.5 DYSLIPIDEMIA: ICD-10-CM

## 2021-12-16 DIAGNOSIS — R00.2 PALPITATIONS: ICD-10-CM

## 2021-12-16 DIAGNOSIS — E03.9 HYPOTHYROIDISM, UNSPECIFIED TYPE: ICD-10-CM

## 2021-12-16 PROCEDURE — 93000 ELECTROCARDIOGRAM COMPLETE: CPT | Performed by: INTERNAL MEDICINE

## 2021-12-16 PROCEDURE — 99214 OFFICE O/P EST MOD 30 MIN: CPT | Performed by: INTERNAL MEDICINE

## 2021-12-16 NOTE — PROGRESS NOTES
History of Present Illness:  77year old female here for follow up. Overall she is doing relatively well. When I saw her August, 2020 there was concern about some sort of underlying cutaneous parasite and ultimately now she is going to be following up with  up in [place]. From a cardiac standpoint she is doing relatively well, no significant palpitations, chest pain, dyspnea, PND, orthopnea or edema. She has been concerned about her weight. Impression:  1. History of palpitations, PVCs, stable. 2. Remote syncope without recurrence. 3. History of atypical chest pain with last stress test 2017, resolved. 4. Thyroid disorder. 5. Remote uterine cancer, hysterectomy and chemotherapy with radiation, in remission. 6. History of recurrent rash, concerning for parasitic infection, and she has had remote Dengue fever by report. She is planning to follow up with a  up in [place]. Plan:  From a cardiac standpoint she is doing well. EKG is stable without any acute changes. Blood pressure is controlled. Minimal to new palpitations. I would continue with statin for her cholesterol and she is also on thyroid replacement. All questions answered and I will see back in a year. Past Medical History:   Diagnosis Date    Abnormal nuclear cardiac imaging test 02/09/2012    Mid to distal anteroseptal and apical reversible defect concerning for ishcmeia. Mild-mod, mid-distal anterior & apical hypk. EF 67%. Borderline abnormal EKG w/1-mm inferolateral ST depression at 7 min exercise. Occasional PVCs.  Anxiety     Carotid duplex 11/46/9112    Mild 8-79% LICA stenosis.  Dengue fever     7/10 while in [place]    Diverticulosis     Dyslipidemia     GERD     Holter monitor study 04/29/2016    Sinus rhythm, avg HR 67 bpm (range  bpm). Rare ectopy.  Hypothyroidism     RLE venous duplex 07/23/2015    Right leg:  No DVT.       S/P cardiac catheterization 02/24/2012 Angiographically normal coronaries. Hyperdynamic. EF 70%. No RWMA.  Scabies exposure 03/2018    Sinusitis     Tinnitus     Uterine cancer (HCC)     hysterectomy    Uterine carcinoma (HCC)        Current Outpatient Medications   Medication Sig Dispense Refill    ergocalciferol (ERGOCALCIFEROL) 1,250 mcg (50,000 unit) capsule Take 1 Capsule by mouth every seven (7) days. 26 Capsule 1    atorvastatin (LIPITOR) 10 mg tablet TAKE 1 TABLET BY MOUTH EVERY DAY 90 Tablet 0    levothyroxine (Synthroid) 112 mcg tablet TAKE 1 TABLET BY MOUTH DAILY BEFORE BREAKFAST 90 Tablet 0    diazePAM (VALIUM) 2 mg tablet TAKE 1 TABLET BY MOUTH DAILY AS NEEDED FOR ANXIETY 20 Tablet 0    bacitracin zinc (BACITRACIN) ointment Apply  to affected area two (2) times a day. 60 g 0    loratadine (CLARITIN) 10 mg tablet Take 1 Tab by mouth daily. Indications: Allergic Rhinitis 30 Tab 6       Social History   reports that she quit smoking about 22 years ago. Her smoking use included cigarettes. She has a 20.00 pack-year smoking history. She has never used smokeless tobacco.   reports no history of alcohol use. Family History  family history includes Heart Attack in her paternal grandmother; Heart Disease in her maternal aunt and maternal aunt; Hypertension in her maternal aunt; Other in her maternal grandfather and mother; Stroke in her maternal grandmother. Review of Systems  Except as stated above include:  Constitutional: Negative for fever, chills and malaise/fatigue. HEENT: No congestion or recent URI. Gastrointestinal: No nausea, vomiting, abdominal pain, bloody stools. Pulmonary:  Negative except as stated above. Cardiac:  Negative except as stated above. Musculoskeletal: Negative except as stated above. Neurological:  No localized symptoms. Skin:  Negative except as stated above. Psych:  Negative except as stated above. Endocrine:  Negative except as stated above.     PHYSICAL EXAM  BP Readings from Last 3 Encounters:   12/16/21 122/78   10/04/21 123/73   09/29/20 133/77     Pulse Readings from Last 3 Encounters:   12/16/21 60   10/04/21 70   09/29/20 74     Wt Readings from Last 3 Encounters:   12/16/21 107.5 kg (237 lb)   10/04/21 107 kg (236 lb)   09/29/20 107 kg (236 lb)     General:   Well developed, well groomed. Head/Neck:   No obvious jugular venous distention     No obvious carotid pulsations. No evidence of xanthelasma. Lungs:   No respiratory distress. Clear bilaterally. Heart:  Regular rate and rhythm. Normal S1/S2. Palpation grossly normal.    No significant murmurs, rubs or gallops. Abdomen:   Non-acute abdomen. No obvious pulsations. Extremities:   Intact peripheral pulses. No significant edema. Neurological:   Alert and oriented to person, place, time. No focal neurological deficit visually. Skin:   No obvious rash    Blood Pressure Metric:  Monitor recommended and adjustments stated if needed.

## 2021-12-16 NOTE — PROGRESS NOTES
Alex Mooreindy presents today for   Chief Complaint   Patient presents with    Follow-up     1 year        Alex Salinas preferred language for health care discussion is english/other. Is someone accompanying this pt? no    Is the patient using any DME equipment during [address]? no    Depression Screening:  3 most recent PHQ Screens 12/16/2021   Little interest or pleasure in doing things Not at all   Feeling down, depressed, irritable, or hopeless Not at all   Total Score PHQ 2 0   Trouble falling or staying asleep, or sleeping too much -   Feeling tired or having little energy -   Poor appetite, weight loss, or overeating -   Feeling bad about yourself - or that you are a failure or have let yourself or your family down -   Trouble concentrating on things such as school, work, reading, or watching TV -   Moving or speaking so slowly that other people could have noticed; or the opposite being so fidgety that others notice -   Thoughts of being better off dead, or hurting yourself in some way -   PHQ 9 Score -   How difficult have these problems made it for you to do your work, take care of your home and get along with others -       Learning Assessment:  Learning Assessment 1/16/2020   PRIMARY LEARNER Patient   HIGHEST LEVEL OF EDUCATION - PRIMARY LEARNER  > 4 YEARS OF COLLEGE   BARRIERS PRIMARY LEARNER NONE   CO-LEARNER CAREGIVER No   PRIMARY LANGUAGE ENGLISH   LEARNER PREFERENCE PRIMARY READING     -     -     -     -   ANSWERED BY patient    RELATIONSHIP SELF       Abuse Screening:  Abuse Screening Questionnaire 2/23/2021   Do you ever feel afraid of your partner? N   Are you in a relationship with someone who physically or mentally threatens you? N   Is it safe for you to go home? Y       Fall Risk  Fall Risk Assessment, last 12 mths 12/16/2021   Able to walk? Yes   Fall in past 12 months? 0   Do you feel unsteady?  0   Are you worried about falling 0   Is the gait abnormal? -   Number of falls in past 12 months -   Fall with injury? -       Pt currently taking Anticoagulant therapy? no    Coordination of Care:  1. Have you been to the ER, urgent care clinic since your last visit? Hospitalized since your last visit? no    2. Have you seen or consulted any other health care providers outside of the [address] [place] [address] since your last visit? Include any pap smears or colon screening.  no

## 2022-01-20 DIAGNOSIS — F41.9 ANXIETY: ICD-10-CM

## 2022-01-20 RX ORDER — DIAZEPAM 2 MG/1
TABLET ORAL
Qty: 20 TABLET | Refills: 2 | Status: SHIPPED | OUTPATIENT
Start: 2022-01-20 | End: 2022-04-27 | Stop reason: SDUPTHER
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
## 2022-02-03 DIAGNOSIS — K90.0 CELIAC DISEASE: ICD-10-CM

## 2022-02-18 DIAGNOSIS — E03.9 HYPOTHYROIDISM, UNSPECIFIED TYPE: ICD-10-CM

## 2022-02-20 RX ORDER — LEVOTHYROXINE SODIUM 112 UG/1
TABLET ORAL
Qty: 90 TABLET | Refills: 0 | Status: SHIPPED | OUTPATIENT
Start: 2022-02-20 | End: 2022-05-18

## 2022-03-18 PROBLEM — C54.8 MALIGNANT NEOPLASM OF OVERLAPPING SITES OF BODY OF UTERUS (HCC): Status: ACTIVE | Noted: 2018-05-24

## 2022-04-27 ENCOUNTER — VIRTUAL VISIT (OUTPATIENT)
Dept: FAMILY MEDICINE CLINIC | Age: 67
End: 2022-04-27
Payer: COMMERCIAL

## 2022-04-27 DIAGNOSIS — R53.81 MALAISE AND FATIGUE: Primary | ICD-10-CM

## 2022-04-27 DIAGNOSIS — U07.1 COVID-19: ICD-10-CM

## 2022-04-27 DIAGNOSIS — E66.01 SEVERE OBESITY (BMI 35.0-39.9) WITH COMORBIDITY (HCC): ICD-10-CM

## 2022-04-27 DIAGNOSIS — E55.9 HYPOVITAMINOSIS D: ICD-10-CM

## 2022-04-27 DIAGNOSIS — B35.1 ONYCHOMYCOSIS: ICD-10-CM

## 2022-04-27 DIAGNOSIS — F41.9 ANXIETY: ICD-10-CM

## 2022-04-27 DIAGNOSIS — R53.83 MALAISE AND FATIGUE: Primary | ICD-10-CM

## 2022-04-27 DIAGNOSIS — R73.9 HYPERGLYCEMIA: ICD-10-CM

## 2022-04-27 DIAGNOSIS — J40 BRONCHITIS: ICD-10-CM

## 2022-04-27 DIAGNOSIS — R21 RASH AND NONSPECIFIC SKIN ERUPTION: ICD-10-CM

## 2022-04-27 DIAGNOSIS — B86 SCABIES: ICD-10-CM

## 2022-04-27 DIAGNOSIS — D50.9 IRON DEFICIENCY ANEMIA, UNSPECIFIED IRON DEFICIENCY ANEMIA TYPE: ICD-10-CM

## 2022-04-27 DIAGNOSIS — E78.5 DYSLIPIDEMIA: ICD-10-CM

## 2022-04-27 DIAGNOSIS — E03.9 HYPOTHYROIDISM, UNSPECIFIED TYPE: ICD-10-CM

## 2022-04-27 PROBLEM — C54.8 MALIGNANT NEOPLASM OF OVERLAPPING SITES OF BODY OF UTERUS (HCC): Status: RESOLVED | Noted: 2018-05-24 | Resolved: 2022-04-27

## 2022-04-27 PROCEDURE — 99214 OFFICE O/P EST MOD 30 MIN: CPT | Performed by: FAMILY MEDICINE

## 2022-04-27 RX ORDER — PERMETHRIN 50 MG/G
CREAM TOPICAL
Qty: 60 G | Refills: 0 | Status: SHIPPED | OUTPATIENT
Start: 2022-04-27 | End: 2022-05-18 | Stop reason: SDUPTHER

## 2022-04-27 RX ORDER — DOXYCYCLINE 100 MG/1
100 TABLET ORAL 2 TIMES DAILY
Qty: 20 TABLET | Refills: 0 | Status: SHIPPED | OUTPATIENT
Start: 2022-04-27 | End: 2022-05-07

## 2022-04-27 RX ORDER — DIAZEPAM 2 MG/1
TABLET ORAL
Qty: 20 TABLET | Refills: 2 | Status: SHIPPED | OUTPATIENT
Start: 2022-04-27 | End: 2022-09-07 | Stop reason: SDUPTHER

## 2022-04-27 NOTE — PROGRESS NOTES
Lorie Ugalde is a 77 y.o. female who was seen by synchronous (real-time) audio-video technology on 4/27/2022 for Concern For COVID-19 (Coronavirus)        Assessment & Plan:       ICD-10-CM ICD-9-CM    1. Malaise and fatigue  A88.89 858.13 METABOLIC PANEL, COMPREHENSIVE    R53.83  CBC WITH AUTOMATED DIFF   2. Anxiety  F41.9 300.00 diazePAM (VALIUM) 2 mg tablet   3. Severe obesity (BMI 35.0-39. 9) with comorbidity ([address]  E66.01 278.01    4. Onychomycosis  B35.1 110.1 efinaconazole (JUBLIA) meggan topical solution   5. Rash and nonspecific skin eruption  R21 782. 1 permethrin (ACTICIN) 5 % topical cream   6. Hypothyroidism, unspecified type  E03.9 244.9 TSH 3RD GENERATION      T4, FREE   7. Hypovitaminosis D  E55.9 268.9 VITAMIN D, 25 HYDROXY   8. Dyslipidemia  E78.5 272.4 LIPID PANEL      METABOLIC PANEL, COMPREHENSIVE   9. Hyperglycemia  R73.9 790.29 HEMOGLOBIN A1C WITH EAG   10. Scabies  B86 133.0 permethrin (ACTICIN) 5 % topical cream   11. Bronchitis  J40 490 doxycycline (ADOXA) 100 mg tablet   12. Iron deficiency anemia, unspecified iron deficiency anemia type  D50.9 280.9 IRON PROFILE      FERRITIN   13. COVID-19  U07.1 079.89 SARS-COV-2 AB, TOTAL     Subjective:   Lorie Ugalde is seen for follow-up care. Profound malaise and fatigue: Patient feels generally tired. She has no energy or motivation at all. She states that she is not depressed. This has happened in the past and she was noted to have iron deficiency anemia. I will recheck labs today. COVID-19: Patient has not had a COVID-19 test but she believes she is contracted the virus about 3 years ago. This made her feel weak with no energy. She wonders whether she has really contracted the virus and would like to get tested for this. She would also like to get her antibodies tested to see whether or not she was in contact with the virus previously. Request will be placed. Bronchitis: Patient has bronchitis.   She has been having a cough that is productive of mucopurulent phlegm. She has some chills and fever. She denies wheeze, hemoptysis or shortness of breath. I will send in doxycycline to treat for bronchitis. Rash: Patient has a rash on her body. This affects her abdomen and the back. She also has a rash on her extremities. This rash she believes is due to infestation and she has applied permethrin cream in the past with good result. Would like a refill of this. Prescription is sent in. She is due to see a dermatologist.  Onychomycosis: Patient has onychomycosis. Has used Jublia in the past with good result. Would like this sent into the pharmacy. Anxiety: Patient has anxiety. She uses albuterol only as needed. She would like a refill of medication. She has run out of the same. Currently anxiety is triggered by illnesses especially COVID-19. Dyslipidemia: Patient has dyslipidemia. She is on atorvastatin. We will check labs. Hypovitaminosis D: Patient has hypovitaminosis D. I will recheck vitamin D levels. Hypothyroidism: Patient has hypothyroidism. She is on Synthroid. Stable on the medication and tolerating. We will recheck labs. Continue current treatment plan. Prior to Admission medications    Medication Sig Start Date End Date Taking? Authorizing Provider   Synthroid 112 mcg tablet TAKE 1 TABLET BY MOUTH DAILY BEFORE AND BREAKFAST 2/20/22  Yes Andrea Hutchinson MD   diazePAM (VALIUM) 2 mg tablet TAKE 1 TABLET BY MOUTH DAILY AS NEEDED FOR ANXIETY 1/20/22  Yes Quique Guevara MD   ergocalciferol (ERGOCALCIFEROL) 1,250 mcg (50,000 unit) capsule Take 1 Capsule by mouth every seven (7) days. 12/8/21  Yes Quique Guevara MD   atorvastatin (LIPITOR) 10 mg tablet TAKE 1 TABLET BY MOUTH EVERY DAY 11/29/21  Yes Andrea Greene MD   bacitracin zinc (BACITRACIN) ointment Apply  to affected area two (2) times a day. 10/4/21  Yes Andrea Greene MD   loratadine (CLARITIN) 10 mg tablet Take 1 Tab by mouth daily. Indications: Allergic Rhinitis 7/27/18  Yes MD KORIN Haney Review of all systems is negative except as noted above in the HPI. Objective:     Patient-Reported Vitals 12/8/2021   Patient-Reported Weight 220   Patient-Reported LMP -   Constitutional: [x] Appears well-developed and well-nourished [x] No apparent distress      Mental status: [x] Alert and awake  [x] Oriented to person/place/time [x] Able to follow commands     HENT: [x] Normocephalic, atraumatic     Pulmonary/Chest: [x] Respiratory effort normal   [x] No visualized signs of difficulty breathing or respiratory distress           Neurological:        [x] No Facial Asymmetry (Cranial nerve 7 motor function) (limited exam due to video visit)                    Psychiatric:       [x] Normal Affect    We discussed the expected course, resolution and complications of the diagnosis(es) in detail. Medication risks, benefits, costs, interactions, and alternatives were discussed as indicated. I advised her to contact the office if her condition worsens, changes or fails to improve as anticipated. She expressed understanding with the diagnosis(es) and plan. Karla Rojas, was evaluated through a synchronous (real-time) audio-video encounter. The patient (or guardian if applicable) is aware that this is a billable service, which includes applicable co-pays. Verbal consent to proceed has been obtained. The visit was conducted pursuant to the emergency declaration under the [facility], [address] [place] [address] [facility] authority and the [employer] General Act. Patient identification was verified, and a caregiver was present when appropriate. The patient was located at home in a state where the provider was licensed to provide care.       Michael Tabor MD

## 2022-04-27 NOTE — PROGRESS NOTES
1. \"Have you been to the ER, urgent care clinic since your last visit? Hospitalized since your last visit? \" No    2. \"Have you seen or consulted any other health care providers outside of the [address] [place] [address] since your last visit? \" No     3. For patients aged 39-70: Has the patient had a colonoscopy / FIT/ Cologuard? No      If the patient is female:    4. For patients aged 41-77: Has the patient had a mammogram within the past 2 years? No      5. For patients aged 21-65: Has the patient had a pap smear?  No

## 2022-04-28 DIAGNOSIS — B35.1 ONYCHOMYCOSIS: ICD-10-CM

## 2022-04-28 DIAGNOSIS — B86 SCABIES: ICD-10-CM

## 2022-04-28 DIAGNOSIS — R21 RASH AND NONSPECIFIC SKIN ERUPTION: ICD-10-CM

## 2022-04-28 NOTE — TELEPHONE ENCOUNTER
This pharmacy faxed over request for the following prescriptions to be filled:    Medication requested :   Requested Prescriptions     Pending Prescriptions Disp Refills    permethrin (ACTICIN) 5 % topical cream 60 g 0     Sig: apply sparingly as directed    efinaconazole (JUBLIA) meggan topical solution 8 mL 0     Sig: Apply to left knee daily.        PCP: Dr. Villafana Headings or Print: [facility]  Mail order or Local pharmacy: [id number] TO APPLY IT!! normal...

## 2022-05-04 RX ORDER — PERMETHRIN 50 MG/G
CREAM TOPICAL
Qty: 60 G | Refills: 0 | Status: CANCELLED | OUTPATIENT
Start: 2022-05-04

## 2022-05-12 ENCOUNTER — HOSPITAL ENCOUNTER (OUTPATIENT)
Dept: LAB | Age: 67
Discharge: HOME | End: 2022-05-12

## 2022-05-12 LAB
25(OH)D3 SERPL-MCNC: 52.4 NG/ML (ref 32–100)
A-G RATIO,AGRAT: 2 RATIO (ref 1.1–2.6)
ABSOLUTE LYMPHOCYTE COUNT, 10803: 2.8 K/UL (ref 1–4.8)
ALBUMIN SERPL-MCNC: 4.3 G/DL (ref 3.5–5)
ALP SERPL-CCNC: 81 U/L (ref 40–120)
ALT SERPL-CCNC: 36 U/L (ref 5–40)
ANION GAP SERPL CALC-SCNC: 13 MMOL/L (ref 3–15)
AST SERPL W P-5'-P-CCNC: 29 U/L (ref 10–37)
BASOPHILS # BLD: 0.1 K/UL (ref 0–0.2)
BASOPHILS NFR BLD: 1 % (ref 0–2)
BILIRUB SERPL-MCNC: 0.6 MG/DL (ref 0.2–1.2)
BUN SERPL-MCNC: 14 MG/DL (ref 6–22)
CALCIUM SERPL-MCNC: 9.8 MG/DL (ref 8.4–10.5)
CHLORIDE SERPL-SCNC: 109 MMOL/L (ref 98–110)
CHOLEST SERPL-MCNC: 155 MG/DL (ref 110–200)
CO2 SERPL-SCNC: 22 MMOL/L (ref 20–32)
CREAT SERPL-MCNC: 0.8 MG/DL (ref 0.8–1.4)
EOSINOPHIL # BLD: 0.3 K/UL (ref 0–0.5)
EOSINOPHIL NFR BLD: 4 % (ref 0–6)
ERYTHROCYTE [DISTWIDTH] IN BLOOD BY AUTOMATED COUNT: 15.6 % (ref 10–15.5)
FE % SATURATION,PSAT: 27 % (ref 20–50)
FERRITIN SERPL-MCNC: 165 NG/ML (ref 10–291)
GFRAA, 66117: >60
GFRNA, 66118: >60
GLOBULIN,GLOB: 2.1 G/DL (ref 2–4)
GLUCOSE SERPL-MCNC: 110 MG/DL (ref 70–99)
GRANULOCYTES,GRANS: 55 % (ref 40–75)
HCT VFR BLD AUTO: 43 % (ref 35.1–48.3)
HDLC SERPL-MCNC: 3.2 MG/DL (ref 0–5)
HDLC SERPL-MCNC: 48 MG/DL
HGB BLD-MCNC: 13.6 G/DL (ref 11.7–16.1)
IMMATURE PLATELET FRACTION: 13.8 % (ref 1.1–7.1)
IRON,IRN: 79 MCG/DL (ref 30–160)
LDL/HDL RATIO,LDHD: 1.8
LDLC SERPL CALC-MCNC: 87 MG/DL (ref 50–99)
LYMPHOCYTES, LYMLT: 34 % (ref 20–45)
MCH RBC QN AUTO: 28 PG (ref 26–34)
MCHC RBC AUTO-ENTMCNC: 32 G/DL (ref 31–36)
MCV RBC AUTO: 87 FL (ref 80–99)
MONOCYTES # BLD: 0.6 K/UL (ref 0.1–1)
MONOCYTES NFR BLD: 7 % (ref 3–12)
NEUTROPHILS # BLD AUTO: 4.6 K/UL (ref 1.8–7.7)
NON-HDL CHOLESTEROL, 011976: 107 MG/DL
PLATELET # BLD AUTO: 178 K/UL (ref 140–440)
PMV BLD AUTO: 13.3 FL (ref 9–13)
POTASSIUM SERPL-SCNC: 4.2 MMOL/L (ref 3.5–5.5)
PROT SERPL-MCNC: 6.4 G/DL (ref 6.2–8.1)
RBC # BLD AUTO: 4.92 M/UL (ref 3.8–5.2)
SENTARA SPECIMEN COL,SENBCF: NORMAL
SODIUM SERPL-SCNC: 144 MMOL/L (ref 133–145)
T4 FREE SERPL-MCNC: 1.4 NG/DL (ref 0.9–1.8)
TIBC,TIBC: 291 MCG/DL (ref 228–428)
TRIGL SERPL-MCNC: 103 MG/DL (ref 40–149)
TSH SERPL DL<=0.005 MIU/L-ACNC: 1.34 MCU/ML (ref 0.27–4.2)
UIBC SERPL-MCNC: 212 MCG/DL (ref 110–370)
VLDLC SERPL CALC-MCNC: 21 MG/DL (ref 8–30)
WBC # BLD AUTO: 8.4 K/UL (ref 4–11)

## 2022-05-12 PROCEDURE — 99001 SPECIMEN HANDLING PT-LAB: CPT

## 2022-05-13 LAB
AVG GLU, 10930: 135 MG/DL (ref 91–123)
HBA1C MFR BLD HPLC: 6.3 % (ref 4.8–5.6)
SARS-COV-2 AB: NON REACTIVE

## 2022-05-18 ENCOUNTER — TELEPHONE (OUTPATIENT)
Dept: FAMILY MEDICINE CLINIC | Age: 67
End: 2022-05-18

## 2022-05-18 DIAGNOSIS — E78.5 DYSLIPIDEMIA: ICD-10-CM

## 2022-05-18 DIAGNOSIS — B86 SCABIES: ICD-10-CM

## 2022-05-18 DIAGNOSIS — E03.9 HYPOTHYROIDISM, UNSPECIFIED TYPE: ICD-10-CM

## 2022-05-18 DIAGNOSIS — B35.1 ONYCHOMYCOSIS: ICD-10-CM

## 2022-05-18 DIAGNOSIS — R21 RASH AND NONSPECIFIC SKIN ERUPTION: ICD-10-CM

## 2022-05-18 RX ORDER — PERMETHRIN 50 MG/G
CREAM TOPICAL
Qty: 60 G | Refills: 0 | Status: SHIPPED | OUTPATIENT
Start: 2022-05-18 | End: 2022-08-30 | Stop reason: SDUPTHER

## 2022-05-18 RX ORDER — LEVOTHYROXINE SODIUM 112 UG/1
TABLET ORAL
Qty: 90 TABLET | Refills: 0 | Status: SHIPPED | OUTPATIENT
Start: 2022-05-18 | End: 2022-05-19 | Stop reason: SDUPTHER

## 2022-05-18 RX ORDER — ATORVASTATIN CALCIUM 10 MG/1
TABLET, FILM COATED ORAL
Qty: 90 TABLET | Refills: 0 | Status: SHIPPED | OUTPATIENT
Start: 2022-05-18 | End: 2022-09-07 | Stop reason: SDUPTHER

## 2022-05-18 NOTE — TELEPHONE ENCOUNTER
Patient stated she has called several times about the creams prescribed that need directions to the pharmacy so they can be filled. The Brittny Malady is for nails and says apply to knee and the permethrin is not specific. Please send directions to pharmacy so she can get these filled.     Thanks

## 2022-05-19 ENCOUNTER — TELEPHONE (OUTPATIENT)
Dept: FAMILY MEDICINE CLINIC | Age: 67
End: 2022-05-19

## 2022-05-19 ENCOUNTER — VIRTUAL VISIT (OUTPATIENT)
Dept: FAMILY MEDICINE CLINIC | Age: 67
End: 2022-05-19
Payer: COMMERCIAL

## 2022-05-19 DIAGNOSIS — M79.10 MYALGIA: Primary | ICD-10-CM

## 2022-05-19 DIAGNOSIS — E03.9 HYPOTHYROIDISM, UNSPECIFIED TYPE: ICD-10-CM

## 2022-05-19 DIAGNOSIS — E55.9 HYPOVITAMINOSIS D: ICD-10-CM

## 2022-05-19 DIAGNOSIS — B35.1 ONYCHOMYCOSIS: ICD-10-CM
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
## 2022-05-19 DIAGNOSIS — R50.9 FEVER, UNSPECIFIED FEVER CAUSE: ICD-10-CM

## 2022-05-19 DIAGNOSIS — R06.02 SOB (SHORTNESS OF BREATH): ICD-10-CM

## 2022-05-19 DIAGNOSIS — M25.50 ARTHRALGIA, UNSPECIFIED JOINT: ICD-10-CM

## 2022-05-19 PROCEDURE — 99215 OFFICE O/P EST HI 40 MIN: CPT | Performed by: FAMILY MEDICINE

## 2022-05-19 RX ORDER — LEVOTHYROXINE SODIUM 112 UG/1
TABLET ORAL
Qty: 90 TABLET | Refills: 0 | Status: SHIPPED | OUTPATIENT
Start: 2022-05-19

## 2022-05-19 RX ORDER — AZELASTINE 1 MG/ML
137 SPRAY, METERED NASAL 2 TIMES DAILY
COMMUNITY
Start: 2022-04-29

## 2022-05-19 NOTE — TELEPHONE ENCOUNTER
Pt stated she wants Dr. Antonette Scales to know that she is going to stop taking some of her newer supplements, to see whether or not they are causing her problems. For more information Ms. Guaman can be reached at [phone]. Please advise.  Thank you!!!

## 2022-05-19 NOTE — PROGRESS NOTES
Chief Complaint   Patient presents with    Follow Up Chronic Condition     still having covid symptoms     1. \"Have you been to the ER, urgent care clinic since your last visit? Hospitalized since your last visit? \" No    2. \"Have you seen or consulted any other health care providers outside of the [address] [place] [address] since your last visit? \" No     3. For patients aged 39-70: Has the patient had a colonoscopy / FIT/ Cologuard? Yes - no Care Gap present      If the patient is female:    4. For patients aged 41-77: Has the patient had a mammogram within the past 2 years? No      5. For patients aged 21-65: Has the patient had a pap smear?  NA - based on age or sex

## 2022-05-19 NOTE — PROGRESS NOTES
Radha Watson is a 77 y.o. female who was seen by synchronous (real-time) audio-video technology on 5/19/2022 for Follow Up Chronic Condition (still having covid symptoms)        Assessment & Plan:       ICD-10-CM ICD-9-CM    1. Myalgia  M79.10 729.1 LYME AB, IGG & IGM BY WB      LUPUS-COLLAGEN DISEASE PANEL      RHEUMATOID FACTOR, QT      CK   2. Hypothyroidism, unspecified type  E03.9 244.9 levothyroxine (SYNTHROID) 112 mcg tablet   3. SOB (shortness of breath)  R06.02 786.05 XR CHEST PA LAT   4. Tick bite, unspecified site, initial encounter  W57. XXXA 919.4 LYME AB, IGG & IGM BY WB     E906.4    5. Fever, unspecified fever cause  R50.9 780.60 URINALYSIS W/MICROSCOPIC   6. Arthralgia, unspecified joint  M25.50 719.40 RHEUMATOID FACTOR, QT   7. Hypovitaminosis D  E55.9 268.9    8. Onychomycosis  B35.1 110.1      I spent at least 40 minutes on this visit with this established patient. Subjective:   Radha Watson at this visit for follow-up care. Muscle aches/fever: Patient has persistent muscle aches and fever. These have been ongoing for weeks. She has malaise and fatigue. We did labs recently that are stable. She wonders about having COVID-19. Her COVID antibody test is negative. She should get COVID testing if she thinks she has symptoms of COVID. She will go to urgent care and get the test done. In the meantime there is no indication that she has had a previous COVID infection. Unclear as to why she has some muscle aches, fever chills malaise and fatigue. She takes lots of walks outside and wonders whether she got a tick bite. We will check for Lyme disease. I will also order a urinalysis and rheumatoid factors and lupus test.  I will follow-up with the results. We discussed at length various causes of muscle aches and fever. Hypothyroidism: Patient has hypothyroidism and she is on thyroid replacement therapy. Thyroid tests have been stable.   She will continue with the medication. Dyslipidemia: Patient has dyslipidemia. She is on atorvastatin. Stable on the medication. Given the muscle aches is a question as to whether her symptoms are due to use of atorvastatin. I will check a CK enzymes. Allergy: Patient has generalized allergy with nasal congestion and sneezing. She takes Astelin nasal spray with good result. She can also use Claritin daily. Hypovitaminosis D: Patient has hypovitaminosis D. She is on vitamin D replacement therapy. We will recheck labs. Shortness of breath: Patient gets episodes of shortness of breath that comes on and off. She denies wheeze. She denies chest pain or pressure or diaphoresis. I will order chest x-ray to evaluate for any lung abnormality. Onychomycosis: Patient has onychomycosis. She will apply Jublia. I will follow-up at next visit. Prior to Admission medications    Medication Sig Start Date End Date Taking? Authorizing Provider   azelastine (ASTELIN) 137 mcg (0.1 %) nasal spray 137 Sprays by Both Nostrils route two (2) times a day. 4/29/22  Yes Provider, Historical   levothyroxine (SYNTHROID) 112 mcg tablet TAKE 1 TABLET BY MOUTH DAILY BEFORE AND BREAKFAST 5/19/22  Yes Andrea Painting MD   atorvastatin (LIPITOR) 10 mg tablet TAKE 1 TABLET BY MOUTH EVERY DAY 5/18/22  Yes Andrea Painting MD   efinaconazole (JUBLIA) meggan topical solution Apply to toe nails daily. 5/18/22  Yes Teressa Santamaria MD   permethrin (ACTICIN) 5 % topical cream apply sparingly to affected area of the body and rinse after 8 hr. 5/18/22  Yes Andrea Painting MD   diazePAM (VALIUM) 2 mg tablet TAKE 1 TABLET BY MOUTH DAILY AS NEEDED FOR ANXIETY 4/27/22  Yes Andrea Painting MD   ergocalciferol (ERGOCALCIFEROL) 1,250 mcg (50,000 unit) capsule Take 1 Capsule by mouth every seven (7) days. 12/8/21  Yes Andrea Greene MD   bacitracin zinc (BACITRACIN) ointment Apply  to affected area two (2) times a day.  10/4/21  Yes Teressa Santamaria MD loratadine (CLARITIN) 10 mg tablet Take 1 Tab by mouth daily. Indications: Allergic Rhinitis 7/27/18  Yes Merissa Longoria MD   levothyroxine (SYNTHROID) 112 mcg tablet TAKE 1 TABLET BY MOUTH DAILY BEFORE AND BREAKFAST 5/18/22 5/19/22  Andrea Greene MD ROS Review of all systems is negative except as noted above in the HPI. Objective:     Patient-Reported Vitals 5/19/2022   Patient-Reported Weight 237lbs   Patient-Reported LMP -   Constitutional: [x] Appears well-developed and well-nourished [x] No apparent distress     Mental status: [x] Alert and awake  [x] Oriented to person/place/time [x] Able to follow commands    HENT: [x] Normocephalic, atraumatic    Pulmonary/Chest: [x] Respiratory effort normal   [x] No visualized signs of difficulty breathing or respiratory distress      Musculoskeletal:   [x] Normal gait with no signs of ataxia     Neurological:        [x] No Facial Asymmetry (Cranial nerve 7 motor function) (limited exam due to video visit)                    Psychiatric:       [x] Normal Affect      We discussed the expected course, resolution and complications of the diagnosis(es) in detail. Medication risks, benefits, costs, interactions, and alternatives were discussed as indicated. I advised her to contact the office if her condition worsens, changes or fails to improve as anticipated. She expressed understanding with the diagnosis(es) and plan. Vladimir Levy, was evaluated through a synchronous (real-time) audio-video encounter. The patient (or guardian if applicable) is aware that this is a billable service, which includes applicable co-pays. Verbal consent to proceed has been obtained. The visit was conducted pursuant to the emergency declaration under the [facility], [address] [place] [address] authority and the [employer] General Act.   Patient identification was verified, and a caregiver was present when appropriate. The patient was located at home in a state where the provider was licensed to provide care.       Omari Cortez MD

## 2022-05-20 ENCOUNTER — HOSPITAL ENCOUNTER (OUTPATIENT)
Dept: LAB | Age: 67
Discharge: HOME | End: 2022-05-20

## 2022-05-20 LAB
AMORPHOUS CRYSTALS,AMORC: PRESENT
BILIRUB UR QL: NEGATIVE
CLARITY: ABNORMAL
COLOR UR: YELLOW
GLUCOSE UR QL: NEGATIVE MG/DL
HGB UR QL STRIP: NEGATIVE
KETONES UR QL STRIP.AUTO: NEGATIVE MG/DL
LEUKOCYTE ESTERASE: NEGATIVE
NITRITE UR QL STRIP.AUTO: NEGATIVE
PH UR STRIP: 5 PH (ref 5–8)
PROT UR QL STRIP: NEGATIVE MG/DL
RBC #/AREA URNS HPF: NEGATIVE /HPF
SENTARA SPECIMEN COL,SENBCF: NORMAL
SP GR UR: 1.02 (ref 1–1.03)
URINE ASCORBIC ACID: NEGATIVE MG/DL
UROBILINOGEN UR STRIP-MCNC: <2 MG/DL
WBC URNS QL MICRO: NEGATIVE /HPF (ref 0–5)

## 2022-05-20 PROCEDURE — 99001 SPECIMEN HANDLING PT-LAB: CPT

## 2022-05-21 LAB
CK SERPL-CCNC: 242 U/L (ref 30–165)
RHEUMATOID FACTOR QUANT, IMMUNOTURBIDIMETRIC: <20 IU/ML (ref 0–20)

## 2022-05-23 LAB
LUPUS ANTICOAGULANT SCREEN, 12306: NEGATIVE
LYME AB IGG/IGM, 20154: <0.2 AI

## 2022-05-26 ENCOUNTER — VIRTUAL VISIT (OUTPATIENT)
Dept: FAMILY MEDICINE CLINIC | Age: 67
End: 2022-05-26
Payer: COMMERCIAL

## 2022-05-26 DIAGNOSIS — J32.4 PANSINUSITIS, UNSPECIFIED CHRONICITY: Primary | ICD-10-CM

## 2022-05-26 DIAGNOSIS — R50.9 FEVER, UNSPECIFIED FEVER CAUSE: ICD-10-CM

## 2022-05-26 DIAGNOSIS — E03.9 HYPOTHYROIDISM, UNSPECIFIED TYPE: ICD-10-CM

## 2022-05-26 DIAGNOSIS — E78.5 DYSLIPIDEMIA: ICD-10-CM

## 2022-05-26 PROCEDURE — 1123F ACP DISCUSS/DSCN MKR DOCD: CPT | Performed by: FAMILY MEDICINE

## 2022-05-26 PROCEDURE — 99213 OFFICE O/P EST LOW 20 MIN: CPT | Performed by: FAMILY MEDICINE

## 2022-05-26 RX ORDER — CLARITHROMYCIN 500 MG/1
500 TABLET, FILM COATED ORAL 2 TIMES DAILY
Qty: 42 TABLET | Refills: 0 | Status: SHIPPED | OUTPATIENT
Start: 2022-05-26 | End: 2022-06-16

## 2022-05-26 NOTE — PROGRESS NOTES
Daysi Valenzuela is a 77 y.o. female who was seen by synchronous (real-time) audio-video technology on 5/26/2022 for Follow Up Chronic Condition        Assessment & Plan:       ICD-10-CM ICD-9-CM    1. Pansinusitis, unspecified chronicity  J32.4 473.8 clarithromycin (BIAXIN) 500 mg tablet   2. Hypothyroidism, unspecified type  E03.9 244.9    3. Dyslipidemia  E78.5 272.4    4. Fever, unspecified fever cause  R50.9 780.60      Subjective:   Daysi Valenzuela is seen for follow-up care. Sinus infection: Patient has pansinusitis. We had labs done recently as she had been having episodes of fever and chills. Labs have been stable. Patient states that she has realized she has been having sinus congestion with postnasal drainage that is mucopurulent in nature. This is likely causing her fever and chills. Patient has taken clarithromycin and this is helping. She has run out of the medication and would like some more sent into the pharmacy. Prescription is sent in. Hypothyroidism: Patient has hypothyroidism and is on levothyroxine. We will continue current treatment plan. Dyslipidemia: Patient has dyslipidemia. She is on Lipitor. Stable on medication. Continue current treatment plan. Prior to Admission medications    Medication Sig Start Date End Date Taking? Authorizing Provider   clarithromycin (BIAXIN) 500 mg tablet Take 1 Tablet by mouth two (2) times a day for 21 days. 5/26/22 6/16/22 Yes Andrea Greene MD   azelastine (ASTELIN) 137 mcg (0.1 %) nasal spray 137 Sprays by Both Nostrils route two (2) times a day. 4/29/22   Provider, Historical   levothyroxine (SYNTHROID) 112 mcg tablet TAKE 1 TABLET BY MOUTH DAILY BEFORE AND BREAKFAST 5/19/22   Andrea Greene MD   atorvastatin (LIPITOR) 10 mg tablet TAKE 1 TABLET BY MOUTH EVERY DAY 5/18/22   Andrea Greene MD   efinaconazole (JUBLIA) meggan topical solution Apply to toe nails daily.  5/18/22   Andrea Greene MD   permethrin (ACTICIN) 5 % topical cream apply sparingly to affected area of the body and rinse after 8 hr. 5/18/22   Andrea Severino MD   diazePAM (VALIUM) 2 mg tablet TAKE 1 TABLET BY MOUTH DAILY AS NEEDED FOR ANXIETY 4/27/22   Andrea Greene MD   ergocalciferol (ERGOCALCIFEROL) 1,250 mcg (50,000 unit) capsule Take 1 Capsule by mouth every seven (7) days. 12/8/21   Andrea Greene MD   bacitracin zinc (BACITRACIN) ointment Apply  to affected area two (2) times a day. 10/4/21   Andrea Greene MD   loratadine (CLARITIN) 10 mg tablet Take 1 Tab by mouth daily. Indications: Allergic Rhinitis 7/27/18   MD KORIN Sommers Review of all systems is negative except as noted above in the HPI. Objective:     Patient-Reported Vitals 5/26/2022   Patient-Reported Weight 237 lbs   Patient-Reported LMP -   Constitutional: [x] Appears well-developed and well-nourished [x] No apparent distress      Mental status: [x] Alert and awake  [x] Oriented to person/place/time [x] Able to follow commands    HENT: [x] Normocephalic, atraumatic    Pulmonary/Chest: [x] Respiratory effort normal   [x] No visualized signs of difficulty breathing or respiratory distress              Skin:        [x] No significant exanthematous lesions or discoloration noted on facial skin            We discussed the expected course, resolution and complications of the diagnosis(es) in detail. Medication risks, benefits, costs, interactions, and alternatives were discussed as indicated. I advised her to contact the office if her condition worsens, changes or fails to improve as anticipated. She expressed understanding with the diagnosis(es) and plan. Sharyle Barba, was evaluated through a synchronous (real-time) audio-video encounter. The patient (or guardian if applicable) is aware that this is a billable service, which includes applicable co-pays. This Virtual Visit was conducted with patient's (and/or legal guardian's) consent.  The visit was conducted pursuant to the emergency declaration under the [facility], [facility] authority and the [employer] General Act. Patient identification was verified, and a caregiver was present when appropriate. The patient was located at: Home: [address] [place] [phone]  The provider was located at:  Facility (Appt Department): [address] MD

## 2022-05-26 NOTE — PROGRESS NOTES
Chief Complaint   Patient presents with    Follow Up Chronic Condition     1. \"Have you been to the ER, urgent care clinic since your last visit? Hospitalized since your last visit? \" No    2. \"Have you seen or consulted any other health care providers outside of the [address] [place] [address] since your last visit? \" No     3. For patients aged 39-70: Has the patient had a colonoscopy / FIT/ Cologuard? Yes - no Care Gap present      If the patient is female:    4. For patients aged 41-77: Has the patient had a mammogram within the past 2 years? No      5. For patients aged 21-65: Has the patient had a pap smear?  Yes - no Care Gap present

## 2022-05-27 ENCOUNTER — TELEPHONE (OUTPATIENT)
Dept: FAMILY MEDICINE CLINIC | Age: 67
End: 2022-05-27

## 2022-05-27 NOTE — TELEPHONE ENCOUNTER
Attempted to get patient medication  (Moni Diaz) authorized response from Cover My Meds was [employer] does not handle the review for this plan. Attempted to call all numbers provided on ID card and no was able to  Provide any answers.

## 2022-07-20 DIAGNOSIS — M79.10 MYALGIA: ICD-10-CM
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
## 2022-07-25 ENCOUNTER — TELEPHONE (OUTPATIENT)
Dept: FAMILY MEDICINE CLINIC | Age: 67
End: 2022-07-25

## 2022-07-25 DIAGNOSIS — M79.10 MYALGIA: ICD-10-CM

## 2022-07-25 NOTE — TELEPHONE ENCOUNTER
Ms Johnson Raffi is requesting Dr Jerilyn Aldridge to place lab orders for her upcoming ad 09/26.  The patient would like to have the results of her lab work before she comes to her ad

## 2022-08-04 DIAGNOSIS — D50.9 IRON DEFICIENCY ANEMIA, UNSPECIFIED IRON DEFICIENCY ANEMIA TYPE: ICD-10-CM

## 2022-08-04 DIAGNOSIS — R53.81 MALAISE AND FATIGUE: ICD-10-CM

## 2022-08-04 DIAGNOSIS — R53.83 MALAISE AND FATIGUE: ICD-10-CM

## 2022-08-04 DIAGNOSIS — U07.1 COVID-19: ICD-10-CM

## 2022-08-04 DIAGNOSIS — E55.9 HYPOVITAMINOSIS D: ICD-10-CM

## 2022-08-04 DIAGNOSIS — E03.9 HYPOTHYROIDISM, UNSPECIFIED TYPE: ICD-10-CM

## 2022-08-04 DIAGNOSIS — R73.9 HYPERGLYCEMIA: ICD-10-CM

## 2022-08-04 DIAGNOSIS — E78.5 DYSLIPIDEMIA: ICD-10-CM

## 2022-08-30 ENCOUNTER — VIRTUAL VISIT (OUTPATIENT)
Dept: FAMILY MEDICINE CLINIC | Age: 67
End: 2022-08-30
Payer: COMMERCIAL

## 2022-08-30 DIAGNOSIS — E03.9 HYPOTHYROIDISM, UNSPECIFIED TYPE: ICD-10-CM

## 2022-08-30 DIAGNOSIS — R21 RASH AND NONSPECIFIC SKIN ERUPTION: ICD-10-CM

## 2022-08-30 DIAGNOSIS — B86 SCABIES: Primary | ICD-10-CM

## 2022-08-30 PROCEDURE — 1123F ACP DISCUSS/DSCN MKR DOCD: CPT | Performed by: FAMILY MEDICINE

## 2022-08-30 PROCEDURE — 99214 OFFICE O/P EST MOD 30 MIN: CPT | Performed by: FAMILY MEDICINE

## 2022-08-30 RX ORDER — PERMETHRIN 50 MG/G
CREAM TOPICAL
Qty: 120 G | Refills: 0 | Status: SHIPPED | OUTPATIENT
Start: 2022-08-30 | End: 2022-09-28 | Stop reason: SDUPTHER

## 2022-08-30 RX ORDER — IVERMECTIN 3 MG/1
TABLET ORAL
Qty: 63 TABLET | Refills: 0 | Status: SHIPPED | OUTPATIENT
Start: 2022-08-30

## 2022-08-30 RX ORDER — LEVOTHYROXINE SODIUM 112 UG/1
TABLET ORAL
Qty: 90 TABLET | Refills: 0 | Status: CANCELLED | OUTPATIENT
Start: 2022-08-30

## 2022-08-30 NOTE — PROGRESS NOTES
1. \"Have you been to the ER, urgent care clinic since your last visit? Hospitalized since your last visit? \" No    2. \"Have you seen or consulted any other health care providers outside of the [address] [place] [address] since your last visit? \" No     3. For patients aged 39-70: Has the patient had a colonoscopy / FIT/ Cologuard? Yes - no Care Gap present      If the patient is female:    4. For patients aged 41-77: Has the patient had a mammogram within the past 2 years? Yes - Care Gap present. Most recent result on file      5. For patients aged 21-65: Has the patient had a pap smear?  Yes - no Care Gap present

## 2022-08-30 NOTE — PROGRESS NOTES
Ceasar Fan is a 77 y.o. female who was seen by synchronous (real-time) audio-video technology on 8/30/2022 for Rash (ongoing)        Assessment & Plan:       ICD-10-CM ICD-9-CM    1. Scabies  B86 133.0 permethrin (ACTICIN) 5 % topical cream      ivermectin (STROMECTOL) 3 mg tablet      2. Rash and nonspecific skin eruption  R21 782. 1 permethrin (ACTICIN) 5 % topical cream      3. Hypothyroidism, unspecified type  E03.9 244.9       I spent 30 minutes with this established patient. Subjective:   Ceasar Fan is seen for acute care. Patient has a rash. This is generalized. It is itchy and similar to a rash that she has had chronically. In the past she has been treated for scabies and states that this is likely a scabies rash and would like medication. States that the permethrin has helped with ivermectin. She is scheduled to see a dermatologist in [place] due to chronicity of the rash. She would like medication sent into the pharmacy. I will send in a prescription for the ivermectin and permethrin. I discussed the use of these medications. Patient has been seen by various specialists/dermatologist in the past.  Question of dermatographism. I will send in the medication and she will follow-up in the clinic. States that she needs paperwork sent to [place] and we will discuss this at her clinic visit. She will also need her medical records sent from her other specialists including a cardiologist and gastroenterologist to her dermatologist office. Hypothyroidism: Patient has hypothyroidism. She is on levothyroxine. Continue current treatment plan. We will recheck labs. Prior to Admission medications    Medication Sig Start Date End Date Taking?  Authorizing Provider   permethrin (ACTICIN) 5 % topical cream apply sparingly to affected area of the body and rinse after 8 hr. please give 2 tubes 8/30/22  Yes Andrea Greene MD   ivermectin (STROMECTOL) 3 mg tablet Take 27 mg (9 tabs) on  days 0,1,7,8,14,21 and 28. 8/30/22  Yes Andrea Greene MD   azelastine (ASTELIN) 137 mcg (0.1 %) nasal spray 137 Sprays by Both Nostrils route two (2) times a day. 4/29/22  Yes Provider, Historical   levothyroxine (SYNTHROID) 112 mcg tablet TAKE 1 TABLET BY MOUTH DAILY BEFORE AND BREAKFAST 5/19/22  Yes Andrea Lujan MD   atorvastatin (LIPITOR) 10 mg tablet TAKE 1 TABLET BY MOUTH EVERY DAY 5/18/22  Yes Andrea Lujan MD   efinaconazole (JUBLIA) meggan topical solution Apply to toe nails daily. 5/18/22  Yes Fadi Martines MD   diazePAM (VALIUM) 2 mg tablet TAKE 1 TABLET BY MOUTH DAILY AS NEEDED FOR ANXIETY 4/27/22  Yes Fadi Martines MD   ergocalciferol (ERGOCALCIFEROL) 1,250 mcg (50,000 unit) capsule Take 1 Capsule by mouth every seven (7) days. 12/8/21  Yes Andrea Greene MD   bacitracin zinc (BACITRACIN) ointment Apply  to affected area two (2) times a day. 10/4/21  Yes Andrea Greene MD   loratadine (CLARITIN) 10 mg tablet Take 1 Tab by mouth daily. Indications: Allergic Rhinitis 7/27/18  Yes Rios Landrum MD   permethrin (ACTICIN) 5 % topical cream apply sparingly to affected area of the body and rinse after 8 hr. Patient not taking: Reported on 8/30/2022 5/18/22 8/30/22  Andrea Greene MD     ROS Review of all systems is negative except as noted above in the HPI.     Objective:     Patient-Reported Vitals 5/26/2022   Patient-Reported Weight 237 lbs   Patient-Reported LMP -   Constitutional: [x] Appears well-developed and well-nourished [x] No apparent distress     Mental status: [x] Alert and awake  [x] Oriented to person/place/time [x] Able to follow commands      HENT: [x] Normocephalic, atraumatic      Pulmonary/Chest: [x] Respiratory effort normal   [x] No visualized signs of difficulty breathing or respiratory distress             Neurological:        [x] No Facial Asymmetry (Cranial nerve 7 motor function) (limited exam due to video visit) Psychiatric:       [x] Normal Affect       We discussed the expected course, resolution and complications of the diagnosis(es) in detail. Medication risks, benefits, costs, interactions, and alternatives were discussed as indicated. I advised her to contact the office if her condition worsens, changes or fails to improve as anticipated. She expressed understanding with the diagnosis(es) and plan. Alice Vaughn, was evaluated through a synchronous (real-time) audio-video encounter. The patient (or guardian if applicable) is aware that this is a billable service, which includes applicable co-pays. This Virtual Visit was conducted with patient's (and/or legal guardian's) consent. The visit was conducted pursuant to the emergency declaration under the [address] [place] [address] authority and the [employer] General Act. Patient identification was verified, and a caregiver was present when appropriate. The patient was located at: Home: [address] [place] [address]  The provider was located at:  Facility (Appt Department): [address] MD

## 2022-09-01 ENCOUNTER — TELEPHONE (OUTPATIENT)
Dept: FAMILY MEDICINE CLINIC | Age: 67
End: 2022-09-01

## 2022-09-01 NOTE — TELEPHONE ENCOUNTER
Pt called and stated her and her  are on ivermectin, and the pharmacy faxed over prior authorization paperwork so both of them can get there medication. Pts  pharmacy [facility] on [address], and the pts is [facility]. Please advise.  Thank you!!!!

## 2022-09-06 NOTE — TELEPHONE ENCOUNTER
Pt calling again because she contacted her insurance company [employer], and she would like to speak to a nurse about it. Pt stated Dr. Reynaldo Abreu needs to put down strongyloides on the prior authorization. Please advise.  Thank you!!!

## 2022-09-06 NOTE — TELEPHONE ENCOUNTER
Spoke with both patient, pharmacy and [employer], [employer] does not do prior auths on the patient's paln.   Patient advised to call member services

## 2022-09-07 ENCOUNTER — VIRTUAL VISIT (OUTPATIENT)
Dept: FAMILY MEDICINE CLINIC | Age: 67
End: 2022-09-07
Payer: COMMERCIAL

## 2022-09-07 DIAGNOSIS — F41.9 ANXIETY: ICD-10-CM

## 2022-09-07 DIAGNOSIS — R21 RASH AND NONSPECIFIC SKIN ERUPTION: Primary | ICD-10-CM

## 2022-09-07 DIAGNOSIS — E55.9 HYPOVITAMINOSIS D: ICD-10-CM

## 2022-09-07 DIAGNOSIS — E78.5 DYSLIPIDEMIA: ICD-10-CM

## 2022-09-07 PROCEDURE — 99213 OFFICE O/P EST LOW 20 MIN: CPT | Performed by: FAMILY MEDICINE

## 2022-09-07 PROCEDURE — 1123F ACP DISCUSS/DSCN MKR DOCD: CPT | Performed by: FAMILY MEDICINE

## 2022-09-07 RX ORDER — DIAZEPAM 2 MG/1
TABLET ORAL
Qty: 20 TABLET | Refills: 2 | Status: SHIPPED | OUTPATIENT
Start: 2022-09-07

## 2022-09-07 RX ORDER — ATORVASTATIN CALCIUM 10 MG/1
10 TABLET, FILM COATED ORAL DAILY
Qty: 90 TABLET | Refills: 0 | Status: SHIPPED | OUTPATIENT
Start: 2022-09-07

## 2022-09-07 RX ORDER — ERGOCALCIFEROL 1.25 MG/1
50000 CAPSULE ORAL
Qty: 26 CAPSULE | Refills: 1 | Status: SHIPPED | OUTPATIENT
Start: 2022-09-07

## 2022-09-07 NOTE — PROGRESS NOTES
Lukas Henao is a 77 y.o. female who was seen by synchronous (real-time) audio-video technology on 9/7/2022 for Skin Problem        Assessment & Plan:       ICD-10-CM ICD-9-CM    1. Rash and nonspecific skin eruption  R21 782.1       2. Dyslipidemia  E78.5 272.4 atorvastatin (LIPITOR) 10 mg tablet      3. Anxiety  F41.9 300.00 diazePAM (VALIUM) 2 mg tablet      4. Hypovitaminosis D  E55.9 268.9 ergocalciferol (ERGOCALCIFEROL) 1,250 mcg (50,000 unit) capsule        Subjective:   Lukas Henao is seen for follow-up care. Rash: Patient has rash. Is a generalized rash. She states that is is itchy and occasionally erythematous. She states that she has tracks and this is like a scabies rash. She has been seen by several dermatologists and other providers for this rash. I have also had a look at this rash in the past.  She had relief with permethrin and I sent in a prescription. Given that she states that this is scabies she has in the past use ivermectin with good result. She requested prescription be sent to her pharmacy. Her insurance however will not pay for ivermectin for scabies. They will only paid for Strongyloides. Patient will have to get a tissue biopsy shows that she has the Strongyloides. Patient is on ivermectin. Anxiety: Patient has anxiety. She takes diazepam as needed. She would like a refill of medication. She is also doing supportive care. Dyslipidemia: Patient has dyslipidemia. She is on Lipitor. Needs a refill of medication. Prescription is sent in. Hypovitaminosis D: Patient has hypovitaminosis D. I will send in a refill of vitamin D replacement therapy. She takes this weekly. Prior to Admission medications    Medication Sig Start Date End Date Taking? Authorizing Provider   diazePAM (VALIUM) 2 mg tablet TAKE 1 TABLET BY MOUTH DAILY AS NEEDED FOR ANXIETY 9/7/22  Yes Andrea Greene MD   atorvastatin (LIPITOR) 10 mg tablet Take 1 Tablet by mouth daily.  9/7/22  Yes Penelope Hines MD   ergocalciferol (ERGOCALCIFEROL) 1,250 mcg (50,000 unit) capsule Take 1 Capsule by mouth every seven (7) days. 9/7/22  Yes Penelope Hines MD   permethrin (ACTICIN) 5 % topical cream apply sparingly to affected area of the body and rinse after 8 hr. please give 2 tubes 8/30/22  Yes Andrea Greene MD   azelastine (ASTELIN) 137 mcg (0.1 %) nasal spray 137 Sprays by Both Nostrils route two (2) times a day. 4/29/22  Yes Provider, Historical   levothyroxine (SYNTHROID) 112 mcg tablet TAKE 1 TABLET BY MOUTH DAILY BEFORE AND BREAKFAST 5/19/22  Yes Andrea Crowe MD   efinaconazole (JUBLIA) meggan topical solution Apply to toe nails daily. 5/18/22  Yes Andrea Greene MD   bacitracin zinc (BACITRACIN) ointment Apply  to affected area two (2) times a day. 10/4/21  Yes Andrea Greene MD   loratadine (CLARITIN) 10 mg tablet Take 1 Tab by mouth daily. Indications: Allergic Rhinitis 7/27/18  Yes Irving Velasco MD   ivermectin (STROMECTOL) 3 mg tablet Take 27 mg (9 tabs) on  days 0,1,7,8,14,21 and 28. Patient not taking: Reported on 9/7/2022 8/30/22   Andrea Crowe MD   atorvastatin (LIPITOR) 10 mg tablet TAKE 1 TABLET BY MOUTH EVERY DAY 5/18/22 9/7/22  Andrea Greene MD   diazePAM (VALIUM) 2 mg tablet TAKE 1 TABLET BY MOUTH DAILY AS NEEDED FOR ANXIETY 4/27/22 9/7/22  Andrea Greene MD   ergocalciferol (ERGOCALCIFEROL) 1,250 mcg (50,000 unit) capsule Take 1 Capsule by mouth every seven (7) days. 12/8/21 9/7/22  Andrea Greene MD     ROS Review of all systems is negative except as noted above in the HPI.     Objective:     Patient-Reported Vitals 9/7/2022   Patient-Reported Weight 230   Patient-Reported LMP -   Constitutional: [x] Appears well-developed and well-nourished [x] No apparent distress     Mental status: [x] Alert and awake  [x] Oriented to person/place/time [x] Able to follow commands     HENT: [x] Normocephalic, atraumatic      Pulmonary/Chest: [x] Respiratory effort normal   [x] No visualized signs of difficulty breathing or respiratory distress             Neurological:        [x] No Facial Asymmetry (Cranial nerve 7 motor function) (limited exam due to video visit)                     Psychiatric:       [x] Normal Affect       We discussed the expected course, resolution and complications of the diagnosis(es) in detail. Medication risks, benefits, costs, interactions, and alternatives were discussed as indicated. I advised her to contact the office if her condition worsens, changes or fails to improve as anticipated. She expressed understanding with the diagnosis(es) and plan. Lethia Riedel, was evaluated through a synchronous (real-time) audio-video encounter. The patient (or guardian if applicable) is aware that this is a billable service, which includes applicable co-pays. This Virtual Visit was conducted with patient's (and/or legal guardian's) consent. The visit was conducted pursuant to the emergency declaration under the [address] [place] [address], [address] [place] [address] authority and the [employer] General Act. Patient identification was verified, and a caregiver was present when appropriate. The patient was located at: Home: [address] [place] [phone]  The provider was located at:  Facility (Appt Department): [address] MD

## 2022-09-07 NOTE — PROGRESS NOTES
1. \"Have you been to the ER, urgent care clinic since your last visit? Hospitalized since your last visit? \" No    2. \"Have you seen or consulted any other health care providers outside of the [address] [place] [address] since your last visit? \" No     3. For patients aged 39-70: Has the patient had a colonoscopy / FIT/ Cologuard? Yes      If the patient is female:    4. For patients aged 41-77: Has the patient had a mammogram within the past 2 years? Yes - no Care Gap present      5. For patients aged 21-65: Has the patient had a pap smear?  No

## 2022-09-21 ENCOUNTER — TELEPHONE (OUTPATIENT)
Dept: FAMILY MEDICINE CLINIC | Age: 67
End: 2022-09-21

## 2022-09-21 NOTE — TELEPHONE ENCOUNTER
----- Message from Katherine Crossville sent at 9/20/2022 10:36 AM EDT -----  Subject: Message to Provider    QUESTIONS  Information for Provider? PT is having a fever, dark urine, headache and   pain on lower right side. PT is requesting a call back from Dr. Drew Kim or   the Nurse, contact# [phone]  ---------------------------------------------------------------------------  --------------  [address]  [phone]; OK to leave message on voicemail  ---------------------------------------------------------------------------  --------------  SCRIPT ANSWERS  Relationship to Patient?  Self

## 2022-09-28 DIAGNOSIS — B86 SCABIES: ICD-10-CM

## 2022-09-28 DIAGNOSIS — R21 RASH AND NONSPECIFIC SKIN ERUPTION: ICD-10-CM

## 2022-09-28 RX ORDER — PERMETHRIN 50 MG/G
CREAM TOPICAL
Qty: 120 G | Refills: 0 | Status: SHIPPED | OUTPATIENT
Start: 2022-09-28

## 2022-09-28 NOTE — TELEPHONE ENCOUNTER
Requested Prescriptions     Pending Prescriptions Disp Refills    permethrin (ACTICIN) 5 % topical cream 120 g 0     Sig: apply sparingly to affected area of the body and rinse after 8 hr. please give 2 tubes       Pt is requesting 2 tubes of this prescription

## 2022-09-28 NOTE — TELEPHONE ENCOUNTER
Please see refill request    Patient was last seen on 9-7-2022    Last prescribed on 8-  #120g x 0    Requesting 2 tubes    Thank you

## 2022-10-04 ENCOUNTER — OFFICE VISIT (OUTPATIENT)
Dept: FAMILY MEDICINE CLINIC | Age: 67
End: 2022-10-04
Payer: COMMERCIAL

## 2022-10-04 VITALS
WEIGHT: 241 LBS | TEMPERATURE: 97.2 F | RESPIRATION RATE: 20 BRPM | BODY MASS INDEX: 36.53 KG/M2 | HEIGHT: 68 IN | DIASTOLIC BLOOD PRESSURE: 88 MMHG | OXYGEN SATURATION: 97 % | SYSTOLIC BLOOD PRESSURE: 136 MMHG | HEART RATE: 83 BPM

## 2022-10-04 DIAGNOSIS — E03.9 HYPOTHYROIDISM, UNSPECIFIED TYPE: ICD-10-CM

## 2022-10-04 DIAGNOSIS — E78.5 DYSLIPIDEMIA: ICD-10-CM

## 2022-10-04 DIAGNOSIS — R21 RASH AND NONSPECIFIC SKIN ERUPTION: Primary | ICD-10-CM

## 2022-10-04 DIAGNOSIS — B86 SCABIES: ICD-10-CM

## 2022-10-04 DIAGNOSIS — F39 MOOD DISORDER (HCC): ICD-10-CM

## 2022-10-04 PROCEDURE — 99215 OFFICE O/P EST HI 40 MIN: CPT | Performed by: FAMILY MEDICINE

## 2022-10-04 PROCEDURE — 1123F ACP DISCUSS/DSCN MKR DOCD: CPT | Performed by: FAMILY MEDICINE

## 2022-10-04 NOTE — PROGRESS NOTES
ROBBY  Emi Lambert is seen for follow-up care. Rash: Patient has a chronic rash. This rash has been ongoing for years. It is an erythematous rash that is very itchy and at times has track like lesions. Patient has been to visit several dermatologists for this rash without much relief in symptoms and no particular diagnosis been given. It comes on spontaneously and she states at various times she goes to visit the dermatology it is either fading or resolving. Patient states that she has worked in an environment that exposed to scabies. She has also been in a tropical environment and thus wonders about tropical parasites that would have invaded her skin. She has had the rash evaluated by her other providers who stated that it looked like a parasitic rash. She was seen by Dr. Pam Espinoza her cardiologist and at the time had a rash. He did suggest that this could be parasitic in nature. Patient has also been seen by the gastroenterologist and has had studies done to evaluate for parasitic infestation. She is seen by Dr. Lauren Mike and I will request the lab results. She had a skin biopsy done in the past at [facility]. She was told that this did not yield anything and we will request the records. Patient has used permethrin to treat suspected scabies. She states that permethrin does help. We have also given ivermectin on 2 occasions. A lot of this also looks like dermatographism. She states she comes in with pictures of her rash that has been taken over the years. She requests referral to [address] [place] [address] infectious disease specifically parasitology clinic. Referral will be placed. We will follow-up with the recommendations made thereof. Patient has seen previous dermatologist and has been frustrated as nothing has been found. Patient would like to see someone infectious disease to find out whether this is a parasitic infestation. Hypothyroidism: Patient has hypothyroidism. She is on levothyroxine. Stable on the medication. We will continue current treatment plan. Mood disorder: Patient has anxiety. She takes diazepam as needed. She has been acutely stressed lately given the persistence of the skin rash and has been trying to get someone to help her come to resolution of her dermatological condition. This is affecting her family life which makes it very stressful. Dyslipidemia: Patient has dyslipidemia. She is on atorvastatin. She will continue current treatment plan. Past Medical History  Past Medical History:   Diagnosis Date    Abnormal nuclear cardiac imaging test 02/09/2012    Mid to distal anteroseptal and apical reversible defect concerning for ishcmeia. Mild-mod, mid-distal anterior & apical hypk. EF 67%. Borderline abnormal EKG w/1-mm inferolateral ST depression at 7 min exercise. Occasional PVCs. Anxiety     Carotid duplex 54/49/6119    Mild 7-51% LICA stenosis. Dengue fever     7/10 while in [place]    Diverticulosis     Dyslipidemia     GERD     Holter monitor study 04/29/2016    Sinus rhythm, avg HR 67 bpm (range  bpm). Rare ectopy. Hypothyroidism     RLE venous duplex 07/23/2015    Right leg:  No DVT. S/P cardiac catheterization 02/24/2012    Angiographically normal coronaries. Hyperdynamic. EF 70%. No RWMA.       Scabies exposure 03/2018    Sinusitis     Tinnitus     Uterine cancer (HCC)     hysterectomy    Uterine carcinoma (HCC)        Surgical History  Past Surgical History:   Procedure Laterality Date    HX CHOLECYSTECTOMY      2009    HX HYSTERECTOMY      HX TOTAL COLECTOMY      2008        Medications  Current Outpatient Medications   Medication Sig Dispense Refill    permethrin (ACTICIN) 5 % topical cream apply sparingly to affected area of the body and rinse after 8 hr. please give 2 tubes (Patient not taking: Reported on 10/4/2022) 120 g 0    diazePAM (VALIUM) 2 mg tablet TAKE 1 TABLET BY MOUTH DAILY AS NEEDED FOR ANXIETY 20 Tablet 2 atorvastatin (LIPITOR) 10 mg tablet Take 1 Tablet by mouth daily. 90 Tablet 0    ergocalciferol (ERGOCALCIFEROL) 1,250 mcg (50,000 unit) capsule Take 1 Capsule by mouth every seven (7) days. 26 Capsule 1    azelastine (ASTELIN) 137 mcg (0.1 %) nasal spray 137 Sprays by Both Nostrils route two (2) times a day. levothyroxine (SYNTHROID) 112 mcg tablet TAKE 1 TABLET BY MOUTH DAILY BEFORE AND BREAKFAST 90 Tablet 0    bacitracin zinc (BACITRACIN) ointment Apply  to affected area two (2) times a day. 60 g 0    ivermectin (STROMECTOL) 3 mg tablet Take 27 mg (9 tabs) on  days 0,1,7,8,14,21 and 28. (Patient not taking: No sig reported) 63 Tablet 0    efinaconazole (JUBLIA) meggan topical solution Apply to toe nails daily. (Patient not taking: Reported on 10/4/2022) 8 mL 0    loratadine (CLARITIN) 10 mg tablet Take 1 Tab by mouth daily. Indications:  Allergic Rhinitis (Patient not taking: Reported on 10/4/2022) 30 Tab 6       Allergies  Allergies   Allergen Reactions    Omnicef [Cefdinir] Hives    Keflex [Cephalexin] Hives    Codeine Other (comments)     Severe headache    Ketek [Telithromycin] Other (comments)     Passed out    Levaquin [Levofloxacin] Unknown (comments)     Did not tolerate      Morphine Other (comments)     Severe headache    Sulfa (Sulfonamide Antibiotics) Hives    Topamax [Topiramate] Other (comments)     Made blood vessels red and pain in eyes    Valtrex [Valacyclovir] Palpitations       Family History  Family History   Problem Relation Age of Onset    Other Mother         anuerysm    Hypertension Maternal Aunt     Heart Disease Maternal Aunt     Stroke Maternal Grandmother     Other Maternal Grandfather         leg amputated due to phlebitis    Heart Attack Paternal Grandmother     Heart Disease Maternal Aunt        Social History  Social History     Socioeconomic History    Marital status:      Spouse name: Not on file    Number of children: Not on file    Years of education: Not on file Highest education level: Not on file   Occupational History    Not on file   Tobacco Use    Smoking status: Former     Packs/day: 1.00     Years: 20.00     Pack years: 20.00     Types: Cigarettes     Quit date: 1999     Years since quittin.2    Smokeless tobacco: Never   Vaping Use    Vaping Use: Never used   Substance and Sexual Activity    Alcohol use: No     Alcohol/week: 0.0 standard drinks    Drug use: No     Types: Prescription, OTC    Sexual activity: Not Currently   Other Topics Concern    Not on file   Social History Narrative    Not on file     Social Determinants of Health     Financial Resource Strain: Not on file   Food Insecurity: Not on file   Transportation Needs: Not on file   Physical Activity: Not on file   Stress: Not on file   Social Connections: Not on file   Intimate Partner Violence: Not on file   Housing Stability: Not on file       Review of Systems  Review of Systems - Review of all systems is negative except as noted above in the HPI.     Vital Signs  Visit Vitals  /88   Pulse 83   Temp 97.2 °F (36.2 °C)   Resp 20   Ht 5' 8\" (1.727 m)   Wt 241 lb (109.3 kg)   LMP  (LMP Unknown)   SpO2 97%   BMI 36.64 kg/m²         Physical Exam  Physical Examination: General appearance - alert, well appearing, and in no distress and overweight  Mental status - affect appropriate to mood  Neurological - alert, oriented, normal speech, no focal findings or movement disorder noted        Results  Results for orders placed or performed in visit on 22   LUPUS ANTICOAGULANT COMP PANEL   Result Value Ref Range    LUPUS ANTICOAGULANT SCREEN Negative Negative   RHEUMATOID FACTOR, QL   Result Value Ref Range    RHEUMATOID FACTOR QUANT, IMMUNOTURBIDIMETRIC <20 0 - 20 IU/mL   LYME AB TOTAL W/RFLX W BLOT   Result Value Ref Range    LYME AB IGG/IGM <0.2 <=0.8 AI   URINALYSIS W/MICROSCOPIC   Result Value Ref Range    Color Yellow Colorless,    CLARITY Turbid (A) Clear, Sli    Specific Gravity 1.021 1.005 - 1.03    pH (UA) 5.0 5.0 - 8.0 pH    Protein Negative Negative, mg/dL    Glucose Negative Negative mg/dL    Ketone Negative Negative, mg/dL    Bilirubin Negative Negative    Blood Negative Negative    Nitrites Negative Negative    Leukocyte Esterase Negative Negative    Urobilinogen <2.0 <2.0 mg/dL    WBC Negative 0 - 5 /hpf    RBC Negative Negative, /hpf    Amorphous Crystals PRESENT     URINE ASCORBIC ACID Negative Negative mg/dL   CK   Result Value Ref Range    Creatine Kinase,Total 242 (H) 30 - 165 U/L       ASSESSMENT and PLAN    ICD-10-CM ICD-9-CM    1. Rash and nonspecific skin eruption  R21 782.1 REFERRAL TO INFECTIOUS DISEASE      2. Scabies  B86 133.0 REFERRAL TO INFECTIOUS DISEASE      3. Dyslipidemia  E78.5 272.4       4. Hypothyroidism, unspecified type  E03.9 244.9       5. [facility] ([facility]  F39 296.90         lab results and schedule of future lab studies reviewed with patient  reviewed diet, exercise and weight control  reviewed medications and side effects in detail      I have discussed the diagnosis with the patient and the intended plan of care as seen in the above orders. The patient has received an after-visit summary and questions were answered concerning future plans. I have discussed medication, side effects, and warnings with the patient in detail. The patient verbalized understanding and is in agreement with the plan of care. The patient will contact the office with any additional concerns. I spent at least 40 minutes on this visit with this established patient. Marcos French MD    PLEASE NOTE:   This document has been produced using voice recognition software.  Unrecognized errors in transcription may be present

## 2022-10-04 NOTE — PROGRESS NOTES
1. \"Have you been to the ER, urgent care clinic since your last visit? Hospitalized since your last visit? \" No    2. \"Have you seen or consulted any other health care providers outside of the [address] [place] [address] since your last visit? \" No     3. For patients aged 39-70: Has the patient had a colonoscopy / FIT/ Cologuard? Yes - no Care Gap present      If the patient is female:    4. For patients aged 41-77: Has the patient had a mammogram within the past 2 years? No      5. For patients aged 21-65: Has the patient had a pap smear?  NA - based on age or sex

## 2022-10-12 ENCOUNTER — OFFICE VISIT (OUTPATIENT)
Dept: CARDIOLOGY CLINIC | Age: 67
End: 2022-10-12
Payer: COMMERCIAL

## 2022-10-12 VITALS
OXYGEN SATURATION: 98 % | SYSTOLIC BLOOD PRESSURE: 128 MMHG | HEIGHT: 68 IN | WEIGHT: 242 LBS | HEART RATE: 79 BPM | BODY MASS INDEX: 36.68 KG/M2 | DIASTOLIC BLOOD PRESSURE: 78 MMHG

## 2022-10-12 DIAGNOSIS — E78.5 DYSLIPIDEMIA: ICD-10-CM

## 2022-10-12 DIAGNOSIS — E03.9 HYPOTHYROIDISM, UNSPECIFIED TYPE: ICD-10-CM

## 2022-10-12 DIAGNOSIS — R00.2 PALPITATIONS: ICD-10-CM

## 2022-10-12 DIAGNOSIS — I49.3 PVC (PREMATURE VENTRICULAR CONTRACTION): Primary | ICD-10-CM

## 2022-10-12 PROCEDURE — 99214 OFFICE O/P EST MOD 30 MIN: CPT | Performed by: INTERNAL MEDICINE

## 2022-10-12 PROCEDURE — 93000 ELECTROCARDIOGRAM COMPLETE: CPT | Performed by: INTERNAL MEDICINE

## 2022-10-12 PROCEDURE — 1123F ACP DISCUSS/DSCN MKR DOCD: CPT | Performed by: INTERNAL MEDICINE

## 2022-10-12 NOTE — PROGRESS NOTES
History of Present Illness:  79 YOF here for follow up. She had been taking Ivermectin and then some Diazepam and noticed some side effects with shortness of breath, headache and just not feeling well for a number of days, slowly resolved. No new chest pain, syncope, PND, orthopnea or edema. No increase in palpitations since the combination of medicines used. There is a noted effect of amplification with receptors with the use of these two medicines. She has ongoing issues with a rash and concerns for parasitic infection. She is ultimately receiving a  referral to physician in [place]. This has been arranged by her primary physician. Impression:  History of palpitations and PVCs, stable. History of remote syncope without recurrence. History of remote atypical chest pain with last stress test 2017, unremarkable and resolved. History of thyroid disorder. History of anxiety. Remote uterine cancer and hysterectomy and chemo with radiation, in remission. History of recurrent rash with remote Dengue fever. She has been concerned this may be a parasite, although she has been seen by GI locally with O&P workup. Plan:  From a cardiac standpoint she is doing well, blood pressure is stable. No recurrent palpitations since the interaction with the medications. There has been no history of Chagas disease by an imaging standpoint. She is following up with a  in [place]. All questions answered and I will see back annually. Past Medical History:   Diagnosis Date    Abnormal nuclear cardiac imaging test 02/09/2012    Mid to distal anteroseptal and apical reversible defect concerning for ishcmeia. Mild-mod, mid-distal anterior & apical hypk. EF 67%. Borderline abnormal EKG w/1-mm inferolateral ST depression at 7 min exercise. Occasional PVCs. Anxiety     Carotid duplex 17/47/5647    Mild 3-23% LICA stenosis.       Dengue fever     7/10 while in [place]    Diverticulosis Dyslipidemia     GERD     Holter monitor study 04/29/2016    Sinus rhythm, avg HR 67 bpm (range  bpm). Rare ectopy. Hypothyroidism     RLE venous duplex 07/23/2015    Right leg:  No DVT. S/P cardiac catheterization 02/24/2012    Angiographically normal coronaries. Hyperdynamic. EF 70%. No RWMA. Scabies exposure 03/2018    Sinusitis     Tinnitus     Uterine cancer (HCC)     hysterectomy    Uterine carcinoma (HCC)        Current Outpatient Medications   Medication Sig Dispense Refill    permethrin (ACTICIN) 5 % topical cream apply sparingly to affected area of the body and rinse after 8 hr. please give 2 tubes (Patient not taking: No sig reported) 120 g 0    diazePAM (VALIUM) 2 mg tablet TAKE 1 TABLET BY MOUTH DAILY AS NEEDED FOR ANXIETY 20 Tablet 2    atorvastatin (LIPITOR) 10 mg tablet Take 1 Tablet by mouth daily. 90 Tablet 0    ergocalciferol (ERGOCALCIFEROL) 1,250 mcg (50,000 unit) capsule Take 1 Capsule by mouth every seven (7) days. 26 Capsule 1    ivermectin (STROMECTOL) 3 mg tablet Take 27 mg (9 tabs) on  days 0,1,7,8,14,21 and 28. (Patient not taking: No sig reported) 63 Tablet 0    azelastine (ASTELIN) 137 mcg (0.1 %) nasal spray 137 Sprays by Both Nostrils route two (2) times a day. levothyroxine (SYNTHROID) 112 mcg tablet TAKE 1 TABLET BY MOUTH DAILY BEFORE AND BREAKFAST 90 Tablet 0    efinaconazole (JUBLIA) meggan topical solution Apply to toe nails daily. (Patient not taking: No sig reported) 8 mL 0    bacitracin zinc (BACITRACIN) ointment Apply  to affected area two (2) times a day. 60 g 0       Social History   reports that she quit smoking about 23 years ago. Her smoking use included cigarettes. She has a 20.00 pack-year smoking history. She has never used smokeless tobacco.   reports no history of alcohol use. Family History  family history includes Heart Attack in her paternal grandmother; Heart Disease in her maternal aunt and maternal aunt;  Hypertension in her maternal aunt; Other in her maternal grandfather and mother; Stroke in her maternal grandmother. Review of Systems  Except as stated above include:  Constitutional: Negative for fever, chills and malaise/fatigue. HEENT: No congestion or recent URI. Gastrointestinal: No nausea, vomiting, abdominal pain, bloody stools. Pulmonary:  Negative except as stated above. Cardiac:  Negative except as stated above. Musculoskeletal: Negative except as stated above. Neurological:  No localized symptoms. Skin:  Negative except as stated above. Psych:  Negative except as stated above. Endocrine:  Negative except as stated above. PHYSICAL EXAM  BP Readings from Last 3 Encounters:   10/12/22 138/88   10/04/22 136/88   12/16/21 122/78     Pulse Readings from Last 3 Encounters:   10/04/22 83   12/16/21 60   10/04/21 70     Wt Readings from Last 3 Encounters:   10/12/22 109.8 kg (242 lb)   10/04/22 109.3 kg (241 lb)   12/16/21 107.5 kg (237 lb)     General:   Well developed, well groomed. Head/Neck:   No obvious jugular venous distention     No obvious carotid pulsations. No evidence of xanthelasma. Lungs:   No respiratory distress. Clear bilaterally. Heart:  Regular rate and rhythm. Normal S1/S2. Palpation grossly normal.    No significant murmurs, rubs or gallops. Abdomen:   Non-acute abdomen. No obvious pulsations. Extremities:   Intact peripheral pulses. No significant edema. Neurological:   Alert and oriented to person, place, time. No focal neurological deficit visually. Skin:   No obvious rash    Blood Pressure Metric:  Monitor recommended and adjustments stated if needed.

## 2022-10-13 ENCOUNTER — TELEPHONE (OUTPATIENT)
Dept: FAMILY MEDICINE CLINIC | Age: 67
End: 2022-10-13

## 2022-10-13 NOTE — TELEPHONE ENCOUNTER
----- Message from Juliann Epps sent at 10/12/2022  4:36 PM EDT -----  Subject: Message to Provider    QUESTIONS  Information for Provider? Please do not forward any records to   New perez Will need to change clinics Thank you   ---------------------------------------------------------------------------  --------------  [id number]  [phone]; OK to leave message on voicemail  ---------------------------------------------------------------------------  --------------  SCRIPT ANSWERS  Relationship to Patient?  Self

## 2022-11-15 RX ORDER — AMOXICILLIN AND CLAVULANATE POTASSIUM 875; 125 MG/1; MG/1
1 TABLET, FILM COATED ORAL EVERY 12 HOURS
Qty: 20 TABLET | Refills: 0 | Status: SHIPPED | OUTPATIENT
Start: 2022-11-15 | End: 2022-11-25

## 2022-11-15 RX ORDER — DOXYCYCLINE 100 MG/1
100 TABLET ORAL 2 TIMES DAILY
Qty: 20 TABLET | Refills: 0 | Status: SHIPPED | OUTPATIENT
Start: 2022-11-15 | End: 2022-11-25

## 2022-11-22 ENCOUNTER — TELEPHONE (OUTPATIENT)
Dept: FAMILY MEDICINE CLINIC | Age: 67
End: 2022-11-22

## 2022-11-22 DIAGNOSIS — E03.9 HYPOTHYROIDISM, UNSPECIFIED TYPE: ICD-10-CM

## 2022-11-22 NOTE — TELEPHONE ENCOUNTER
Pt stated she had Covid 3 weeks ago and she isn't 100% yet. She wants to know how long does she have to wait to get the flu vaccine. Reinier Mitch Anand can be reached at [phone]. Please advise.  Thank you!!!

## 2022-11-23 RX ORDER — LEVOTHYROXINE SODIUM 112 UG/1
TABLET ORAL
Qty: 90 TABLET | Refills: 0 | Status: SHIPPED | OUTPATIENT
Start: 2022-11-23

## 2022-12-03 DIAGNOSIS — E55.9 HYPOVITAMINOSIS D: ICD-10-CM

## 2022-12-03 DIAGNOSIS — E78.5 DYSLIPIDEMIA: ICD-10-CM

## 2022-12-05 RX ORDER — ATORVASTATIN CALCIUM 10 MG/1
10 TABLET, FILM COATED ORAL DAILY
Qty: 90 TABLET | Refills: 0 | Status: SHIPPED | OUTPATIENT
Start: 2022-12-05

## 2022-12-05 RX ORDER — ERGOCALCIFEROL 1.25 MG/1
CAPSULE ORAL
Qty: 26 CAPSULE | Refills: 1 | Status: SHIPPED | OUTPATIENT
Start: 2022-12-05

## 2022-12-12 ENCOUNTER — CLINICAL SUPPORT (OUTPATIENT)
Dept: FAMILY MEDICINE CLINIC | Age: 67
End: 2022-12-12
Payer: COMMERCIAL

## 2022-12-12 DIAGNOSIS — Z23 ENCOUNTER FOR IMMUNIZATION: Primary | ICD-10-CM

## 2022-12-12 PROCEDURE — 90471 IMMUNIZATION ADMIN: CPT | Performed by: FAMILY MEDICINE

## 2022-12-12 PROCEDURE — 90694 VACC AIIV4 NO PRSRV 0.5ML IM: CPT | Performed by: FAMILY MEDICINE

## 2022-12-12 NOTE — PROGRESS NOTES
After obtaining consent, and per orders of Dr. Benigno Suh, injection of fluad quad adjuvanted given by Pierce Malin. Patient instructed to remain in clinic for 20 minutes afterwards, and to report any adverse reaction to me immediately.
.

## 2023-01-05 ENCOUNTER — OFFICE VISIT (OUTPATIENT)
Dept: FAMILY MEDICINE CLINIC | Age: 68
End: 2023-01-05
Payer: COMMERCIAL

## 2023-01-05 VITALS
DIASTOLIC BLOOD PRESSURE: 78 MMHG | HEIGHT: 68 IN | TEMPERATURE: 97.3 F | BODY MASS INDEX: 37.13 KG/M2 | SYSTOLIC BLOOD PRESSURE: 134 MMHG | OXYGEN SATURATION: 97 % | RESPIRATION RATE: 17 BRPM | WEIGHT: 245 LBS | HEART RATE: 70 BPM

## 2023-01-05 DIAGNOSIS — U07.1 COVID-19: ICD-10-CM

## 2023-01-05 DIAGNOSIS — E66.01 SEVERE OBESITY (BMI 35.0-39.9) WITH COMORBIDITY (HCC): ICD-10-CM

## 2023-01-05 DIAGNOSIS — E03.9 HYPOTHYROIDISM, UNSPECIFIED TYPE: ICD-10-CM

## 2023-01-05 DIAGNOSIS — R21 RASH AND NONSPECIFIC SKIN ERUPTION: Primary | ICD-10-CM

## 2023-01-05 DIAGNOSIS — E78.5 DYSLIPIDEMIA: ICD-10-CM

## 2023-01-05 DIAGNOSIS — F39 MOOD DISORDER (HCC): ICD-10-CM

## 2023-01-05 PROCEDURE — 99214 OFFICE O/P EST MOD 30 MIN: CPT | Performed by: FAMILY MEDICINE

## 2023-01-05 PROCEDURE — 1123F ACP DISCUSS/DSCN MKR DOCD: CPT | Performed by: FAMILY MEDICINE

## 2023-01-05 NOTE — PROGRESS NOTES
ROBBY  Grace Cottrell comes in for follow-up care. Rash: Patient has chronic skin rash. She has had this rash for years. She has been seen by various dermatologist.  She is trying to get in to see a specialist in [place]. She is still trying to collect information about that clinic and also getting all her medical records together. She will let us know when she is ready to go for further evaluation. In the meantime she will continue with supportive care. Rash is usually itchy. 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 Patient has been treated for scabies in the past.  She wonders about an infestation by parasite causing the rash. Hypothyroidism: Patient has hypothyroidism. She is on levothyroxine. Takes 112 mcg daily. We will continue current treatment plan. Dyslipidemia: Patient has dyslipidemia. She is on Lipitor. Takes 10 mg daily. Stable on medication. We will recheck lipid panel at next visit. Hypovitaminosis D: Patient has hypovitaminosis D. She is on medication for this. Continue current treatment plan. Mood disorder: Patient has a history of mood disorder. She is stable. She does supportive care. COVID-19: Patient was exposed and got COVID-19 a few weeks back. She has been doing supportive care measures. She feels improved. Obesity: Patient has a BMI 37.25. She will intensify lifestyle and dietary modification. Health maintenance: Patient has had mammogram and DEXA scan done. We will request the records. Past Medical History  Past Medical History:   Diagnosis Date    Abnormal nuclear cardiac imaging test 02/09/2012    Mid to distal anteroseptal and apical reversible defect concerning for ishcmeia. Mild-mod, mid-distal anterior & apical hypk. EF 67%. Borderline abnormal EKG w/1-mm inferolateral ST depression at 7 min exercise. Occasional PVCs. Anxiety     Carotid duplex 21/07/4642    Mild 0-11% LICA stenosis.       Dengue fever     7/10 while in [place]    Diverticulosis Dyslipidemia     GERD     Holter monitor study 04/29/2016    Sinus rhythm, avg HR 67 bpm (range  bpm). Rare ectopy. Hypothyroidism     RLE venous duplex 07/23/2015    Right leg:  No DVT. S/P cardiac catheterization 02/24/2012    Angiographically normal coronaries. Hyperdynamic. EF 70%. No RWMA. Scabies exposure 03/2018    Sinusitis     Tinnitus     Uterine cancer (HCC)     hysterectomy    Uterine carcinoma (HCC)        Surgical History  Past Surgical History:   Procedure Laterality Date    HX CHOLECYSTECTOMY      2009    HX HYSTERECTOMY      HX TOTAL COLECTOMY      2008        Medications  Current Outpatient Medications   Medication Sig Dispense Refill    atorvastatin (LIPITOR) 10 mg tablet TAKE 1 TABLET BY MOUTH DAILY 90 Tablet 0    ergocalciferol (ERGOCALCIFEROL) 1,250 mcg (50,000 unit) capsule TAKE 1 CAPSULE BY MOUTH EVERY 7 DAYS 26 Capsule 1    levothyroxine (Synthroid) 112 mcg tablet TAKE 1 TABLET BY MOUTH DAILY BEFORE BREAKFAST 90 Tablet 0    permethrin (ACTICIN) 5 % topical cream apply sparingly to affected area of the body and rinse after 8 hr. please give 2 tubes (Patient not taking: No sig reported) 120 g 0    diazePAM (VALIUM) 2 mg tablet TAKE 1 TABLET BY MOUTH DAILY AS NEEDED FOR ANXIETY 20 Tablet 2    azelastine (ASTELIN) 137 mcg (0.1 %) nasal spray 137 Sprays by Both Nostrils route two (2) times a day. bacitracin zinc (BACITRACIN) ointment Apply  to affected area two (2) times a day. 60 g 0    ivermectin (STROMECTOL) 3 mg tablet Take 27 mg (9 tabs) on  days 0,1,7,8,14,21 and 28. (Patient not taking: No sig reported) 63 Tablet 0    efinaconazole (JUBLIA) meggan topical solution Apply to toe nails daily.  (Patient not taking: No sig reported) 8 mL 0       Allergies  Allergies   Allergen Reactions    Omnicef [Cefdinir] Hives    Keflex [Cephalexin] Hives    Codeine Other (comments)     Severe headache    Ketek [Telithromycin] Other (comments)     Passed out    Levaquin [Levofloxacin] Unknown (comments)     Did not tolerate      Morphine Other (comments)     Severe headache    Sulfa (Sulfonamide Antibiotics) Hives    Topamax [Topiramate] Other (comments)     Made blood vessels red and pain in eyes    Valtrex [Valacyclovir] Palpitations       Family History  Family History   Problem Relation Age of Onset    Other Mother         anuerysm    Hypertension Maternal Aunt     Heart Disease Maternal Aunt     Stroke Maternal Grandmother     Other Maternal Grandfather         leg amputated due to phlebitis    Heart Attack Paternal Grandmother     Heart Disease Maternal Aunt        Social History  Social History     Socioeconomic History    Marital status:      Spouse name: Not on file    Number of children: Not on file    Years of education: Not on file    Highest education level: Not on file   Occupational History    Not on file   Tobacco Use    Smoking status: Former     Packs/day: 1.00     Years: 20.00     Pack years: 20.00     Types: Cigarettes     Quit date: 1999     Years since quittin.5    Smokeless tobacco: Never   Vaping Use    Vaping Use: Never used   Substance and Sexual Activity    Alcohol use: No     Alcohol/week: 0.0 standard drinks    Drug use: No     Types: Prescription, OTC    Sexual activity: Not Currently   Other Topics Concern    Not on file   Social History Narrative    Not on file     Social Determinants of Health     Financial Resource Strain: Not on file   Food Insecurity: Not on file   Transportation Needs: Not on file   Physical Activity: Not on file   Stress: Not on file   Social Connections: Not on file   Intimate Partner Violence: Not on file   Housing Stability: Not on file       Review of Systems  Review of Systems - Review of all systems is negative except as noted above in the HPI.     Vital Signs  Visit Vitals  /78 (BP 1 Location: Right upper arm, BP Patient Position: Sitting, BP Cuff Size: Large adult)   Pulse 70   Temp 97.3 °F (36.3 °C) (Temporal)   Resp 17   Ht 5' 8\" (1.727 m)   Wt 245 lb (111.1 kg)   LMP  (LMP Unknown)   SpO2 97%   BMI 37.25 kg/m²         Physical Exam  Physical Examination: General appearance - oriented to person, place, and time and acyanotic, in no respiratory distress  Mental status - affect appropriate to mood  Lymphatics - no palpable lymphadenopathy  Chest - no tachypnea, retractions or cyanosis  Heart - S1 and S2 normal  Abdomen - no rebound tenderness noted  Back exam - limited range of motion  Neurological - abnormal neurological exam unchanged from prior examinations  Musculoskeletal - osteoarthritic changes noted in both hands  Extremities - intact peripheral pulses      Results  Results for orders placed or performed in visit on 05/20/22   LUPUS ANTICOAGULANT COMP PANEL   Result Value Ref Range    LUPUS ANTICOAGULANT SCREEN Negative Negative   RHEUMATOID FACTOR, QL   Result Value Ref Range    RHEUMATOID FACTOR QUANT, IMMUNOTURBIDIMETRIC <20 0 - 20 IU/mL   LYME AB TOTAL W/RFLX W BLOT   Result Value Ref Range    LYME AB IGG/IGM <0.2 <=0.8 AI   URINALYSIS W/MICROSCOPIC   Result Value Ref Range    Color Yellow Colorless,    CLARITY Turbid (A) Clear, Sli    Specific Gravity 1.021 1.005 - 1.03    pH (UA) 5.0 5.0 - 8.0 pH    Protein Negative Negative, mg/dL    Glucose Negative Negative mg/dL    Ketone Negative Negative, mg/dL    Bilirubin Negative Negative    Blood Negative Negative    Nitrites Negative Negative    Leukocyte Esterase Negative Negative    Urobilinogen <2.0 <2.0 mg/dL    WBC Negative 0 - 5 /hpf    RBC Negative Negative, /hpf    Amorphous Crystals PRESENT     URINE ASCORBIC ACID Negative Negative mg/dL   CK   Result Value Ref Range    Creatine Kinase,Total 242 (H) 30 - 165 U/L       ASSESSMENT and PLAN    ICD-10-CM ICD-9-CM    1. Rash and nonspecific skin eruption  R21 782.1       2. Hypothyroidism, unspecified type  E03.9 244.9       3. Mood disorder (HCC)  F39 296.90       4.  Severe obesity (BMI 35.0-39. 9) with comorbidity ([facility]  E66.01 278.01       5. Dyslipidemia  E78.5 272.4       6. COVID-19  U07.1 079.89         lab results and schedule of future lab studies reviewed with patient  reviewed diet, exercise and weight control  reviewed medications and side effects in detail      I have discussed the diagnosis with the patient and the intended plan of care as seen in the above orders. The patient has received an after-visit summary and questions were answered concerning future plans. I have discussed medication, side effects, and warnings with the patient in detail. The patient verbalized understanding and is in agreement with the plan of care. The patient will contact the office with any additional concerns. Antoni Thompson MD    PLEASE NOTE:   This document has been produced using voice recognition software.  Unrecognized errors in transcription may be present

## 2023-01-26 DIAGNOSIS — E03.9 HYPOTHYROIDISM, UNSPECIFIED TYPE: ICD-10-CM

## 2023-01-26 RX ORDER — LEVOTHYROXINE SODIUM 112 UG/1
TABLET ORAL
Qty: 90 TABLET | Refills: 0 | Status: SHIPPED | OUTPATIENT
Start: 2023-01-26

## 2023-02-16 ENCOUNTER — OFFICE VISIT (OUTPATIENT)
Facility: CLINIC | Age: 68
End: 2023-02-16
Payer: COMMERCIAL

## 2023-02-16 VITALS
HEART RATE: 82 BPM | OXYGEN SATURATION: 95 % | DIASTOLIC BLOOD PRESSURE: 65 MMHG | RESPIRATION RATE: 20 BRPM | TEMPERATURE: 98.1 F | WEIGHT: 233 LBS | SYSTOLIC BLOOD PRESSURE: 129 MMHG | BODY MASS INDEX: 35.31 KG/M2 | HEIGHT: 68 IN

## 2023-02-16 DIAGNOSIS — E03.9 HYPOTHYROIDISM, UNSPECIFIED TYPE: ICD-10-CM

## 2023-02-16 DIAGNOSIS — E78.5 HYPERLIPIDEMIA, UNSPECIFIED HYPERLIPIDEMIA TYPE: ICD-10-CM

## 2023-02-16 DIAGNOSIS — R21 RASH AND OTHER NONSPECIFIC SKIN ERUPTION: Primary | ICD-10-CM

## 2023-02-16 DIAGNOSIS — E55.9 VITAMIN D DEFICIENCY, UNSPECIFIED: ICD-10-CM

## 2023-02-16 DIAGNOSIS — F41.9 ANXIETY DISORDER, UNSPECIFIED TYPE: ICD-10-CM

## 2023-02-16 PROCEDURE — 99215 OFFICE O/P EST HI 40 MIN: CPT | Performed by: FAMILY MEDICINE

## 2023-02-16 PROCEDURE — 1123F ACP DISCUSS/DSCN MKR DOCD: CPT | Performed by: FAMILY MEDICINE

## 2023-02-16 RX ORDER — DIAZEPAM 2 MG/1
TABLET ORAL
Qty: 30 TABLET | Refills: 1 | Status: SHIPPED | OUTPATIENT
Start: 2023-02-16 | End: 2023-03-16

## 2023-02-16 NOTE — PROGRESS NOTES
1. \"Have you been to the ER, urgent care clinic since your last visit? Hospitalized since your last visit? \"No    2. \"Have you seen or consulted any other health care providers outside of the [address] [place] [address] since your last visit? \" No    3. For patients aged 39-70: Has the patient had a colonoscopy / FIT/ Cologuard? No      If the patient is female:    4. For patients aged 41-77: Has the patient had a mammogram within the past 2 years? Yes      5. For patients aged 21-65: Has the patient had a pap smear?  Yes

## 2023-02-17 ENCOUNTER — TELEPHONE (OUTPATIENT)
Facility: CLINIC | Age: 68
End: 2023-02-17

## 2023-02-17 NOTE — TELEPHONE ENCOUNTER
The pharmacy faxed over the following request:    Diazepam oral 2mg tablets is not recommended in geriatric patients.

## 2023-02-19 NOTE — PROGRESS NOTES
OSWALDO  Sharma Brittle comes in for follow-up care. Rash: Patient has chronic skin rash. She comes in requesting to be referred to a specialist in [address] [place] [address]. Would like to be referred to Dr. Kiran Negrete infectious disease specialist at [facility].  Patient provides a number. Phone number is [phone] and the fax number is [phone]. I have seen this patient for her chronic skin rash and the have referred her to various specialists. She also has other medical conditions and has asked other specialists to have a look at the rash. Various tests have been done to try and the find out the cause of the rash. Patient states that rash started after she had [place] [facility] [place]. Patient has been seen by other specialists for her other chronic medical conditions and they have looked at the rash noting that it is suggestive of parasitic infection though no tests done so far have yielded positive/definitive results. She thus would like an evaluation by an infectious disease specialist given this rash has been persistent and is affecting her quality of life. Patient has used various topical medications including permethrin given the rash imitates scabies. It also imitates dermatographia. Patient has various images of the rash that she has taken over the years given the chronicity of the problem. Hypothyroidism: Patient has hypothyroidism. Patient is on levothyroxine. Stable on medication. Continue current treatment plan. We will recheck TSH at next visit. Dyslipidemia: Patient has dyslipidemia. She is on atorvastatin. Takes 10 mg daily. She will exercise and take a diet low in polysaturated fats. Hypovitaminosis D: Patient has history of low vitamin D levels. She is on vitamin D replacement therapy weekly. Continue current treatment plan. Mood disorder: Patient has mood disorder and anxiety.   She takes diazepam as needed for severe anxiety. Would like a refill of medication. Prescription is given. Obesity: Patient has a BMI of 35.43. She will intensify lifestyle and dietary modification. Past Medical History  Past Medical History:   Diagnosis Date    Abnormal nuclear cardiac imaging test 02/09/2012    Mid to distal anteroseptal and apical reversible defect concerning for ishcmeia. Mild-mod, mid-distal anterior & apical hypk. EF 67%. Borderline abnormal EKG w/1-mm inferolateral ST depression at 7 min exercise. Occasional PVCs. Anxiety     Carotid bruit 30/76/6695    Mild 5-45% LICA stenosis. Dengue fever     7/10 while in [place]    Diverticulosis     [facility] 07/23/2015    Right leg:  No DVT. Dyslipidemia     GERD (gastroesophageal reflux disease)     Holter monitor, abnormal 04/29/2016    Sinus rhythm, avg HR 67 bpm (range  bpm). Rare ectopy. Hypothyroidism     S/P cardiac catheterization 02/24/2012    Angiographically normal coronaries. Hyperdynamic. EF 70%. No RWMA.       Scabies exposure 03/2018    Sinusitis     Tinnitus     Uterine cancer (HCC)     hysterectomy    Uterine carcinoma [facility])        Surgical History  Past Surgical History:   Procedure Laterality Date    CHOLECYSTECTOMY      2009    HYSTERECTOMY (CERVIX STATUS UNKNOWN)      TOTAL COLECTOMY      2008        Medications  Current Outpatient Medications   Medication Sig Dispense Refill    diazePAM (VALIUM) 2 MG tablet TAKE 1 TABLET BY MOUTH DAILY AS NEEDED FOR ANXIETY 30 tablet 1    atorvastatin (LIPITOR) 10 MG tablet Take 10 mg by mouth daily      azelastine (ASTELIN) 0.1 % nasal spray 137 sprays by Nasal route 2 times daily      bacitracin zinc 500 UNIT/GM ointment Apply topically 2 times daily      ergocalciferol (ERGOCALCIFEROL) 1.25 MG (51108 UT) capsule TAKE 1 CAPSULE BY MOUTH EVERY 7 DAYS      levothyroxine (SYNTHROID) 112 MCG tablet TAKE 1 TABLET BY MOUTH DAILY BEFORE BREAKFAST permethrin (ELIMITE) 5 % cream apply sparingly to affected area of the body and rinse after 8 hr. please give 2 tubes      Efinaconazole 10 % SOLN Apply to toe nails daily. ivermectin 3 MG tablet Take 27 mg (9 tabs) on  days 0,1,7,8,14,21 and 28. (Patient not taking: Reported on 2023)       No current facility-administered medications for this visit.        Allergies  Allergies   Allergen Reactions    Cefdinir Hives    Cephalexin Hives    Codeine Other (See Comments)     Severe headache    Levofloxacin      Other reaction(s): Unknown (comments)  Did not tolerate    Morphine Other (See Comments)     Severe headache    Sulfa Antibiotics Hives    Telithromycin Other (See Comments)     Passed out    Topiramate Other (See Comments)     Made blood vessels red and pain in eyes    Valacyclovir Palpitations       Family History  Family History   Problem Relation Age of Onset    Hypertension Maternal Aunt     Other Mother         anuerysm    Heart Disease Maternal Aunt     Stroke Maternal Grandmother     Heart Disease Maternal Aunt     Heart Attack Paternal Grandmother     Other Maternal Grandfather         leg amputated due to phlebitis       Social History  Social History     Socioeconomic History    Marital status:      Spouse name: Not on file    Number of children: Not on file    Years of education: Not on file    Highest education level: Not on file   Occupational History    Not on file   Tobacco Use    Smoking status: Former     Packs/day: 1.00     Types: Cigarettes     Quit date: 1999     Years since quittin.6    Smokeless tobacco: Never   Substance and Sexual Activity    Alcohol use: No     Alcohol/week: 0.0 standard drinks    Drug use: No     Types: Prescription, OTC    Sexual activity: Not on file   Other Topics Concern    Not on file   Social History Narrative    Not on file     Social Determinants of Health     Financial Resource Strain: Not on file   Food Insecurity: Not on file Transportation Needs: Not on file   Physical Activity: Not on file   Stress: Not on file   Social Connections: Not on file   Intimate Partner Violence: Not on file   Housing Stability: Not on file       Review of Systems  Review of Systems - Review of all systems is negative except as noted above in the HPI. Vital Signs  /65   Pulse 82   Temp 98.1 °F (36.7 °C) (Temporal)   Resp 20   Ht 5' 8\" (1.727 m)   Wt 233 lb (105.7 kg)   SpO2 95%   BMI 35.43 kg/m²       Physical Exam  Physical Examination: General appearance - oriented to person, place, and time and acyanotic, in no respiratory distress  Mental status - affect appropriate to mood  Chest - no tachypnea, retractions or cyanosis  Heart - S1 and S2 normal  Abdomen - no rebound tenderness noted  Back exam - limited range of motion  Neurological - normal muscle tone, no tremors, strength 5/5  Musculoskeletal - no muscular tenderness noted  Skin -patient with multiple healed scratch romi lesions on her back and arms. Results  No results found for this visit on 02/16/23. ASSESSMENT and PLAN   Diagnosis Orders   1. Rash and other nonspecific skin eruption        2. Anxiety disorder, unspecified type  diazePAM (VALIUM) 2 MG tablet      3. Hypothyroidism, unspecified type        4. Vitamin D deficiency, unspecified        5. Hyperlipidemia, unspecified hyperlipidemia type        I spent 45 minutes with this established patient. I have discussed the diagnosis with the patient and the intended plan of care as seen in the above orders. The patient has received an after-visit summary and questions were answered concerning future plans. I have discussed medication, side effects, and warnings with the patient in detail. The patient verbalized understanding and is in agreement with the plan of care. The patient will contact the office with any additional concerns.     Christiana Reyes MD, MD    PLEASE NOTE:   This document has been produced using voice recognition software.  Unrecognized errors in transcription may be present

## 2023-02-23 ENCOUNTER — TELEPHONE (OUTPATIENT)
Facility: CLINIC | Age: 68
End: 2023-02-23

## 2023-02-23 NOTE — TELEPHONE ENCOUNTER
Pt is requesting to be contacted to discuss a conversation she had with Dr Kay Bennett regarding a referral

## 2023-02-24 ENCOUNTER — TELEPHONE (OUTPATIENT)
Facility: CLINIC | Age: 68
End: 2023-02-24

## 2023-02-24 NOTE — TELEPHONE ENCOUNTER
Spoke with patient and advised that we spoke with the office and will submit the referral and notes.   Patient verbalized understanding

## 2023-02-24 NOTE — TELEPHONE ENCOUNTER
Pt calling requesting to speak to [place]. Mrs. Claudine Walter can be reached at [phone]. Please advise.  Thank you!!!

## 2023-02-24 NOTE — TELEPHONE ENCOUNTER
Spoke with patient ref to referral that was requested too Dr Noam Pedersen at [facility] (Infectious Diseases [phone])    When that office was called and Med the intake nurse advised that all that this office needed to do was send the referral, office notes and whatever other specialist notes, lab reports, that would be appropriate for review by the physicians to determine if they would be able to assist Ms Georegtte Dickson with her conditions     Fax number is [phone].     Provider to be advised

## 2023-02-27 DIAGNOSIS — R21 RASH AND OTHER NONSPECIFIC SKIN ERUPTION: Primary | ICD-10-CM

## 2023-03-07 RX ORDER — ERGOCALCIFEROL 1.25 MG/1
CAPSULE ORAL
Qty: 13 CAPSULE | Refills: 3 | Status: SHIPPED | OUTPATIENT
Start: 2023-03-07

## 2023-03-09 ENCOUNTER — TELEPHONE (OUTPATIENT)
Facility: CLINIC | Age: 68
End: 2023-03-09

## 2023-03-09 NOTE — TELEPHONE ENCOUNTER
Called and left message that lab orders as requested were ready for  and that the patient specifically will need to go to a [facility] facility to have them drawn.

## 2023-03-16 RX ORDER — ATORVASTATIN CALCIUM 10 MG/1
TABLET, FILM COATED ORAL
Qty: 90 TABLET | Refills: 1 | Status: SHIPPED | OUTPATIENT
Start: 2023-03-16

## 2023-03-24 LAB — WORM SPEC MACRO/MICRO ID: NORMAL

## 2023-04-06 ENCOUNTER — TELEPHONE (OUTPATIENT)
Facility: CLINIC | Age: 68
End: 2023-04-06

## 2023-05-16 ENCOUNTER — OFFICE VISIT (OUTPATIENT)
Facility: CLINIC | Age: 68
End: 2023-05-16
Payer: MEDICARE

## 2023-05-16 VITALS
BODY MASS INDEX: 37.13 KG/M2 | SYSTOLIC BLOOD PRESSURE: 139 MMHG | OXYGEN SATURATION: 97 % | DIASTOLIC BLOOD PRESSURE: 78 MMHG | HEIGHT: 68 IN | WEIGHT: 245 LBS | HEART RATE: 77 BPM | TEMPERATURE: 97.7 F | RESPIRATION RATE: 20 BRPM

## 2023-05-16 DIAGNOSIS — R73.9 HYPERGLYCEMIA, UNSPECIFIED: ICD-10-CM

## 2023-05-16 DIAGNOSIS — R53.81 OTHER MALAISE: ICD-10-CM

## 2023-05-16 DIAGNOSIS — E53.8 VITAMIN B 12 DEFICIENCY: ICD-10-CM

## 2023-05-16 DIAGNOSIS — E55.9 VITAMIN D DEFICIENCY, UNSPECIFIED: ICD-10-CM

## 2023-05-16 DIAGNOSIS — E83.42 HYPOMAGNESEMIA: ICD-10-CM

## 2023-05-16 DIAGNOSIS — R82.90 ABNORMAL URINE: ICD-10-CM

## 2023-05-16 DIAGNOSIS — R21 RASH AND OTHER NONSPECIFIC SKIN ERUPTION: Primary | ICD-10-CM

## 2023-05-16 DIAGNOSIS — Z91.81 AT HIGH RISK FOR FALLS: ICD-10-CM

## 2023-05-16 DIAGNOSIS — E03.9 HYPOTHYROIDISM, UNSPECIFIED TYPE: ICD-10-CM

## 2023-05-16 DIAGNOSIS — D50.9 IRON DEFICIENCY ANEMIA, UNSPECIFIED IRON DEFICIENCY ANEMIA TYPE: ICD-10-CM

## 2023-05-16 DIAGNOSIS — E78.5 HYPERLIPIDEMIA, UNSPECIFIED HYPERLIPIDEMIA TYPE: ICD-10-CM

## 2023-05-16 LAB — HBA1C MFR BLD: 6.3 %

## 2023-05-16 PROCEDURE — 1123F ACP DISCUSS/DSCN MKR DOCD: CPT | Performed by: FAMILY MEDICINE

## 2023-05-16 PROCEDURE — 83036 HEMOGLOBIN GLYCOSYLATED A1C: CPT | Performed by: FAMILY MEDICINE

## 2023-05-16 PROCEDURE — 99215 OFFICE O/P EST HI 40 MIN: CPT | Performed by: FAMILY MEDICINE

## 2023-05-16 RX ORDER — TRETINOIN 1 MG/G
CREAM TOPICAL
Qty: 45 G | Refills: 1 | Status: SHIPPED | OUTPATIENT
Start: 2023-05-16

## 2023-05-16 RX ORDER — ERGOCALCIFEROL 1.25 MG/1
CAPSULE ORAL
Qty: 13 CAPSULE | Refills: 3 | Status: SHIPPED | OUTPATIENT
Start: 2023-05-16

## 2023-05-16 SDOH — ECONOMIC STABILITY: FOOD INSECURITY: WITHIN THE PAST 12 MONTHS, THE FOOD YOU BOUGHT JUST DIDN'T LAST AND YOU DIDN'T HAVE MONEY TO GET MORE.: NEVER TRUE

## 2023-05-16 SDOH — ECONOMIC STABILITY: INCOME INSECURITY: HOW HARD IS IT FOR YOU TO PAY FOR THE VERY BASICS LIKE FOOD, HOUSING, MEDICAL CARE, AND HEATING?: NOT VERY HARD

## 2023-05-16 SDOH — ECONOMIC STABILITY: HOUSING INSECURITY
IN THE LAST 12 MONTHS, WAS THERE A TIME WHEN YOU DID NOT HAVE A STEADY PLACE TO SLEEP OR SLEPT IN A SHELTER (INCLUDING NOW)?: NO

## 2023-05-16 SDOH — ECONOMIC STABILITY: FOOD INSECURITY: WITHIN THE PAST 12 MONTHS, YOU WORRIED THAT YOUR FOOD WOULD RUN OUT BEFORE YOU GOT MONEY TO BUY MORE.: NEVER TRUE

## 2023-05-16 ASSESSMENT — PATIENT HEALTH QUESTIONNAIRE - PHQ9
SUM OF ALL RESPONSES TO PHQ QUESTIONS 1-9: 0
SUM OF ALL RESPONSES TO PHQ9 QUESTIONS 1 & 2: 0
2. FEELING DOWN, DEPRESSED OR HOPELESS: 0
SUM OF ALL RESPONSES TO PHQ QUESTIONS 1-9: 0
1. LITTLE INTEREST OR PLEASURE IN DOING THINGS: 0

## 2023-05-16 NOTE — PROGRESS NOTES
OSWALDO  Harry Carrillo comes in for follow up care. Rash: Patient has a chronic rash. This has been documented on several occasions at previous visits. She has attempted to be seen by previous dermatologists. Has had various tests whose results have been negative. She has been told she has dermatographia. Patient states that this is not dermatographia. Patient believes she has positive gestation and has also made attempts to be seen in tropical medicine. Test results for skin parasitic infestation have been negative to date. Patient has been treated with ivermectin and she has also been on permethrin for scabies. She was recently seen at the dermatology clinic at [facility]. She would like transiently dermatologist.  Lately she has noted a new rash. She has darkening of the skin especially in the axillary areas and in the gluteal area. This area does not janeth. It is not itchy or flaky. It is not crusty. We will refer patient to dermatology for evaluation given the new symptoms. Her previous rash is still present. It has been chronic for her. This involves track like linear lesions on the skin to the erythematous. These are made worse by itching and she has multiple scratch marks. Patient has facial acne like rash and she states that she has used tretinoin cream in the past with good result. She would like prescription given for this. Hyperglycemia: We will check HbA1c today. Hypovitaminosis D: Patient has hypovitaminosis D. We will check vitamin D levels. She is on vitamin D replacement therapy weekly. Hyperlipidemia: Patient has dyslipidemia. She is on Lipitor. She will continue to exercise and take a diet low in polysaturated fats. I will check lipid panel. Iron deficiency anemia: Patient has a history of iron deficiency anemia. We will check iron profile and ferritin levels. Hypothyroidism: Patient has hypothyroidism. She is on levothyroxine 112 mcg daily.   We will recheck

## 2023-05-16 NOTE — PROGRESS NOTES
1. \"Have you been to the ER, urgent care clinic since your last visit? Hospitalized since your last visit? \"No    2. \"Have you seen or consulted any other health care providers outside of the [address] [place] [address] since your last visit? \" No    3. For patients aged 39-70: Has the patient had a colonoscopy / FIT/ Cologuard? Yes      If the patient is female:    4. For patients aged 41-77: Has the patient had a mammogram within the past 2 years? Yes      5. For patients aged 21-65: Has the patient had a pap smear?  Yes

## 2023-05-19 ENCOUNTER — TELEPHONE (OUTPATIENT)
Facility: CLINIC | Age: 68
End: 2023-05-19

## 2023-05-22 RX ORDER — TRETINOIN 0.1 MG/G
GEL TOPICAL
Qty: 45 G | Refills: 1 | Status: SHIPPED | OUTPATIENT
Start: 2023-05-22

## 2023-05-26 ENCOUNTER — HOSPITAL ENCOUNTER (OUTPATIENT)
Facility: HOSPITAL | Age: 68
End: 2023-05-26
Payer: MEDICARE

## 2023-05-26 DIAGNOSIS — E83.42 HYPOMAGNESEMIA: ICD-10-CM

## 2023-05-26 DIAGNOSIS — D50.9 IRON DEFICIENCY ANEMIA, UNSPECIFIED IRON DEFICIENCY ANEMIA TYPE: ICD-10-CM

## 2023-05-26 DIAGNOSIS — E78.5 HYPERLIPIDEMIA, UNSPECIFIED HYPERLIPIDEMIA TYPE: ICD-10-CM

## 2023-05-26 DIAGNOSIS — E03.9 HYPOTHYROIDISM, UNSPECIFIED TYPE: ICD-10-CM

## 2023-05-26 DIAGNOSIS — E55.9 VITAMIN D DEFICIENCY, UNSPECIFIED: ICD-10-CM

## 2023-05-26 DIAGNOSIS — R53.81 OTHER MALAISE: ICD-10-CM

## 2023-05-26 DIAGNOSIS — E53.8 VITAMIN B 12 DEFICIENCY: ICD-10-CM

## 2023-05-26 DIAGNOSIS — R82.90 ABNORMAL URINE: ICD-10-CM

## 2023-05-26 LAB
25(OH)D3 SERPL-MCNC: 60.2 NG/ML (ref 30–100)
ALBUMIN SERPL-MCNC: 3.5 G/DL (ref 3.4–5)
ALBUMIN/GLOB SERPL: 1.1 (ref 0.8–1.7)
ALP SERPL-CCNC: 92 U/L (ref 45–117)
ALT SERPL-CCNC: 53 U/L (ref 13–56)
ANION GAP SERPL CALC-SCNC: 4 MMOL/L (ref 3–18)
APPEARANCE UR: CLEAR
AST SERPL-CCNC: 26 U/L (ref 10–38)
BACTERIA URNS QL MICRO: ABNORMAL /HPF
BASOPHILS # BLD: 0.1 K/UL (ref 0–0.1)
BASOPHILS NFR BLD: 1 % (ref 0–2)
BILIRUB SERPL-MCNC: 0.6 MG/DL (ref 0.2–1)
BILIRUB UR QL: NEGATIVE
BUN SERPL-MCNC: 15 MG/DL (ref 7–18)
BUN/CREAT SERPL: 19 (ref 12–20)
CALCIUM SERPL-MCNC: 9.7 MG/DL (ref 8.5–10.1)
CHLORIDE SERPL-SCNC: 114 MMOL/L (ref 100–111)
CHOLEST SERPL-MCNC: 161 MG/DL
CO2 SERPL-SCNC: 24 MMOL/L (ref 21–32)
COLOR UR: YELLOW
CREAT SERPL-MCNC: 0.8 MG/DL (ref 0.6–1.3)
DIFFERENTIAL METHOD BLD: ABNORMAL
EOSINOPHIL # BLD: 0.3 K/UL (ref 0–0.4)
EOSINOPHIL NFR BLD: 4 % (ref 0–5)
EPITH CASTS URNS QL MICRO: ABNORMAL /LPF (ref 0–5)
ERYTHROCYTE [DISTWIDTH] IN BLOOD BY AUTOMATED COUNT: 15.9 % (ref 11.6–14.5)
GLOBULIN SER CALC-MCNC: 3.3 G/DL (ref 2–4)
GLUCOSE SERPL-MCNC: 106 MG/DL (ref 74–99)
GLUCOSE UR STRIP.AUTO-MCNC: NEGATIVE MG/DL
HCT VFR BLD AUTO: 42 % (ref 35–45)
HDLC SERPL-MCNC: 43 MG/DL (ref 40–60)
HDLC SERPL: 3.7 (ref 0–5)
HGB BLD-MCNC: 13.5 G/DL (ref 12–16)
HGB UR QL STRIP: NEGATIVE
IMM GRANULOCYTES # BLD AUTO: 0 K/UL (ref 0–0.04)
IMM GRANULOCYTES NFR BLD AUTO: 1 % (ref 0–0.5)
IRON SATN MFR SERPL: 16 % (ref 20–50)
IRON SERPL-MCNC: 52 UG/DL (ref 50–175)
KETONES UR QL STRIP.AUTO: NEGATIVE MG/DL
LDLC SERPL CALC-MCNC: 97.4 MG/DL (ref 0–100)
LEUKOCYTE ESTERASE UR QL STRIP.AUTO: NEGATIVE
LIPID PANEL: NORMAL
LYMPHOCYTES # BLD: 2.4 K/UL (ref 0.9–3.6)
LYMPHOCYTES NFR BLD: 35 % (ref 21–52)
MAGNESIUM SERPL-MCNC: 1.9 MG/DL (ref 1.6–2.6)
MCH RBC QN AUTO: 27.1 PG (ref 24–34)
MCHC RBC AUTO-ENTMCNC: 32.1 G/DL (ref 31–37)
MCV RBC AUTO: 84.2 FL (ref 78–100)
MONOCYTES # BLD: 0.5 K/UL (ref 0.05–1.2)
MONOCYTES NFR BLD: 8 % (ref 3–10)
NEUTS SEG # BLD: 3.4 K/UL (ref 1.8–8)
NEUTS SEG NFR BLD: 51 % (ref 40–73)
NITRITE UR QL STRIP.AUTO: NEGATIVE
NRBC # BLD: 0 K/UL (ref 0–0.01)
NRBC BLD-RTO: 0 PER 100 WBC
PH UR STRIP: 5.5 (ref 5–8)
PLATELET # BLD AUTO: 186 K/UL (ref 135–420)
PMV BLD AUTO: 12.3 FL (ref 9.2–11.8)
POTASSIUM SERPL-SCNC: 3.8 MMOL/L (ref 3.5–5.5)
PROT SERPL-MCNC: 6.8 G/DL (ref 6.4–8.2)
PROT UR STRIP-MCNC: NEGATIVE MG/DL
RBC # BLD AUTO: 4.99 M/UL (ref 4.2–5.3)
RBC #/AREA URNS HPF: ABNORMAL /HPF (ref 0–5)
SODIUM SERPL-SCNC: 142 MMOL/L (ref 136–145)
SP GR UR REFRACTOMETRY: 1.02 (ref 1–1.03)
TIBC SERPL-MCNC: 325 UG/DL (ref 250–450)
TRIGL SERPL-MCNC: 103 MG/DL
TSH SERPL DL<=0.05 MIU/L-ACNC: 0.45 UIU/ML (ref 0.36–3.74)
UROBILINOGEN UR QL STRIP.AUTO: 0.2 EU/DL (ref 0.2–1)
VIT B12 SERPL-MCNC: 432 PG/ML (ref 211–911)
VLDLC SERPL CALC-MCNC: 20.6 MG/DL
WBC # BLD AUTO: 6.7 K/UL (ref 4.6–13.2)
WBC URNS QL MICRO: ABNORMAL /HPF (ref 0–4)

## 2023-05-26 PROCEDURE — 82306 VITAMIN D 25 HYDROXY: CPT

## 2023-05-26 PROCEDURE — 81001 URINALYSIS AUTO W/SCOPE: CPT

## 2023-05-26 PROCEDURE — 85025 COMPLETE CBC W/AUTO DIFF WBC: CPT

## 2023-05-26 PROCEDURE — 80061 LIPID PANEL: CPT

## 2023-05-26 PROCEDURE — 83540 ASSAY OF IRON: CPT

## 2023-05-26 PROCEDURE — 84443 ASSAY THYROID STIM HORMONE: CPT

## 2023-05-26 PROCEDURE — 80053 COMPREHEN METABOLIC PANEL: CPT

## 2023-05-26 PROCEDURE — 83735 ASSAY OF MAGNESIUM: CPT

## 2023-05-26 PROCEDURE — 36415 COLL VENOUS BLD VENIPUNCTURE: CPT

## 2023-05-26 PROCEDURE — 82607 VITAMIN B-12: CPT

## 2023-05-26 PROCEDURE — 83550 IRON BINDING TEST: CPT

## 2023-05-30 RX ORDER — AMOXICILLIN AND CLAVULANATE POTASSIUM 875; 125 MG/1; MG/1
1 TABLET, FILM COATED ORAL 2 TIMES DAILY
Qty: 14 TABLET | Refills: 0 | Status: SHIPPED | OUTPATIENT
Start: 2023-05-30 | End: 2023-06-04

## 2023-05-30 RX ORDER — LEVOTHYROXINE SODIUM 112 MCG
TABLET ORAL
Qty: 90 TABLET | Refills: 1 | Status: SHIPPED | OUTPATIENT
Start: 2023-05-30

## 2023-06-13 ENCOUNTER — TELEPHONE (OUTPATIENT)
Facility: CLINIC | Age: 68
End: 2023-06-13

## 2023-06-23 RX ORDER — AMOXICILLIN AND CLAVULANATE POTASSIUM 875; 125 MG/1; MG/1
1 TABLET, FILM COATED ORAL 2 TIMES DAILY
Qty: 14 TABLET | Refills: 0 | Status: CANCELLED | OUTPATIENT
Start: 2023-06-23 | End: 2023-06-28

## 2023-08-14 RX ORDER — LEVOTHYROXINE SODIUM 112 UG/1
TABLET ORAL
Qty: 90 TABLET | Refills: 1 | Status: SHIPPED | OUTPATIENT
Start: 2023-08-14

## 2023-08-14 NOTE — TELEPHONE ENCOUNTER
----- Message from Loren Prather sent at 8/14/2023  1:47 PM EDT -----  Subject: Refill Request    QUESTIONS  Name of Medication? SYNTHROID 112 MCG tablet  Patient-reported dosage and instructions? TAKE 1 TABLET BY MOUTH DAILY   BEFORE BREAKFAST  How many days do you have left? 12  Preferred Pharmacy? [address] [place] [address]  Pharmacy phone number (if available)? [phone]  Additional Information for Provider? Order 90 day supply. Patient wants   \"name brand\" only! Please let pharmacy know this in advance. ---------------------------------------------------------------------------  --------------  Kirsty Arzola INFO  What is the best way for the office to contact you? OK to leave message on   voicemail  Preferred Call Back Phone Number? [phone]  ---------------------------------------------------------------------------  --------------  SCRIPT ANSWERS  Relationship to Patient?  Self

## 2023-08-30 RX ORDER — NYSTATIN 100000 U/G
OINTMENT TOPICAL
Qty: 60 G | Refills: 1 | Status: SHIPPED | OUTPATIENT
Start: 2023-08-30

## 2023-08-30 NOTE — TELEPHONE ENCOUNTER
----- Message from Jaspal Ford sent at 8/28/2023  4:17 PM EDT -----  Subject: Refill Request    QUESTIONS  Name of Medication? Other - Nystatin ointment  Patient-reported dosage and instructions? 100,000 usp nystatin units   How many days do you have left? 0  Preferred Pharmacy? [address] [place] [address]  Pharmacy phone number (if available)? [phone]  Additional Information for Provider? Patient stated she was previously   prescribed this by a dermatologist for a fungal rash  ---------------------------------------------------------------------------  --------------  CALL BACK INFO  What is the best way for the office to contact you? OK to leave message on   voicemail  Preferred Call Back Phone Number? [phone]  ---------------------------------------------------------------------------  --------------  SCRIPT ANSWERS  Relationship to Patient?  Self 1.63

## 2023-09-25 ENCOUNTER — OFFICE VISIT (OUTPATIENT)
Facility: CLINIC | Age: 68
End: 2023-09-25
Payer: MEDICARE

## 2023-09-25 VITALS
DIASTOLIC BLOOD PRESSURE: 94 MMHG | SYSTOLIC BLOOD PRESSURE: 169 MMHG | WEIGHT: 250 LBS | TEMPERATURE: 98.1 F | OXYGEN SATURATION: 97 % | BODY MASS INDEX: 39.24 KG/M2 | RESPIRATION RATE: 20 BRPM | HEART RATE: 84 BPM | HEIGHT: 67 IN

## 2023-09-25 DIAGNOSIS — E03.9 HYPOTHYROIDISM, UNSPECIFIED TYPE: ICD-10-CM

## 2023-09-25 DIAGNOSIS — E66.9 OBESITY (BMI 30-39.9): ICD-10-CM

## 2023-09-25 DIAGNOSIS — D50.9 IRON DEFICIENCY ANEMIA, UNSPECIFIED IRON DEFICIENCY ANEMIA TYPE: ICD-10-CM

## 2023-09-25 DIAGNOSIS — Z23 ENCOUNTER FOR IMMUNIZATION: ICD-10-CM

## 2023-09-25 DIAGNOSIS — I10 ESSENTIAL (PRIMARY) HYPERTENSION: ICD-10-CM

## 2023-09-25 DIAGNOSIS — R21 RASH AND NONSPECIFIC SKIN ERUPTION: ICD-10-CM

## 2023-09-25 DIAGNOSIS — R73.9 HYPERGLYCEMIA, UNSPECIFIED: ICD-10-CM

## 2023-09-25 DIAGNOSIS — N32.81 OAB (OVERACTIVE BLADDER): ICD-10-CM

## 2023-09-25 DIAGNOSIS — E55.9 VITAMIN D DEFICIENCY, UNSPECIFIED: ICD-10-CM

## 2023-09-25 DIAGNOSIS — E78.5 HYPERLIPIDEMIA, UNSPECIFIED HYPERLIPIDEMIA TYPE: Primary | ICD-10-CM

## 2023-09-25 DIAGNOSIS — B35.1 ONYCHOMYCOSIS: ICD-10-CM

## 2023-09-25 DIAGNOSIS — E55.9 VITAMIN D DEFICIENCY: ICD-10-CM

## 2023-09-25 PROCEDURE — 90694 VACC AIIV4 NO PRSRV 0.5ML IM: CPT | Performed by: FAMILY MEDICINE

## 2023-09-25 PROCEDURE — 3074F SYST BP LT 130 MM HG: CPT | Performed by: FAMILY MEDICINE

## 2023-09-25 PROCEDURE — G0008 ADMIN INFLUENZA VIRUS VAC: HCPCS | Performed by: FAMILY MEDICINE

## 2023-09-25 PROCEDURE — 99214 OFFICE O/P EST MOD 30 MIN: CPT | Performed by: FAMILY MEDICINE

## 2023-09-25 PROCEDURE — 1123F ACP DISCUSS/DSCN MKR DOCD: CPT | Performed by: FAMILY MEDICINE

## 2023-09-25 PROCEDURE — 3078F DIAST BP <80 MM HG: CPT | Performed by: FAMILY MEDICINE

## 2023-09-25 RX ORDER — CICLOPIROX 7.7 MG/G
GEL TOPICAL
Qty: 100 G | Refills: 0 | Status: SHIPPED | OUTPATIENT
Start: 2023-09-25

## 2023-09-25 RX ORDER — HYDROCHLOROTHIAZIDE 12.5 MG/1
12.5 CAPSULE, GELATIN COATED ORAL EVERY MORNING
Qty: 30 CAPSULE | Refills: 1 | Status: SHIPPED | OUTPATIENT
Start: 2023-09-25

## 2023-09-25 RX ORDER — DIAZEPAM 2 MG/1
2 TABLET ORAL DAILY PRN
COMMUNITY
Start: 2023-08-01

## 2023-09-25 RX ORDER — ATORVASTATIN CALCIUM 10 MG/1
10 TABLET, FILM COATED ORAL DAILY
Qty: 90 TABLET | Refills: 1 | Status: SHIPPED | OUTPATIENT
Start: 2023-09-25

## 2023-09-25 SDOH — ECONOMIC STABILITY: FOOD INSECURITY: WITHIN THE PAST 12 MONTHS, YOU WORRIED THAT YOUR FOOD WOULD RUN OUT BEFORE YOU GOT MONEY TO BUY MORE.: NEVER TRUE

## 2023-09-25 SDOH — ECONOMIC STABILITY: INCOME INSECURITY: HOW HARD IS IT FOR YOU TO PAY FOR THE VERY BASICS LIKE FOOD, HOUSING, MEDICAL CARE, AND HEATING?: SOMEWHAT HARD

## 2023-09-25 SDOH — ECONOMIC STABILITY: FOOD INSECURITY: WITHIN THE PAST 12 MONTHS, THE FOOD YOU BOUGHT JUST DIDN'T LAST AND YOU DIDN'T HAVE MONEY TO GET MORE.: NEVER TRUE

## 2023-09-25 ASSESSMENT — PATIENT HEALTH QUESTIONNAIRE - PHQ9
SUM OF ALL RESPONSES TO PHQ9 QUESTIONS 1 & 2: 0
2. FEELING DOWN, DEPRESSED OR HOPELESS: 0
SUM OF ALL RESPONSES TO PHQ QUESTIONS 1-9: 0
1. LITTLE INTEREST OR PLEASURE IN DOING THINGS: 0
SUM OF ALL RESPONSES TO PHQ QUESTIONS 1-9: 0

## 2023-09-26 RX ORDER — HYDROCHLOROTHIAZIDE 12.5 MG/1
12.5 CAPSULE, GELATIN COATED ORAL EVERY MORNING
Qty: 90 CAPSULE | OUTPATIENT
Start: 2023-09-26

## 2023-09-26 NOTE — PROGRESS NOTES
Patient received fluad quad immunization IM to right deltoid. Hetolerated procedure well. Patient was observed for 10 minutes, no adverse effects noted. She left ambulatory with no complaints of pain or distress noted. Patient hasreceived immunizations in office prior to today. 1. \"Have you been to the ER, urgent care clinic since your last visit? Hospitalized since your last visit? \"No    2. \"Have you seen or consulted any other health care providers outside of the [address] [place] [address] since your last visit? \" No    3. For patients aged 43-73: Has the patient had a colonoscopy / FIT/ Cologuard? Yes      If the patient is female:    4. For patients aged 43-66: Has the patient had a mammogram within the past 2 years? Yes      5. For patients aged 21-65: Has the patient had a pap smear?  Not applicable
normal  Abdomen - no rebound tenderness noted  Back exam - limited range of motion  Neurological - motor and sensory grossly normal bilaterally  Musculoskeletal - no muscular tenderness noted  Extremities - intact peripheral pulses  Skin -Candida intertrigo rash base of neck, hyperpigmentation on the arms and forearms      Results  No results found for this visit on 09/25/23. ASSESSMENT and PLAN    ICD-10-CM    1. Hyperlipidemia, unspecified hyperlipidemia type  E78.5 atorvastatin (LIPITOR) 10 MG tablet     Lipid Panel     Comprehensive Metabolic Panel      2. Hypothyroidism, unspecified type  E03.9 TSH + Free T4 Panel      3. Encounter for immunization  Z23 Influenza, FLUAD, (age 72 y+), IM, Preservative Free, 0.5 mL     CANCELED: Pneumococcal, PCV20, PREVNAR 20, (age 25 yrs+), IM, PF      4. Rash and nonspecific skin eruption  R21 nystatin-triamcinolone (MYCOLOG II) 703382-5.1 UNIT/GM-% cream     External Referral To Dermatology      5. Vitamin D deficiency, unspecified  E55.9       6. Hyperglycemia, unspecified  R73.9 Hemoglobin A1C      7. Iron deficiency anemia, unspecified iron deficiency anemia type  D50.9 CBC with Auto Differential     Comprehensive Metabolic Panel      8. Vitamin D deficiency  E55.9       9. Essential (primary) hypertension  I10       10. Onychomycosis  B35.1 Ciclopirox (LOPROX) 0.77 % gel      11. OAB (overactive bladder)  N32.81 Urinalysis with Microscopic      12. Obesity (BMI 30-39. 9)  E66.9       lab results and schedule of future lab studies reviewed with patient  reviewed diet, exercise and weight control  cardiovascular risk and specific lipid/LDL goals reviewed  reviewed medications and side effects in detail      I have discussed the diagnosis with the patient and the intended plan of care as seen in the above orders. The patient has received an after-visit summary and questions were answered concerning future plans.  I have discussed medication, side effects, and warnings with

## 2023-09-28 ENCOUNTER — HOSPITAL ENCOUNTER (OUTPATIENT)
Facility: HOSPITAL | Age: 68
Discharge: HOME | End: 2023-09-28
Payer: MEDICARE

## 2023-09-28 DIAGNOSIS — D50.9 IRON DEFICIENCY ANEMIA, UNSPECIFIED IRON DEFICIENCY ANEMIA TYPE: ICD-10-CM

## 2023-09-28 DIAGNOSIS — R73.9 HYPERGLYCEMIA, UNSPECIFIED: ICD-10-CM

## 2023-09-28 DIAGNOSIS — E78.5 HYPERLIPIDEMIA, UNSPECIFIED HYPERLIPIDEMIA TYPE: ICD-10-CM

## 2023-09-28 DIAGNOSIS — E03.9 HYPOTHYROIDISM, UNSPECIFIED TYPE: ICD-10-CM

## 2023-09-28 DIAGNOSIS — N32.81 OAB (OVERACTIVE BLADDER): ICD-10-CM

## 2023-09-28 LAB
ALBUMIN SERPL-MCNC: 3.6 G/DL (ref 3.4–5)
ALBUMIN/GLOB SERPL: 1.1 (ref 0.8–1.7)
ALP SERPL-CCNC: 85 U/L (ref 45–117)
ALT SERPL-CCNC: 42 U/L (ref 13–56)
ANION GAP SERPL CALC-SCNC: 4 MMOL/L (ref 3–18)
APPEARANCE UR: CLEAR
AST SERPL-CCNC: 22 U/L (ref 10–38)
BACTERIA URNS QL MICRO: ABNORMAL /HPF
BASOPHILS # BLD: 0.1 K/UL (ref 0–0.1)
BASOPHILS NFR BLD: 1 % (ref 0–2)
BILIRUB SERPL-MCNC: 0.6 MG/DL (ref 0.2–1)
BILIRUB UR QL: NEGATIVE
BUN SERPL-MCNC: 15 MG/DL (ref 7–18)
BUN/CREAT SERPL: 21 (ref 12–20)
CALCIUM SERPL-MCNC: 9.6 MG/DL (ref 8.5–10.1)
CHLORIDE SERPL-SCNC: 112 MMOL/L (ref 100–111)
CHOLEST SERPL-MCNC: 151 MG/DL
CO2 SERPL-SCNC: 26 MMOL/L (ref 21–32)
COLOR UR: YELLOW
CREAT SERPL-MCNC: 0.73 MG/DL (ref 0.6–1.3)
DIFFERENTIAL METHOD BLD: ABNORMAL
EOSINOPHIL # BLD: 0.2 K/UL (ref 0–0.4)
EOSINOPHIL NFR BLD: 3 % (ref 0–5)
EPITH CASTS URNS QL MICRO: ABNORMAL /LPF (ref 0–5)
ERYTHROCYTE [DISTWIDTH] IN BLOOD BY AUTOMATED COUNT: 15.3 % (ref 11.6–14.5)
EST. AVERAGE GLUCOSE BLD GHB EST-MCNC: 128 MG/DL
GLOBULIN SER CALC-MCNC: 3.2 G/DL (ref 2–4)
GLUCOSE SERPL-MCNC: 103 MG/DL (ref 74–99)
GLUCOSE UR STRIP.AUTO-MCNC: NEGATIVE MG/DL
HBA1C MFR BLD: 6.1 % (ref 4.2–5.6)
HCT VFR BLD AUTO: 41.8 % (ref 35–45)
HDLC SERPL-MCNC: 38 MG/DL (ref 40–60)
HDLC SERPL: 4 (ref 0–5)
HGB BLD-MCNC: 13.3 G/DL (ref 12–16)
HGB UR QL STRIP: NEGATIVE
IMM GRANULOCYTES # BLD AUTO: 0 K/UL (ref 0–0.04)
IMM GRANULOCYTES NFR BLD AUTO: 1 % (ref 0–0.5)
KETONES UR QL STRIP.AUTO: NEGATIVE MG/DL
LDLC SERPL CALC-MCNC: 90.6 MG/DL (ref 0–100)
LEUKOCYTE ESTERASE UR QL STRIP.AUTO: NEGATIVE
LIPID PANEL: ABNORMAL
LYMPHOCYTES # BLD: 2.4 K/UL (ref 0.9–3.6)
LYMPHOCYTES NFR BLD: 36 % (ref 21–52)
MCH RBC QN AUTO: 26.9 PG (ref 24–34)
MCHC RBC AUTO-ENTMCNC: 31.8 G/DL (ref 31–37)
MCV RBC AUTO: 84.6 FL (ref 78–100)
MONOCYTES # BLD: 0.5 K/UL (ref 0.05–1.2)
MONOCYTES NFR BLD: 8 % (ref 3–10)
NEUTS SEG # BLD: 3.4 K/UL (ref 1.8–8)
NEUTS SEG NFR BLD: 52 % (ref 40–73)
NITRITE UR QL STRIP.AUTO: NEGATIVE
NRBC # BLD: 0 K/UL (ref 0–0.01)
NRBC BLD-RTO: 0 PER 100 WBC
PH UR STRIP: 5.5 (ref 5–8)
PLATELET # BLD AUTO: 177 K/UL (ref 135–420)
PMV BLD AUTO: 13.1 FL (ref 9.2–11.8)
POTASSIUM SERPL-SCNC: 4.2 MMOL/L (ref 3.5–5.5)
PROT SERPL-MCNC: 6.8 G/DL (ref 6.4–8.2)
PROT UR STRIP-MCNC: NEGATIVE MG/DL
RBC # BLD AUTO: 4.94 M/UL (ref 4.2–5.3)
RBC #/AREA URNS HPF: NEGATIVE /HPF (ref 0–5)
SODIUM SERPL-SCNC: 142 MMOL/L (ref 136–145)
SP GR UR REFRACTOMETRY: 1.02 (ref 1–1.03)
T4 FREE SERPL-MCNC: 1.4 NG/DL (ref 0.7–1.5)
TRIGL SERPL-MCNC: 112 MG/DL
TSH SERPL DL<=0.05 MIU/L-ACNC: 0.27 UIU/ML (ref 0.36–3.74)
UROBILINOGEN UR QL STRIP.AUTO: 0.2 EU/DL (ref 0.2–1)
VLDLC SERPL CALC-MCNC: 22.4 MG/DL
WBC # BLD AUTO: 6.6 K/UL (ref 4.6–13.2)
WBC URNS QL MICRO: ABNORMAL /HPF (ref 0–4)

## 2023-09-28 PROCEDURE — 84439 ASSAY OF FREE THYROXINE: CPT

## 2023-09-28 PROCEDURE — 81001 URINALYSIS AUTO W/SCOPE: CPT

## 2023-09-28 PROCEDURE — 36415 COLL VENOUS BLD VENIPUNCTURE: CPT

## 2023-09-28 PROCEDURE — 80053 COMPREHEN METABOLIC PANEL: CPT

## 2023-09-28 PROCEDURE — 85025 COMPLETE CBC W/AUTO DIFF WBC: CPT

## 2023-09-28 PROCEDURE — 83036 HEMOGLOBIN GLYCOSYLATED A1C: CPT

## 2023-09-28 PROCEDURE — 80061 LIPID PANEL: CPT

## 2023-09-28 PROCEDURE — 84443 ASSAY THYROID STIM HORMONE: CPT

## 2023-09-29 ENCOUNTER — TELEPHONE (OUTPATIENT)
Facility: CLINIC | Age: 68
End: 2023-09-29

## 2023-09-29 NOTE — TELEPHONE ENCOUNTER
----- Message from Effieabeth Lombard sent at 9/29/2023  2:10 PM EDT -----  Subject: Message to Provider    QUESTIONS  Information for Provider? PATIENT IS CALLING TO SEE IF THE PNEUMONIA VACS   HAVE ARRIVED, SHE WAS ASKED TO CALL BACK FRIDAY TO SEE IF THEY CAME IN SO   HER AND HER  COULD SCHEDULE A NURSE VISIT. PLEASE CALL PATIENT TO   ADVISE  ---------------------------------------------------------------------------  --------------  Lalito KAUR  [phone]; OK to leave message on voicemail  ---------------------------------------------------------------------------  --------------  SCRIPT ANSWERS  Relationship to Patient?  Self

## 2023-10-02 DIAGNOSIS — B35.1 ONYCHOMYCOSIS: ICD-10-CM

## 2023-10-02 RX ORDER — CICLOPIROX 7.7 MG/G
GEL TOPICAL
Qty: 100 G | Refills: 0 | Status: SHIPPED | OUTPATIENT
Start: 2023-10-02

## 2023-10-02 RX ORDER — NITROFURANTOIN 25; 75 MG/1; MG/1
100 CAPSULE ORAL 2 TIMES DAILY
Qty: 10 CAPSULE | Refills: 0 | Status: SHIPPED | OUTPATIENT
Start: 2023-10-02 | End: 2023-10-07

## 2023-10-02 RX ORDER — LEVOTHYROXINE SODIUM 0.1 MG/1
100 TABLET ORAL DAILY
Qty: 90 TABLET | Refills: 0 | Status: SHIPPED | OUTPATIENT
Start: 2023-10-02

## 2023-10-02 NOTE — TELEPHONE ENCOUNTER
Patient would like a new creme called in to  pharmacy-patient requesting Ketoconalone unknown strength, also a refill on toenail fungus meds and Diclufan for possible yeast

## 2023-10-13 ENCOUNTER — TELEPHONE (OUTPATIENT)
Facility: CLINIC | Age: 68
End: 2023-10-13

## 2023-10-13 NOTE — TELEPHONE ENCOUNTER
Patient is expecting a Patricia and wants to know what shots are recommended for them before the baby comes. Patient had heard maybe Whooping cough-please advise-    Patients are coming in on Thursday 10- for Prevnar 20 vaccines and repeat urine)    Patient would also like the Ciclopirox (LOPROX) 0.77 % gel      To  be switched to an ointment-the gel is to sticky for the patient.   Also patient needs the Levothyroxine sent in as a name brand (Synthroid)

## 2023-10-16 RX ORDER — LEVOTHYROXINE SODIUM 0.1 MG/1
100 TABLET ORAL DAILY
Qty: 90 TABLET | Refills: 2 | Status: SHIPPED | OUTPATIENT
Start: 2023-10-16 | End: 2023-10-20 | Stop reason: SDUPTHER

## 2023-10-17 ENCOUNTER — TELEPHONE (OUTPATIENT)
Facility: CLINIC | Age: 68
End: 2023-10-17

## 2023-10-17 NOTE — TELEPHONE ENCOUNTER
Patient requesting a refill on Hydrocortisone creme for itching    Not showing as a current medication

## 2023-10-18 RX ORDER — DIAPER,BRIEF,INFANT-TODD,DISP
EACH MISCELLANEOUS
Qty: 453 G | Refills: 0 | Status: SHIPPED | OUTPATIENT
Start: 2023-10-18 | End: 2023-10-25

## 2023-10-20 ENCOUNTER — TELEPHONE (OUTPATIENT)
Facility: CLINIC | Age: 68
End: 2023-10-20

## 2023-10-20 RX ORDER — LEVOTHYROXINE SODIUM 0.1 MG/1
100 TABLET ORAL DAILY
Qty: 90 TABLET | Refills: 2 | Status: SHIPPED | OUTPATIENT
Start: 2023-10-20

## 2023-10-20 NOTE — TELEPHONE ENCOUNTER
----- Message from Olga Damon sent at 10/19/2023  3:52 PM EDT -----  Subject: Medication Problem    Medication: levothyroxine (SYNTHROID) 100 MCG tablet  Dosage: 100 mcg daily  Ordering Provider: david    Question/Problem: PT states she has always got the name brand of this   medication. She says the RX is being sent in as generic and that is not   what she wants. She wants name brand only called in.        Pharmacy: [facility] [address] [place], [place] [phone]    ---------------------------------------------------------------------------  --------------  Mattie Clark INFO  [phone]; OK to leave message on voicemail  ---------------------------------------------------------------------------  --------------    SCRIPT ANSWERS  Relationship to Patient: Self

## 2023-10-30 RX ORDER — HYDROCHLOROTHIAZIDE 12.5 MG/1
12.5 CAPSULE, GELATIN COATED ORAL EVERY MORNING
Qty: 90 CAPSULE | Refills: 1 | Status: SHIPPED | OUTPATIENT
Start: 2023-10-30

## 2023-12-05 RX ORDER — AMOXICILLIN AND CLAVULANATE POTASSIUM 875; 125 MG/1; MG/1
1 TABLET, FILM COATED ORAL 2 TIMES DAILY
Qty: 20 TABLET | Refills: 0 | Status: SHIPPED | OUTPATIENT
Start: 2023-12-05 | End: 2023-12-15

## 2023-12-05 RX ORDER — HYDROCHLOROTHIAZIDE 12.5 MG/1
12.5 CAPSULE, GELATIN COATED ORAL EVERY MORNING
Qty: 90 CAPSULE | Refills: 1 | Status: SHIPPED | OUTPATIENT
Start: 2023-12-05

## 2023-12-05 NOTE — TELEPHONE ENCOUNTER
Spoke with patient advising she has a sinus infection/sinus headache no fever     ----- Message from [facility] sent at 12/5/2023  3:58 PM EST -----  Subject: Refill Request    QUESTIONS  Name of Medication? amoxicillin-clavulanate (AUGMENTIN) 875-125 MG per   tablet  Patient-reported dosage and instructions? Take 1 tablet by mouth 2 times   daily for 5 days  How many days do you have left? 0  Preferred Pharmacy? [address] [place] [address]  Pharmacy phone number (if available)? [phone]  Additional Information for Provider? Please call if there are any issues   refilling   ---------------------------------------------------------------------------  --------------  CALL BACK INFO  What is the best way for the office to contact you? OK to leave message on   voicemail  Preferred Call Back Phone Number? [phone]  ---------------------------------------------------------------------------  --------------  SCRIPT ANSWERS  Relationship to Patient?  Self

## 2023-12-14 ENCOUNTER — OFFICE VISIT (OUTPATIENT)
Age: 68
End: 2023-12-14
Payer: MEDICARE

## 2023-12-14 VITALS
DIASTOLIC BLOOD PRESSURE: 88 MMHG | WEIGHT: 253 LBS | BODY MASS INDEX: 39.63 KG/M2 | HEART RATE: 65 BPM | OXYGEN SATURATION: 95 % | SYSTOLIC BLOOD PRESSURE: 136 MMHG

## 2023-12-14 DIAGNOSIS — R00.2 PALPITATIONS: Primary | ICD-10-CM

## 2023-12-14 DIAGNOSIS — I49.3 PVC (PREMATURE VENTRICULAR CONTRACTION): ICD-10-CM

## 2023-12-14 DIAGNOSIS — E78.5 DYSLIPIDEMIA: ICD-10-CM

## 2023-12-14 PROCEDURE — 93000 ELECTROCARDIOGRAM COMPLETE: CPT | Performed by: INTERNAL MEDICINE

## 2023-12-14 PROCEDURE — 99214 OFFICE O/P EST MOD 30 MIN: CPT | Performed by: INTERNAL MEDICINE

## 2023-12-14 PROCEDURE — 1123F ACP DISCUSS/DSCN MKR DOCD: CPT | Performed by: INTERNAL MEDICINE

## 2023-12-14 NOTE — PROGRESS NOTES
History of Present Illness:  76 YOF here for follow up. I saw her about a year ago. In that time period she became a grandmother a few weeks ago and has been doing well. She is under a fair amount of stress as a result. Blood pressure is higher today. She has also had some stress eating and gained weight. No significant chest pain, dyspnea or palpitations. She wants to get to the gym and start exercising. She has yet to see a  in [place]. Impression:  History of palpitations and PVCs, stable. Recent weight gain with home stress with daughter having a baby and her becoming a grandmother a few weeks ago. We talked about weight loss. Remote atypical chest pain with last stress test 2017 unremarkable. Thyroid disorder. History of remote anxiety. Remote uterine cancer and hysterectomy with chemotherapy and radiation. History of recurrent rash and remote Dengue fever, extensive workup. Plan:  From a cardiac standpoint she is doing well, but we had a discussion about weight loss and exercise. She is on thyroid medication and she is on a statin for dyslipidemia. All questions answered and I will plan to see back annually unless there is a clinical change. Wt Readings from Last 3 Encounters:   12/14/23 114.8 kg (253 lb)   09/25/23 113.4 kg (250 lb)   05/16/23 111.1 kg (245 lb)     Past Medical History:   Diagnosis Date    Abnormal nuclear cardiac imaging test 02/09/2012    Mid to distal anteroseptal and apical reversible defect concerning for ishcmeia. Mild-mod, mid-distal anterior & apical hypk. EF 67%. Borderline abnormal EKG w/1-mm inferolateral ST depression at 7 min exercise. Occasional PVCs. Anxiety     Carotid bruit 03/45/9608    Mild 0-64% LICA stenosis. Dengue fever     7/10 while in [place]    Diverticulosis     DVT (deep venous thrombosis) ([address]) 07/23/2015    Right leg:  No DVT.       Dyslipidemia     GERD (gastroesophageal reflux disease)     Holter monitor,

## 2024-01-22 ENCOUNTER — TELEPHONE (OUTPATIENT)
Facility: CLINIC | Age: 69
End: 2024-01-22

## 2024-01-22 NOTE — TELEPHONE ENCOUNTER
----- Message from Grace Tran sent at 1/22/2024 11:50 AM EST -----  Subject: Message to Provider    QUESTIONS  Information for Provider? please call if any opening become available   before 02/01 appt.   ---------------------------------------------------------------------------  --------------  CALL BACK INFO  [phone]; OK to leave message on voicemail  ---------------------------------------------------------------------------  --------------  SCRIPT ANSWERS  Relationship to Patient? Self

## 2024-02-01 ENCOUNTER — OFFICE VISIT (OUTPATIENT)
Facility: CLINIC | Age: 69
End: 2024-02-01
Payer: MEDICARE

## 2024-02-01 VITALS
TEMPERATURE: 98.1 F | OXYGEN SATURATION: 99 % | RESPIRATION RATE: 20 BRPM | SYSTOLIC BLOOD PRESSURE: 138 MMHG | HEIGHT: 68 IN | HEART RATE: 71 BPM | BODY MASS INDEX: 38.19 KG/M2 | WEIGHT: 252 LBS | DIASTOLIC BLOOD PRESSURE: 82 MMHG

## 2024-02-01 DIAGNOSIS — E53.8 VITAMIN B 12 DEFICIENCY: ICD-10-CM

## 2024-02-01 DIAGNOSIS — D50.9 IRON DEFICIENCY ANEMIA, UNSPECIFIED IRON DEFICIENCY ANEMIA TYPE: ICD-10-CM

## 2024-02-01 DIAGNOSIS — M25.50 ARTHRALGIA, UNSPECIFIED JOINT: Primary | ICD-10-CM

## 2024-02-01 DIAGNOSIS — E78.5 HYPERLIPIDEMIA, UNSPECIFIED HYPERLIPIDEMIA TYPE: ICD-10-CM

## 2024-02-01 DIAGNOSIS — E07.9 THYROID DISORDER: ICD-10-CM

## 2024-02-01 DIAGNOSIS — R73.9 HYPERGLYCEMIA, UNSPECIFIED: ICD-10-CM

## 2024-02-01 DIAGNOSIS — E66.9 OBESITY (BMI 30-39.9): ICD-10-CM

## 2024-02-01 DIAGNOSIS — R30.0 DYSURIA: ICD-10-CM

## 2024-02-01 DIAGNOSIS — E55.9 VITAMIN D DEFICIENCY, UNSPECIFIED: ICD-10-CM

## 2024-02-01 DIAGNOSIS — R21 RASH: ICD-10-CM

## 2024-02-01 PROBLEM — G43.C0: Status: ACTIVE | Noted: 2019-10-31

## 2024-02-01 PROBLEM — J30.2 PERENNIAL ALLERGIC RHINITIS WITH SEASONAL VARIATION: Status: ACTIVE | Noted: 2018-12-05

## 2024-02-01 PROBLEM — L29.89 CHRONIC PRURITIC RASH IN ADULT: Status: ACTIVE | Noted: 2019-10-31

## 2024-02-01 PROBLEM — L82.1 OTHER SEBORRHEIC KERATOSIS: Status: ACTIVE | Noted: 2019-10-31

## 2024-02-01 PROBLEM — E66.01 SEVERE OBESITY WITH BODY MASS INDEX (BMI) OF 35.0 TO 39.9 WITH SERIOUS COMORBIDITY (HCC): Status: ACTIVE | Noted: 2018-07-27

## 2024-02-01 PROBLEM — L50.8 CHRONIC URTICARIA: Status: ACTIVE | Noted: 2019-10-30

## 2024-02-01 PROBLEM — L30.9 DERMATITIS, UNSPECIFIED: Status: ACTIVE | Noted: 2019-10-31

## 2024-02-01 PROBLEM — B78.9 INFECTION DUE TO STRONGYLOIDES: Status: ACTIVE | Noted: 2019-10-31

## 2024-02-01 PROBLEM — L29.8 CHRONIC PRURITIC RASH IN ADULT: Status: ACTIVE | Noted: 2019-10-31

## 2024-02-01 PROBLEM — J30.89 PERENNIAL ALLERGIC RHINITIS WITH SEASONAL VARIATION: Status: ACTIVE | Noted: 2018-12-05

## 2024-02-01 PROCEDURE — 99214 OFFICE O/P EST MOD 30 MIN: CPT | Performed by: FAMILY MEDICINE

## 2024-02-01 PROCEDURE — 1123F ACP DISCUSS/DSCN MKR DOCD: CPT | Performed by: FAMILY MEDICINE

## 2024-02-01 NOTE — PROGRESS NOTES
HPI  Maryellen Alejo comes in for follow up care.  Left shoulder pain: Patient has left shoulder pain.  Left shoulder was swollen and had limitation in range of motion.  At the time it felt warm.  She also had generalized joint aches.  She has done supportive care and modified her diet.  This has resulted in decreased pain and improved range of motion.  She wonders about arthritis and would like to be evaluated for this.  We will check labs.  Patient has been seen by the rheumatologist in the past.  May need to be seen for follow-up care.  Rash: Patient has rash on both ankles.  There is itching both lower extremities.  She has been seen by the dermatologist.  She has applied topical medication with transient relief.  Hypothyroidism: Patient has hypothyroidism.  Will recheck TSH levels.  She is on levothyroxine.  Dyslipidemia: Patient has dyslipidemia.  Patient is on Lipitor 10 mg daily.  She will exercise and take a diet low in polysaturated fats.  Hypovitaminosis D: We will check vitamin D levels.  Patient is on vitamin D replacement therapy weekly.  Thyroid disorder: We will check TSH levels.  Iron deficiency anemia: Patient has iron deficiency anemia.  Will recheck labs.  Hyperglycemia: We will check HbA1c.  Obesity: Patient has obesity with a BMI of 38.32.  Patient will intensify lifestyle and dietary modification.      Past Medical History  Past Medical History:   Diagnosis Date    Abnormal nuclear cardiac imaging test 02/09/2012    Mid to distal anteroseptal and apical reversible defect concerning for ishcmeia.  Mild-mod, mid-distal anterior & apical hypk.  EF 67%.  Borderline abnormal EKG w/1-mm inferolateral ST depression at 7 min exercise.  Occasional PVCs.    Anxiety     Carotid bruit 03/08/2013    Mild 1-49% LICA stenosis.      Dengue fever     7/10 while in [place]    Diverticulosis     DVT (deep venous thrombosis) ([facility]) 07/23/2015    Right leg:  No DVT.      Dyslipidemia     GERD (gastroesophageal

## 2024-02-15 ENCOUNTER — HOSPITAL ENCOUNTER (OUTPATIENT)
Facility: HOSPITAL | Age: 69
Discharge: HOME | End: 2024-02-15
Payer: MEDICARE

## 2024-02-15 DIAGNOSIS — R30.0 DYSURIA: ICD-10-CM

## 2024-02-15 PROCEDURE — 87086 URINE CULTURE/COLONY COUNT: CPT

## 2024-02-15 PROCEDURE — 36415 COLL VENOUS BLD VENIPUNCTURE: CPT

## 2024-02-16 LAB
BACTERIA SPEC CULT: NORMAL
CC UR VC: NORMAL
SERVICE CMNT-IMP: NORMAL

## 2024-03-01 ENCOUNTER — TELEPHONE (OUTPATIENT)
Facility: CLINIC | Age: 69
End: 2024-03-01

## 2024-03-01 NOTE — TELEPHONE ENCOUNTER
----- Message from Sabrina Damon MA sent at 3/1/2024  9:45 AM EST -----  Subject: Message to Provider    QUESTIONS  Information for Provider? Requesting Urine sensitive test? Has reoccurring   UTI. Also requesting test for HEP C. Please call patient.  ---------------------------------------------------------------------------  --------------  CALL BACK INFO  [phone]; OK to leave message on voicemail  ---------------------------------------------------------------------------  --------------  SCRIPT ANSWERS  Relationship to Patient? Self

## 2024-03-04 ENCOUNTER — TELEPHONE (OUTPATIENT)
Facility: CLINIC | Age: 69
End: 2024-03-04

## 2024-03-04 DIAGNOSIS — R82.90 ABNORMAL URINE: Primary | ICD-10-CM

## 2024-03-04 NOTE — TELEPHONE ENCOUNTER
Pt wants to know can Dr. LANDRY put in a gram sensitive test along with her urinalysis. MsVidal Sapna is requesting for Krystle  Please advise. Thank you!

## 2024-03-06 NOTE — TELEPHONE ENCOUNTER
Left message on voicemail for patient to either come here after provider returns to office (Monday next week) or she may go to [facility] order is in the system

## 2024-05-17 ENCOUNTER — HOSPITAL ENCOUNTER (OUTPATIENT)
Facility: HOSPITAL | Age: 69
End: 2024-05-17
Payer: MEDICARE

## 2024-05-17 DIAGNOSIS — D50.9 IRON DEFICIENCY ANEMIA, UNSPECIFIED IRON DEFICIENCY ANEMIA TYPE: ICD-10-CM

## 2024-05-17 DIAGNOSIS — R73.9 HYPERGLYCEMIA, UNSPECIFIED: ICD-10-CM

## 2024-05-17 DIAGNOSIS — M25.50 ARTHRALGIA, UNSPECIFIED JOINT: ICD-10-CM

## 2024-05-17 DIAGNOSIS — E53.8 VITAMIN B 12 DEFICIENCY: ICD-10-CM

## 2024-05-17 DIAGNOSIS — E78.5 HYPERLIPIDEMIA, UNSPECIFIED HYPERLIPIDEMIA TYPE: ICD-10-CM

## 2024-05-17 DIAGNOSIS — E07.9 THYROID DISORDER: ICD-10-CM

## 2024-05-17 DIAGNOSIS — E55.9 VITAMIN D DEFICIENCY, UNSPECIFIED: ICD-10-CM

## 2024-05-17 DIAGNOSIS — R82.90 ABNORMAL URINE: ICD-10-CM

## 2024-05-17 LAB
25(OH)D3 SERPL-MCNC: 63 NG/ML (ref 30–100)
ALBUMIN SERPL-MCNC: 3.6 G/DL (ref 3.4–5)
ALBUMIN/GLOB SERPL: 1.2 (ref 0.8–1.7)
ALP SERPL-CCNC: 79 U/L (ref 45–117)
ALT SERPL-CCNC: 61 U/L (ref 13–56)
ANION GAP SERPL CALC-SCNC: 5 MMOL/L (ref 3–18)
APPEARANCE UR: CLEAR
AST SERPL-CCNC: 36 U/L (ref 10–38)
BACTERIA URNS QL MICRO: NEGATIVE /HPF
BASOPHILS # BLD: 0.1 K/UL (ref 0–0.1)
BASOPHILS NFR BLD: 1 % (ref 0–2)
BILIRUB SERPL-MCNC: 0.8 MG/DL (ref 0.2–1)
BILIRUB UR QL: NEGATIVE
BUN SERPL-MCNC: 11 MG/DL (ref 7–18)
BUN/CREAT SERPL: 14 (ref 12–20)
CALCIUM SERPL-MCNC: 9.2 MG/DL (ref 8.5–10.1)
CHLORIDE SERPL-SCNC: 110 MMOL/L (ref 100–111)
CHOLEST SERPL-MCNC: 164 MG/DL
CO2 SERPL-SCNC: 26 MMOL/L (ref 21–32)
COLOR UR: YELLOW
CREAT SERPL-MCNC: 0.8 MG/DL (ref 0.6–1.3)
DIFFERENTIAL METHOD BLD: ABNORMAL
EOSINOPHIL # BLD: 0.2 K/UL (ref 0–0.4)
EOSINOPHIL NFR BLD: 3 % (ref 0–5)
EPITH CASTS URNS QL MICRO: NORMAL /LPF (ref 0–5)
ERYTHROCYTE [DISTWIDTH] IN BLOOD BY AUTOMATED COUNT: 15.6 % (ref 11.6–14.5)
EST. AVERAGE GLUCOSE BLD GHB EST-MCNC: 123 MG/DL
GLOBULIN SER CALC-MCNC: 3.1 G/DL (ref 2–4)
GLUCOSE SERPL-MCNC: 110 MG/DL (ref 74–99)
GLUCOSE UR STRIP.AUTO-MCNC: NEGATIVE MG/DL
HBA1C MFR BLD: 5.9 % (ref 4.2–5.6)
HCT VFR BLD AUTO: 42.2 % (ref 35–45)
HDLC SERPL-MCNC: 39 MG/DL (ref 40–60)
HDLC SERPL: 4.2 (ref 0–5)
HGB BLD-MCNC: 13.5 G/DL (ref 12–16)
HGB UR QL STRIP: NEGATIVE
IMM GRANULOCYTES # BLD AUTO: 0 K/UL (ref 0–0.04)
IMM GRANULOCYTES NFR BLD AUTO: 1 % (ref 0–0.5)
KETONES UR QL STRIP.AUTO: NEGATIVE MG/DL
LDLC SERPL CALC-MCNC: 101.8 MG/DL (ref 0–100)
LEUKOCYTE ESTERASE UR QL STRIP.AUTO: NEGATIVE
LIPID PANEL: ABNORMAL
LYMPHOCYTES # BLD: 2.4 K/UL (ref 0.9–3.6)
LYMPHOCYTES NFR BLD: 32 % (ref 21–52)
MCH RBC QN AUTO: 27.4 PG (ref 24–34)
MCHC RBC AUTO-ENTMCNC: 32 G/DL (ref 31–37)
MCV RBC AUTO: 85.8 FL (ref 78–100)
MONOCYTES # BLD: 0.6 K/UL (ref 0.05–1.2)
MONOCYTES NFR BLD: 8 % (ref 3–10)
NEUTS SEG # BLD: 4.1 K/UL (ref 1.8–8)
NEUTS SEG NFR BLD: 56 % (ref 40–73)
NITRITE UR QL STRIP.AUTO: NEGATIVE
NRBC # BLD: 0 K/UL (ref 0–0.01)
NRBC BLD-RTO: 0 PER 100 WBC
PH UR STRIP: 6 (ref 5–8)
PLATELET # BLD AUTO: 173 K/UL (ref 135–420)
PMV BLD AUTO: 12.4 FL (ref 9.2–11.8)
POTASSIUM SERPL-SCNC: 4.1 MMOL/L (ref 3.5–5.5)
PROT SERPL-MCNC: 6.7 G/DL (ref 6.4–8.2)
PROT UR STRIP-MCNC: NEGATIVE MG/DL
RBC # BLD AUTO: 4.92 M/UL (ref 4.2–5.3)
RBC #/AREA URNS HPF: NORMAL /HPF (ref 0–5)
RHEUMATOID FACT SERPL-ACNC: <10 IU/ML
SODIUM SERPL-SCNC: 141 MMOL/L (ref 136–145)
SP GR UR REFRACTOMETRY: 1 (ref 1–1.03)
T4 FREE SERPL-MCNC: 1.2 NG/DL (ref 0.7–1.5)
TRIGL SERPL-MCNC: 116 MG/DL
TSH SERPL DL<=0.05 MIU/L-ACNC: 0.88 UIU/ML (ref 0.36–3.74)
URATE SERPL-MCNC: 5.6 MG/DL (ref 2.6–7.2)
UROBILINOGEN UR QL STRIP.AUTO: 0.2 EU/DL (ref 0.2–1)
VIT B12 SERPL-MCNC: 401 PG/ML (ref 211–911)
VLDLC SERPL CALC-MCNC: 23.2 MG/DL
WBC # BLD AUTO: 7.4 K/UL (ref 4.6–13.2)
WBC URNS QL MICRO: NORMAL /HPF (ref 0–4)

## 2024-05-17 PROCEDURE — 82607 VITAMIN B-12: CPT

## 2024-05-17 PROCEDURE — 84439 ASSAY OF FREE THYROXINE: CPT

## 2024-05-17 PROCEDURE — 86225 DNA ANTIBODY NATIVE: CPT

## 2024-05-17 PROCEDURE — 86431 RHEUMATOID FACTOR QUANT: CPT

## 2024-05-17 PROCEDURE — 85025 COMPLETE CBC W/AUTO DIFF WBC: CPT

## 2024-05-17 PROCEDURE — 84550 ASSAY OF BLOOD/URIC ACID: CPT

## 2024-05-17 PROCEDURE — 86235 NUCLEAR ANTIGEN ANTIBODY: CPT

## 2024-05-17 PROCEDURE — 83516 IMMUNOASSAY NONANTIBODY: CPT

## 2024-05-17 PROCEDURE — 82306 VITAMIN D 25 HYDROXY: CPT

## 2024-05-17 PROCEDURE — 36415 COLL VENOUS BLD VENIPUNCTURE: CPT

## 2024-05-17 PROCEDURE — 83036 HEMOGLOBIN GLYCOSYLATED A1C: CPT

## 2024-05-17 PROCEDURE — 84443 ASSAY THYROID STIM HORMONE: CPT

## 2024-05-17 PROCEDURE — 81001 URINALYSIS AUTO W/SCOPE: CPT

## 2024-05-17 PROCEDURE — 80053 COMPREHEN METABOLIC PANEL: CPT

## 2024-05-17 PROCEDURE — 80061 LIPID PANEL: CPT

## 2024-05-20 LAB
CENTROMERE B AB SER-ACNC: <0.2 AI (ref 0–0.9)
CHROMATIN AB SERPL-ACNC: <0.2 AI (ref 0–0.9)
DSDNA AB SER-ACNC: <1 IU/ML (ref 0–9)
ENA JO1 AB SER-ACNC: <0.2 AI (ref 0–0.9)
ENA RNP AB SER-ACNC: 0.4 AI (ref 0–0.9)
ENA SCL70 AB SER-ACNC: <0.2 AI (ref 0–0.9)
ENA SM AB SER-ACNC: <0.2 AI (ref 0–0.9)
ENA SM+RNP AB SER-ACNC: <0.2 AI (ref 0–0.9)
ENA SS-A AB SER-ACNC: <0.2 AI (ref 0–0.9)
ENA SS-B AB SER-ACNC: <0.2 AI (ref 0–0.9)
RIBOSOMAL P AB SER-ACNC: <0.2 AI (ref 0–0.9)
SEE BELOW:, 164879: NORMAL

## 2024-05-22 ENCOUNTER — OFFICE VISIT (OUTPATIENT)
Facility: CLINIC | Age: 69
End: 2024-05-22
Payer: MEDICARE

## 2024-05-22 VITALS
OXYGEN SATURATION: 97 % | SYSTOLIC BLOOD PRESSURE: 143 MMHG | HEIGHT: 68 IN | WEIGHT: 251 LBS | RESPIRATION RATE: 16 BRPM | DIASTOLIC BLOOD PRESSURE: 73 MMHG | BODY MASS INDEX: 38.04 KG/M2 | TEMPERATURE: 97.5 F | HEART RATE: 59 BPM

## 2024-05-22 DIAGNOSIS — E66.9 OBESITY (BMI 30-39.9): ICD-10-CM

## 2024-05-22 DIAGNOSIS — E66.01 SEVERE OBESITY (BMI 35.0-39.9) WITH COMORBIDITY (HCC): ICD-10-CM

## 2024-05-22 DIAGNOSIS — F39 UNSPECIFIED MOOD (AFFECTIVE) DISORDER (HCC): ICD-10-CM

## 2024-05-22 DIAGNOSIS — G89.29 CHRONIC MIDLINE LOW BACK PAIN, UNSPECIFIED WHETHER SCIATICA PRESENT: ICD-10-CM

## 2024-05-22 DIAGNOSIS — E03.9 HYPOTHYROIDISM, UNSPECIFIED TYPE: ICD-10-CM

## 2024-05-22 DIAGNOSIS — I10 ESSENTIAL (PRIMARY) HYPERTENSION: Primary | ICD-10-CM

## 2024-05-22 DIAGNOSIS — C55 UTERINE CARCINOMA (HCC): ICD-10-CM

## 2024-05-22 DIAGNOSIS — M54.50 CHRONIC MIDLINE LOW BACK PAIN, UNSPECIFIED WHETHER SCIATICA PRESENT: ICD-10-CM

## 2024-05-22 DIAGNOSIS — E78.5 HYPERLIPIDEMIA, UNSPECIFIED HYPERLIPIDEMIA TYPE: ICD-10-CM

## 2024-05-22 DIAGNOSIS — R74.8 ELEVATED LIVER ENZYMES: ICD-10-CM

## 2024-05-22 DIAGNOSIS — M25.50 ARTHRALGIA, UNSPECIFIED JOINT: ICD-10-CM

## 2024-05-22 DIAGNOSIS — E55.9 VITAMIN D DEFICIENCY: ICD-10-CM

## 2024-05-22 PROCEDURE — 3077F SYST BP >= 140 MM HG: CPT | Performed by: FAMILY MEDICINE

## 2024-05-22 PROCEDURE — 99214 OFFICE O/P EST MOD 30 MIN: CPT | Performed by: FAMILY MEDICINE

## 2024-05-22 PROCEDURE — 1123F ACP DISCUSS/DSCN MKR DOCD: CPT | Performed by: FAMILY MEDICINE

## 2024-05-22 PROCEDURE — 3078F DIAST BP <80 MM HG: CPT | Performed by: FAMILY MEDICINE

## 2024-05-22 SDOH — ECONOMIC STABILITY: INCOME INSECURITY: HOW HARD IS IT FOR YOU TO PAY FOR THE VERY BASICS LIKE FOOD, HOUSING, MEDICAL CARE, AND HEATING?: NOT HARD AT ALL

## 2024-05-22 SDOH — ECONOMIC STABILITY: FOOD INSECURITY: WITHIN THE PAST 12 MONTHS, THE FOOD YOU BOUGHT JUST DIDN'T LAST AND YOU DIDN'T HAVE MONEY TO GET MORE.: NEVER TRUE

## 2024-05-22 SDOH — ECONOMIC STABILITY: FOOD INSECURITY: WITHIN THE PAST 12 MONTHS, YOU WORRIED THAT YOUR FOOD WOULD RUN OUT BEFORE YOU GOT MONEY TO BUY MORE.: NEVER TRUE

## 2024-05-22 ASSESSMENT — PATIENT HEALTH QUESTIONNAIRE - PHQ9
SUM OF ALL RESPONSES TO PHQ QUESTIONS 1-9: 0
SUM OF ALL RESPONSES TO PHQ9 QUESTIONS 1 & 2: 0
SUM OF ALL RESPONSES TO PHQ QUESTIONS 1-9: 0
1. LITTLE INTEREST OR PLEASURE IN DOING THINGS: NOT AT ALL
SUM OF ALL RESPONSES TO PHQ QUESTIONS 1-9: 0
2. FEELING DOWN, DEPRESSED OR HOPELESS: NOT AT ALL
SUM OF ALL RESPONSES TO PHQ QUESTIONS 1-9: 0

## 2024-05-22 NOTE — PROGRESS NOTES
\"Have you been to the ER, urgent care clinic since your last visit?  Hospitalized since your last visit?\"    NO    “Have you seen or consulted any other health care providers outside of [facility] since your last visit?”    NO            Click Here for Release of Records Request    
Hepatic Function Panel      4. Severe obesity (BMI 35.0-39.9) with comorbidity (HCC)  E66.01       5. Chronic midline low back pain, unspecified whether sciatica present  M54.50 XR LUMBAR SPINE (2-3 VIEWS)    G89.29       6. Arthralgia, unspecified joint  M25.50       7. Hypothyroidism, unspecified type  E03.9       8. Hyperlipidemia, unspecified hyperlipidemia type  E78.5       9. Uterine carcinoma (HCC)  C55       10. Vitamin D deficiency  E55.9       11. Obesity (BMI 30-39.9)  E66.9       lab results and schedule of future lab studies reviewed with patient  reviewed diet, exercise and weight control  cardiovascular risk and specific lipid/LDL goals reviewed  reviewed medications and side effects in detail      I have discussed the diagnosis with the patient and the intended plan of care as seen in the above orders. The patient has received an after-visit summary and questions were answered concerning future plans. I have discussed medication, side effects, and warnings with the patient in detail. The patient verbalized understanding and is in agreement with the plan of care. The patient will contact the office with any additional concerns.    Doris Beckwith MD    PLEASE NOTE:   This document has been produced using voice recognition software. Unrecognized errors in transcription may be present

## 2024-05-28 DIAGNOSIS — M54.50 CHRONIC MIDLINE LOW BACK PAIN, UNSPECIFIED WHETHER SCIATICA PRESENT: Primary | ICD-10-CM

## 2024-05-28 DIAGNOSIS — G89.29 CHRONIC MIDLINE LOW BACK PAIN, UNSPECIFIED WHETHER SCIATICA PRESENT: Primary | ICD-10-CM

## 2024-06-01 DIAGNOSIS — E55.9 VITAMIN D DEFICIENCY, UNSPECIFIED: ICD-10-CM

## 2024-06-03 RX ORDER — ERGOCALCIFEROL 1.25 MG/1
CAPSULE ORAL
Qty: 13 CAPSULE | Refills: 3 | Status: SHIPPED | OUTPATIENT
Start: 2024-06-03

## 2024-06-17 ENCOUNTER — OFFICE VISIT (OUTPATIENT)
Age: 69
End: 2024-06-17
Payer: MEDICARE

## 2024-06-17 VITALS
HEIGHT: 68 IN | HEART RATE: 71 BPM | TEMPERATURE: 98 F | BODY MASS INDEX: 38.34 KG/M2 | OXYGEN SATURATION: 97 % | WEIGHT: 253 LBS

## 2024-06-17 DIAGNOSIS — M51.36 DDD (DEGENERATIVE DISC DISEASE), LUMBAR: Primary | ICD-10-CM

## 2024-06-17 DIAGNOSIS — M47.816 SPONDYLOSIS OF LUMBAR SPINE: ICD-10-CM

## 2024-06-17 DIAGNOSIS — M54.16 LUMBAR NEURITIS: ICD-10-CM

## 2024-06-17 PROCEDURE — 99204 OFFICE O/P NEW MOD 45 MIN: CPT | Performed by: PHYSICAL MEDICINE & REHABILITATION

## 2024-06-17 PROCEDURE — 1123F ACP DISCUSS/DSCN MKR DOCD: CPT | Performed by: PHYSICAL MEDICINE & REHABILITATION

## 2024-06-17 RX ORDER — METHYLPREDNISOLONE 4 MG/1
TABLET ORAL
Qty: 1 KIT | Refills: 0 | Status: SHIPPED | OUTPATIENT
Start: 2024-06-17
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
## 2024-06-19 DIAGNOSIS — E78.5 HYPERLIPIDEMIA, UNSPECIFIED HYPERLIPIDEMIA TYPE: ICD-10-CM

## 2024-06-19 RX ORDER — ATORVASTATIN CALCIUM 10 MG/1
10 TABLET, FILM COATED ORAL DAILY
Qty: 90 TABLET | Refills: 1 | Status: SHIPPED | OUTPATIENT
Start: 2024-06-19

## 2024-06-27 ENCOUNTER — HOSPITAL ENCOUNTER (OUTPATIENT)
Facility: HOSPITAL | Age: 69
Setting detail: RECURRING SERIES
Discharge: HOME | End: 2024-06-30
Payer: MEDICARE

## 2024-06-27 PROCEDURE — 97530 THERAPEUTIC ACTIVITIES: CPT

## 2024-06-27 PROCEDURE — 97110 THERAPEUTIC EXERCISES: CPT

## 2024-06-27 PROCEDURE — 97161 PT EVAL LOW COMPLEX 20 MIN: CPT

## 2024-06-27 NOTE — THERAPY EVALUATION
[facility]  [address] [place] [address]  Phone: [phone]   [phone]  Plan of Care / Statement of Necessity for Physical Therapy Services     Patient Name: Maryellen Alejo : 1955   Medical   Diagnosis: Other low back pain [M54.59]  Treatment Diagnosis:   M54.59, G89.29  CHRONIC LOWER BACK PAIN    Onset Date:       Referral Source: Oliver Aguirre MD Start of Care (SOC): 2024   Prior Hospitalization: See medical history Provider #: [id number]   Prior Level of Function: Indep comm ambulator but declining function over time due to ongoing back pain    Comorbidities: Prior hx of uterine cancer, colon resection, gall bladder removal     Assessment / key information:  Pt is a 68 year old female who presents with chronic low back pain that she is having increasing difficulty with transfers, gait, bed mobility and stair negotiation.  Pt  presents with the following deficits:   Strength : WFL, grossly 5/5 BLEs, ROM lumbar spine , balance is fair, static standing balance is 8 seconds bilaterally.    Pt states that her MD has hinted at her having stenosis, but she her referral diagnosis states \" DDD, lumbar neuritis, and spondylosis of lumbar spine\" and has symptoms consistent with such, flexion bias clinically.   Pt would benefit from skilled PT to address above deficits to improve pt's functional status and ability to return to PLOF with decreased pain and improved overall functional status. Pt's rehab potential is good due to her motivation, cognition and. HEP issued and established.   Pt goals established and reviewed with pt.          Evaluation Complexity:  History:  LOW Complexity : Zero comorbidities / personal factors that will impact the outcome / POC; Examination:  LOW Complexity : 1-2 Standardized tests and measures addressing body structure, function, activity limitation and / or participation in recreation

## 2024-06-27 NOTE — PROGRESS NOTES
PT DAILY TREATMENT NOTE/LUMBAR EVAL       Patient Name: Maryellen Alejo    Date: 2024    : 1955  Insurance: Payor: [employer] / Plan: AETNA MEDICARE-ADVANTAGE PPO / Product Type: Medicare /      Patient  verified yes     Visit #   Current / Total 1 16   Time   In / Out 2:22 3:04   Pain   In / Out 9/10 7/10   Subjective Functional Status/Changes: See POC   Treatment Area: Other low back pain [M54.59]    SUBJECTIVE    CC: pain, excruciating pain and spasms  History/Mechanism of Injury: after a long  car ride, got out of the car and was a \"C shape\" and unable to stand up.   Pain-        Worst: 1010    Best: 010       Current: 9/10  Aggravated By: prolonged sitting, position changes,    Alleviated By: laying flat on her back , Advil   Previous Treatment/Compliance: Advil, lidocaine   PMHx/Surgical Hx: radical hysterectomy with extensive scar tissue, colon resection surgery, gall bladder rupture surgery , uterine cancer diagnosed in , does not have PET scans and currently not undergoing any investigation for any spread, lymph  nodes were taken   Work Hx: retired from desk job  Living Situation: lives with spouse in a 1 story ranch with 5  RIGO with B rails  Hobbies: family  PLOF: indep, community ambulator  Limitations to PLOF: pain, difficulty   Pt Goals: \"I want to be able to lessen my pain and actually get a diagnosis, be able to stand up straight, walk about 3 miles and go the gym, go back to weight training \"    OBJECTIVE/EXAMINATION  15 min [x]Eval  - untimed                 Therapeutic Procedures:  Tx Min Billable or 1:1 Min (if diff from Tx Min) Procedure, Rationale, Specifics    [id number] Therapeutic Exercise (timed):  increase ROM, strength, coordination, balance, and proprioception to improve patient's ability to progress to PLOF and address remaining functional goals. (see flow sheet as applicable)     Details if applicable:     15 15 14530 Therapeutic Activity (timed):  use of

## 2024-07-01 NOTE — PROGRESS NOTES
PHYSICAL / OCCUPATIONAL THERAPY - DAILY TREATMENT NOTE    Patient Name: Maryellen Alejo    Date: 2024    : 1955  Insurance: Payor: [employer] / Plan: AETNA MEDICARE-ADVANTAGE PPO / Product Type: Medicare /      Patient  verified Yes     Visit #   Current / Total 2 16   Time   In / Out 3:03 3:46   Pain   In / Out 5 4     Subjective Functional Status/Changes: Pt c/o significant pain across low back/buttock; used to be intermittent but for the past 4-5 months has been constant and debilitating so that she cannot tolerate walking.     TREATMENT AREA =  Other low back pain [M54.59]     OBJECTIVE      Therapeutic Procedures:    Tx Min Billable or 1:1 Min (if diff from Tx Min) Procedure, Rationale, Specifics   28 28 [id number] Therapeutic Exercise (timed):  increase ROM, strength, coordination, balance, and proprioception to improve patient's ability to progress to PLOF and address remaining functional goals. (see flow sheet as applicable)     Details if applicable:       15 15 60299 Neuromuscular Re-Education (timed):  improve balance, coordination, kinesthetic sense, posture, core stability and proprioception to improve patient's ability to develop conscious control of individual muscles and awareness of position of extremities in order to progress to PLOF and address remaining functional goals. (see flow sheet as applicable)     Details if applicable:            Details if applicable:            Details if applicable:            Details if applicable:     43 43 [facility] Totals Reminder: bill using total billable min of TIMED therapeutic procedures (example: do not include dry needle or estim unattended, both untimed codes, in totals to left)  8-22 min = 1 unit; 23-37 min = 2 units; 38-52 min = 3 units; 53-67 min = 4 units; 68-82 min = 5 units   Total Total     [x]  Patient Education billed concurrently with other procedures   [x] Review HEP    [x] Progressed/Changed HEP, detail:  see chart copy  [] Other

## 2024-07-02 ENCOUNTER — HOSPITAL ENCOUNTER (OUTPATIENT)
Facility: HOSPITAL | Age: 69
Setting detail: RECURRING SERIES
Discharge: HOME | End: 2024-07-05
Payer: MEDICARE

## 2024-07-02 PROCEDURE — 97110 THERAPEUTIC EXERCISES: CPT

## 2024-07-02 PROCEDURE — 97112 NEUROMUSCULAR REEDUCATION: CPT

## 2024-07-09 ENCOUNTER — HOSPITAL ENCOUNTER (OUTPATIENT)
Facility: HOSPITAL | Age: 69
Setting detail: RECURRING SERIES
Discharge: HOME | End: 2024-07-12
Payer: MEDICARE

## 2024-07-09 PROCEDURE — 97112 NEUROMUSCULAR REEDUCATION: CPT

## 2024-07-09 PROCEDURE — 97110 THERAPEUTIC EXERCISES: CPT

## 2024-07-09 PROCEDURE — G0283 ELEC STIM OTHER THAN WOUND: HCPCS

## 2024-07-09 NOTE — PROGRESS NOTES
12/31/24    PLAN  - Continue Plan of Care  - Upgrade activities as tolerated    CATHLEEN WU, PT    7/9/2024    9:30 AM    Future Appointments   Date Time Provider Department Center   7/9/2024  1:40 PM Cathleen Wu, PT [facility]   7/11/2024  9:00 AM Cathleen Wu, PT [facility]   7/16/2024  1:00 PM Cathleen Wu, PT [facility]   7/18/2024  9:40 AM Cathleen Wu, PT [facility]   7/23/2024  1:00 PM Cathleen Wu, PT [facility]   7/25/2024 11:40 AM Rosa Elena Wilson, PT [facility]   7/30/2024  1:00 PM Rosa Elena Wilson, PT [facility]   8/22/2024  2:30 PM Doris Beckwith MD [facility]   9/4/2024  1:15 PM Oliver Aguirre MD [facility]   12/18/2024 10:40 AM Axel Samano MD [facility] BS AMB

## 2024-07-11 ENCOUNTER — HOSPITAL ENCOUNTER (OUTPATIENT)
Facility: HOSPITAL | Age: 69
Setting detail: RECURRING SERIES
End: 2024-07-11
Payer: MEDICARE

## 2024-07-16 ENCOUNTER — HOSPITAL ENCOUNTER (OUTPATIENT)
Facility: HOSPITAL | Age: 69
Setting detail: RECURRING SERIES
Discharge: HOME | End: 2024-07-19
Payer: MEDICARE

## 2024-07-16 PROCEDURE — 97110 THERAPEUTIC EXERCISES: CPT

## 2024-07-16 PROCEDURE — 97112 NEUROMUSCULAR REEDUCATION: CPT

## 2024-07-16 NOTE — PROGRESS NOTES
PHYSICAL / OCCUPATIONAL THERAPY - DAILY TREATMENT NOTE    Patient Name: Maryellen Alejo    Date: 2024    : 1955  Insurance: Payor: [employer] MEDICARE / Plan: AETNA MEDICARE-ADVANTAGE PPO / Product Type: Medicare /      Patient  verified Yes     Visit #   Current / Total 4 16   Time   In / Out 1:00 1:55   Pain   In / Out 7-8 5   Subjective Functional Status/Changes: Pt states she did much better after her last PT session without sx exacerbation. Attributes to use of Estim. She was doing well with LBP until yesterday; back started to significantly increase and made standing/walking difficult; took Tylenol and hot shower.      TREATMENT AREA =  Other low back pain [M54.59]     OBJECTIVE    Modalities Rationale:     decrease pain to improve patient's ability to progress to PLOF and address remaining functional goals.    10 min [x] Estim Unattended, type/location: IFC to l/s; reclined h/l                                     [x]  w/ice    []  w/heat    min [] Estim Attended, type/location:                                     []  w/US     []  w/ice    []  w/heat    []  TENS insruct      min []  Mechanical Traction: type/lbs                   []  pro   []  sup   []  int   []  cont    []  before manual    []  after manual    min []  Ultrasound, settings/location:      min  unbill []  Ice     []  Heat    location/position:     min []  Paraffin,  details:     min []  Vasopneumatic Device, press/temp:     min []  Whirlpool / Fluido:    If using vaso (only need to measure limb vaso being performed on)      pre-treatment girth :       post-treatment girth :       measured at (landmark location) :      min []  Other:    Skin assessment post-treatment:   Intact      Therapeutic Procedures:    Tx Min Billable or 1:1 Min (if diff from Tx Min) Procedure, Rationale, Specifics   79 05 [id number] Therapeutic Exercise (timed):  increase ROM, strength, coordination, balance, and proprioception to improve patient's ability to

## 2024-07-18 ENCOUNTER — HOSPITAL ENCOUNTER (OUTPATIENT)
Facility: HOSPITAL | Age: 69
Setting detail: RECURRING SERIES
Discharge: HOME | End: 2024-07-21
Payer: MEDICARE

## 2024-07-18 PROCEDURE — G0283 ELEC STIM OTHER THAN WOUND: HCPCS

## 2024-07-18 PROCEDURE — 97110 THERAPEUTIC EXERCISES: CPT

## 2024-07-18 PROCEDURE — 97112 NEUROMUSCULAR REEDUCATION: CPT

## 2024-07-18 NOTE — PROGRESS NOTES
PHYSICAL / OCCUPATIONAL THERAPY - DAILY TREATMENT NOTE    Patient Name: Maryellen Alejo    Date: 2024    : 1955  Insurance: Payor: [employer]ICARE / Plan: AETNA MEDICARE-ADVANTAGE PPO / Product Type: Medicare /      Patient  verified Yes     Visit #   Current / Total 5 16   Time   In / Out 9:40 10:33   Pain   In / Out 6 4-5   Subjective Functional Status/Changes: Pt states she had a little increase in pain up to 8/10 immediately after last session but improved as the evening progressed.     TREATMENT AREA =  Other low back pain [M54.59]     OBJECTIVE    Modalities Rationale:     decrease pain to improve patient's ability to progress to PLOF and address remaining functional goals.    10 min [x] Estim Unattended, type/location: IFC to l/s; reclined h/l                                     [x]  w/ice    []  w/heat    min [] Estim Attended, type/location:                                     []  w/US     []  w/ice    []  w/heat    []  TENS insruct      min []  Mechanical Traction: type/lbs                   []  pro   []  sup   []  int   []  cont    []  before manual    []  after manual    min []  Ultrasound, settings/location:      min  unbill []  Ice     []  Heat    location/position:     min []  Paraffin,  details:     min []  Vasopneumatic Device, press/temp:     min []  Whirlpool / Fluido:    If using vaso (only need to measure limb vaso being performed on)      pre-treatment girth :       post-treatment girth :       measured at (landmark location) :      min []  Other:    Skin assessment post-treatment:   Intact      Therapeutic Procedures:    Tx Min Billable or 1:1 Min (if diff from Tx Min) Procedure, Rationale, Specifics   28  [id number] Therapeutic Exercise (timed):  increase ROM, strength, coordination, balance, and proprioception to improve patient's ability to progress to PLOF and address remaining functional goals. (see flow sheet as applicable)     Details if applicable:       15 15 [id number]

## 2024-07-23 ENCOUNTER — HOSPITAL ENCOUNTER (OUTPATIENT)
Facility: HOSPITAL | Age: 69
Setting detail: RECURRING SERIES
Discharge: HOME | End: 2024-07-26
Payer: MEDICARE

## 2024-07-23 PROCEDURE — G0283 ELEC STIM OTHER THAN WOUND: HCPCS

## 2024-07-23 PROCEDURE — 97112 NEUROMUSCULAR REEDUCATION: CPT

## 2024-07-23 PROCEDURE — 97530 THERAPEUTIC ACTIVITIES: CPT

## 2024-07-23 NOTE — PROGRESS NOTES
PHYSICAL / OCCUPATIONAL THERAPY - DAILY TREATMENT NOTE    Patient Name: Maryellen Alejo    Date: 2024    : 1955  Insurance: Payor: [employer]ICARE / Plan: AETNA MEDICARE-ADVANTAGE PPO / Product Type: Medicare /      Patient  verified Yes     Visit #   Current / Total 6 16   Time   In / Out 1:02 2:01   Pain   In / Out 2 5   Subjective Functional Status/Changes: Pt states she did fine after last session Thursday, but had a lot of activity at home Sat. 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  she couldn't walk. Managed with rest, Tylenol and Biofreeze. Mon was the same. Today pain has subsided significantly.      TREATMENT AREA =  Other low back pain [M54.59]     OBJECTIVE    Modalities Rationale:     decrease pain to improve patient's ability to progress to PLOF and address remaining functional goals.    10 min [x] Estim Unattended, type/location: IFC to l/s; reclined h/l                                     [x]  w/ice    []  w/heat    min [] Estim Attended, type/location:                                     []  w/US     []  w/ice    []  w/heat    []  TENS insruct      min []  Mechanical Traction: type/lbs                   []  pro   []  sup   []  int   []  cont    []  before manual    []  after manual    min []  Ultrasound, settings/location:      min  unbill []  Ice     []  Heat    location/position:     min []  Paraffin,  details:     min []  Vasopneumatic Device, press/temp:     min []  Whirlpool / Fluido:    If using vaso (only need to measure limb vaso being performed on)      pre-treatment girth :       post-treatment girth :       measured at (landmark location) :      min []  Other:    Skin assessment post-treatment:   Intact      Therapeutic Procedures:    Tx Min Billable or 1:1 Min (if diff from Tx Min) Procedure, Rationale, Specifics   11 [phone] Therapeutic Exercise (timed):  increase ROM, strength, coordination,

## 2024-07-23 NOTE — THERAPY RECERTIFICATION
[facility]  [address] [place] [address]  Phone: [phone]   [phone]  PHYSICAL THERAPY PROGRESS NOTE  Patient Name: Maryellen Alejo : 1955   Treatment/Medical Diagnosis: Other low back pain [M54.59]   Referral Source: Oliver Aguirre MD     Date of Initial Visit: 24 Attended Visits: 6 Missed Visits: 1     SUMMARY OF TREATMENT  Physical therapy has consisted of therapeutic exercises for increased core strength, ROM, and flexibility. Therapeutic activities performed to improve functional activities, model real life movements and performance specific to the patient's need with supervision to return the patient to their PLOF.  Neuromuscular Re-ed performed to improve coordination, improve mm activation, improve proprioception to improve the patient's ability to perform ADL/IADL's. Cold pack/Estim provided PRN for palliative.  Pt education provided regarding HEP.     CURRENT STATUS  Pt has participated in 6 sessions of skilled PT for dx: LBP. Pt reports overall good response to PT treatment with significant improvement in her daily pain and functional activity tolerance as outlined further below.    % improvement: 70-75%  Max pain 9.5/10; localized across low back and increased after moving items in her home and emotional stress  Avg pain 5.5-6/10  Min pain 3/10    Current improvements:  Significant improvement in pain frequency/intensity; pt states she had been previously unable to perform normal daily activities due to severe low back pain, but since starting PT this has significantly improved (though she did have a recent flare up over the weekend which brought her max pain intensity up). Decreased impairment with walking, standing, household chores and ADL's.  Remaining functional limitations:  transfers, walking, bed mobility, stairs, prolonged standing (30 min), sitting (~60 min tolerance), household chores,

## 2024-07-24 RX ORDER — LEVOTHYROXINE SODIUM 100 MCG
100 TABLET ORAL DAILY
Qty: 90 TABLET | Refills: 2 | Status: SHIPPED | OUTPATIENT
Start: 2024-07-24

## 2024-07-25 ENCOUNTER — HOSPITAL ENCOUNTER (OUTPATIENT)
Facility: HOSPITAL | Age: 69
Setting detail: RECURRING SERIES
Discharge: HOME | End: 2024-07-28
Payer: MEDICARE

## 2024-07-25 PROCEDURE — G0283 ELEC STIM OTHER THAN WOUND: HCPCS

## 2024-07-25 PROCEDURE — 97112 NEUROMUSCULAR REEDUCATION: CPT

## 2024-07-25 PROCEDURE — 97110 THERAPEUTIC EXERCISES: CPT

## 2024-07-25 NOTE — PROGRESS NOTES
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 Inc pain during, took 4 tylenol, used biofreeze and hot shower.      TREATMENT AREA =  Other low back pain [M54.59]     OBJECTIVE    Modalities Rationale:     decrease pain to improve patient's ability to progress to PLOF and address remaining functional goals.    10 min [x] Estim Unattended, type/location: IFC to l/s; reclined h/l                                     [x]  w/ice    []  w/heat    min [] Estim Attended, type/location:                                     []  w/US     []  w/ice    []  w/heat    []  TENS insruct      min []  Mechanical Traction: type/lbs                   []  pro   []  sup   []  int   []  cont    []  before manual    []  after manual    min []  Ultrasound, settings/location:      min  unbill []  Ice     []  Heat    location/position:     min []  Paraffin,  details:     min []  Vasopneumatic Device, press/temp:     min []  Whirlpool / Fluido:    If using vaso (only need to measure limb vaso being performed on)      pre-treatment girth :       post-treatment girth :       measured at (landmark location) :      min []  Other:    Skin assessment post-treatment:   Intact      Therapeutic Procedures:    Tx Min Billable or 1:1 Min (if diff from Tx Min) Procedure, Rationale, Specifics   34 [phone] [id number] Therapeutic Exercise (timed):  increase ROM, strength, coordination, balance, and proprioception to improve patient's ability to progress to PLOF and address remaining functional goals. (see flow sheet as applicable)     Details if

## 2024-07-26 ENCOUNTER — TELEPHONE (OUTPATIENT)
Facility: CLINIC | Age: 69
End: 2024-07-26

## 2024-07-26 NOTE — TELEPHONE ENCOUNTER
SYNTHROID 100 MCG tablet   [facility] [place], [place].    Pt stated the pharmacy will not refill her medication

## 2024-07-26 NOTE — TELEPHONE ENCOUNTER
Called pharmacy to see why patient could not fill her medication. Pharmacy says they are in the processes of filling ti and it is not ready for pickup yet.

## 2024-07-26 NOTE — TELEPHONE ENCOUNTER
Pt stated she is completely out of medicine and she would like for the nurse to give her a call when its refilled. Please advise. Thank you!!!    SYNTHROID 100 MCG tablet   [facility] [place], [place].     Pt stated the pharmacy will not refill her medication

## 2024-07-30 ENCOUNTER — HOSPITAL ENCOUNTER (OUTPATIENT)
Facility: HOSPITAL | Age: 69
Setting detail: RECURRING SERIES
Discharge: HOME | End: 2024-08-02
Payer: MEDICARE

## 2024-07-30 PROCEDURE — G0283 ELEC STIM OTHER THAN WOUND: HCPCS

## 2024-07-30 PROCEDURE — 97112 NEUROMUSCULAR REEDUCATION: CPT

## 2024-07-30 PROCEDURE — 97110 THERAPEUTIC EXERCISES: CPT

## 2024-07-30 PROCEDURE — 97530 THERAPEUTIC ACTIVITIES: CPT

## 2024-07-30 NOTE — PROGRESS NOTES
PHYSICAL / OCCUPATIONAL THERAPY - DAILY TREATMENT NOTE    Patient Name: Maryellen Alejo    Date: 2024    : 1955  Insurance: Payor: [employer] MEDICARE / Plan: AETNA MEDICARE-ADVANTAGE PPO / Product Type: Medicare /      Patient  verified Yes     Visit #   Current / Total 8 16   Time   In / Out 1:01 2:08   Pain   In / Out 6.5/10 510   Subjective Functional Status/Changes: Pt states she is not feeling too bad today. Has been putting her house back together after having her floors replaced; causes some pain but manageable. =     TREATMENT AREA =  Other low back pain [M54.59]     OBJECTIVE    Modalities Rationale:     decrease pain to improve patient's ability to progress to PLOF and address remaining functional goals.    10 min [x] Estim Unattended, type/location: IFC to l/s; reclined h/l                                     [x]  w/ice    []  w/heat    min [] Estim Attended, type/location:                                     []  w/US     []  w/ice    []  w/heat    []  TENS insruct      min []  Mechanical Traction: type/lbs                   []  pro   []  sup   []  int   []  cont    []  before manual    []  after manual    min []  Ultrasound, settings/location:      min  unbill []  Ice     []  Heat    location/position:     min []  Paraffin,  details:     min []  Vasopneumatic Device, press/temp:     min []  Whirlpool / Fluido:    If using vaso (only need to measure limb vaso being performed on)      pre-treatment girth :       post-treatment girth :       measured at (landmark location) :      min []  Other:    Skin assessment post-treatment:   Intact      Therapeutic Procedures:    Tx Min Billable or 1:1 Min (if diff from Tx Min) Procedure, Rationale, Specifics   [phone] [id number] Therapeutic Exercise (timed):  increase ROM, strength, coordination, balance, and proprioception to improve patient's ability to progress to PLOF and address remaining functional goals. (see flow sheet as applicable)     Details if

## 2024-08-06 ENCOUNTER — APPOINTMENT (OUTPATIENT)
Facility: HOSPITAL | Age: 69
End: 2024-08-06
Payer: MEDICARE

## 2024-08-13 ENCOUNTER — HOSPITAL ENCOUNTER (OUTPATIENT)
Facility: HOSPITAL | Age: 69
Setting detail: RECURRING SERIES
Discharge: HOME | End: 2024-08-16
Payer: MEDICARE

## 2024-08-13 PROCEDURE — 97112 NEUROMUSCULAR REEDUCATION: CPT

## 2024-08-13 PROCEDURE — 97110 THERAPEUTIC EXERCISES: CPT

## 2024-08-13 NOTE — PROGRESS NOTES
PHYSICAL / OCCUPATIONAL THERAPY - DAILY TREATMENT NOTE    Patient Name: Maryellen Alejo    Date: 2024    : 1955  Insurance: Payor: [employer] MEDICARE / Plan: AETNA MEDICARE-ADVANTAGE PPO / Product Type: Medicare /      Patient  verified Yes     Visit #   Current / Total 9 16   Time   In / Out 1:13 1:47   Pain   In / Out 6 6   Subjective Functional Status/Changes: Pt states she has been busy the past couple of weeks with visiting family and lifting grandchild (18 lbs)/cleaning house. She got her TENS but hasn't opened it yet     TREATMENT AREA =  Other low back pain [M54.59]     OBJECTIVE    Modalities Rationale:     decrease pain to improve patient's ability to progress to PLOF and address remaining functional goals.    PD min [x] Estim Unattended, type/location: IFC to l/s; reclined h/l                                     [x]  w/ice    []  w/heat    min [] Estim Attended, type/location:                                     []  w/US     []  w/ice    []  w/heat    []  TENS insruct      min []  Mechanical Traction: type/lbs                   []  pro   []  sup   []  int   []  cont    []  before manual    []  after manual    min []  Ultrasound, settings/location:      min  unbill []  Ice     []  Heat    location/position:     min []  Paraffin,  details:     min []  Vasopneumatic Device, press/temp:     min []  Whirlpool / Fluido:    If using vaso (only need to measure limb vaso being performed on)      pre-treatment girth :       post-treatment girth :       measured at (landmark location) :      min []  Other:    Skin assessment post-treatment:   Intact      Therapeutic Procedures:    Tx Min Billable or 1:1 Min (if diff from Tx Min) Procedure, Rationale, Specifics   20 [phone] [id number] Therapeutic Exercise (timed):  increase ROM, strength, coordination, balance, and proprioception to improve patient's ability to progress to PLOF and address remaining functional goals. (see flow sheet as applicable)     Details if

## 2024-08-15 ENCOUNTER — HOSPITAL ENCOUNTER (OUTPATIENT)
Facility: HOSPITAL | Age: 69
Setting detail: RECURRING SERIES
Discharge: HOME | End: 2024-08-18
Payer: MEDICARE

## 2024-08-15 PROCEDURE — G0283 ELEC STIM OTHER THAN WOUND: HCPCS

## 2024-08-15 PROCEDURE — 97110 THERAPEUTIC EXERCISES: CPT

## 2024-08-15 PROCEDURE — 97112 NEUROMUSCULAR REEDUCATION: CPT

## 2024-08-15 NOTE — PROGRESS NOTES
PHYSICAL / OCCUPATIONAL THERAPY - DAILY TREATMENT NOTE    Patient Name: Maryellen Alejo    Date: 8/15/2024    : 1955  Insurance: Payor: RE[employer]I MEDICARE / Plan: AETNA MEDICARE-ADVANTAGE PPO / Product Type: Medicare /      Patient  verified Yes     Visit #   Current / Total 10 16   Time   In / Out 1:00 1:55   Pain   In / Out 6 5   Subjective Functional Status/Changes: Pt c/o increased pain after last session and the following day; \"I couldn't do anything except lay on the couch\". C/o feeling frustrated/down due to fluctuating activity tolerance. Pt states she has a [employer] at home and wants to know if she can get on it.     TREATMENT AREA =  Other low back pain [M54.59]     OBJECTIVE    Modalities Rationale:     decrease pain to improve patient's ability to progress to PLOF and address remaining functional goals.    10 min [x] Estim Unattended, type/location: IFC to l/s; reclined h/l; Pt and spouse ed on use with her TENS device and spouse able to demonstrate good independence with pt only requiring assist for pad application/removal to l/s                                    []  w/ice    []  w/heat    min [] Estim Attended, type/location:                                     []  w/US     []  w/ice    []  w/heat    []  TENS insruct      min []  Mechanical Traction: type/lbs                   []  pro   []  sup   []  int   []  cont    []  before manual    []  after manual    min []  Ultrasound, settings/location:      min  unbill []  Ice     []  Heat    location/position:     min []  Paraffin,  details:     min []  Vasopneumatic Device, press/temp:     min []  Whirlpool / Fluido:    If using vaso (only need to measure limb vaso being performed on)      pre-treatment girth :       post-treatment girth :       measured at (landmark location) :      min []  Other:    Skin assessment post-treatment:   Intact      Therapeutic Procedures:    Tx Min Billable or 1:1 Min (if diff from Tx Min) Procedure, Rationale,

## 2024-08-20 ENCOUNTER — APPOINTMENT (OUTPATIENT)
Facility: HOSPITAL | Age: 69
End: 2024-08-20
Payer: MEDICARE

## 2024-08-22 ENCOUNTER — OFFICE VISIT (OUTPATIENT)
Facility: CLINIC | Age: 69
End: 2024-08-22

## 2024-08-22 VITALS
RESPIRATION RATE: 20 BRPM | WEIGHT: 252 LBS | DIASTOLIC BLOOD PRESSURE: 85 MMHG | TEMPERATURE: 98.4 F | SYSTOLIC BLOOD PRESSURE: 135 MMHG | OXYGEN SATURATION: 97 % | BODY MASS INDEX: 38.19 KG/M2 | HEIGHT: 68 IN | HEART RATE: 72 BPM

## 2024-08-22 DIAGNOSIS — E78.5 HYPERLIPIDEMIA, UNSPECIFIED HYPERLIPIDEMIA TYPE: ICD-10-CM

## 2024-08-22 DIAGNOSIS — E03.9 HYPOTHYROIDISM, UNSPECIFIED TYPE: ICD-10-CM

## 2024-08-22 DIAGNOSIS — Z00.00 INITIAL MEDICARE ANNUAL WELLNESS VISIT: Primary | ICD-10-CM

## 2024-08-22 DIAGNOSIS — R73.9 HYPERGLYCEMIA, UNSPECIFIED: ICD-10-CM

## 2024-08-22 DIAGNOSIS — D50.9 IRON DEFICIENCY ANEMIA, UNSPECIFIED IRON DEFICIENCY ANEMIA TYPE: ICD-10-CM

## 2024-08-22 DIAGNOSIS — E53.8 VITAMIN B 12 DEFICIENCY: ICD-10-CM

## 2024-08-22 DIAGNOSIS — G89.29 CHRONIC MIDLINE LOW BACK PAIN, UNSPECIFIED WHETHER SCIATICA PRESENT: ICD-10-CM

## 2024-08-22 DIAGNOSIS — M54.50 CHRONIC MIDLINE LOW BACK PAIN, UNSPECIFIED WHETHER SCIATICA PRESENT: ICD-10-CM

## 2024-08-22 DIAGNOSIS — R21 RASH AND OTHER NONSPECIFIC SKIN ERUPTION: ICD-10-CM

## 2024-08-22 DIAGNOSIS — E55.9 VITAMIN D DEFICIENCY, UNSPECIFIED: ICD-10-CM

## 2024-08-22 DIAGNOSIS — B89 PARASITOSIS: ICD-10-CM

## 2024-08-22 DIAGNOSIS — I10 ESSENTIAL (PRIMARY) HYPERTENSION: ICD-10-CM

## 2024-08-22 DIAGNOSIS — M25.469 KNEE SWELLING: ICD-10-CM

## 2024-08-22 DIAGNOSIS — Z91.81 AT HIGH RISK FOR FALLS: ICD-10-CM

## 2024-08-22 PROBLEM — E78.00 HYPERCHOLESTEROLEMIA: Status: ACTIVE | Noted: 2024-01-29

## 2024-08-22 RX ORDER — ERGOCALCIFEROL 1.25 MG/1
CAPSULE ORAL
Qty: 13 CAPSULE | Refills: 3 | Status: SHIPPED | OUTPATIENT
Start: 2024-08-22

## 2024-08-22 ASSESSMENT — PATIENT HEALTH QUESTIONNAIRE - PHQ9
10. IF YOU CHECKED OFF ANY PROBLEMS, HOW DIFFICULT HAVE THESE PROBLEMS MADE IT FOR YOU TO DO YOUR WORK, TAKE CARE OF THINGS AT HOME, OR GET ALONG WITH OTHER PEOPLE: NOT DIFFICULT AT ALL
8. MOVING OR SPEAKING SO SLOWLY THAT OTHER PEOPLE COULD HAVE NOTICED. OR THE OPPOSITE, BEING SO FIGETY OR RESTLESS THAT YOU HAVE BEEN MOVING AROUND A LOT MORE THAN USUAL: NOT AT ALL
1. LITTLE INTEREST OR PLEASURE IN DOING THINGS: NOT AT ALL
SUM OF ALL RESPONSES TO PHQ QUESTIONS 1-9: 0
SUM OF ALL RESPONSES TO PHQ QUESTIONS 1-9: 0
5. POOR APPETITE OR OVEREATING: NOT AT ALL
SUM OF ALL RESPONSES TO PHQ9 QUESTIONS 1 & 2: 0
2. FEELING DOWN, DEPRESSED OR HOPELESS: NOT AT ALL
9. THOUGHTS THAT YOU WOULD BE BETTER OFF DEAD, OR OF HURTING YOURSELF: NOT AT ALL
SUM OF ALL RESPONSES TO PHQ QUESTIONS 1-9: 0
3. TROUBLE FALLING OR STAYING ASLEEP: NOT AT ALL
4. FEELING TIRED OR HAVING LITTLE ENERGY: NOT AT ALL
6. FEELING BAD ABOUT YOURSELF - OR THAT YOU ARE A FAILURE OR HAVE LET YOURSELF OR YOUR FAMILY DOWN: NOT AT ALL
SUM OF ALL RESPONSES TO PHQ QUESTIONS 1-9: 0
7. TROUBLE CONCENTRATING ON THINGS, SUCH AS READING THE NEWSPAPER OR WATCHING TELEVISION: NOT AT ALL

## 2024-08-22 ASSESSMENT — LIFESTYLE VARIABLES
HOW MANY STANDARD DRINKS CONTAINING ALCOHOL DO YOU HAVE ON A TYPICAL DAY: PATIENT DOES NOT DRINK
HOW OFTEN DO YOU HAVE A DRINK CONTAINING ALCOHOL: NEVER

## 2024-08-22 NOTE — PROGRESS NOTES
1. \"Have you been to the ER, urgent care clinic since your last visit?  Hospitalized since your last visit?\"No    2. \"Have you seen or consulted any other health care providers outside of the [facility] since your last visit?\" No    3. For patients aged 45-75: Has the patient had a colonoscopy / FIT/ Cologuard? Yes      If the patient is female:    4. For patients aged 40-74: Has the patient had a mammogram within the past 2 years? Yes      5. For patients aged 21-65: Has the patient had a pap smear? Not applicable

## 2024-08-22 NOTE — PROGRESS NOTES
HPI  Maryellen Alejo comes in for follow up care.  Left leg pain: Patient has pain left leg at the tibial plateau.  She fell previously and sustained injury at the tibial plateau.  Now there is a bump that is firm.  There is no bleeding or redness.  I will order an x-ray of the left knee to evaluate for abnormality.  Skin rash: Patient has chronic skin rash.  Rash is sporadic and the occasionally manifests as dermatographia.  She was seen by dermatologist and states that she was diagnosed as having ashy dermatosis.  We will request records from her dermatologist.  Patient has been seen on various occasions with different dermatologist in an effort to try and find out what is causing her recurrent skin rash.  HTN: Patient has hypertension.  Blood pressure is stable.  Denies headache, changes in vision or focal weakness.  Patient is on HCTZ and tolerating the same.  She will take a low-sodium diet.  Will recheck labs.  Hypothyroidism: Patient has hypothyroidism.  She is on Synthroid 100 mcg daily.  Will recheck labs.  Parasitic infection: Patient suspects that he has had parasitic infection that has dermatological manifestations.  Requests Deanna antibody screen for strongyloidiasis.  She also would like to get ova and parasite test done.  Request is placed.  Dyslipidemia: Patient has dyslipidemia.  She will continue to exercise and take a diet low in polysaturated fats.  She takes Lipitor 10 mg daily.  Continue current management plan.  Will recheck lipid panel.  Hypovitaminosis D: I will check vitamin D levels.  Patient is on vitamin D replacement therapy weekly.  Vitamin B12 deficiency: Patient has vitamin B12 deficiency.  I will check vitamin B12 levels.  Hyperglycemia: Patient has hyperglycemia and prediabetes.  Will check HbA1c.      Past Medical History  Past Medical History:   Diagnosis Date    Abnormal nuclear cardiac imaging test 02/09/2012    Mid to distal anteroseptal and apical reversible defect concerning

## 2024-08-27 ENCOUNTER — HOSPITAL ENCOUNTER (OUTPATIENT)
Facility: HOSPITAL | Age: 69
Setting detail: RECURRING SERIES
Discharge: HOME | End: 2024-08-30
Payer: MEDICARE

## 2024-08-27 PROCEDURE — 97530 THERAPEUTIC ACTIVITIES: CPT

## 2024-08-27 PROCEDURE — 97110 THERAPEUTIC EXERCISES: CPT

## 2024-08-27 PROCEDURE — 97112 NEUROMUSCULAR REEDUCATION: CPT

## 2024-08-27 NOTE — PROGRESS NOTES
PT DISCHARGE DAILY NOTE AND SUMMARY     Date:2024  Patient name: Maryellen Alejo Start of Care: 2024   Referral source: Oliver Aguirre MD : [id number]   Medical/Treatment Diagnosis: Other low back pain [M54.59] Onset Date:chronic     Prior Hospitalization: see medical history Provider#: [id number]   Comorbidities:   Prior hx of uterine cancer, colon resection, gall bladder removal     Prior Level of Function:Indep comm ambulator but declining function over time due to ongoing back pain   Insurance: Payor: [employer] MEDICARE / Plan: [employer] MEDICARE-ADVANTAGE PPO / Product Type: Medicare /      Visits from Start of Care: 11  Missed Visits: 1    Medicare: Reporting Period (date from last assessment to current assessment): 2024-2024    Patient  verified yes     Visit #   Current / Total 11 16   Time   In / Out 106 156   Pain   In / Out 6 6   Subjective Functional Status/Changes: Pt stated she feels since initial visit, however still having, feels like she has the tool she needs to manage pain and would like to DC today.     TREATMENT AREA =  Other low back pain [M54.59]    If an interpreting service is utilized for treatment of this patient, the contents of this document represent the material reviewed with the patient via the .     OBJECTIVE         Therapeutic Procedures:    Tx Min Billable or 1:1 Min (if diff from Tx Min) Procedure, Rationale, Specifics   15  05230 Therapeutic Exercise (timed):  increase ROM, strength, coordination, balance, and proprioception to improve patient's ability to progress to PLOF and address remaining functional goals. (see flow sheet as applicable)     Details if applicable:       20  27208 Neuromuscular Re-Education (timed):  improve balance, coordination, kinesthetic sense, posture, core stability and proprioception to improve patient's ability to develop conscious control of individual muscles and awareness of position of extremities in order to  demonstrate increased functional mobility.  Status at EVAL: 34% and daily pain      Current: NOT MET   score , 38%  8/27/2024                  RECOMMENDATIONS  Discontinue therapy. Progressing towards or have reached established goals.    Luna Julian, PTA       8/27/2024       1:03 PM  If an interpreting service was utilized for treatment of this patient, the contents of this document represent the material reviewed with the patient via the .     Payor: [employer] / Plan: [employer] MEDICARE-ADVANTAGE PPO / Product Type: Medicare /      Not Medicaid Ins, no signature required for DC

## 2024-08-29 ENCOUNTER — APPOINTMENT (OUTPATIENT)
Facility: HOSPITAL | Age: 69
End: 2024-08-29
Payer: MEDICARE

## 2024-09-04 ENCOUNTER — OFFICE VISIT (OUTPATIENT)
Age: 69
End: 2024-09-04
Payer: MEDICARE

## 2024-09-04 VITALS
HEIGHT: 68 IN | BODY MASS INDEX: 37.89 KG/M2 | WEIGHT: 250 LBS | TEMPERATURE: 98 F | HEART RATE: 71 BPM | OXYGEN SATURATION: 96 %

## 2024-09-04 DIAGNOSIS — M51.36 DDD (DEGENERATIVE DISC DISEASE), LUMBAR: Primary | ICD-10-CM

## 2024-09-04 DIAGNOSIS — M47.816 SPONDYLOSIS OF LUMBAR SPINE: ICD-10-CM

## 2024-09-04 DIAGNOSIS — M54.16 LUMBAR NEURITIS: ICD-10-CM

## 2024-09-04 PROCEDURE — 99214 OFFICE O/P EST MOD 30 MIN: CPT | Performed by: PHYSICAL MEDICINE & REHABILITATION

## 2024-09-04 PROCEDURE — 1123F ACP DISCUSS/DSCN MKR DOCD: CPT | Performed by: PHYSICAL MEDICINE & REHABILITATION

## 2024-09-04 NOTE — PROGRESS NOTES
how she progresses prior to pursuing more aggressive treatment. The patient is Neurologically intact. I will see the patient back in 3 months or earlier if needed.     Written by Chapo Sandra, medical scribe, as dictated by Oliver Aguirre MD  I examined the patient, reviewed and agree with the note.

## 2024-09-30 ENCOUNTER — TELEPHONE (OUTPATIENT)
Facility: CLINIC | Age: 69
End: 2024-09-30

## 2024-09-30 NOTE — TELEPHONE ENCOUNTER
----- Message from Jane LEONORA sent at 9/30/2024  9:11 AM EDT -----  Regarding: ECC Escalation To Practice  ECC Escalation To Practice      Type of Escalation: Acute Care Symptom  --------------------------------------------------------------------------------------------------------------------------    Information for Provider:  Patient is looking for appointment for: Symptom Cough\Cold  Reasons for Message: Unable to reach practice     Additional Information Patient went in to the walk in clinic  yesterday 9/29/2024  Has been sick for 3 weeks , coughing fever , cold , and congestion on nose    patient mentioned she had a chest xray   --------------------------------------------------------------------------------------------------------------------------    Relationship to Patient: Self     Call Back Info: Do not leave any message, patient will call back for answer  Preferred Call Back Number: Phone [phone]

## 2024-10-08 ENCOUNTER — OFFICE VISIT (OUTPATIENT)
Facility: CLINIC | Age: 69
End: 2024-10-08
Payer: MEDICARE

## 2024-10-08 VITALS
HEIGHT: 68 IN | SYSTOLIC BLOOD PRESSURE: 124 MMHG | TEMPERATURE: 97.8 F | RESPIRATION RATE: 20 BRPM | OXYGEN SATURATION: 97 % | DIASTOLIC BLOOD PRESSURE: 80 MMHG | BODY MASS INDEX: 37.74 KG/M2 | WEIGHT: 249 LBS | HEART RATE: 83 BPM

## 2024-10-08 DIAGNOSIS — R10.9 FLANK PAIN: ICD-10-CM

## 2024-10-08 DIAGNOSIS — I10 ESSENTIAL (PRIMARY) HYPERTENSION: ICD-10-CM

## 2024-10-08 DIAGNOSIS — E55.9 VITAMIN D DEFICIENCY, UNSPECIFIED: ICD-10-CM

## 2024-10-08 DIAGNOSIS — E03.9 HYPOTHYROIDISM, UNSPECIFIED TYPE: ICD-10-CM

## 2024-10-08 DIAGNOSIS — E78.5 HYPERLIPIDEMIA, UNSPECIFIED HYPERLIPIDEMIA TYPE: ICD-10-CM

## 2024-10-08 DIAGNOSIS — R05.3 CHRONIC COUGH: Primary | ICD-10-CM

## 2024-10-08 LAB
BILIRUBIN, URINE, POC: NORMAL
BLOOD URINE, POC: NEGATIVE
GLUCOSE URINE, POC: NEGATIVE
KETONES, URINE, POC: NEGATIVE
LEUKOCYTE ESTERASE, URINE, POC: NEGATIVE
NITRITE, URINE, POC: NEGATIVE
PH, URINE, POC: 5.5 (ref 4.6–8)
PROTEIN,URINE, POC: NEGATIVE
SPECIFIC GRAVITY, URINE, POC: 1.03 (ref 1–1.03)
URINALYSIS CLARITY, POC: CLEAR
URINALYSIS COLOR, POC: YELLOW
UROBILINOGEN, POC: NORMAL

## 2024-10-08 PROCEDURE — 99214 OFFICE O/P EST MOD 30 MIN: CPT | Performed by: FAMILY MEDICINE

## 2024-10-08 PROCEDURE — 81003 URINALYSIS AUTO W/O SCOPE: CPT | Performed by: FAMILY MEDICINE

## 2024-10-08 PROCEDURE — 3074F SYST BP LT 130 MM HG: CPT | Performed by: FAMILY MEDICINE

## 2024-10-08 PROCEDURE — 3079F DIAST BP 80-89 MM HG: CPT | Performed by: FAMILY MEDICINE

## 2024-10-08 PROCEDURE — 1123F ACP DISCUSS/DSCN MKR DOCD: CPT | Performed by: FAMILY MEDICINE

## 2024-10-08 NOTE — PROGRESS NOTES
HPI  Maryellen Alejo comes in for follow up care.  Sore throat: Patient was recently seen in urgent care.  She had a sore throat, headache, low-grade fever, shortness of breath.  Cough was productive of thick phlegm and had been ongoing for about 5 days.  States that she suspects like pneumonia.  She had taken Augmentin at 1000 mg twice a day that she had at home.  In urgent care she had a chest x-ray done that was negative for acute abnormality.  She was diagnosed with bronchitis.  She was given azithromycin.  She comes in for follow-up care.  Still continues to cough but symptoms seem to be improving.  Sore throat has improved.  We discussed supportive care.  She will continue with this.  She continues to have pleuritic chest pain that comes on and off.  Patient states that she has a history of smoker.  Quit years ago but had smoked for a long time.  She is concerned about lung abnormality or cancer given the chronic smoking.  We will order CT scan of the chest given the chronicity of her cough.  I will follow-up with the result.  If symptoms persist she may need to see the pulmonologist.  She denies wheeze or shortness of breath.    Hypertension: Patient has hypertension.  Blood pressure is stable.  She is on HCTZ.  Will continue current treatment plan.  Dyslipidemia: Patient is on Lipitor 10 mg daily.  Stable on medication.  Continue current treatment plan.  She will exercise and take a diet low in polysaturated fats.  Hypothyroidism: Patient is on Synthroid 100 mcg daily.  Will recheck labs at next visit.  Hypovitaminosis D: Patient takes vitamin D replacement therapy weekly.  She will do supportive care.  Can take Tylenol as needed for pain.  We discussed supportive care.    Past Medical History  Past Medical History:   Diagnosis Date    Abnormal nuclear cardiac imaging test 02/09/2012    Mid to distal anteroseptal and apical reversible defect concerning for ishcmeia.  Mild-mod, mid-distal anterior & apical

## 2024-10-08 NOTE — PROGRESS NOTES
1. \"Have you been to the ER, urgent care clinic since your last visit?  Hospitalized since your last visit?\"Yes When: [facility]  9-    2. \"Have you seen or consulted any other health care providers outside of the [facility] since your last visit?\" No    3. For patients aged 45-75: Has the patient had a colonoscopy / FIT/ Cologuard? Yes      If the patient is female:    4. For patients aged 40-74: Has the patient had a mammogram within the past 2 years? Yes      5. For patients aged 21-65: Has the patient had a pap smear? Not applicable

## 2024-10-28 RX ORDER — LEVOTHYROXINE SODIUM 100 UG/1
100 TABLET ORAL DAILY
Qty: 90 TABLET | Refills: 2 | Status: SHIPPED | OUTPATIENT
Start: 2024-10-28

## 2024-10-28 NOTE — TELEPHONE ENCOUNTER
.Last seen 10/8/2024   Last labs 5/17/2024  Last filled  7/24/2024  Next appointment 10/29/2024     Lab Results   Component Value Date     05/17/2024    K 4.1 05/17/2024     05/17/2024    CO2 26 05/17/2024    BUN 11 05/17/2024    CREATININE 0.80 05/17/2024    GLUCOSE 110 (H) 05/17/2024    CALCIUM 9.2 05/17/2024    BILITOT 0.8 05/17/2024    ALKPHOS 79 05/17/2024    AST 36 05/17/2024    ALT 61 (H) 05/17/2024    LABGLOM 80 05/17/2024    GFRAA >60.0 05/12/2022    AGRATIO 2.0 05/12/2022    GLOB 3.1 05/17/2024

## 2024-10-29 ENCOUNTER — OFFICE VISIT (OUTPATIENT)
Facility: CLINIC | Age: 69
End: 2024-10-29
Payer: MEDICARE

## 2024-10-29 DIAGNOSIS — Z23 FLU VACCINE NEED: ICD-10-CM

## 2024-10-29 DIAGNOSIS — Z23 NEED FOR VACCINATION WITH 20-POLYVALENT PNEUMOCOCCAL CONJUGATE VACCINE: Primary | ICD-10-CM

## 2024-10-29 PROCEDURE — G0009 ADMIN PNEUMOCOCCAL VACCINE: HCPCS | Performed by: FAMILY MEDICINE

## 2024-10-29 PROCEDURE — G0008 ADMIN INFLUENZA VIRUS VAC: HCPCS | Performed by: FAMILY MEDICINE

## 2024-10-29 PROCEDURE — 90653 IIV ADJUVANT VACCINE IM: CPT | Performed by: FAMILY MEDICINE

## 2024-10-29 PROCEDURE — 90677 PCV20 VACCINE IM: CPT | Performed by: FAMILY MEDICINE

## 2024-10-29 NOTE — PROGRESS NOTES
Obtained immunization consent form from patient.  Administered flu vaccine in left arm . Verified by me  that this is the correct immunization/injection. Patient observed for 15 minutes with no adverse reaction.              Obtained immunization consent form from patient.  Administered pneumonia vaccine in right arm. Verified by me  that this is the correct immunization/injection. Patient observed for 15 minutes with no adverse reaction.

## 2024-11-19 ENCOUNTER — TELEPHONE (OUTPATIENT)
Facility: CLINIC | Age: 69
End: 2024-11-19

## 2024-11-19 DIAGNOSIS — F41.9 ANXIETY DISORDER, UNSPECIFIED TYPE: ICD-10-CM

## 2024-11-19 RX ORDER — DIAZEPAM 2 MG/1
TABLET ORAL
Qty: 30 TABLET | OUTPATIENT
Start: 2024-11-19

## 2024-12-18 ENCOUNTER — OFFICE VISIT (OUTPATIENT)
Age: 69
End: 2024-12-18
Payer: MEDICARE

## 2024-12-18 VITALS
WEIGHT: 251 LBS | SYSTOLIC BLOOD PRESSURE: 123 MMHG | BODY MASS INDEX: 38.04 KG/M2 | HEART RATE: 70 BPM | DIASTOLIC BLOOD PRESSURE: 82 MMHG | HEIGHT: 68 IN | OXYGEN SATURATION: 96 %

## 2024-12-18 DIAGNOSIS — R00.2 PALPITATIONS: Primary | ICD-10-CM

## 2024-12-18 DIAGNOSIS — R07.89 OTHER CHEST PAIN: ICD-10-CM

## 2024-12-18 DIAGNOSIS — R06.02 SHORTNESS OF BREATH: ICD-10-CM

## 2024-12-18 DIAGNOSIS — E78.5 DYSLIPIDEMIA: ICD-10-CM

## 2024-12-18 DIAGNOSIS — E07.9 THYROID DISEASE: ICD-10-CM

## 2024-12-18 DIAGNOSIS — I49.3 PVC (PREMATURE VENTRICULAR CONTRACTION): ICD-10-CM

## 2024-12-18 PROCEDURE — 1123F ACP DISCUSS/DSCN MKR DOCD: CPT | Performed by: INTERNAL MEDICINE

## 2024-12-18 PROCEDURE — 1126F AMNT PAIN NOTED NONE PRSNT: CPT | Performed by: INTERNAL MEDICINE

## 2024-12-18 PROCEDURE — 93000 ELECTROCARDIOGRAM COMPLETE: CPT | Performed by: INTERNAL MEDICINE

## 2024-12-18 PROCEDURE — 99214 OFFICE O/P EST MOD 30 MIN: CPT | Performed by: INTERNAL MEDICINE

## 2024-12-18 PROCEDURE — 1159F MED LIST DOCD IN RCRD: CPT | Performed by: INTERNAL MEDICINE

## 2024-12-18 ASSESSMENT — PATIENT HEALTH QUESTIONNAIRE - PHQ9
SUM OF ALL RESPONSES TO PHQ QUESTIONS 1-9: 0
1. LITTLE INTEREST OR PLEASURE IN DOING THINGS: NOT AT ALL
SUM OF ALL RESPONSES TO PHQ QUESTIONS 1-9: 0
SUM OF ALL RESPONSES TO PHQ9 QUESTIONS 1 & 2: 0
2. FEELING DOWN, DEPRESSED OR HOPELESS: NOT AT ALL
SUM OF ALL RESPONSES TO PHQ QUESTIONS 1-9: 0
SUM OF ALL RESPONSES TO PHQ QUESTIONS 1-9: 0

## 2024-12-18 NOTE — PROGRESS NOTES
HPI:  69-year-old female here for follow-up.  I saw her about a year ago.  No new chest pain.  She had severe RSV infection a couple of months ago, still gradually improving but gets some mild shortness of breath.  No new chest pain or syncope.  No PND, orthopnea or edema.      Impression:   History of palpitations, PVC, stable.   Recent severe RSV infection with still some residual dyspnea without any obvious crackles on exam today in her lungs.   Thyroid disorder.   Dyslipidemia on statin.   Remote uterine cancer and hysterectomy with chemotherapy and radiation, stable.   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      Plan:  Given her RSV infection a couple of months ago with some residual dyspnea, history of ectopy and palpitations, I am going to follow-up with an echocardiogram to make sure there is no change in heart function.  Continue with statin indefinitely.  All questions answered and I can see back annually.          Wt Readings from Last 3 Encounters:   12/18/24 113.9 kg (251 lb)   10/08/24 112.9 kg (249 lb)   09/04/24 113.4 kg (250 lb)     Past Medical History:   Diagnosis Date    Abnormal nuclear cardiac imaging test 02/09/2012    Mid to distal anteroseptal and apical reversible defect concerning for ishcmeia.  Mild-mod, mid-distal anterior & apical hypk.  EF 67%.  Borderline abnormal EKG w/1-mm inferolateral ST depression at 7 min exercise.  Occasional PVCs.    Anxiety     Carotid bruit 03/08/2013    Mild 1-49% LICA stenosis.      Dengue fever     7/10 while in [place]    Diverticulosis     DVT (deep venous thrombosis) ([facility]) 07/23/2015    Right leg:  No DVT.      Dyslipidemia     GERD (gastroesophageal reflux disease)     Holter monitor, abnormal 04/29/2016    Sinus rhythm, avg HR 67 bpm (range  bpm).  Rare ectopy.      Hypothyroidism     S/P cardiac catheterization 02/24/2012    Angiographically normal coronaries.

## 2025-01-09 ENCOUNTER — TELEPHONE (OUTPATIENT)
Age: 70
End: 2025-01-09

## 2025-01-23 ENCOUNTER — OFFICE VISIT (OUTPATIENT)
Age: 70
End: 2025-01-23
Payer: MEDICARE

## 2025-01-23 VITALS — TEMPERATURE: 98 F | WEIGHT: 251 LBS | HEIGHT: 68 IN | BODY MASS INDEX: 38.04 KG/M2

## 2025-01-23 DIAGNOSIS — M54.16 LUMBAR NEURITIS: ICD-10-CM

## 2025-01-23 DIAGNOSIS — M47.816 SPONDYLOSIS OF LUMBAR SPINE: ICD-10-CM

## 2025-01-23 DIAGNOSIS — M51.362 DEGENERATION OF INTERVERTEBRAL DISC OF LUMBAR REGION WITH DISCOGENIC BACK PAIN AND LOWER EXTREMITY PAIN: Primary | ICD-10-CM

## 2025-01-23 PROCEDURE — 1160F RVW MEDS BY RX/DR IN RCRD: CPT | Performed by: PHYSICAL MEDICINE & REHABILITATION

## 2025-01-23 PROCEDURE — 1123F ACP DISCUSS/DSCN MKR DOCD: CPT | Performed by: PHYSICAL MEDICINE & REHABILITATION

## 2025-01-23 PROCEDURE — 1159F MED LIST DOCD IN RCRD: CPT | Performed by: PHYSICAL MEDICINE & REHABILITATION

## 2025-01-23 PROCEDURE — 99214 OFFICE O/P EST MOD 30 MIN: CPT | Performed by: PHYSICAL MEDICINE & REHABILITATION

## 2025-01-23 PROCEDURE — 1125F AMNT PAIN NOTED PAIN PRSNT: CPT | Performed by: PHYSICAL MEDICINE & REHABILITATION

## 2025-01-23 NOTE — PROGRESS NOTES
extremity pain. She is compliant with her daily HEP, and she continues using her TENs unit. She denies changes in bowel or bladder habits.      reviewed. Body mass index is 38.16 kg/m².    PCP: Doris Beckwith MD      Past Medical History:   Diagnosis Date    Abnormal nuclear cardiac imaging test 2012    Mid to distal anteroseptal and apical reversible defect concerning for ishcmeia.  Mild-mod, mid-distal anterior & apical hypk.  EF 67%.  Borderline abnormal EKG w/1-mm inferolateral ST depression at 7 min exercise.  Occasional PVCs.    Anxiety     Carotid bruit 2013    Mild 1-49% LICA stenosis.      Dengue fever     7/10 while in [place]    Diverticulosis     DVT (deep venous thrombosis) ([facility] 2015    Right leg:  No DVT.      Dyslipidemia     GERD (gastroesophageal reflux disease)     Holter monitor, abnormal 2016    Sinus rhythm, avg HR 67 bpm (range  bpm).  Rare ectopy.      Hypothyroidism     S/P cardiac catheterization 2012    Angiographically normal coronaries.  Hyperdynamic.  EF 70%.  No RWMA.      Scabies exposure 2018    Sinusitis     Tinnitus     Uterine cancer (HCC)     hysterectomy    Uterine carcinoma (HCC)        Social History     Socioeconomic History    Marital status:      Spouse name: None    Number of children: None    Years of education: None    Highest education level: None   Tobacco Use    Smoking status: Former     Current packs/day: 0.00     Types: Cigarettes     Quit date: 1999     Years since quittin.6    Smokeless tobacco: Never   Vaping Use    Vaping status: Never Used   Substance and Sexual Activity    Alcohol use: No     Alcohol/week: 0.0 standard drinks of alcohol    Drug use: No     Types: Prescription, OTC     Social Determinants of Health     Financial Resource Strain: Low Risk  (2024)    Overall Financial Resource Strain (CARDIA)     Difficulty of Paying Living Expenses: Not hard at all   Food Insecurity: No Food

## 2025-02-14 ENCOUNTER — TELEPHONE (OUTPATIENT)
Age: 70
End: 2025-02-14

## 2025-04-15 ENCOUNTER — TELEPHONE (OUTPATIENT)
Facility: CLINIC | Age: 70
End: 2025-04-15

## 2025-04-15 NOTE — TELEPHONE ENCOUNTER
Patient is requesting  amoxicillin-clavulanate (AUGMENTIN) 875-125 MG per tablet     Please be advised

## 2025-07-01 ENCOUNTER — TELEMEDICINE (OUTPATIENT)
Facility: CLINIC | Age: 70
End: 2025-07-01
Payer: MEDICARE

## 2025-07-01 DIAGNOSIS — E03.9 HYPOTHYROIDISM, UNSPECIFIED TYPE: ICD-10-CM

## 2025-07-01 DIAGNOSIS — M54.50 CHRONIC MIDLINE LOW BACK PAIN, UNSPECIFIED WHETHER SCIATICA PRESENT: Primary | ICD-10-CM

## 2025-07-01 DIAGNOSIS — E78.5 HYPERLIPIDEMIA, UNSPECIFIED HYPERLIPIDEMIA TYPE: ICD-10-CM

## 2025-07-01 DIAGNOSIS — G89.29 CHRONIC MIDLINE LOW BACK PAIN, UNSPECIFIED WHETHER SCIATICA PRESENT: Primary | ICD-10-CM

## 2025-07-01 PROCEDURE — 99213 OFFICE O/P EST LOW 20 MIN: CPT | Performed by: FAMILY MEDICINE

## 2025-07-01 PROCEDURE — 1160F RVW MEDS BY RX/DR IN RCRD: CPT | Performed by: FAMILY MEDICINE

## 2025-07-01 PROCEDURE — 1159F MED LIST DOCD IN RCRD: CPT | Performed by: FAMILY MEDICINE

## 2025-07-01 PROCEDURE — 1123F ACP DISCUSS/DSCN MKR DOCD: CPT | Performed by: FAMILY MEDICINE

## 2025-07-01 RX ORDER — CYCLOBENZAPRINE HCL 10 MG
10 TABLET ORAL 3 TIMES DAILY PRN
Qty: 60 TABLET | Refills: 2 | Status: SHIPPED | OUTPATIENT
Start: 2025-07-01

## 2025-07-01 SDOH — ECONOMIC STABILITY: FOOD INSECURITY: WITHIN THE PAST 12 MONTHS, YOU WORRIED THAT YOUR FOOD WOULD RUN OUT BEFORE YOU GOT MONEY TO BUY MORE.: NEVER TRUE

## 2025-07-01 SDOH — ECONOMIC STABILITY: FOOD INSECURITY: WITHIN THE PAST 12 MONTHS, THE FOOD YOU BOUGHT JUST DIDN'T LAST AND YOU DIDN'T HAVE MONEY TO GET MORE.: NEVER TRUE

## 2025-07-01 ASSESSMENT — PATIENT HEALTH QUESTIONNAIRE - PHQ9
SUM OF ALL RESPONSES TO PHQ QUESTIONS 1-9: 0
2. FEELING DOWN, DEPRESSED OR HOPELESS: NOT AT ALL
1. LITTLE INTEREST OR PLEASURE IN DOING THINGS: NOT AT ALL
SUM OF ALL RESPONSES TO PHQ QUESTIONS 1-9: 0
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
## 2025-07-09 ENCOUNTER — HOSPITAL ENCOUNTER (EMERGENCY)
Facility: HOSPITAL | Age: 70
Discharge: HOME | End: 2025-07-09
Attending: EMERGENCY MEDICINE
Payer: MEDICARE

## 2025-07-09 ENCOUNTER — TELEPHONE (OUTPATIENT)
Facility: CLINIC | Age: 70
End: 2025-07-09

## 2025-07-09 ENCOUNTER — APPOINTMENT (OUTPATIENT)
Facility: HOSPITAL | Age: 70
End: 2025-07-09
Payer: MEDICARE

## 2025-07-09 VITALS
OXYGEN SATURATION: 95 % | HEART RATE: 70 BPM | SYSTOLIC BLOOD PRESSURE: 134 MMHG | WEIGHT: 251 LBS | DIASTOLIC BLOOD PRESSURE: 95 MMHG | RESPIRATION RATE: 18 BRPM | TEMPERATURE: 97.8 F | BODY MASS INDEX: 38.16 KG/M2

## 2025-07-09 DIAGNOSIS — R10.9 LEFT FLANK PAIN: Primary | ICD-10-CM

## 2025-07-09 LAB
ALBUMIN SERPL-MCNC: 3.8 G/DL (ref 3.4–5)
ALBUMIN/GLOB SERPL: 1.2 (ref 0.8–1.7)
ALP SERPL-CCNC: 83 U/L (ref 45–117)
ALT SERPL-CCNC: 47 U/L (ref 10–35)
ANION GAP SERPL CALC-SCNC: 12 MMOL/L (ref 3–18)
APPEARANCE UR: CLEAR
AST SERPL-CCNC: 44 U/L (ref 10–38)
BACTERIA URNS QL MICRO: ABNORMAL /HPF
BASOPHILS # BLD: 0.09 K/UL (ref 0–0.1)
BASOPHILS NFR BLD: 1.1 % (ref 0–2)
BILIRUB SERPL-MCNC: 0.6 MG/DL (ref 0.2–1)
BILIRUB UR QL: NEGATIVE
BUN SERPL-MCNC: 13 MG/DL (ref 6–23)
BUN/CREAT SERPL: 15 (ref 12–20)
CALCIUM SERPL-MCNC: 10 MG/DL (ref 8.5–10.1)
CHLORIDE SERPL-SCNC: 104 MMOL/L (ref 98–107)
CO2 SERPL-SCNC: 20 MMOL/L (ref 21–32)
COLOR UR: YELLOW
CREAT SERPL-MCNC: 0.82 MG/DL (ref 0.6–1.3)
DIFFERENTIAL METHOD BLD: ABNORMAL
EOSINOPHIL # BLD: 0.29 K/UL (ref 0–0.4)
EOSINOPHIL NFR BLD: 3.7 % (ref 0–5)
EPITH CASTS URNS QL MICRO: ABNORMAL /LPF (ref 0–5)
ERYTHROCYTE [DISTWIDTH] IN BLOOD BY AUTOMATED COUNT: 14.8 % (ref 11.6–14.5)
GLOBULIN SER CALC-MCNC: 3 G/DL (ref 2–4)
GLUCOSE SERPL-MCNC: 120 MG/DL (ref 74–108)
GLUCOSE UR STRIP.AUTO-MCNC: NEGATIVE MG/DL
HCT VFR BLD AUTO: 45.7 % (ref 35–45)
HGB BLD-MCNC: 15 G/DL (ref 12–16)
HGB UR QL STRIP: NEGATIVE
IMM GRANULOCYTES # BLD AUTO: 0.07 K/UL (ref 0–0.04)
IMM GRANULOCYTES NFR BLD AUTO: 0.9 % (ref 0–0.5)
KETONES UR QL STRIP.AUTO: NEGATIVE MG/DL
LEUKOCYTE ESTERASE UR QL STRIP.AUTO: ABNORMAL
LIPASE SERPL-CCNC: 34 U/L (ref 13–75)
LYMPHOCYTES # BLD: 2.94 K/UL (ref 0.9–3.6)
LYMPHOCYTES NFR BLD: 37.2 % (ref 21–52)
MCH RBC QN AUTO: 28.5 PG (ref 24–34)
MCHC RBC AUTO-ENTMCNC: 32.8 G/DL (ref 31–37)
MCV RBC AUTO: 86.9 FL (ref 78–100)
MONOCYTES # BLD: 0.61 K/UL (ref 0.05–1.2)
MONOCYTES NFR BLD: 7.7 % (ref 3–10)
NEUTS SEG # BLD: 3.9 K/UL (ref 1.8–8)
NEUTS SEG NFR BLD: 49.4 % (ref 40–73)
NITRITE UR QL STRIP.AUTO: NEGATIVE
NRBC # BLD: 0 K/UL (ref 0–0.01)
NRBC BLD-RTO: 0 PER 100 WBC
PH UR STRIP: 6 (ref 5–8)
PLATELET # BLD AUTO: 133 K/UL (ref 135–420)
PLATELET COMMENT: ABNORMAL
PMV BLD AUTO: 13.1 FL (ref 9.2–11.8)
POTASSIUM SERPL-SCNC: 5 MMOL/L (ref 3.5–5.5)
PROT SERPL-MCNC: 6.8 G/DL (ref 6.4–8.2)
PROT UR STRIP-MCNC: NEGATIVE MG/DL
RBC # BLD AUTO: 5.26 M/UL (ref 4.2–5.3)
RBC #/AREA URNS HPF: NEGATIVE /HPF (ref 0–5)
RBC MORPH BLD: ABNORMAL
SODIUM SERPL-SCNC: 136 MMOL/L (ref 136–145)
SP GR UR REFRACTOMETRY: 1.01 (ref 1–1.03)
UROBILINOGEN UR QL STRIP.AUTO: 0.2 EU/DL (ref 0.2–1)
WBC # BLD AUTO: 7.9 K/UL (ref 4.6–13.2)
WBC URNS QL MICRO: ABNORMAL /HPF (ref 0–5)

## 2025-07-09 PROCEDURE — 80053 COMPREHEN METABOLIC PANEL: CPT

## 2025-07-09 PROCEDURE — 6360000002 HC RX W HCPCS: Performed by: EMERGENCY MEDICINE

## 2025-07-09 PROCEDURE — 83690 ASSAY OF LIPASE: CPT

## 2025-07-09 PROCEDURE — 81001 URINALYSIS AUTO W/SCOPE: CPT

## 2025-07-09 PROCEDURE — 6370000000 HC RX 637 (ALT 250 FOR IP): Performed by: EMERGENCY MEDICINE

## 2025-07-09 PROCEDURE — 85025 COMPLETE CBC W/AUTO DIFF WBC: CPT

## 2025-07-09 PROCEDURE — 74174 CTA ABD&PLVS W/CONTRAST: CPT

## 2025-07-09 PROCEDURE — 96374 THER/PROPH/DIAG INJ IV PUSH: CPT

## 2025-07-09 PROCEDURE — 99285 EMERGENCY DEPT VISIT HI MDM: CPT

## 2025-07-09 PROCEDURE — 6360000004 HC RX CONTRAST MEDICATION: Performed by: EMERGENCY MEDICINE

## 2025-07-09 RX ORDER — LIDOCAINE 50 MG/G
1 PATCH TOPICAL DAILY
Qty: 10 PATCH | Refills: 0 | Status: SHIPPED | OUTPATIENT
Start: 2025-07-09 | End: 2025-07-19

## 2025-07-09 RX ORDER — MORPHINE SULFATE 4 MG/ML
4 INJECTION, SOLUTION INTRAMUSCULAR; INTRAVENOUS
Status: DISCONTINUED | OUTPATIENT
Start: 2025-07-09 | End: 2025-07-09

## 2025-07-09 RX ORDER — MEPERIDINE HYDROCHLORIDE 50 MG/ML
50 INJECTION INTRAMUSCULAR; INTRAVENOUS; SUBCUTANEOUS ONCE
Status: COMPLETED | OUTPATIENT
Start: 2025-07-09 | End: 2025-07-09

## 2025-07-09 RX ORDER — OXYCODONE AND ACETAMINOPHEN 5; 325 MG/1; MG/1
1 TABLET ORAL EVERY 6 HOURS PRN
Qty: 12 TABLET | Refills: 0 | Status: SHIPPED | OUTPATIENT
Start: 2025-07-09 | End: 2025-07-12

## 2025-07-09 RX ORDER — LIDOCAINE 4 G/G
1 PATCH TOPICAL
Status: DISCONTINUED | OUTPATIENT
Start: 2025-07-09 | End: 2025-07-09 | Stop reason: HOSPADM

## 2025-07-09 RX ORDER — IOPAMIDOL 755 MG/ML
80 INJECTION, SOLUTION INTRAVASCULAR
Status: COMPLETED | OUTPATIENT
Start: 2025-07-09 | End: 2025-07-09

## 2025-07-09 RX ADMIN — MEPERIDINE HYDROCHLORIDE 50 MG: 50 INJECTION INTRAMUSCULAR; INTRAVENOUS; SUBCUTANEOUS at 17:53

## 2025-07-09 RX ADMIN — IOPAMIDOL 100 ML: 755 INJECTION, SOLUTION INTRAVENOUS at 19:04

## 2025-07-09 ASSESSMENT — PAIN SCALES - GENERAL: PAINLEVEL_OUTOF10: 9

## 2025-07-09 ASSESSMENT — PAIN DESCRIPTION - LOCATION: LOCATION: FLANK

## 2025-07-09 ASSESSMENT — PAIN DESCRIPTION - DESCRIPTORS: DESCRIPTORS: ACHING;SHARP

## 2025-07-09 ASSESSMENT — PAIN - FUNCTIONAL ASSESSMENT: PAIN_FUNCTIONAL_ASSESSMENT: 0-10

## 2025-07-09 NOTE — ED NOTES
EMERGENCY DEPARTMENT HISTORY AND PHYSICAL EXAM      Date: 7/9/2025  Patient Name: Maryellen Alejo    History of Presenting Illness     Chief Complaint   Patient presents with    Flank Pain       History (Context): Maryellen Alejo is a 69 y.o. female with a PMH of uterine cancer s/p hysterectomy and diverticulitis s/p colon resection who presents to the ED today with chief complaint of constant non-radiating L sided flank pain x3 days. She describes the pain as dull, throbbing, \"zapping\" and rates it a 10/10. She has never had pain like this before and has taken advil, tylenol and flexeril which has not helped. She has also tried icy hot topical treatment which has not helped. She was given the flexeril 5 days ago for a L sided lumbar muscle strain for which it did help. She denies dysuria, chest pain, abdominal pain, vomiting, and changes in urinary frequency. She does report hematuria, possible fever, chills, and nausea.      PCP: Doris Beckwith MD    No current facility-administered medications for this encounter.     Current Outpatient Medications   Medication Sig Dispense Refill    SYNTHROID 100 MCG tablet TAKE 1 TABLET BY MOUTH DAILY 90 tablet 2    cyclobenzaprine (FLEXERIL) 10 MG tablet Take 1 tablet by mouth 3 times daily as needed for Muscle spasms 60 tablet 2    amoxicillin-clavulanate (AUGMENTIN) 875-125 MG per tablet Take 1 tablet by mouth 2 times daily for 7 days 14 tablet 0    vitamin D (ERGOCALCIFEROL) 1.25 MG (75798 UT) CAPS capsule TAKE 1 CAPSULE BY MOUTH EVERY 7 DAYS (Patient not taking: Reported on 7/17/2025) 13 capsule 3    atorvastatin (LIPITOR) 10 MG tablet TAKE 1 TABLET BY MOUTH DAILY 90 tablet 1    hydroCHLOROthiazide (MICROZIDE) 12.5 MG capsule Take 1 capsule by mouth every morning (Patient not taking: Reported on 8/22/2024) 90 capsule 1    azelastine (ASTELIN) 0.1 % nasal spray 137 sprays by Nasal route 2 times daily      Efinaconazole 10 % SOLN Apply to toe nails daily. (Patient not  sprays by Nasal route 2 times daily      Efinaconazole 10 % SOLN Apply to toe nails daily. (Patient not taking: Reported on 2025)         Past History     Past Medical History:   Past Medical History:   Diagnosis Date    Abnormal nuclear cardiac imaging test 2012    Mid to distal anteroseptal and apical reversible defect concerning for ishcmeia.  Mild-mod, mid-distal anterior & apical hypk.  EF 67%.  Borderline abnormal EKG w/1-mm inferolateral ST depression at 7 min exercise.  Occasional PVCs.    Anxiety     Carotid bruit 2013    Mild 1-49% LICA stenosis.      Dengue fever     7/10 while in [place]    Diverticulosis     DVT (deep venous thrombosis) ([facility]) 2015    Right leg:  No DVT.      Dyslipidemia     GERD (gastroesophageal reflux disease)     Holter monitor, abnormal 2016    Sinus rhythm, avg HR 67 bpm (range  bpm).  Rare ectopy.      Hypothyroidism     S/P cardiac catheterization 2012    Angiographically normal coronaries.  Hyperdynamic.  EF 70%.  No RWMA.      Scabies exposure 2018    Sinusitis     Tinnitus     Uterine cancer (HCC)     hysterectomy    Uterine carcinoma (HCC)        Past Surgical History:  Past Surgical History:   Procedure Laterality Date    CHOLECYSTECTOMY      2009    HYSTERECTOMY (CERVIX STATUS UNKNOWN)      TOTAL COLECTOMY             Family History:  Family History   Problem Relation Age of Onset    Hypertension Maternal Aunt     Other Mother         anuerysm    Heart Disease Maternal Aunt     Stroke Maternal Grandmother     Heart Disease Maternal Aunt     Heart Attack Paternal Grandmother     Other Maternal Grandfather         leg amputated due to phlebitis       Social History:   Social History     Tobacco Use    Smoking status: Former     Current packs/day: 0.00     Types: Cigarettes     Quit date: 1999     Years since quittin.0    Smokeless tobacco: Never   Vaping Use    Vaping status: Never Used   Substance Use Topics

## 2025-07-09 NOTE — TELEPHONE ENCOUNTER
Patient called and requested additional testing be ordered for her back pain-the lower back was feeling better but now the middle back is what is excruciating. (Seems to think now it could be kidney stones)  Patient was advised she would need to come in and be seen-provider was out of the office.  Recommended er patient verbalized understanding and will follow up with this office.  Patient will go to [facility]

## 2025-07-09 NOTE — ED TRIAGE NOTES
Pt presents to ed via triage with c/o left sided lower flank pain that radiates up the back x10 days.     Pt also endorsing n/v and dark colored urine. Pt denies abdominal pain/cp/sob

## 2025-07-14 ENCOUNTER — TELEMEDICINE (OUTPATIENT)
Facility: CLINIC | Age: 70
End: 2025-07-14
Payer: MEDICARE

## 2025-07-14 DIAGNOSIS — R10.84 GENERALIZED ABDOMINAL PAIN: Primary | ICD-10-CM

## 2025-07-14 DIAGNOSIS — R82.71 BACTERIURIA: ICD-10-CM

## 2025-07-14 DIAGNOSIS — E78.5 HYPERLIPIDEMIA, UNSPECIFIED HYPERLIPIDEMIA TYPE: ICD-10-CM

## 2025-07-14 DIAGNOSIS — E03.9 HYPOTHYROIDISM, UNSPECIFIED TYPE: ICD-10-CM

## 2025-07-14 DIAGNOSIS — R10.9 ACUTE LEFT FLANK PAIN: ICD-10-CM

## 2025-07-14 PROCEDURE — 1160F RVW MEDS BY RX/DR IN RCRD: CPT | Performed by: FAMILY MEDICINE

## 2025-07-14 PROCEDURE — 1159F MED LIST DOCD IN RCRD: CPT | Performed by: FAMILY MEDICINE

## 2025-07-14 PROCEDURE — 1123F ACP DISCUSS/DSCN MKR DOCD: CPT | Performed by: FAMILY MEDICINE

## 2025-07-14 PROCEDURE — 99214 OFFICE O/P EST MOD 30 MIN: CPT | Performed by: FAMILY MEDICINE

## 2025-07-14 RX ORDER — OXYCODONE AND ACETAMINOPHEN 5; 325 MG/1; MG/1
1 TABLET ORAL EVERY 6 HOURS PRN
Qty: 12 TABLET | Refills: 0 | Status: SHIPPED | OUTPATIENT
Start: 2025-07-14 | End: 2025-07-17

## 2025-07-14 NOTE — PROGRESS NOTES
Have you been to the ER, urgent care clinic since your last visit?  Hospitalized since your last visit?   YES - When: approximately 2 days ago.  Where and Why: back pain [facility].    Have you seen or consulted any other health care providers outside our system since your last visit?       
Alcohol use: No     Alcohol/week: 0.0 standard drinks of alcohol    Drug use: No     Types: Prescription, OTC        PHYSICAL EXAMINATION:  Constitutional: [x] Appears well-developed and well-nourished [] No apparent distress      [] Abnormal-   Mental status  [x] Alert and awake  [] Oriented to person/place/time []Able to follow commands      Eyes:  EOM    [x]  Normal  [] Abnormal-  Sclera  []  Normal  [] Abnormal -         Discharge []  None visible  [] Abnormal -    HENT:   [] Normocephalic, atraumatic.  [] Abnormal   [] Mouth/Throat: Mucous membranes are moist.     External Ears [x] Normal  [] Abnormal-     Neck: [x] No visualized mass     Pulmonary/Chest: [x] Respiratory effort normal.  [] No visualized signs of difficulty breathing or respiratory distress        [] Abnormal-      Musculoskeletal:   [x] Normal gait with no signs of ataxia         [] Normal range of motion of neck        [] Abnormal-       Neurological:        [x] No Facial Asymmetry (Cranial nerve 7 motor function) (limited exam to video visit)          [] No gaze palsy        [] Abnormal-         Skin:        [x] No significant exanthematous lesions or discoloration noted on facial skin         [] Abnormal-            Psychiatric:       [x] Normal Affect [] No Hallucinations        [] Abnormal-     Other pertinent observable physical exam findings-     ASSESSMENT/PLAN:   Diagnosis Orders   1. Generalized abdominal pain  US ABDOMEN COMPLETE      2. Bacteriuria  amoxicillin-clavulanate (AUGMENTIN) 875-125 MG per tablet      3. Acute left flank pain  US ABDOMEN COMPLETE    amoxicillin-clavulanate (AUGMENTIN) 875-125 MG per tablet    oxyCODONE-acetaminophen (PERCOCET) 5-325 MG per tablet      4. Hyperlipidemia, unspecified hyperlipidemia type        5. Hypothyroidism, unspecified type          No follow-ups on file.    Maryellen Alejo, was evaluated through a synchronous (real-time) audio-video encounter. The patient (or guardian if applicable) is

## 2025-07-17 ENCOUNTER — TELEMEDICINE (OUTPATIENT)
Facility: CLINIC | Age: 70
End: 2025-07-17
Payer: MEDICARE

## 2025-07-17 DIAGNOSIS — R82.71 BACTERIURIA: ICD-10-CM

## 2025-07-17 DIAGNOSIS — E03.9 HYPOTHYROIDISM, UNSPECIFIED TYPE: ICD-10-CM

## 2025-07-17 DIAGNOSIS — R10.9 ACUTE LEFT FLANK PAIN: Primary | ICD-10-CM

## 2025-07-17 DIAGNOSIS — E78.5 HYPERLIPIDEMIA, UNSPECIFIED HYPERLIPIDEMIA TYPE: ICD-10-CM

## 2025-07-17 PROCEDURE — 1123F ACP DISCUSS/DSCN MKR DOCD: CPT | Performed by: FAMILY MEDICINE

## 2025-07-17 PROCEDURE — 1159F MED LIST DOCD IN RCRD: CPT | Performed by: FAMILY MEDICINE

## 2025-07-17 PROCEDURE — 1160F RVW MEDS BY RX/DR IN RCRD: CPT | Performed by: FAMILY MEDICINE

## 2025-07-17 PROCEDURE — 99213 OFFICE O/P EST LOW 20 MIN: CPT | Performed by: FAMILY MEDICINE

## 2025-07-17 NOTE — PROGRESS NOTES
Oriented to person/place/time []Able to follow commands      Eyes:  EOM    [x]  Normal  [] Abnormal-  Sclera  []  Normal  [] Abnormal -         Discharge []  None visible  [] Abnormal -    HENT:   [x] Normocephalic, atraumatic.  [] Abnormal   [] Mouth/Throat: Mucous membranes are moist.     External Ears [x] Normal  [] Abnormal-     Neck: [x] No visualized mass     Pulmonary/Chest: [x] Respiratory effort normal.  [] No visualized signs of difficulty breathing or respiratory distress        [] Abnormal-      Musculoskeletal:   [] Normal gait with no signs of ataxia         [x] Normal range of motion of neck        [] Abnormal-       Neurological:        [x] No Facial Asymmetry (Cranial nerve 7 motor function) (limited exam to video visit)          [] No gaze palsy        [] Abnormal-         Skin:        [] No significant exanthematous lesions or discoloration noted on facial skin         [] Abnormal-            Psychiatric:       [x] Normal Affect [x] No Hallucinations        [] Abnormal-     Other pertinent observable physical exam findings-     ASSESSMENT/PLAN:   Diagnosis Orders   1. Acute left flank pain  [facility]      2. Bacteriuria  [facility]      3. Hypothyroidism, unspecified type        4. Hyperlipidemia, unspecified hyperlipidemia type          No follow-ups on file.    Maryellen Alejo, was evaluated through a synchronous (real-time) audio-video encounter. The patient (or guardian if applicable) is aware that this is a billable service, which includes applicable co-pays. This Virtual Visit was conducted with patient's (and/or legal guardian's) consent. Patient identification was verified, and a caregiver was present when appropriate.   The patient was located at Home: [address] [place] [id number]  Provider was located at Facility (Appt Dept): [address] [place] [id number]  Confirm you are appropriately licensed, registered, or certified to

## 2025-07-21 ENCOUNTER — HOSPITAL ENCOUNTER (OUTPATIENT)
Facility: HOSPITAL | Age: 70
Discharge: HOME | End: 2025-07-24
Payer: MEDICARE

## 2025-07-21 DIAGNOSIS — R10.84 GENERALIZED ABDOMINAL PAIN: ICD-10-CM

## 2025-07-21 DIAGNOSIS — R10.9 ACUTE LEFT FLANK PAIN: ICD-10-CM

## 2025-07-21 PROCEDURE — 76700 US EXAM ABDOM COMPLETE: CPT

## 2025-07-25 ENCOUNTER — TELEPHONE (OUTPATIENT)
Facility: CLINIC | Age: 70
End: 2025-07-25

## 2025-07-29 DIAGNOSIS — R82.71 BACTERIURIA: ICD-10-CM

## 2025-07-29 DIAGNOSIS — R10.9 ACUTE LEFT FLANK PAIN: ICD-10-CM

## 2025-07-29 DIAGNOSIS — G89.29 CHRONIC MIDLINE LOW BACK PAIN, UNSPECIFIED WHETHER SCIATICA PRESENT: ICD-10-CM

## 2025-07-29 DIAGNOSIS — M54.50 CHRONIC MIDLINE LOW BACK PAIN, UNSPECIFIED WHETHER SCIATICA PRESENT: ICD-10-CM

## 2025-07-29 RX ORDER — CYCLOBENZAPRINE HCL 10 MG
10 TABLET ORAL 3 TIMES DAILY PRN
Qty: 60 TABLET | Refills: 2 | Status: SHIPPED | OUTPATIENT
Start: 2025-07-29

## 2025-07-29 NOTE — TELEPHONE ENCOUNTER
Pt stated wanted a refill on the following:amoxicillin-clavulanate (AUGMENTIN) 875-125 MG per tablet and cyclobenzaprine (FLEXERIL) 10 MG tablet        Spoke with patient and she is requesting another round of antibiotics.  Patient has an appt with GI on Thursday (7-) but states she is debilitating pain-it has moved from her back to the front, and patient feels miserable

## 2025-07-30 RX ORDER — LEVOTHYROXINE SODIUM 100 MCG
100 TABLET ORAL DAILY
Qty: 90 TABLET | Refills: 2 | Status: SHIPPED | OUTPATIENT
Start: 2025-07-30

## 2025-07-30 NOTE — TELEPHONE ENCOUNTER
Last seen 7/17/2025   Last labs 07/09/2025  Last filled  10/28/2024  Next appointment 8/25/2025     Lab Results   Component Value Date     07/09/2025    K 5.0 07/09/2025     07/09/2025    CO2 20 (L) 07/09/2025    BUN 13 07/09/2025    CREATININE 0.82 07/09/2025    GLUCOSE 120 (H) 07/09/2025    CALCIUM 10.0 07/09/2025    BILITOT 0.6 07/09/2025    ALKPHOS 83 07/09/2025    AST 44 (H) 07/09/2025    ALT 47 (H) 07/09/2025    LABGLOM 77 07/09/2025    GFRAA >60.0 05/12/2022    AGRATIO 2.0 05/12/2022    GLOB 3.0 07/09/2025

## 2025-08-06 DIAGNOSIS — E78.5 HYPERLIPIDEMIA, UNSPECIFIED HYPERLIPIDEMIA TYPE: ICD-10-CM

## 2025-08-06 RX ORDER — ATORVASTATIN CALCIUM 10 MG/1
10 TABLET, FILM COATED ORAL DAILY
Qty: 90 TABLET | Refills: 1 | Status: SHIPPED | OUTPATIENT
Start: 2025-08-06